# Patient Record
Sex: MALE | Race: BLACK OR AFRICAN AMERICAN | Employment: UNEMPLOYED | URBAN - METROPOLITAN AREA
[De-identification: names, ages, dates, MRNs, and addresses within clinical notes are randomized per-mention and may not be internally consistent; named-entity substitution may affect disease eponyms.]

---

## 2019-03-25 ENCOUNTER — OP HISTORICAL/CONVERTED ENCOUNTER (OUTPATIENT)
Dept: OTHER | Age: 44
End: 2019-03-25

## 2019-04-23 ENCOUNTER — OP HISTORICAL/CONVERTED ENCOUNTER (OUTPATIENT)
Dept: OTHER | Age: 44
End: 2019-04-23

## 2019-05-14 ENCOUNTER — OP HISTORICAL/CONVERTED ENCOUNTER (OUTPATIENT)
Dept: OTHER | Age: 44
End: 2019-05-14

## 2019-05-16 ENCOUNTER — OP HISTORICAL/CONVERTED ENCOUNTER (OUTPATIENT)
Dept: OTHER | Age: 44
End: 2019-05-16

## 2019-05-24 ENCOUNTER — IP HISTORICAL/CONVERTED ENCOUNTER (OUTPATIENT)
Dept: OTHER | Age: 44
End: 2019-05-24

## 2019-06-11 ENCOUNTER — OP HISTORICAL/CONVERTED ENCOUNTER (OUTPATIENT)
Dept: OTHER | Age: 44
End: 2019-06-11

## 2019-06-17 ENCOUNTER — OP HISTORICAL/CONVERTED ENCOUNTER (OUTPATIENT)
Dept: OTHER | Age: 44
End: 2019-06-17

## 2019-07-01 ENCOUNTER — OP HISTORICAL/CONVERTED ENCOUNTER (OUTPATIENT)
Dept: OTHER | Age: 44
End: 2019-07-01

## 2019-07-09 ENCOUNTER — ED HISTORICAL/CONVERTED ENCOUNTER (OUTPATIENT)
Dept: OTHER | Age: 44
End: 2019-07-09

## 2019-07-26 ENCOUNTER — OP HISTORICAL/CONVERTED ENCOUNTER (OUTPATIENT)
Dept: OTHER | Age: 44
End: 2019-07-26

## 2019-07-27 ENCOUNTER — APPOINTMENT (OUTPATIENT)
Dept: CT IMAGING | Age: 44
DRG: 299 | End: 2019-07-27
Attending: EMERGENCY MEDICINE
Payer: COMMERCIAL

## 2019-07-27 ENCOUNTER — HOSPITAL ENCOUNTER (INPATIENT)
Age: 44
LOS: 3 days | Discharge: HOME HEALTH | DRG: 299 | End: 2019-07-30
Attending: EMERGENCY MEDICINE | Admitting: INTERNAL MEDICINE
Payer: COMMERCIAL

## 2019-07-27 DIAGNOSIS — C34.90 MALIGNANT NEOPLASM OF LUNG, UNSPECIFIED LATERALITY, UNSPECIFIED PART OF LUNG (HCC): Chronic | ICD-10-CM

## 2019-07-27 DIAGNOSIS — I87.1 SVC SYNDROME: Primary | ICD-10-CM

## 2019-07-27 DIAGNOSIS — D61.818 PANCYTOPENIA (HCC): ICD-10-CM

## 2019-07-27 DIAGNOSIS — J18.9 PNEUMONIA OF RIGHT LUNG DUE TO INFECTIOUS ORGANISM, UNSPECIFIED PART OF LUNG: ICD-10-CM

## 2019-07-27 DIAGNOSIS — R13.10 PAIN WITH SWALLOWING: ICD-10-CM

## 2019-07-27 PROBLEM — I10 HTN (HYPERTENSION): Chronic | Status: ACTIVE | Noted: 2019-07-27

## 2019-07-27 LAB
ALBUMIN SERPL-MCNC: 3.1 G/DL (ref 3.5–5)
ALBUMIN/GLOB SERPL: 0.8 {RATIO} (ref 1.1–2.2)
ALP SERPL-CCNC: 148 U/L (ref 45–117)
ALT SERPL-CCNC: 19 U/L (ref 12–78)
ANION GAP SERPL CALC-SCNC: 9 MMOL/L (ref 5–15)
AST SERPL-CCNC: 16 U/L (ref 15–37)
B PERT DNA SPEC QL NAA+PROBE: NOT DETECTED
BILIRUB SERPL-MCNC: 0.4 MG/DL (ref 0.2–1)
BUN SERPL-MCNC: 18 MG/DL (ref 6–20)
BUN/CREAT SERPL: 19 (ref 12–20)
C PNEUM DNA SPEC QL NAA+PROBE: NOT DETECTED
CALCIUM SERPL-MCNC: 8.8 MG/DL (ref 8.5–10.1)
CHLORIDE SERPL-SCNC: 106 MMOL/L (ref 97–108)
CO2 SERPL-SCNC: 25 MMOL/L (ref 21–32)
COMMENT, HOLDF: NORMAL
CREAT SERPL-MCNC: 0.94 MG/DL (ref 0.7–1.3)
FLUAV H1 2009 PAND RNA SPEC QL NAA+PROBE: NOT DETECTED
FLUAV H1 RNA SPEC QL NAA+PROBE: NOT DETECTED
FLUAV H3 RNA SPEC QL NAA+PROBE: NOT DETECTED
FLUAV SUBTYP SPEC NAA+PROBE: NOT DETECTED
FLUBV RNA SPEC QL NAA+PROBE: NOT DETECTED
GLOBULIN SER CALC-MCNC: 3.7 G/DL (ref 2–4)
GLUCOSE SERPL-MCNC: 101 MG/DL (ref 65–100)
HADV DNA SPEC QL NAA+PROBE: NOT DETECTED
HCOV 229E RNA SPEC QL NAA+PROBE: NOT DETECTED
HCOV HKU1 RNA SPEC QL NAA+PROBE: NOT DETECTED
HCOV NL63 RNA SPEC QL NAA+PROBE: NOT DETECTED
HCOV OC43 RNA SPEC QL NAA+PROBE: NOT DETECTED
HMPV RNA SPEC QL NAA+PROBE: NOT DETECTED
HPIV1 RNA SPEC QL NAA+PROBE: NOT DETECTED
HPIV2 RNA SPEC QL NAA+PROBE: NOT DETECTED
HPIV3 RNA SPEC QL NAA+PROBE: NOT DETECTED
HPIV4 RNA SPEC QL NAA+PROBE: NOT DETECTED
LACTATE SERPL-SCNC: 1 MMOL/L (ref 0.4–2)
LIPASE SERPL-CCNC: 136 U/L (ref 73–393)
M PNEUMO DNA SPEC QL NAA+PROBE: NOT DETECTED
POTASSIUM SERPL-SCNC: 3.7 MMOL/L (ref 3.5–5.1)
PROCALCITONIN SERPL-MCNC: <0.1 NG/ML
PROT SERPL-MCNC: 6.8 G/DL (ref 6.4–8.2)
RSV RNA SPEC QL NAA+PROBE: NOT DETECTED
RV+EV RNA SPEC QL NAA+PROBE: NOT DETECTED
SAMPLES BEING HELD,HOLD: NORMAL
SODIUM SERPL-SCNC: 140 MMOL/L (ref 136–145)
TROPONIN I SERPL-MCNC: <0.05 NG/ML

## 2019-07-27 PROCEDURE — 84484 ASSAY OF TROPONIN QUANT: CPT

## 2019-07-27 PROCEDURE — 74011250636 HC RX REV CODE- 250/636: Performed by: INTERNAL MEDICINE

## 2019-07-27 PROCEDURE — 84145 PROCALCITONIN (PCT): CPT

## 2019-07-27 PROCEDURE — 74011636320 HC RX REV CODE- 636/320: Performed by: RADIOLOGY

## 2019-07-27 PROCEDURE — 0099U RESPIRATORY PANEL,PCR,NASOPHARYNGEAL: CPT

## 2019-07-27 PROCEDURE — 83605 ASSAY OF LACTIC ACID: CPT

## 2019-07-27 PROCEDURE — 65270000029 HC RM PRIVATE

## 2019-07-27 PROCEDURE — 85025 COMPLETE CBC W/AUTO DIFF WBC: CPT

## 2019-07-27 PROCEDURE — 36415 COLL VENOUS BLD VENIPUNCTURE: CPT

## 2019-07-27 PROCEDURE — 80053 COMPREHEN METABOLIC PANEL: CPT

## 2019-07-27 PROCEDURE — 99284 EMERGENCY DEPT VISIT MOD MDM: CPT

## 2019-07-27 PROCEDURE — 87040 BLOOD CULTURE FOR BACTERIA: CPT

## 2019-07-27 PROCEDURE — 86738 MYCOPLASMA ANTIBODY: CPT

## 2019-07-27 PROCEDURE — 71260 CT THORAX DX C+: CPT

## 2019-07-27 PROCEDURE — 93005 ELECTROCARDIOGRAM TRACING: CPT

## 2019-07-27 PROCEDURE — 74011000258 HC RX REV CODE- 258: Performed by: INTERNAL MEDICINE

## 2019-07-27 PROCEDURE — 83690 ASSAY OF LIPASE: CPT

## 2019-07-27 RX ORDER — FLUCONAZOLE 100 MG/1
TABLET ORAL SEE ADMIN INSTRUCTIONS
COMMUNITY
Start: 2019-07-21 | End: 2019-07-30

## 2019-07-27 RX ORDER — METOPROLOL SUCCINATE 50 MG/1
50 TABLET, EXTENDED RELEASE ORAL DAILY
COMMUNITY

## 2019-07-27 RX ORDER — DEXAMETHASONE SODIUM PHOSPHATE 4 MG/ML
10 INJECTION, SOLUTION INTRA-ARTICULAR; INTRALESIONAL; INTRAMUSCULAR; INTRAVENOUS; SOFT TISSUE ONCE
Status: COMPLETED | OUTPATIENT
Start: 2019-07-27 | End: 2019-07-27

## 2019-07-27 RX ORDER — ACETAMINOPHEN 325 MG/1
650 TABLET ORAL
Status: DISCONTINUED | OUTPATIENT
Start: 2019-07-27 | End: 2019-07-30 | Stop reason: HOSPADM

## 2019-07-27 RX ORDER — PANTOPRAZOLE SODIUM 40 MG/1
40 TABLET, DELAYED RELEASE ORAL
Status: DISCONTINUED | OUTPATIENT
Start: 2019-07-28 | End: 2019-07-30 | Stop reason: HOSPADM

## 2019-07-27 RX ORDER — SODIUM CHLORIDE TAB 1 GM 1 G
1 TAB MISCELLANEOUS DAILY
COMMUNITY
End: 2019-09-24

## 2019-07-27 RX ORDER — SODIUM CHLORIDE 0.9 % (FLUSH) 0.9 %
5-40 SYRINGE (ML) INJECTION EVERY 8 HOURS
Status: DISCONTINUED | OUTPATIENT
Start: 2019-07-27 | End: 2019-07-30 | Stop reason: HOSPADM

## 2019-07-27 RX ORDER — FLUCONAZOLE 100 MG/1
100 TABLET ORAL DAILY
Status: DISCONTINUED | OUTPATIENT
Start: 2019-07-28 | End: 2019-07-30 | Stop reason: HOSPADM

## 2019-07-27 RX ORDER — METOPROLOL SUCCINATE 50 MG/1
50 TABLET, EXTENDED RELEASE ORAL DAILY
Status: DISCONTINUED | OUTPATIENT
Start: 2019-07-28 | End: 2019-07-30 | Stop reason: HOSPADM

## 2019-07-27 RX ORDER — LANOLIN ALCOHOL/MO/W.PET/CERES
325 CREAM (GRAM) TOPICAL
COMMUNITY
End: 2019-09-24

## 2019-07-27 RX ORDER — POTASSIUM CHLORIDE 20 MEQ/1
20 TABLET, EXTENDED RELEASE ORAL DAILY
COMMUNITY
End: 2019-08-15 | Stop reason: SDUPTHER

## 2019-07-27 RX ORDER — LEVOFLOXACIN 5 MG/ML
750 INJECTION, SOLUTION INTRAVENOUS EVERY 24 HOURS
Status: DISCONTINUED | OUTPATIENT
Start: 2019-07-27 | End: 2019-07-30

## 2019-07-27 RX ORDER — SUCRALFATE 1 G/1
1 TABLET ORAL DAILY
Status: DISCONTINUED | OUTPATIENT
Start: 2019-07-28 | End: 2019-07-28

## 2019-07-27 RX ORDER — SODIUM CHLORIDE 0.9 % (FLUSH) 0.9 %
5-40 SYRINGE (ML) INJECTION AS NEEDED
Status: DISCONTINUED | OUTPATIENT
Start: 2019-07-27 | End: 2019-07-30 | Stop reason: HOSPADM

## 2019-07-27 RX ORDER — SODIUM CHLORIDE 9 MG/ML
75 INJECTION, SOLUTION INTRAVENOUS CONTINUOUS
Status: DISCONTINUED | OUTPATIENT
Start: 2019-07-27 | End: 2019-07-30 | Stop reason: HOSPADM

## 2019-07-27 RX ORDER — FUROSEMIDE 20 MG/1
20 TABLET ORAL DAILY
COMMUNITY
End: 2019-08-15 | Stop reason: SDUPTHER

## 2019-07-27 RX ORDER — HYDROCODONE BITARTRATE AND ACETAMINOPHEN 5; 325 MG/1; MG/1
1 TABLET ORAL
COMMUNITY
End: 2020-06-30

## 2019-07-27 RX ORDER — HYDROMORPHONE HYDROCHLORIDE 1 MG/ML
0.5 INJECTION, SOLUTION INTRAMUSCULAR; INTRAVENOUS; SUBCUTANEOUS
Status: DISCONTINUED | OUTPATIENT
Start: 2019-07-27 | End: 2019-07-30 | Stop reason: HOSPADM

## 2019-07-27 RX ORDER — DRONABINOL 2.5 MG/1
2.5 CAPSULE ORAL
COMMUNITY
End: 2019-08-27

## 2019-07-27 RX ORDER — OXYCODONE AND ACETAMINOPHEN 5; 325 MG/1; MG/1
1 TABLET ORAL
Status: DISCONTINUED | OUTPATIENT
Start: 2019-07-27 | End: 2019-07-30 | Stop reason: HOSPADM

## 2019-07-27 RX ORDER — ZOLPIDEM TARTRATE 5 MG/1
5 TABLET ORAL
Status: DISCONTINUED | OUTPATIENT
Start: 2019-07-27 | End: 2019-07-30 | Stop reason: HOSPADM

## 2019-07-27 RX ORDER — SUCRALFATE 1 G/1
1 TABLET ORAL DAILY
COMMUNITY
End: 2019-09-10

## 2019-07-27 RX ORDER — PANTOPRAZOLE SODIUM 40 MG/1
40 TABLET, DELAYED RELEASE ORAL DAILY
COMMUNITY
End: 2019-08-15 | Stop reason: SDUPTHER

## 2019-07-27 RX ORDER — ENOXAPARIN SODIUM 100 MG/ML
40 INJECTION SUBCUTANEOUS EVERY 24 HOURS
Status: CANCELLED | OUTPATIENT
Start: 2019-07-27

## 2019-07-27 RX ORDER — SODIUM CHLORIDE 9 MG/ML
75 INJECTION, SOLUTION INTRAVENOUS ONCE
Status: ACTIVE | OUTPATIENT
Start: 2019-07-27 | End: 2019-07-28

## 2019-07-27 RX ORDER — DEXAMETHASONE SODIUM PHOSPHATE 4 MG/ML
4 INJECTION, SOLUTION INTRA-ARTICULAR; INTRALESIONAL; INTRAMUSCULAR; INTRAVENOUS; SOFT TISSUE EVERY 6 HOURS
Status: DISCONTINUED | OUTPATIENT
Start: 2019-07-28 | End: 2019-07-30 | Stop reason: HOSPADM

## 2019-07-27 RX ORDER — PROCHLORPERAZINE EDISYLATE 5 MG/ML
10 INJECTION INTRAMUSCULAR; INTRAVENOUS
Status: DISCONTINUED | OUTPATIENT
Start: 2019-07-27 | End: 2019-07-30 | Stop reason: HOSPADM

## 2019-07-27 RX ORDER — PREDNISONE 10 MG/1
TABLET ORAL SEE ADMIN INSTRUCTIONS
COMMUNITY
Start: 2019-07-18 | End: 2019-07-30

## 2019-07-27 RX ADMIN — PIPERACILLIN SODIUM,TAZOBACTAM SODIUM 3.38 G: 3; .375 INJECTION, POWDER, FOR SOLUTION INTRAVENOUS at 18:23

## 2019-07-27 RX ADMIN — DEXAMETHASONE SODIUM PHOSPHATE 10 MG: 4 INJECTION, SOLUTION INTRAMUSCULAR; INTRAVENOUS at 16:10

## 2019-07-27 RX ADMIN — SODIUM CHLORIDE 75 ML/HR: 900 INJECTION, SOLUTION INTRAVENOUS at 16:15

## 2019-07-27 RX ADMIN — Medication 10 ML: at 15:47

## 2019-07-27 RX ADMIN — IOPAMIDOL 100 ML: 612 INJECTION, SOLUTION INTRAVENOUS at 12:26

## 2019-07-27 RX ADMIN — LEVOFLOXACIN 750 MG: 5 INJECTION, SOLUTION INTRAVENOUS at 16:06

## 2019-07-27 NOTE — PROGRESS NOTES
TRANSFER - IN REPORT:    Verbal report received from Azar RN (name) on Og Griffin  being received from ED (unit) for routine progression of care      Report consisted of patients Situation, Background, Assessment and   Recommendations(SBAR). Information from the following report(s) SBAR, Kardex, ED Summary, Intake/Output, MAR, Accordion and Recent Results was reviewed with the receiving nurse. Opportunity for questions and clarification was provided. Assessment completed upon patients arrival to unit and care assumed.

## 2019-07-27 NOTE — ED TRIAGE NOTES
Patient states having pain to upper, mid chest when eating for about 8 weeks. Patient is lung cancer patient, states had radiation. Pain with swallowing also.

## 2019-07-27 NOTE — H&P
[address]  [address] [place] [address]  [phone]    Admission History and Physical      NAME:  Day Woodruff   :   1975   MRN:  [id number]     PCP:  Renetta Hall MD     Date/Time:  2019         Subjective:     CHIEF COMPLAINT: Pain     HISTORY OF PRESENT ILLNESS:     Mr. Rex Hope is a 39 y.o.  male who is admitted with SVC syndrome. Mr. Rex Hope presented to the Emergency Department this AM complaining of pain: chest, mainly when he eats, intermittent, shooting into right arm at times    History obtained from chart review and the patient. No previous records available    Past Medical History:   Diagnosis Date    Anemia, iron deficiency     Aphagia     2/2 radiation    Hypertension     Lung cancer ([facility] [address] 2019    Pneumonia involving right lung         No past surgical history on file. Social History     Tobacco Use    Smoking status: Not on file   Substance Use Topics    Alcohol use: Not Currently        Family History   Problem Relation Age of Onset    Cancer Neg Hx         Allergies no known allergies     Prior to Admission medications    Medication Sig Start Date End Date Taking? Authorizing Provider   furosemide (LASIX) 20 mg tablet Take 20 mg by mouth daily. Yes Provider, Historical   metoprolol succinate (TOPROL-XL) 50 mg XL tablet Take 50 mg by mouth daily. Yes Provider, Historical   potassium chloride (K-DUR, KLOR-CON) 20 mEq tablet Take 20 mEq by mouth daily. Yes Provider, Historical   sucralfate (CARAFATE) 1 gram tablet Take 1 g by mouth daily. Yes Provider, Historical   ferrous sulfate 325 mg (65 mg iron) tablet Take 325 mg by mouth three (3) times daily (with meals). Yes Provider, Historical   sodium chloride 1 gram tablet Take 1 g by mouth daily. Yes Provider, Historical   dronabinol (MARINOL) 2.5 mg capsule Take 2.5 mg by mouth two (2) times daily as needed (appetite).    Yes Provider, Historical   guaiFENesin (MUCINEX) 1,200 mg Ta12 ER tablet Take 1,200 mg by mouth two (2) times a day. Yes Provider, Historical   fluconazole (DIFLUCAN) 100 mg tablet See Admin Instructions. Take two pills on day one and then one pill daily until finished. 7/12/19 7/30/19 Yes Provider, Historical   HYDROcodone-acetaminophen (NORCO) 5-325 mg per tablet Take 1 Tab by mouth every six (6) hours as needed for Pain. Yes Provider, Historical   pantoprazole (PROTONIX) 40 mg tablet Take 40 mg by mouth daily. Yes Provider, Historical   predniSONE (DELTASONE) 10 mg tablet Take  by mouth See Admin Instructions.  Take 20 mg daily x 7 days and then 10 mg daily x 7 days   Prednisone started on 7/18/19 7/18/19 7/31/19 Yes Provider, Historical   OTHER Banophen/Lidocaine/Maalox swish and swallow 5 ml for 30 seconds four times daily started 7/26/19   Yes Provider, Historical         Review of Systems:  (bold if positive, if negative)    Gen:  Eyes:  ENT:  CVS:  chest pain,Pulm:  GI:    :    MS:  swellingSkin:  Psych:  Endo:    Hem:  Renal:    Neuro:            Objective:      VITALS:    Vital signs reviewed; most recent are:    Visit Vitals  BP (!) 136/99   Pulse 96   Temp 98.2 °F (36.8 °C)   Resp 22   Ht 5' 9\" (1.753 m)   Wt 59 kg (130 lb)   SpO2 100%   BMI 19.20 kg/m²     SpO2 Readings from Last 6 Encounters:   07/27/19 100%        No intake or output data in the 24 hours ending 07/27/19 1538         Exam:     Physical Exam:    Gen: Well-developed, thin, in no acute distress  HEENT:  Pink conjunctivae, PERRL, hearing intact to voice, moist mucous membranes  Neck: Supple, without masses, thyroid non-tender  Resp: No accessory muscle use, clear breath sounds without wheezes rales or rhonchi  Card: No murmurs, normal S1, S2 without thrills, bruits or peripheral edema, 2+ LE peripheral pulses  Abd:  Soft, non-tender, non-distended, normoactive bowel sounds are present, no palpable organomegaly and no detectable hernias  Lymph: No cervical or inguinal adenopathy  Musc: No cyanosis or clubbing  Skin: No rashes or ulcers, skin turgor is good, cap refill <2 sec  Neuro:  Cranial nerves are grossly intact, no focal motor weakness, follows commands appropriately  Psych:  Good insight, oriented to person, place and time, alert             Labs:    Recent Labs     07/27/19  1454   WBC 1.1*   HGB 7.6*   HCT 24.3*   PLT 61*     Recent Labs     07/27/19  1131      K 3.7      CO2 25   *   BUN 18   CREA 0.94   CA 8.8   ALB 3.1*   TBILI 0.4   SGOT 16   ALT 19     No results found for: GLUCPOC  No results for input(s): PH, PCO2, PO2, HCO3, FIO2 in the last 72 hours. No results for input(s): INR in the last 72 hours. No lab exists for component: INREXT, INREXT    Additional testing:  Chest CT with mutliple pulmonary nodules, large right paratracheal mass with occlusion of the SVC, possible post-obstructive pneumonia in RUL, visualized by me.          Assessment/Plan:       Principal Problem:    SVC syndrome (7/27/2019)   - worrisome that this occurred after chemotherapy and radiation   - consult Hem/Onc   - I think the majority of his symptoms are due to mechanical compression from the mass   - pain meds for now   - Palliative Care consult on Monday    Active Problems:    Lung cancer ([facility] [address] (7/27/2019)   - unclear if he has failed his recent treatment   - consult Hem/Onc      Pneumonia (7/27/2019)   - not really symptomatic, check procalcitonin and send pneumonia work up   - antibiotics for now      Dysphagia   - may be due to mechanical compression but may have radiation esophagitis or some thing else going on   - continue acid suppression, fluconazole and sucralfate   - GI consult      Pancytopenia   - worrisome as he is over 2 weeks out from chemotherapy/radiation   - watch counts   - Hem/Onc to see       Code status:   - patient is FULL CODE, no AMD on file, mother Tonja Cannon is surrogate      Total time spent on patient care: 79 Minutes                  Care Plan discussed with: Patient, Family, Nursing Staff and Gill Garcia NP    Discussed:  Code Status, Care Plan and D/C Planning    Prophylaxis:  SCD's, no Lovenox due to platelet count    Probable Disposition:  Home w/Family           ___________________________________________________    Attending Physician: Juan Carlos Vaz MD

## 2019-07-27 NOTE — ROUTINE PROCESS
TRANSFER - OUT REPORT:    Verbal report given to Kingsley(name) stefany Fritz  being transferred to 5th floor(unit) for routine progression of care       Report consisted of patients Situation, Background, Assessment and   Recommendations(SBAR). Information from the following report(s) SBAR, Kardex, ED Summary, [facility] and Recent Results was reviewed with the receiving nurse. Lines:   Peripheral IV 07/27/19 Left Antecubital (Active)   Site Assessment Clean, dry, & intact 7/27/2019 11:36 AM   Phlebitis Assessment 0 7/27/2019 11:36 AM   Infiltration Assessment 0 7/27/2019 11:36 AM   Dressing Status Clean, dry, & intact 7/27/2019 11:36 AM   Hub Color/Line Status Pink 7/27/2019 11:36 AM   Action Taken Blood drawn 7/27/2019 11:36 AM        Opportunity for questions and clarification was provided.       Patient transported with:   [employer]

## 2019-07-27 NOTE — ED PROVIDER NOTES
39 y.o. male with past medical history significant for stage 3B non small cell lung cancer who presents ambulatory to ED with chief complaint of chest pain. PT reports upper mid throbbing chest pain whenever he eats which occasionally will shoot into with a sharp quality into his RUE causing his arm to shake and his chronic SOB to be exacerbated whenever he swallows. Pt reports that he recently finished radiation treatments of the chest for his lung cancer around 7/13/19. Pt was treated at [facility] and states that he is now done with radiation treatment after receiving 33 rounds of treatment. 11:25 AM  I have evaluated the patient as the Provider in Triage. I have reviewed His vital signs and the triage nurse assessment. I have talked with the patient and any available family and advised that I am the provider in triage and have ordered the appropriate study to initiate their work up based on the clinical presentation during my assessment. I have advised that the patient will be accommodated in the Main ED as soon as possible. I have also requested to contact the triage nurse or myself immediately if the patient experiences any changes in their condition during this brief waiting period. This is a 65-year-old male who presents to the emergency room with complaints of chest pain with eating, and intermittent right arm pain after he eats. Patient states she has stage IIIB non-small cell lung cancer, and is currently being treated at [facility]. Patient cannot recall the name of his oncologist. Patient states he had 33 radiation treatments the last one being on the 13th. Patient states he had his last chemotherapy treatment last week. States after his last radiation treatment he had intermittent chest pain followed by excruciating right arm pain that causes him to shake. Patient also states that he is having difficulty swallowing after the last radiation treatment.  States he can only have soft foods today he is able to eat chicken broth without problem. Denies any fever or chills. Denies any nausea or vomiting. Denies any worsening shortness of breath or dizziness. There is no further complaints at this time. Other, MD Hermelinda  No past medical history on file. No past surgical history on file. The history is provided by the patient. No  was used. No past medical history on file. No past surgical history on file. No family history on file. Social History     Socioeconomic History    Marital status: Not on file     Spouse name: Not on file    Number of children: Not on file    Years of education: Not on file    Highest education level: Not on file   Occupational History    Not on file   Social Needs    Financial resource strain: Not on file    Food insecurity:     Worry: Not on file     Inability: Not on file    Transportation needs:     Medical: Not on file     Non-medical: Not on file   Tobacco Use    Smoking status: Not on file   Substance and Sexual Activity    Alcohol use: Not on file    Drug use: Not on file    Sexual activity: Not on file   Lifestyle    Physical activity:     Days per week: Not on file     Minutes per session: Not on file    Stress: Not on file   Relationships    Social connections:     Talks on phone: Not on file     Gets together: Not on file     Attends [employer] service: Not on file     Active member of club or organization: Not on file     Attends meetings of clubs or organizations: Not on file     Relationship status: Not on file    Intimate partner violence:     Fear of current or ex partner: Not on file     Emotionally abused: Not on file     Physically abused: Not on file     Forced sexual activity: Not on file   Other Topics Concern    Not on file   Social History Narrative    Not on file         ALLERGIES: Patient has no allergy information on record.     Review of Systems   Constitutional: Negative for activity change, appetite change, chills, fatigue and fever. HENT: Positive for trouble swallowing. Negative for congestion, ear discharge, ear pain, sinus pressure, sinus pain and sore throat. Eyes: Negative for photophobia, pain, redness, itching and visual disturbance. Respiratory: Negative for chest tightness and shortness of breath. Cardiovascular: Positive for chest pain. Negative for palpitations. Gastrointestinal: Negative for abdominal distention, abdominal pain, nausea and vomiting. Endocrine: Negative. Genitourinary: Negative for difficulty urinating, frequency and urgency. Musculoskeletal: Positive for arthralgias (rue pain). Negative for back pain, neck pain and neck stiffness. Skin: Negative for color change, pallor, rash and wound. Allergic/Immunologic: Negative. Neurological: Negative for dizziness, syncope, weakness and headaches. Hematological: Does not bruise/bleed easily. Psychiatric/Behavioral: Negative for behavioral problems. The patient is not nervous/anxious. There were no vitals filed for this visit. Physical Exam   Constitutional: He is oriented to person, place, and time. He appears well-developed and well-nourished. No distress. Chronically ill appearing male   HENT:   Head: Normocephalic and atraumatic. Right Ear: External ear normal.   Left Ear: External ear normal.   Nose: Nose normal.   Mouth/Throat: Oropharynx is clear and moist.   Eyes: Pupils are equal, round, and reactive to light. Conjunctivae and EOM are normal. Right eye exhibits no discharge. Left eye exhibits no discharge. Neck: Normal range of motion. Neck supple. No JVD present. No tracheal deviation present. Cardiovascular: Regular rhythm, normal heart sounds and intact distal pulses. Exam reveals no gallop. No murmur heard. Tachycardia 100's   Pulmonary/Chest: Effort normal and breath sounds normal. No respiratory distress. He has no wheezes.  He has no rales. He exhibits no tenderness. Abdominal: Soft. Bowel sounds are normal. He exhibits no distension. There is no tenderness. There is no rebound and no guarding. Genitourinary:   Genitourinary Comments: Negative     Musculoskeletal: Normal range of motion. He exhibits no edema or tenderness. Neurological: He is alert and oriented to person, place, and time. Skin: Skin is warm and dry. No rash noted. No erythema. No pallor. Discoloration on anterior chest consistent with radiation   Psychiatric: He has a normal mood and affect. His behavior is normal. Judgment and thought content normal.   Nursing note and vitals reviewed. MDM  Number of Diagnoses or Management Options  Pain with swallowing: new and requires workup  Pneumonia of right lung due to infectious organism, unspecified part of lung: new and requires workup  SVC syndrome: new and requires workup  Diagnosis management comments: Plan:  Admit to the hospitalist service for further evaluation and treatment. IV antibiotics. IV Fluids. Patient in agreement with plan of care. Amount and/or Complexity of Data Reviewed  Clinical lab tests: ordered and reviewed  Tests in the radiology section of CPT®: ordered and reviewed  Discuss the patient with other providers: yes (Discussed with Dr. Shon Khalil  )           Procedures    Hospitalist Shawna for Admission  2:06 PM    ED Room Number: ER07/07  Patient Name and age:  Phuong Fritz 39 y.o.  male  Working Diagnosis:   1. SVC syndrome    2. Pneumonia of right lung due to infectious organism, unspecified part of lung    3. Pain with swallowing      Readmission: no  Isolation Requirements:  Other, cancer with chemo last week  Recommended Level of Care:  step down  Code Status:  Full Code  Other:  Lung cancer patient, gets treatment at [facility]. Last radiation 7/13, last chemo last week.  Now with worsening pain when eating, decreased po intake and pneumonia that will require IV antibiotics. SVC syndrome.    Department:[facility] ED - [phone]

## 2019-07-27 NOTE — PROGRESS NOTES
[facility]: MED RECONCILIATION    Comments/Recommendations:   Patient is awake and alert. Interview with the patient and family member at the bedside. Verifies NK[facility]  Preferred pharmacy updated. Patient provides pill bottles for review and a medication list which was found not to be up to date during the interview. The patient will not take his own medications while in the hospital and family will take the medications home. The patient was started on a Z-Jonatan on 7/18 for a cough but he only took this medication four two days and then stopped it because it was not helping. Allergies: Patient has no known allergies. Prior to Admission Medications:     Prior to Admission Medications   Prescriptions Last Dose Informant Patient Reported? Taking? HYDROcodone-acetaminophen (NORCO) 5-325 mg per tablet 7/25/2019 Parent Yes Yes   Sig: Take 1 Tab by mouth every six (6) hours as needed for Pain. OTHER 7/26/2019 at Unknown time Parent Yes Yes   Sig: Banophen/Lidocaine/Maalox swish and swallow 5 ml for 30 seconds four times daily started 7/26/19   dronabinol (MARINOL) 2.5 mg capsule 7/25/2019 Parent Yes Yes   Sig: Take 2.5 mg by mouth two (2) times daily as needed (appetite). ferrous sulfate 325 mg (65 mg iron) tablet 7/27/2019 at Unknown time Parent Yes Yes   Sig: Take 325 mg by mouth three (3) times daily (with meals). fluconazole (DIFLUCAN) 100 mg tablet 7/26/2019 at Unknown time Parent Yes Yes   Sig: See Admin Instructions. Take two pills on day one and then one pill daily until finished. furosemide (LASIX) 20 mg tablet 7/26/2019 at Unknown time Parent Yes Yes   Sig: Take 20 mg by mouth daily. guaiFENesin (MUCINEX) 1,200 mg Ta12 ER tablet 7/27/2019 at Unknown time Parent Yes Yes   Sig: Take 1,200 mg by mouth two (2) times a day. metoprolol succinate (TOPROL-XL) 50 mg XL tablet 7/27/2019 at Unknown time Parent Yes Yes   Sig: Take 50 mg by mouth daily.    pantoprazole (PROTONIX) 40 mg tablet 7/26/2019 at Unknown time Parent Yes Yes   Sig: Take 40 mg by mouth daily. potassium chloride (K-DUR, KLOR-CON) 20 mEq tablet 7/26/2019 at Unknown time Parent Yes Yes   Sig: Take 20 mEq by mouth daily. predniSONE (DELTASONE) 10 mg tablet 7/26/2019 at Unknown time Parent Yes Yes   Sig: Take  by mouth See Admin Instructions. Take 20 mg daily x 7 days and then 10 mg daily x 7 days   Prednisone started on 7/18/19   sodium chloride 1 gram tablet 7/26/2019 at Unknown time Parent Yes Yes   Sig: Take 1 g by mouth daily. sucralfate (CARAFATE) 1 gram tablet 7/26/2019 at Unknown time Parent Yes Yes   Sig: Take 1 g by mouth daily.       Facility-Administered Medications: None      Thank you,      Zoe Villalobos, PharmD, BCPS

## 2019-07-28 PROBLEM — R13.10 DYSPHAGIA: Status: ACTIVE | Noted: 2019-07-28

## 2019-07-28 LAB
ANION GAP SERPL CALC-SCNC: 5 MMOL/L (ref 5–15)
BUN SERPL-MCNC: 16 MG/DL (ref 6–20)
BUN/CREAT SERPL: 22 (ref 12–20)
CALCIUM SERPL-MCNC: 8.6 MG/DL (ref 8.5–10.1)
CHLORIDE SERPL-SCNC: 104 MMOL/L (ref 97–108)
CO2 SERPL-SCNC: 27 MMOL/L (ref 21–32)
CREAT SERPL-MCNC: 0.74 MG/DL (ref 0.7–1.3)
ERYTHROCYTE [DISTWIDTH] IN BLOOD BY AUTOMATED COUNT: 21.8 % (ref 11.5–14.5)
GLUCOSE SERPL-MCNC: 158 MG/DL (ref 65–100)
HCT VFR BLD AUTO: 25 % (ref 36.6–50.3)
HGB BLD-MCNC: 7.9 G/DL (ref 12.1–17)
MAGNESIUM SERPL-MCNC: 1.6 MG/DL (ref 1.6–2.4)
MCH RBC QN AUTO: 21.1 PG (ref 26–34)
MCHC RBC AUTO-ENTMCNC: 31.6 G/DL (ref 30–36.5)
MCV RBC AUTO: 66.8 FL (ref 80–99)
NRBC # BLD: 0 K/UL (ref 0–0.01)
NRBC BLD-RTO: 0 PER 100 WBC
PHOSPHATE SERPL-MCNC: 3.3 MG/DL (ref 2.6–4.7)
PLATELET # BLD AUTO: 61 K/UL (ref 150–400)
POTASSIUM SERPL-SCNC: 4 MMOL/L (ref 3.5–5.1)
RBC # BLD AUTO: 3.74 M/UL (ref 4.1–5.7)
SODIUM SERPL-SCNC: 136 MMOL/L (ref 136–145)
WBC # BLD AUTO: 1 K/UL (ref 4.1–11.1)

## 2019-07-28 PROCEDURE — 74011250636 HC RX REV CODE- 250/636: Performed by: INTERNAL MEDICINE

## 2019-07-28 PROCEDURE — 74011000258 HC RX REV CODE- 258: Performed by: INTERNAL MEDICINE

## 2019-07-28 PROCEDURE — 84100 ASSAY OF PHOSPHORUS: CPT

## 2019-07-28 PROCEDURE — 85027 COMPLETE CBC AUTOMATED: CPT

## 2019-07-28 PROCEDURE — 74011250637 HC RX REV CODE- 250/637: Performed by: INTERNAL MEDICINE

## 2019-07-28 PROCEDURE — 87899 AGENT NOS ASSAY W/OPTIC: CPT

## 2019-07-28 PROCEDURE — 80048 BASIC METABOLIC PNL TOTAL CA: CPT

## 2019-07-28 PROCEDURE — 65270000029 HC RM PRIVATE

## 2019-07-28 PROCEDURE — 83735 ASSAY OF MAGNESIUM: CPT

## 2019-07-28 PROCEDURE — 36415 COLL VENOUS BLD VENIPUNCTURE: CPT

## 2019-07-28 PROCEDURE — 87449 NOS EACH ORGANISM AG IA: CPT

## 2019-07-28 RX ORDER — SUCRALFATE 1 G/1
1 TABLET ORAL
Status: DISCONTINUED | OUTPATIENT
Start: 2019-07-28 | End: 2019-07-30 | Stop reason: HOSPADM

## 2019-07-28 RX ADMIN — PIPERACILLIN SODIUM,TAZOBACTAM SODIUM 3.38 G: 3; .375 INJECTION, POWDER, FOR SOLUTION INTRAVENOUS at 00:10

## 2019-07-28 RX ADMIN — SUCRALFATE 1 G: 1 TABLET ORAL at 09:09

## 2019-07-28 RX ADMIN — PANTOPRAZOLE SODIUM 40 MG: 40 TABLET, DELAYED RELEASE ORAL at 06:31

## 2019-07-28 RX ADMIN — SODIUM CHLORIDE 75 ML/HR: 900 INJECTION, SOLUTION INTRAVENOUS at 11:08

## 2019-07-28 RX ADMIN — SUCRALFATE 1 G: 1 TABLET ORAL at 21:59

## 2019-07-28 RX ADMIN — DEXAMETHASONE SODIUM PHOSPHATE 4 MG: 4 INJECTION, SOLUTION INTRAMUSCULAR; INTRAVENOUS at 11:54

## 2019-07-28 RX ADMIN — SUCRALFATE 1 G: 1 TABLET ORAL at 15:54

## 2019-07-28 RX ADMIN — DEXAMETHASONE SODIUM PHOSPHATE 4 MG: 4 INJECTION, SOLUTION INTRAMUSCULAR; INTRAVENOUS at 17:42

## 2019-07-28 RX ADMIN — METOPROLOL SUCCINATE 50 MG: 50 TABLET, EXTENDED RELEASE ORAL at 09:09

## 2019-07-28 RX ADMIN — DEXAMETHASONE SODIUM PHOSPHATE 4 MG: 4 INJECTION, SOLUTION INTRAMUSCULAR; INTRAVENOUS at 06:29

## 2019-07-28 RX ADMIN — Medication 10 ML: at 13:27

## 2019-07-28 RX ADMIN — DEXAMETHASONE SODIUM PHOSPHATE 4 MG: 4 INJECTION, SOLUTION INTRAMUSCULAR; INTRAVENOUS at 00:09

## 2019-07-28 RX ADMIN — FLUCONAZOLE 100 MG: 100 TABLET ORAL at 09:09

## 2019-07-28 RX ADMIN — LEVOFLOXACIN 750 MG: 5 INJECTION, SOLUTION INTRAVENOUS at 14:40

## 2019-07-28 RX ADMIN — PIPERACILLIN SODIUM,TAZOBACTAM SODIUM 3.38 G: 3; .375 INJECTION, POWDER, FOR SOLUTION INTRAVENOUS at 15:54

## 2019-07-28 RX ADMIN — PIPERACILLIN SODIUM,TAZOBACTAM SODIUM 3.38 G: 3; .375 INJECTION, POWDER, FOR SOLUTION INTRAVENOUS at 09:08

## 2019-07-28 NOTE — CONSULTS
[facility]. Antelmo Gamez M.D.  [phone]                    GASTROENTEROLOGY CONSULTATION NOTE              NAME:  Ely Swan   :   1975   MRN:   [id number]       Referring Physician:    Dr. Daisy Beckford Date:   2019 12:27 PM    Chief Complaint:    Dysphagia and odynophagia     History of Present Illness:    Patient is a 39 y. o. who presented to the ER with worsening odynophagia and dysphagia that started after he finished radiation therapy for lung cancer with SVC syndrome. He reports after he initiates the swallow, he gets pain in the substernal area that radiates to the left chest and that is preventing him from swallowing his food. He denies nausea or vomiting, denies hematemesis and denies history of GERD. PMH:  Past Medical History:   Diagnosis Date    Anemia, iron deficiency     Aphagia     2/2 radiation    Hypertension     Lung cancer ([facility] [address] 2019    Pneumonia involving right lung        PSH:  No past surgical history on file. Allergies: Allergies no known allergies    Home Medications:  Prior to Admission Medications   Prescriptions Last Dose Informant Patient Reported? Taking? HYDROcodone-acetaminophen (NORCO) 5-325 mg per tablet 2019 Parent Yes Yes   Sig: Take 1 Tab by mouth every six (6) hours as needed for Pain. OTHER 2019 at Unknown time Parent Yes Yes   Sig: Banophen/Lidocaine/Maalox swish and swallow 5 ml for 30 seconds four times daily started 19   dronabinol (MARINOL) 2.5 mg capsule 2019 Parent Yes Yes   Sig: Take 2.5 mg by mouth two (2) times daily as needed (appetite). ferrous sulfate 325 mg (65 mg iron) tablet 2019 at Unknown time Parent Yes Yes   Sig: Take 325 mg by mouth three (3) times daily (with meals). fluconazole (DIFLUCAN) 100 mg tablet 2019 at Unknown time Parent Yes Yes   Sig: See Admin Instructions. Take two pills on day one and then one pill daily until finished.    furosemide (LASIX) 20 mg tablet 7/26/2019 at Unknown time Parent Yes Yes   Sig: Take 20 mg by mouth daily. guaiFENesin (MUCINEX) 1,200 mg Ta12 ER tablet 7/27/2019 at Unknown time Parent Yes Yes   Sig: Take 1,200 mg by mouth two (2) times a day. metoprolol succinate (TOPROL-XL) 50 mg XL tablet 7/27/2019 at Unknown time Parent Yes Yes   Sig: Take 50 mg by mouth daily. pantoprazole (PROTONIX) 40 mg tablet 7/26/2019 at Unknown time Parent Yes Yes   Sig: Take 40 mg by mouth daily. potassium chloride (K-DUR, KLOR-CON) 20 mEq tablet 7/26/2019 at Unknown time Parent Yes Yes   Sig: Take 20 mEq by mouth daily. predniSONE (DELTASONE) 10 mg tablet 7/26/2019 at Unknown time Parent Yes Yes   Sig: Take  by mouth See Admin Instructions. Take 20 mg daily x 7 days and then 10 mg daily x 7 days   Prednisone started on 7/18/19   sodium chloride 1 gram tablet 7/26/2019 at Unknown time Parent Yes Yes   Sig: Take 1 g by mouth daily. sucralfate (CARAFATE) 1 gram tablet 7/26/2019 at Unknown time Parent Yes Yes   Sig: Take 1 g by mouth daily.       Facility-Administered Medications: None       Hospital Medications:  Current Facility-Administered Medications   Medication Dose Route Frequency    levoFLOXacin (LEVAQUIN) 750 mg in D5W IVPB  750 mg IntraVENous Q24H    piperacillin-tazobactam (ZOSYN) 3.375 g in 0.9% sodium chloride (MBP/ADV) 100 mL  3.375 g IntraVENous Q8H    0.9% sodium chloride infusion  75 mL/hr IntraVENous CONTINUOUS    sodium chloride (NS) flush 5-40 mL  5-40 mL IntraVENous Q8H    sodium chloride (NS) flush 5-40 mL  5-40 mL IntraVENous PRN    acetaminophen (TYLENOL) tablet 650 mg  650 mg Oral Q4H PRN    oxyCODONE-acetaminophen (PERCOCET) 5-325 mg per tablet 1 Tab  1 Tab Oral Q4H PRN    HYDROmorphone (PF) (DILAUDID) injection 0.5 mg  0.5 mg IntraVENous Q4H PRN    prochlorperazine (COMPAZINE) injection 10 mg  10 mg IntraVENous Q6H PRN    zolpidem (AMBIEN) tablet 5 mg  5 mg Oral QHS PRN    pantoprazole (PROTONIX) tablet 40 mg  40 mg Oral ACB    dexamethasone (DECADRON) 4 mg/mL injection 4 mg  4 mg IntraVENous Q6H    fluconazole (DIFLUCAN) tablet 100 mg  100 mg Oral DAILY    metoprolol succinate (TOPROL-XL) XL tablet 50 mg  50 mg Oral DAILY    sucralfate (CARAFATE) tablet 1 g  1 g Oral DAILY       Social History:  Social History     Tobacco Use    Smoking status: Not on file   Substance Use Topics    Alcohol use: Not Currently       Family History:  Family History   Problem Relation Age of Onset    Cancer Neg Hx        Review of Systems:  (bold if positive, if negative)     Gen:    Eyes:    ENT:    CVS:  chest pain,  Pulm:    GI:    :    MS:  swelling  Skin:    Psych:    Endo:    Hem:    Renal:    Neuro:       Objective:     Patient Vitals for the past 8 hrs:   BP Temp Pulse Resp SpO2   07/28/19 1138 109/67 97.7 °F (36.5 °C) 90 21 100 %   07/28/19 0741 113/74 97.4 °F (36.3 °C) 83 20 100 %     07/28 0701 - 07/28 1900  In: 240 [P.O.:240]  Out: -   07/26 1901 - 07/28 0700  In: 100 [I.V.:100]  Out: -     EXAM:     NEURO-alert, oriented x3, affect appropriate, no focal neurological deficits, moves all extremities well   HEENT-Head: Normocephalic, no lesions, without obvious abnormality. LUNGS-clear to auscultation bilaterally    COR-regular rate and rhythym     ABD- soft, non-tender. Bowel sounds normal. No masses,  no organomegaly     EXT-no edema    Skin - No rash     Data Review     Recent Labs     07/28/19 0229 07/27/19  1454   WBC 1.0* 1.1*   HGB 7.9* 7.6*   HCT 25.0* 24.3*   PLT 61* 61*     Recent Labs     07/28/19  0229 07/27/19  1131    140   K 4.0 3.7    106   CO2 27 25   BUN 16 18   CREA 0.74 0.94   * 101*   PHOS 3.3  --    CA 8.6 8.8     Recent Labs     07/27/19  1131   SGOT 16   *   TP 6.8   ALB 3.1*   GLOB 3.7   LPSE 136     No results for input(s): INR, PTP, APTT in the last 72 hours.     No lab exists for component: INREXT    Patient Active Problem List   Diagnosis Code    SVC syndrome I87.1    Lung cancer (HCC) C34.90    Pneumonia J18.9    HTN (hypertension) I10    Pancytopenia (HCC) D61.818    Dysphagia R13.10       Assessment and Plan:  Odynophagia and dysphagia most likely related to recent radiation causing radiation esophagitis. Candidiasis or extrinsic compression can not be ruled out either. Since he is quite neutropenic at this point, I would start with PPI and sucralfate and keep on soft diet. If symptoms fail to improve and WBCs are better, and EGD can be performed. Will see in am.      Thanks for allowing me to participate in the care of this patient.   Signed By: Dayana Campbell MD     7/28/2019  12:27 PM

## 2019-07-28 NOTE — PROGRESS NOTES
[facility] [place] [facility]  [address] [facility], [address] [place] [address]  [phone]      Medical Progress Note      NAME: Janet Camacho   :  1975  MRM:  [id number]    Date/Time: 2019  11:23 AM         Subjective:     Chief Complaint:  Pain: chest, mainly with swallowing, about the same    ROS:  (bold if positive, if negative)                   Chest Pain     Tolerating Diet          Objective:       Vitals:          Last 24hrs VS reviewed since prior progress note.  Most recent are:    Visit Vitals  /74 (BP 1 Location: Right arm, BP Patient Position: At rest)   Pulse 83   Temp 97.4 °F (36.3 °C)   Resp 20   Ht 5' 9\" (1.753 m)   Wt 59 kg (130 lb)   SpO2 100%   BMI 19.20 kg/m²     SpO2 Readings from Last 6 Encounters:   19 100%            Intake/Output Summary (Last 24 hours) at 2019 1123  Last data filed at 2019 0913  Gross per 24 hour   Intake 340 ml   Output --   Net 340 ml          Exam:     Physical Exam:    Gen:  Well-developed, well-nourished, in no acute distress  HEENT:  Pink conjunctivae, PERRL, hearing intact to voice, moist mucous membranes  Neck:  Supple, without masses, thyroid non-tender  Resp:  No accessory muscle use, clear breath sounds without wheezes rales or rhonchi  Card:  No murmurs, normal S1, S2 without thrills, bruits or peripheral edema  Abd:  Soft, non-tender, non-distended, normoactive bowel sounds are present, no palpable organomegaly and no detectable hernias  Lymph:  No cervical or inguinal adenopathy  Musc:  No cyanosis or clubbing  Skin:  No rashes or ulcers, skin turgor is good  Neuro:  Cranial nerves are grossly intact, no focal motor weakness, follows commands appropriately  Psych:  Good insight, oriented to person, place and time, alert       Medications Reviewed: (see below)    Lab Data Reviewed: (see below)    ______________________________________________________________________    Medications:     Current Facility-Administered Medications   Medication Dose Route Frequency    levoFLOXacin (LEVAQUIN) 750 mg in D5W IVPB  750 mg IntraVENous Q24H    piperacillin-tazobactam (ZOSYN) 3.375 g in 0.9% sodium chloride (MBP/ADV) 100 mL  3.375 g IntraVENous Q8H    0.9% sodium chloride infusion  75 mL/hr IntraVENous CONTINUOUS    sodium chloride (NS) flush 5-40 mL  5-40 mL IntraVENous Q8H    sodium chloride (NS) flush 5-40 mL  5-40 mL IntraVENous PRN    acetaminophen (TYLENOL) tablet 650 mg  650 mg Oral Q4H PRN    oxyCODONE-acetaminophen (PERCOCET) 5-325 mg per tablet 1 Tab  1 Tab Oral Q4H PRN    HYDROmorphone (PF) (DILAUDID) injection 0.5 mg  0.5 mg IntraVENous Q4H PRN    prochlorperazine (COMPAZINE) injection 10 mg  10 mg IntraVENous Q6H PRN    zolpidem (AMBIEN) tablet 5 mg  5 mg Oral QHS PRN    pantoprazole (PROTONIX) tablet 40 mg  40 mg Oral ACB    dexamethasone (DECADRON) 4 mg/mL injection 4 mg  4 mg IntraVENous Q6H    fluconazole (DIFLUCAN) tablet 100 mg  100 mg Oral DAILY    metoprolol succinate (TOPROL-XL) XL tablet 50 mg  50 mg Oral DAILY    sucralfate (CARAFATE) tablet 1 g  1 g Oral DAILY            Lab Review:     Recent Labs     07/28/19 0229 07/27/19  1454   WBC 1.0* 1.1*   HGB 7.9* 7.6*   HCT 25.0* 24.3*   PLT 61* 61*     Recent Labs     07/28/19 0229 07/27/19  1131    140   K 4.0 3.7    106   CO2 27 25   * 101*   BUN 16 18   CREA 0.74 0.94   CA 8.6 8.8   MG 1.6  --    PHOS 3.3  --    ALB  --  3.1*   TBILI  --  0.4   SGOT  --  16   ALT  --  19     No results found for: GLUCPOC  No results for input(s): PH, PCO2, PO2, HCO3, FIO2 in the last 72 hours. No results for input(s): INR in the last 72 hours.     No lab exists for component: INREXT  No results found for: SDES  Lab Results   Component Value Date/Time    Culture result: NO GROWTH AFTER 15 HOURS 07/27/2019 02:54 PM            Assessment:     Principal Problem:    SVC syndrome (7/27/2019)    Active Problems:    Lung cancer ([facility] [address] (7/27/2019) Pneumonia (7/27/2019)      HTN (hypertension) (7/27/2019)      Pancytopenia ([facility] [address] (7/27/2019)           Plan:     Principal Problem:    SVC syndrome (7/27/2019)   - continue steroids, no real improvement yet   - Hem/Onc input appreciated   - outpatient follow up here    Active Problems:    Lung cancer ([facility] [address] (7/27/2019)   - outpatient follow up here      Pneumonia (7/27/2019)   - procalcitonin <0.1, infection seems unlikely,more likely due to malignancy   - limited course of antibiotics   - respiratory panel negative, awaiting other w/u      HTN (hypertension) (7/27/2019)   - BP okay on metoprolo      Pancytopenia ([facility] [address] (7/27/2019)   - per Dr. Nicole Cheney, concerning for another cause given length of time since chemo/radiation   - Dr. Nicole Cheney has ordered HIV testing      Dysphagia   - GI to see, continue acid suppression      Total time spent in patient care: 25 minutes                  Care Plan discussed with: Patient, Nursing Staff and Dr. Nicole Cheney    Discussed:  Code Status, Care Plan and D/C Planning    Prophylaxis:  SCD's    Disposition:  Home w/Family           ___________________________________________________    Attending Physician: Zev Diaz MD

## 2019-07-28 NOTE — CONSULTS
[facility] [place] at [address] [facility]  [facility]: [phone]  F: [phone]      Reason for Visit:   Lacy Ahumada is a 39 y.o. male who is seen in consultation at the request of Dr. Neptali Kline for evaluation of recurrent/progressive lung cancer, SVC syndrome      Hematology / Oncology Treatment History:   chemoXRT at [facility], completed 2 weeks ago      History of Present Illness:     Admitted on 7/27/19 complaining of chest pain, almost only when he eats, intermittent, radiating to right arm at times. No pain currently. No SOB. Past Medical History:   Diagnosis Date    Anemia, iron deficiency     Aphagia     2/2 radiation    Hypertension     Lung cancer ([address] 7/27/2019    Pneumonia involving right lung       No past surgical history on file.    Social History     Tobacco Use    Smoking status: Not on file   Substance Use Topics    Alcohol use: Not Currently      Family History   Problem Relation Age of Onset    Cancer Neg Hx      Current Facility-Administered Medications   Medication Dose Route Frequency    levoFLOXacin (LEVAQUIN) 750 mg in D5W IVPB  750 mg IntraVENous Q24H    piperacillin-tazobactam (ZOSYN) 3.375 g in 0.9% sodium chloride (MBP/ADV) 100 mL  3.375 g IntraVENous Q8H    0.9% sodium chloride infusion  75 mL/hr IntraVENous CONTINUOUS    sodium chloride (NS) flush 5-40 mL  5-40 mL IntraVENous Q8H    sodium chloride (NS) flush 5-40 mL  5-40 mL IntraVENous PRN    acetaminophen (TYLENOL) tablet 650 mg  650 mg Oral Q4H PRN    oxyCODONE-acetaminophen (PERCOCET) 5-325 mg per tablet 1 Tab  1 Tab Oral Q4H PRN    HYDROmorphone (PF) (DILAUDID) injection 0.5 mg  0.5 mg IntraVENous Q4H PRN    prochlorperazine (COMPAZINE) injection 10 mg  10 mg IntraVENous Q6H PRN    zolpidem (AMBIEN) tablet 5 mg  5 mg Oral QHS PRN    pantoprazole (PROTONIX) tablet 40 mg  40 mg Oral ACB    dexamethasone (DECADRON) 4 mg/mL injection 4 mg  4 mg IntraVENous Q6H    fluconazole (DIFLUCAN) tablet 100 mg  100 mg Oral DAILY    metoprolol succinate (TOPROL-XL) XL tablet 50 mg  50 mg Oral DAILY    sucralfate (CARAFATE) tablet 1 g  1 g Oral DAILY      Allergies no known allergies     Review of Systems: A complete review of systems was obtained, negative except as described above. Physical Exam:     Visit Vitals  /74 (BP 1 Location: Right arm, BP Patient Position: At rest)   Pulse 83   Temp 97.4 °F (36.3 °C)   Resp 20   Ht 5' 9\" (1.753 m)   Wt 130 lb (59 kg)   SpO2 100%   BMI 19.20 kg/m²     ECOG PS: 1  General: No distress  Eyes: PERRLA, anicteric sclerae  HENT: Atraumatic with normal appearance of ears and nose; OP clear  Neck: Supple; no thyromegaly   Lymphatic: No cervical, supraclavicular, or axillary adenopathy  Respiratory: CTAB, normal respiratory effort  CV: Normal rate, regular rhythm, no murmurs, no peripheral edema  GI: Soft, nontender, nondistended, no masses, no hepatomegaly, no splenomegaly  MS: Normal gait and station. Digits without clubbing or cyanosis. Skin: radiation burns to lower neck, upper chest  Neuro/Psych: Alert, oriented, appropriate affect, normal judgment/insight      Results:     Lab Results   Component Value Date/Time    WBC 1.0 (L) 07/28/2019 02:29 AM    HGB 7.9 (L) 07/28/2019 02:29 AM    HCT 25.0 (L) 07/28/2019 02:29 AM    PLATELET 61 (L) 19/06/9624 02:29 AM    MCV 66.8 (L) 07/28/2019 02:29 AM    ABS.  NEUTROPHILS 0.8 (L) 07/27/2019 02:54 PM     Lab Results   Component Value Date/Time    Sodium 136 07/28/2019 02:29 AM    Potassium 4.0 07/28/2019 02:29 AM    Chloride 104 07/28/2019 02:29 AM    CO2 27 07/28/2019 02:29 AM    Glucose 158 (H) 07/28/2019 02:29 AM    BUN 16 07/28/2019 02:29 AM    Creatinine 0.74 07/28/2019 02:29 AM    GFR est AA >60 07/28/2019 02:29 AM    GFR est non-AA >60 07/28/2019 02:29 AM    Calcium 8.6 07/28/2019 02:29 AM     Lab Results   Component Value Date/Time    Bilirubin, total 0.4 07/27/2019 11:31 AM    ALT (SGPT) 19 07/27/2019 11:31 AM    AST (SGOT) 16 07/27/2019 11:31 AM    Alk. phosphatase 148 (H) 07/27/2019 11:31 AM    Protein, total 6.8 07/27/2019 11:31 AM    Albumin 3.1 (L) 07/27/2019 11:31 AM    Globulin 3.7 07/27/2019 11:31 AM     7/27/19 CT chest  IMPRESSION:    1. Large right paratracheal mass as described, consistent with metastatic  adenopathy, with resultant occlusion of the SVC, occlusion of the superior  azygos vein and tumor/thrombus extending into the left brachiocephalic vein, and  numerous collaterals throughout the mediastinum. Findings are consistent with  SVC syndrome. The mass also appears to occlude the right upper lobe pulmonary  artery. 2. Multiple pulmonary nodules in the right upper lobe as described above,  consistent with some combination of the primary lung malignancy and metastatic  disease. Multiple other small consolidative and groundglass opacities in the  right upper lobe, which may represent postobstructive atelectasis or pneumonia. Correlation with prior imaging if available is recommended. 3. Large right pleural effusion. Trace perihepatic ascites. 4. Sclerosis in the juxta-articular humeral heads, suspicious for avascular  Necrosis. Personally reviewed CT images    Assessment and Recommendations:   1. Recurrent/progressive right lung cancer:  Need to obtain records from [facility], will get these tomorrow. High concern for progressive disease. Discussed with patient that this may be treatable, but is not curable, goal will be palliative. He stated that he would like to transfer his care to [facility]    Will obtain CT a/p and MRI brain tomorrow to complete staging. Agree with palliative care consult    2. SVC syndrome:  Was given dex 10 mg IV then 4 mg q 6h; continue    3. Pancytopenia:  Due to chemotherapy, radiation; transfuse plt for < 10 and hgb < 7; ?  If secondary diagnosis is contributing, as degree is worse than expected; checking HIV with am labs    4. Dysphagia: likely due to mass and radiation; agree with fluconazole and sucralfate    5. Pneumonia:  Continue abx as per hospitalist    Discussed with Dr. Walter Mendoza      If stable tomorrow, pt may be able to be discharged home for outpatient follow up and treatment.         Signed By: Darline Perez MD

## 2019-07-28 NOTE — PROGRESS NOTES
Spiritual Care Assessment/Progress Note  [facility]      NAME: Laurie Lipscomb      MRN: [id number]  AGE: 39 y.o.  SEX: male  Restoration Affiliation:    Language: English     7/28/2019     Total Time (in minutes): 12     Spiritual Assessment begun in [facility]  MED SURG 2 through conversation with:         [x]Patient        [] Family    [] Friend(s)        Reason for Consult: Palliative Care, Initial/Spiritual Assessment     Spiritual beliefs: (Please include comment if needed)     [x] Identifies with a gale tradition:  Pentecostalism      [] Supported by a gale community:            [] Claims no spiritual orientation:           [] Seeking spiritual identity:                [] Adheres to an individual form of spirituality:           [] Not able to assess:                           Identified resources for coping:      [] Prayer                               [] Music                  [] Guided Imagery     [x] Family/friends                 [] Pet visits     [] Devotional reading                         [] Unknown     [] Other:                                            Interventions offered during this visit: (See comments for more details)    Patient Interventions: Affirmation of gale, Catharsis/review of pertinent events in supportive environment, Iconic (affirming the presence of God/Higher Power), Prayer (assurance of), Initial/Spiritual assessment, patient floor           Plan of Care:     [] Support spiritual and/or cultural needs    [] Support AMD and/or advance care planning process      [] Support grieving process   [] Coordinate Rites and/or Rituals    [] Coordination with community clergy   [] No spiritual needs identified at this time   [] Detailed Plan of Care below (See Comments)  [] Make referral to Music Therapy  [] Make referral to Pet Therapy     [] Make referral to Addiction services  [] Make referral to [place]  [] Make referral to Spiritual Care Partner  [] No future visits requested [x] Follow up visits as needed     Comments: Initial Palliative Care spiritual assessment in 5 Med Surg. Mr. Naina Swain shared some of his recent medical journey and his current medical challenge that brought him to the hospital.  He shared he is feeling a bit better. His mother and his 2 Aunts have been a significant support for him. He attends a [employer] near where he lives though he has not shared with the Spiritism his current clark challenges. He has not other needs at this time. Provided spiritual presence and prayer. Advised of  Availability.   Visited by: Britt Marshall., MS., [address] [facility] [address] [place] ([id number])

## 2019-07-28 NOTE — PROGRESS NOTES
Problem: Falls - Risk of  Goal: *Absence of Falls  Description  Document Martin Sy Fall Risk and appropriate interventions in the flowsheet.   Outcome: Progressing Towards Goal  Note:   Fall Risk Interventions:            Medication Interventions: Teach patient to arise slowly

## 2019-07-28 NOTE — PROGRESS NOTES
Bedside shift change report given to Janie FLORES (oncoming nurse) by Franchesca Leblanc (offgoing nurse). Report included the following information SBAR, Kardex, MAR and Recent Results.

## 2019-07-29 ENCOUNTER — APPOINTMENT (OUTPATIENT)
Dept: CT IMAGING | Age: 44
DRG: 299 | End: 2019-07-29
Attending: INTERNAL MEDICINE
Payer: COMMERCIAL

## 2019-07-29 ENCOUNTER — HOSPITAL ENCOUNTER (OUTPATIENT)
Dept: MRI IMAGING | Age: 44
Discharge: HOME | DRG: 299 | End: 2019-07-29
Attending: INTERNAL MEDICINE
Payer: COMMERCIAL

## 2019-07-29 VITALS — BODY MASS INDEX: 19.2 KG/M2 | WEIGHT: 130 LBS

## 2019-07-29 LAB
APTT PPP: 22.4 SEC (ref 22.1–32)
ATRIAL RATE: 105 BPM
BASOPHILS # BLD: 0 K/UL (ref 0–0.1)
BASOPHILS NFR BLD: 0 % (ref 0–1)
CALCULATED P AXIS, ECG09: 81 DEGREES
CALCULATED R AXIS, ECG10: 69 DEGREES
CALCULATED T AXIS, ECG11: 62 DEGREES
DIAGNOSIS, 93000: NORMAL
DIFFERENTIAL METHOD BLD: ABNORMAL
EOSINOPHIL # BLD: 0 K/UL (ref 0–0.4)
EOSINOPHIL NFR BLD: 1 % (ref 0–7)
ERYTHROCYTE [DISTWIDTH] IN BLOOD BY AUTOMATED COUNT: 21.9 % (ref 11.5–14.5)
HCT VFR BLD AUTO: 24.3 % (ref 36.6–50.3)
HGB BLD-MCNC: 7.6 G/DL (ref 12.1–17)
HIV 1+2 AB+HIV1 P24 AG SERPL QL IA: NONREACTIVE
HIV12 RESULT COMMENT, HHIVC: NORMAL
IMM GRANULOCYTES # BLD AUTO: 0 K/UL
IMM GRANULOCYTES NFR BLD AUTO: 0 %
INR PPP: 1.1 (ref 0.9–1.1)
LYMPHOCYTES # BLD: 0.2 K/UL (ref 0.8–3.5)
LYMPHOCYTES NFR BLD: 15 % (ref 12–49)
MCH RBC QN AUTO: 21.1 PG (ref 26–34)
MCHC RBC AUTO-ENTMCNC: 31.3 G/DL (ref 30–36.5)
MCV RBC AUTO: 67.3 FL (ref 80–99)
MONOCYTES # BLD: 0.1 K/UL (ref 0–1)
MONOCYTES NFR BLD: 8 % (ref 5–13)
NEUTS SEG # BLD: 0.8 K/UL (ref 1.8–8)
NEUTS SEG NFR BLD: 76 % (ref 32–75)
NRBC # BLD: 0 K/UL (ref 0–0.01)
NRBC BLD-RTO: 0 PER 100 WBC
P-R INTERVAL, ECG05: 158 MS
PATH REV BLD -IMP: ABNORMAL
PLATELET # BLD AUTO: 61 K/UL (ref 150–400)
PMV BLD AUTO: ABNORMAL FL (ref 8.9–12.9)
PROTHROMBIN TIME: 11.5 SEC (ref 9–11.1)
Q-T INTERVAL, ECG07: 324 MS
QRS DURATION, ECG06: 78 MS
QTC CALCULATION (BEZET), ECG08: 428 MS
RBC # BLD AUTO: 3.61 M/UL (ref 4.1–5.7)
RBC MORPH BLD: ABNORMAL
THERAPEUTIC RANGE,PTTT: NORMAL SECS (ref 58–77)
VENTRICULAR RATE, ECG03: 105 BPM
WBC # BLD AUTO: 1.1 K/UL (ref 4.1–11.1)

## 2019-07-29 PROCEDURE — 85610 PROTHROMBIN TIME: CPT

## 2019-07-29 PROCEDURE — 74011250636 HC RX REV CODE- 250/636: Performed by: INTERNAL MEDICINE

## 2019-07-29 PROCEDURE — 74011250637 HC RX REV CODE- 250/637: Performed by: INTERNAL MEDICINE

## 2019-07-29 PROCEDURE — 76450000000

## 2019-07-29 PROCEDURE — 85730 THROMBOPLASTIN TIME PARTIAL: CPT

## 2019-07-29 PROCEDURE — 74011636320 HC RX REV CODE- 636/320: Performed by: RADIOLOGY

## 2019-07-29 PROCEDURE — 87389 HIV-1 AG W/HIV-1&-2 AB AG IA: CPT

## 2019-07-29 PROCEDURE — 65270000029 HC RM PRIVATE

## 2019-07-29 PROCEDURE — 74011250636 HC RX REV CODE- 250/636: Performed by: RADIOLOGY

## 2019-07-29 PROCEDURE — A9575 INJ GADOTERATE MEGLUMI 0.1ML: HCPCS | Performed by: RADIOLOGY

## 2019-07-29 PROCEDURE — 74177 CT ABD & PELVIS W/CONTRAST: CPT

## 2019-07-29 PROCEDURE — 70553 MRI BRAIN STEM W/O & W/DYE: CPT

## 2019-07-29 PROCEDURE — 36415 COLL VENOUS BLD VENIPUNCTURE: CPT

## 2019-07-29 PROCEDURE — 74011000258 HC RX REV CODE- 258: Performed by: INTERNAL MEDICINE

## 2019-07-29 RX ORDER — DEXAMETHASONE 4 MG/1
4 TABLET ORAL 4 TIMES DAILY
Qty: 120 TAB | Refills: 0 | Status: SHIPPED | OUTPATIENT
Start: 2019-07-29 | End: 2019-07-30 | Stop reason: SDUPTHER

## 2019-07-29 RX ORDER — LEVOFLOXACIN 750 MG/1
750 TABLET ORAL DAILY
Qty: 5 TAB | Refills: 0 | Status: SHIPPED | OUTPATIENT
Start: 2019-07-29 | End: 2019-07-30

## 2019-07-29 RX ORDER — GADOTERATE MEGLUMINE 376.9 MG/ML
12 INJECTION INTRAVENOUS
Status: COMPLETED | OUTPATIENT
Start: 2019-07-29 | End: 2019-07-29

## 2019-07-29 RX ADMIN — LEVOFLOXACIN 750 MG: 5 INJECTION, SOLUTION INTRAVENOUS at 15:33

## 2019-07-29 RX ADMIN — DEXAMETHASONE SODIUM PHOSPHATE 4 MG: 4 INJECTION, SOLUTION INTRAMUSCULAR; INTRAVENOUS at 21:11

## 2019-07-29 RX ADMIN — IOPAMIDOL 100 ML: 755 INJECTION, SOLUTION INTRAVENOUS at 12:44

## 2019-07-29 RX ADMIN — IOHEXOL 50 ML: 240 INJECTION, SOLUTION INTRATHECAL; INTRAVASCULAR; INTRAVENOUS; ORAL at 10:00

## 2019-07-29 RX ADMIN — GADOTERATE MEGLUMINE 12 ML: 376.9 INJECTION INTRAVENOUS at 12:55

## 2019-07-29 RX ADMIN — SUCRALFATE 1 G: 1 TABLET ORAL at 17:42

## 2019-07-29 RX ADMIN — DEXAMETHASONE SODIUM PHOSPHATE 4 MG: 4 INJECTION, SOLUTION INTRAMUSCULAR; INTRAVENOUS at 06:52

## 2019-07-29 RX ADMIN — Medication 10 ML: at 21:12

## 2019-07-29 RX ADMIN — METOPROLOL SUCCINATE 50 MG: 50 TABLET, EXTENDED RELEASE ORAL at 08:50

## 2019-07-29 RX ADMIN — DEXAMETHASONE SODIUM PHOSPHATE 4 MG: 4 INJECTION, SOLUTION INTRAMUSCULAR; INTRAVENOUS at 15:34

## 2019-07-29 RX ADMIN — Medication 10 ML: at 15:34

## 2019-07-29 RX ADMIN — SUCRALFATE 1 G: 1 TABLET ORAL at 08:50

## 2019-07-29 RX ADMIN — PANTOPRAZOLE SODIUM 40 MG: 40 TABLET, DELAYED RELEASE ORAL at 06:52

## 2019-07-29 RX ADMIN — PIPERACILLIN SODIUM,TAZOBACTAM SODIUM 3.38 G: 3; .375 INJECTION, POWDER, FOR SOLUTION INTRAVENOUS at 08:49

## 2019-07-29 RX ADMIN — SUCRALFATE 1 G: 1 TABLET ORAL at 21:11

## 2019-07-29 RX ADMIN — PIPERACILLIN SODIUM,TAZOBACTAM SODIUM 3.38 G: 3; .375 INJECTION, POWDER, FOR SOLUTION INTRAVENOUS at 00:49

## 2019-07-29 RX ADMIN — PIPERACILLIN SODIUM,TAZOBACTAM SODIUM 3.38 G: 3; .375 INJECTION, POWDER, FOR SOLUTION INTRAVENOUS at 17:34

## 2019-07-29 RX ADMIN — SODIUM CHLORIDE 75 ML/HR: 900 INJECTION, SOLUTION INTRAVENOUS at 18:50

## 2019-07-29 RX ADMIN — FLUCONAZOLE 100 MG: 100 TABLET ORAL at 08:50

## 2019-07-29 RX ADMIN — DEXAMETHASONE SODIUM PHOSPHATE 4 MG: 4 INJECTION, SOLUTION INTRAMUSCULAR; INTRAVENOUS at 00:51

## 2019-07-29 NOTE — PROGRESS NOTES
Bedside shift change report given to Pinky Sanchez RN (oncoming nurse) by Naeem Chapin RN (offgoing nurse). Report included the following information SBAR, Kardex and MAR.

## 2019-07-29 NOTE — PROGRESS NOTES
7/29/2019  4:49 PM    Reason for Admission:   SVC Syndrome                   RRAT Score:   5                  Plan for utilizing home health:  TBD                        Current Advanced Directive/Advance Care Plan: Pt reported he does not have an advanced plan and was not interested in discussing it with spiritual care                         Transition of Care Plan:     Pt resides in a mobile home in [facility] alone, but his mother is currently staying with him. He reported he has a good local support system. Rx filled at [facility] in [place], no issues getting Rx. Pt has not worked or 3 months due to his cancer treatment. Pt was independent with ADL's and driving before admission. No hx of HH, Rehab, or DME. Pt's PCP is Dr. Sumit Sahu in [place]. Pt's family will provide discharge transportation. Pt does not have any discharge concerns at this time. CM will follow for discharge needs. GABY Santoyo    Care Management Interventions  PCP Verified by CM: Yes  Mode of Transport at Discharge: Other (see comment)(Family)  Transition of Care Consult (CM Consult): Discharge Planning  Current Support Network: Own Home(pt's mother is currently staying with him in his home)  Confirm Follow Up Transport: Family  Plan discussed with Pt/Family/Caregiver:  Yes

## 2019-07-29 NOTE — PROGRESS NOTES
[address] NP  [phone]           GI PROGRESS NOTE        NAME: Rohit Pop   :  1975   MRN:  [id number]       Subjective:   Had some pain with carbonated beverage, but overall feels much better. Objective:   Lying in bed in NAD. VITALS:   Last 24hrs VS reviewed since prior progress note. Most recent are:  Visit Vitals  /87 (BP 1 Location: Right arm, BP Patient Position: At rest)   Pulse 84   Temp 98.3 °F (36.8 °C)   Resp 19   Ht 5' 9\" (1.753 m)   Wt 59 kg (130 lb)   SpO2 100%   BMI 19.20 kg/m²       Intake/Output Summary (Last 24 hours) at 2019 1522  Last data filed at 2019 5736  Gross per 24 hour   Intake 660 ml   Output --   Net 660 ml       PHYSICAL EXAM:  General: Alert, in no acute distress    HEENT: Anicteric sclerae. Lungs:            CTA Bilaterally. Heart:  Regular  rhythm,    Abdomen: Soft, Non distended, Non tender.  (+)Bowel sounds, no HSM  Extremities: No c/c/e  Neurologic:  CN 2-12 gi, Alert and oriented X 3. No acute neurological distress   Psych:   Good insight. Not anxious nor agitated.     Lab Data Reviewed:   Recent Labs     19  1454   WBC 1.0* 1.1*   HGB 7.9* 7.6*   HCT 25.0* 24.3*   PLT 61* 61*     Recent Labs     19  02219  1131    140   K 4.0 3.7    106   CO2 27 25   BUN 16 18   CREA 0.74 0.94   * 101*   PHOS 3.3  --    CA 8.6 8.8     Recent Labs     19  1131   SGOT 16   *   TP 6.8   ALB 3.1*   GLOB 3.7   LPSE 136       ________________________________________________________________________  Patient Active Problem List   Diagnosis Code    SVC syndrome I87.1    Lung cancer (HCC) C34.90    Pneumonia J18.9    HTN (hypertension) I10    Pancytopenia (HCC) D61.818    Dysphagia R13.10         Assessment and Plan:  Dysphagia and Odynophagia:  Improved from yesterday  - Continue Daily PPI  - Continue Carafate  - Monitor hemoglobin  - Soft textured diet  - No NSAIDs  - Discussed the importance of eating slowly and chewing food thoroughly with him. - No plans for EGD at this time. Will see again on request.  Please have him follow up in the office after his discharge. Please call with any questions or concerns.        Signed By: Ed Gibbons NP     7/29/2019  3:22 PM

## 2019-07-29 NOTE — PROGRESS NOTES
[facility] [place] [facility]  [address]  [phone]      Medical Progress Note      NAME: Laura Tamez   :  1975  MRM:  [id number]    Date/Time: 2019  10:35 AM          Subjective:     Chief Complaint:  Pain: chest, mainly with swallowing, better today, exacerbated some by carbonation this AM    ROS:  (bold if positive, if negative)                   Chest Pain     Tolerating Diet          Objective:       Vitals:          Last 24hrs VS reviewed since prior progress note.  Most recent are:    Visit Vitals  /70 (BP 1 Location: Right arm, BP Patient Position: At rest)   Pulse 84   Temp 97.8 °F (36.6 °C)   Resp 19   Ht 5' 9\" (1.753 m)   Wt 59 kg (130 lb)   SpO2 99%   BMI 19.20 kg/m²     SpO2 Readings from Last 6 Encounters:   19 99%            Intake/Output Summary (Last 24 hours) at 2019 1035  Last data filed at 2019 3558  Gross per 24 hour   Intake 660 ml   Output 200 ml   Net 460 ml          Exam:     Physical Exam:    Gen:  Well-developed, well-nourished, in no acute distress  HEENT:  Pink conjunctivae, PERRL, hearing intact to voice, moist mucous membranes  Neck:  Supple, without masses, thyroid non-tender  Resp:  No accessory muscle use, clear breath sounds without wheezes rales or rhonchi  Card:  No murmurs, normal S1, S2 without thrills, bruits or peripheral edema  Abd:  Soft, non-tender, non-distended, normoactive bowel sounds are present, no palpable organomegaly and no detectable hernias  Lymph:  No cervical or inguinal adenopathy  Musc:  No cyanosis or clubbing  Skin:  No rashes or ulcers, skin turgor is good  Neuro:  Cranial nerves are grossly intact, no focal motor weakness, follows commands appropriately  Psych:  Good insight, oriented to person, place and time, alert       Medications Reviewed: (see below)    Lab Data Reviewed: (see below)    ______________________________________________________________________    Medications:     Current Facility-Administered Medications   Medication Dose Route Frequency    iopamidol (ISOVUE-370) 76 % injection 100 mL  100 mL IntraVENous RAD ONCE    sucralfate (CARAFATE) tablet 1 g  1 g Oral AC&HS    levoFLOXacin (LEVAQUIN) 750 mg in D5W IVPB  750 mg IntraVENous Q24H    piperacillin-tazobactam (ZOSYN) 3.375 g in 0.9% sodium chloride (MBP/ADV) 100 mL  3.375 g IntraVENous Q8H    0.9% sodium chloride infusion  75 mL/hr IntraVENous CONTINUOUS    sodium chloride (NS) flush 5-40 mL  5-40 mL IntraVENous Q8H    sodium chloride (NS) flush 5-40 mL  5-40 mL IntraVENous PRN    acetaminophen (TYLENOL) tablet 650 mg  650 mg Oral Q4H PRN    oxyCODONE-acetaminophen (PERCOCET) 5-325 mg per tablet 1 Tab  1 Tab Oral Q4H PRN    HYDROmorphone (PF) (DILAUDID) injection 0.5 mg  0.5 mg IntraVENous Q4H PRN    prochlorperazine (COMPAZINE) injection 10 mg  10 mg IntraVENous Q6H PRN    zolpidem (AMBIEN) tablet 5 mg  5 mg Oral QHS PRN    pantoprazole (PROTONIX) tablet 40 mg  40 mg Oral ACB    dexamethasone (DECADRON) 4 mg/mL injection 4 mg  4 mg IntraVENous Q6H    fluconazole (DIFLUCAN) tablet 100 mg  100 mg Oral DAILY    metoprolol succinate (TOPROL-XL) XL tablet 50 mg  50 mg Oral DAILY            Lab Review:     Recent Labs     07/28/19 0229 07/27/19  1454   WBC 1.0* 1.1*   HGB 7.9* 7.6*   HCT 25.0* 24.3*   PLT 61* 61*     Recent Labs     07/28/19 0229 07/27/19  1131    140   K 4.0 3.7    106   CO2 27 25   * 101*   BUN 16 18   CREA 0.74 0.94   CA 8.6 8.8   MG 1.6  --    PHOS 3.3  --    ALB  --  3.1*   TBILI  --  0.4   SGOT  --  16   ALT  --  19     No results found for: GLUCPOC  No results for input(s): PH, PCO2, PO2, HCO3, FIO2 in the last 72 hours. No results for input(s): INR in the last 72 hours.     No lab exists for component: INREXT, INREXT  No results found for: SDES  Lab Results   Component Value Date/Time    Culture result: NO GROWTH 2 DAYS 07/27/2019 02:54 PM            Assessment:     Principal Problem:    SVC syndrome (7/27/2019)    Active Problems:    Lung cancer ([address] (7/27/2019)      Pneumonia (7/27/2019)      HTN (hypertension) (7/27/2019)      Pancytopenia ([address] (7/27/2019)      Dysphagia (7/28/2019)           Plan:     Principal Problem:    SVC syndrome (7/27/2019)   - continue steroids, some improvement   - Hem/Onc input appreciated   - outpatient follow up here    Active Problems:    Lung cancer ([facility] (7/27/2019)   - outpatient follow up here   - Dr. Wilkie Dance has ordered staging scans   - home after scans if okay with Dr. Wilkie Dance      Pneumonia (7/27/2019)   - procalcitonin <0.1, infection seems unlikely, more likely due to malignancy   - limited course of antibiotics   - respiratory panel negative, awaiting other w/u      HTN (hypertension) (7/27/2019)   - BP okay on metoprolol      Pancytopenia ([address] (7/27/2019)   - per Dr. Wilkie Dance, concerning for another cause given length of time since chemo/radiation   - Dr. Wilkie Dance has ordered HIV testing, doesn't look like it was drawn   - persists      Dysphagia   - GI input appreciated, continue acid suppression, sucralfate, fluconazole      Total time spent in patient care: 35 minutes                  Care Plan discussed with: Patient, Family, Nursing Staff and Dr. Wilkie Dance    Discussed:  Code Status, Care Plan and D/C Planning    Prophylaxis:  SCD's    Disposition:  Home w/Family           ___________________________________________________    Attending Physician: Houston Muñoz MD

## 2019-07-29 NOTE — ACP (ADVANCE CARE PLANNING)
Primary Decision Maker: Ernesto Watkins - [phone]  Advance Care Planning 7/29/2019   Patient's Healthcare Decision Maker is: Legal Next of Kin   Confirm Advance Directive None   Patient Would Like to Complete Advance Directive Yes     Pt does not have AMD in place but was receptive to receiving information. Pt is not currently , does not have children, mother is legal NOK and would be surrogate decision maker in the event that pt is unable to speak for himself. Copy of AMD was provided for pt to review. Palliative team will be available if pt wishes to complete form while inpt; pt aware that health care teams involved in his care can assist with completion of AMD after discharge from [facility], as well.

## 2019-07-29 NOTE — DISCHARGE INSTRUCTIONS
HOSPITALIST DISCHARGE INSTRUCTIONS  NAME: Adiel Kenyon   :  1975   MRN:  [id number]     Date/Time:  2019 1:28 PM    ADMIT DATE: 2019     DISCHARGE DATE: 2019     DISCHARGE DIAGNOSIS:  Lung cancer with SVC syndrome    MEDICATIONS:  · It is important that you take the medication exactly as they are prescribed. · Keep your medication in the bottles provided by the pharmacist and keep a list of the medication names, dosages, and times to be taken in your wallet. · Do not take other medications without consulting your doctor. Pain Management: per above medications    What to do at Home    Recommended diet:  Regular Diet    Recommended activity: Activity as tolerated    If you experience any of the following symptoms then please call your primary care physician or return to the emergency room if you cannot get hold of your doctor:  Fever, chills, nausea, vomiting, diarrhea, change in mentation, falling, bleeding, shortness of breath    Follow Up: Follow-up Information     Follow up With Specialties Details Why Contact Info    Other, MD Hermelinda    Patient can only remember the practice name and not the physician              Information obtained by :  I understand that if any problems occur once I am at home I am to contact my physician. I understand and acknowledge receipt of the instructions indicated above.                                                                                                                                            Physician's or R.N.'s Signature                                                                  Date/Time                                                                                                                                              Patient or Representative Signature                                                          Date/Time

## 2019-07-29 NOTE — CONSULTS
Palliative Medicine Consult  Alphonso: [phone]    Patient Name: Mable Brown  YOB: 1975    Date of Initial Consult: 07/29/2019  Reason for Consult: Care decisions  Requesting Provider: Arturo Steve MD   Primary Care Physician: Celina, MD Hermelinda     SUMMARY:   Mable Brown is a 39 y. o. with a past history of HTN, lung CA , who was admitted on 7/27/2019 from home with a diagnosis of SVC syndrome. Patient presented to the ED with complaints of chest with eating and right arm pain. ED eval revealed neutra penia and chest CT with Large right paratracheal mass as described, consistent with metastatic adenopathy, with resultant occlusion of the SVC, occlusion of the superior azygos vein and tumor/thrombus extending into the left brachiocephalic vein, and numerous collaterals throughout the mediastinum. Findings are consistent with SVC syndrome. The mass also appears to occlude the right upper lobe pulmonary artery. Multiple pulmonary nodules in the right upper lobe as described above, consistent with some combination of the primary lung malignancy and metastatic disease. Multiple other small consolidative and groundglass opacities in the right upper lobe, which may represent postobstructive atelectasis or pneumonia. Large right pleural effusion. Trace perihepatic ascites. Patient admitted and started on IV antibiotics and steroids. Heme Onc consulted and attempting to obtain records from [facility]. Patient planning on transferring care to [facility]. Staging scans ordered and are pending. GI consulted and odynophagia and dysphagia most likely related to recent radiation causing radiation esophagitis. Recommended starting PPI and sucralfate and keep on a soft diet. Would recommend EGD if symptoms fail to improve. Current medical issues leading to Palliative Medicine involvement include: care decisions. SH: , no children.  Lives alone but his mother is staying with him currently. PALLIATIVE DIAGNOSES:   1. Cancer related pain  2. Physical debility  3. Goals of care  4. ACP  5. Lung CA  6. SVC Syndrome        PLAN:   1. Met with patient and his mother with [facility] and introduced the role of Palliative Medicine. 2. Cancer related pain--> right chest and arm, worse when he eats. Currently exacerbated by carbonated drink he is drinking. Possible GI source. Reports that it is overall improved with PPI/sulcrafate and decadron. Has opioid pain medication ordered by has not required any. Will continue to optimize symptom management. 3. Goals of care--> Discussed current hospitalization and prior level of functioning. Reports prior to admission, he was independent in ADLs, lived alone, and was working full time. Since cancer diagnosis, he has not been working due to ongoing treatment/radiation. Reports that with his first chemo treatment, he suffered an allergic reaction, but other than that has tolerated treatment well. He understands the plan for staging studies and he is planning on transferring his care to [address] [place] [address] for continued treatment. Reports that diagnosis was a surprise, given how young he is, and he is hopeful that he will respond to treatment. Will continue to follow. 4. ACP--> Patient has not completed an AMD in the past, reviewed blank form with patient and mother and he will consider completing, not interested in completing one now. Surrogate decision maker would be his mother, Licha Echeverria, as legal next of kin. 5. Initial consult note routed to primary continuity provider and/or primary health care team members  6.  Communicated plan of care with: Palliative [facility] Team     GOALS OF CARE / TREATMENT PREFERENCES:     GOALS OF CARE:  Patient/Health Care Proxy Stated Goals: Prolong life    TREATMENT PREFERENCES:   Code Status: Full Code    Advance Care Planning:  [x] [facility] Interdisciplinary Team has updated the ACP Navigator with Health Care Decision Maker and Patient Capacity      Advance Care Planning 7/29/2019   Patient's Healthcare Decision Maker is: Legal Next of Kin   Confirm Advance Directive None   Patient Would Like to Complete Advance Directive Yes       Medical Interventions: Full interventions     Other Instructions: Other:    As far as possible, the palliative care team has discussed with patient / health care proxy about goals of care / treatment preferences for patient. HISTORY:     History obtained from: patient, mother    CHIEF COMPLAINT: pain    HPI/SUBJECTIVE:    The patient is:   [x] Verbal and participatory  [] Non-participatory due to:     Patient presented to the ED with complaints of chest with eating and right arm pain. ED eval revealed neutropenia and chest CT with Large right paratracheal mass as described, consistent with metastatic adenopathy, with resultant occlusion of the SVC, occlusion of the superior azygos vein and tumor/thrombus extending into the left brachiocephalic vein, and numerous collaterals throughout the mediastinum. Findings are consistent with SVC syndrome. The mass also appears to occlude the right upper lobe pulmonary artery. Multiple pulmonary nodules in the right upper lobe as described above, consistent with some combination of the primary lung malignancy and metastatic disease. Multiple other small consolidative and groundglass opacities in the right upper lobe, which may represent postobstructive atelectasis or pneumonia. Large right pleural effusion. Trace perihepatic ascites. PAtient admitted and started on IV antibiotics and steroids. Heme Onc consulted and [facility] Patient planning on transferring care [facility]. Staging scans ordered and are pending. GI consulted and odynophagia and dysphagia most likely related to recent radiation causing radiation esophagitis.  Recommended starting PPI and sucralfate and keep on a soft diet. Would recommend EGD if symptoms fail to improve. Complaints of right chest and arm, worse when he eats. Currently exacerbated by carbonated drink he is drinking. Possible GI source. Reports that it is overall improved with PPI/sulcrafate and decadron. Has opioid pain medication ordered by has not required any. Clinical Pain Assessment (nonverbal scale for severity on nonverbal patients):   Clinical Pain Assessment  Severity: 0          Duration: for how long has pt been experiencing pain (e.g., 2 days, 1 month, years)  Frequency: how often pain is an issue (e.g., several times per day, once every few days, constant)     FUNCTIONAL ASSESSMENT:     Palliative Performance Scale (PPS):  PPS: 60       PSYCHOSOCIAL/SPIRITUAL SCREENING:     Palliative IDT has assessed this patient for cultural preferences / practices and a referral made as appropriate to needs (Cultural Services, Patient Advocacy, Ethics, etc.)    Any spiritual / Methodist concerns:  [] Yes /  [x] No    Caregiver Burnout:  [] Yes /  [x] No /  [] No Caregiver Present      Anticipatory grief assessment:   [x] Normal  / [] Maladaptive       ESAS Anxiety:      ESAS Depression:          REVIEW OF SYSTEMS:     Positive and pertinent negative findings in ROS are noted above in HPI. The following systems were [x] reviewed / [] unable to be reviewed as noted in HPI  Other findings are noted below. Systems: constitutional, ears/nose/mouth/throat, respiratory, gastrointestinal, genitourinary, musculoskeletal, integumentary, neurologic, psychiatric, endocrine. Positive findings noted below. Modified ESAS Completed by: provider     Drowsiness: 0     Pain: 0     Nausea: 0   Anorexia: 0 Dyspnea: 0     Constipation: No              PHYSICAL EXAM:     From RN flowsheet:  Wt Readings from Last 3 Encounters:   07/27/19 130 lb (59 kg)   07/29/19 130 lb (59 kg)     Blood pressure 143/84, pulse 74, temperature 97.7 °F (36.5 °C), resp.  rate 18, height 5' 9\" (1.753 m), weight 130 lb (59 kg), SpO2 100 %. Pain Scale 1: Numeric (0 - 10)  Pain Intensity 1: 0                 Last bowel movement, if known:     Constitutional: thin, NAD  Eyes: pupils equal, anicteric  ENMT: no nasal discharge, moist mucous membranes  Cardiovascular: regular rhythm, distal pulses intact  Respiratory: breathing not labored, symmetric  Gastrointestinal: soft non-tender, +bowel sounds  Musculoskeletal: no deformity, no tenderness to palpation  Skin: warm, dry  Neurologic: following commands, moving all extremities  Psychiatric: full affect, no hallucinations  Other:       HISTORY:     Principal Problem:    SVC syndrome (7/27/2019)    Active Problems:    Lung cancer ([facility] (7/27/2019)      Pneumonia (7/27/2019)      HTN (hypertension) (7/27/2019)      Pancytopenia ([facility] (7/27/2019)      Dysphagia (7/28/2019)      Past Medical History:   Diagnosis Date    Anemia, iron deficiency     Aphagia     2/2 radiation    Hypertension     Lung cancer ([facility] 7/27/2019    Pneumonia involving right lung       No past surgical history on file. Family History   Problem Relation Age of Onset    Cancer Neg Hx       History reviewed, no pertinent family history.   Social History     Tobacco Use    Smoking status: Not on file   Substance Use Topics    Alcohol use: Not Currently     No Known Allergies   Current Facility-Administered Medications   Medication Dose Route Frequency    sucralfate (CARAFATE) tablet 1 g  1 g Oral AC&HS    0.9% sodium chloride infusion  75 mL/hr IntraVENous CONTINUOUS    sodium chloride (NS) flush 5-40 mL  5-40 mL IntraVENous Q8H    sodium chloride (NS) flush 5-40 mL  5-40 mL IntraVENous PRN    acetaminophen (TYLENOL) tablet 650 mg  650 mg Oral Q4H PRN    oxyCODONE-acetaminophen (PERCOCET) 5-325 mg per tablet 1 Tab  1 Tab Oral Q4H PRN    HYDROmorphone (PF) (DILAUDID) injection 0.5 mg  0.5 mg IntraVENous Q4H PRN    prochlorperazine (COMPAZINE) injection 10 mg  10 mg IntraVENous Q6H PRN    zolpidem (AMBIEN) tablet 5 mg  5 mg Oral QHS PRN    pantoprazole (PROTONIX) tablet 40 mg  40 mg Oral ACB    dexamethasone (DECADRON) 4 mg/mL injection 4 mg  4 mg IntraVENous Q6H    fluconazole (DIFLUCAN) tablet 100 mg  100 mg Oral DAILY    metoprolol succinate (TOPROL-XL) XL tablet 50 mg  50 mg Oral DAILY          LAB AND IMAGING FINDINGS:     Lab Results   Component Value Date/Time    WBC 1.3 (L) 07/30/2019 06:03 AM    HGB 7.4 (L) 07/30/2019 06:03 AM    PLATELET 62 (L) 63/10/4426 06:03 AM     Lab Results   Component Value Date/Time    Sodium 136 07/28/2019 02:29 AM    Potassium 4.0 07/28/2019 02:29 AM    Chloride 104 07/28/2019 02:29 AM    CO2 27 07/28/2019 02:29 AM    BUN 16 07/28/2019 02:29 AM    Creatinine 0.74 07/28/2019 02:29 AM    Calcium 8.6 07/28/2019 02:29 AM    Magnesium 1.6 07/28/2019 02:29 AM    Phosphorus 3.3 07/28/2019 02:29 AM      Lab Results   Component Value Date/Time    AST (SGOT) 16 07/27/2019 11:31 AM    Alk. phosphatase 148 (H) 07/27/2019 11:31 AM    Protein, total 6.8 07/27/2019 11:31 AM    Albumin 3.1 (L) 07/27/2019 11:31 AM    Globulin 3.7 07/27/2019 11:31 AM     Lab Results   Component Value Date/Time    INR 1.2 (H) 07/30/2019 07:19 AM    Prothrombin time 11.9 (H) 07/30/2019 07:19 AM    aPTT 25.4 07/30/2019 07:19 AM      No results found for: IRON, FE, TIBC, IBCT, PSAT, FERR   No results found for: PH, PCO2, PO2  No components found for: GLPOC   No results found for: CPK, CKMB             Total time: 50 min  Counseling / coordination time, spent as noted above: 40  > 50% counseling / coordination?: yes    Prolonged service was provided for  []30 min   []75 min in face to face time in the presence of the patient, spent as noted above. Time Start:   Time End:   Note: this can only be billed with 73144 (initial) or 28100 (follow up). If multiple start / stop times, list each separately.

## 2019-07-29 NOTE — PROGRESS NOTES
[facility] [place] at [facility] 88  [address] [facility], [address] [place]: [phone]  F: [phone]      Reason for Visit:   Mable Brown is a 39 y.o. male who is seen in consultation at the request of Dr. Lisandro Suresh for evaluation of recurrent/progressive lung cancer, SVC syndrome      Hematology / Oncology Treatment History:   chemoXRT at [facility], completed 2 weeks ago    Reviewed last note dated 7/12/2019 Dr Gary Campbell from [address] [place] [address]  Followed for non-small cell lung carcinoma and anemia  R level 4 lymph node bx showed non small cell carcinoma, favored squamous cell carcinoma  Received C2 cisplatin and etoposide on 7/16; was receiving radiation to chest.     Assess/plan  1. Non-small cell lung cancer. Locally advanced. Pt with SVC syndrome. Radiation to chest for SVC  Received weekly carboplatin AUC 2 but during 2nd dose of taxol pt developed allergic reaction to taxol    PET 4/23/19 showed FDG avid para medistinal mass in the RUL consistent w/ malignancy, satellite lesions in the RUL, small right pleural effusion, R cervical and subcarnal lymph nodes and FDG avid subcutaneous density in the left groin concerning for metastatis disease  Rlevel 4 lymph node bx showed non small cell carcinoma favored squamous cell carcinoma. But not enough tissue for molecular testing. Reviewed with pathologist and cardiothoracic surgeon; not able to get any additional tissue for molecular testing. Due to allergic reaction to taxol started on Etoposide and cisplatin. 2. Microcytic hypochromic anemia  2/2 to chemo and low Fe levels; on ferrous sulfate 325 mg TID    3. Hypokalemia  KCL 20 gilma daily    4. Anorexia  Marinol BID    5. PICC line/ right common femoral vein due to SVC; pt unable to get erin cath      History of Present Illness:     Admitted on 7/27/19 complaining of chest pain, almost only when he eats, intermittent, radiating to right arm at times.   No pain currently. No SOB. Interval History:     Feeling better; able to eat more this am;. Denies any N/V. Sore throat is what brought him in to the hospital. Hungry but sore throat was keeping him from eating. Has loss approx 30# over the last several months. Had XRT to rt chest area: 33 treatments;   Due for next treatment on 8/6;     Mother at bedside. Past Medical History:   Diagnosis Date    Anemia, iron deficiency     Aphagia     2/2 radiation    Hypertension     Lung cancer ([facility] 7/27/2019    Pneumonia involving right lung       No past surgical history on file.    Social History     Tobacco Use    Smoking status: Not on file   Substance Use Topics    Alcohol use: Not Currently      Family History   Problem Relation Age of Onset    Cancer Neg Hx      Current Facility-Administered Medications   Medication Dose Route Frequency    sucralfate (CARAFATE) tablet 1 g  1 g Oral AC&HS    levoFLOXacin (LEVAQUIN) 750 mg in D5W IVPB  750 mg IntraVENous Q24H    piperacillin-tazobactam (ZOSYN) 3.375 g in 0.9% sodium chloride (MBP/ADV) 100 mL  3.375 g IntraVENous Q8H    0.9% sodium chloride infusion  75 mL/hr IntraVENous CONTINUOUS    sodium chloride (NS) flush 5-40 mL  5-40 mL IntraVENous Q8H    sodium chloride (NS) flush 5-40 mL  5-40 mL IntraVENous PRN    acetaminophen (TYLENOL) tablet 650 mg  650 mg Oral Q4H PRN    oxyCODONE-acetaminophen (PERCOCET) 5-325 mg per tablet 1 Tab  1 Tab Oral Q4H PRN    HYDROmorphone (PF) (DILAUDID) injection 0.5 mg  0.5 mg IntraVENous Q4H PRN    prochlorperazine (COMPAZINE) injection 10 mg  10 mg IntraVENous Q6H PRN    zolpidem (AMBIEN) tablet 5 mg  5 mg Oral QHS PRN    pantoprazole (PROTONIX) tablet 40 mg  40 mg Oral ACB    dexamethasone (DECADRON) 4 mg/mL injection 4 mg  4 mg IntraVENous Q6H    fluconazole (DIFLUCAN) tablet 100 mg  100 mg Oral DAILY    metoprolol succinate (TOPROL-XL) XL tablet 50 mg  50 mg Oral DAILY      No Known Allergies     Review of Systems: A complete review of systems was obtained, negative except as described above. Physical Exam:     Visit Vitals  /87 (BP 1 Location: Right arm, BP Patient Position: At rest)   Pulse 84   Temp 98.3 °F (36.8 °C)   Resp 19   Ht 5' 9\" (1.753 m)   Wt 59 kg (130 lb)   SpO2 100%   BMI 19.20 kg/m²     ECOG PS: 1  General: No distress  Eyes: PERRLA, anicteric sclerae  HENT: Atraumatic with normal appearance of ears and nose; OP clear  Neck: Supple; no thyromegaly   Lymphatic: No cervical, supraclavicular, or axillary adenopathy  Respiratory: diminished on RLL; , normal respiratory effort  CV: Normal rate, regular rhythm, no murmurs, no peripheral edema  GI: Soft, nontender, nondistended, no masses, no hepatomegaly, no splenomegaly  MS:  PICC line noted to rt upper leg; Digits without clubbing or cyanosis. Skin: radiation burns to rt upper lower neck, upper chest and rt upper back area. Neuro/Psych: Alert, oriented, appropriate affect, normal judgment/insight      Results:     Lab Results   Component Value Date/Time    WBC 1.0 (L) 07/28/2019 02:29 AM    HGB 7.9 (L) 07/28/2019 02:29 AM    HCT 25.0 (L) 07/28/2019 02:29 AM    PLATELET 61 (L) 07/49/6524 02:29 AM    MCV 66.8 (L) 07/28/2019 02:29 AM    ABS. NEUTROPHILS 0.8 (L) 07/27/2019 02:54 PM     Lab Results   Component Value Date/Time    Sodium 136 07/28/2019 02:29 AM    Potassium 4.0 07/28/2019 02:29 AM    Chloride 104 07/28/2019 02:29 AM    CO2 27 07/28/2019 02:29 AM    Glucose 158 (H) 07/28/2019 02:29 AM    BUN 16 07/28/2019 02:29 AM    Creatinine 0.74 07/28/2019 02:29 AM    GFR est AA >60 07/28/2019 02:29 AM    GFR est non-AA >60 07/28/2019 02:29 AM    Calcium 8.6 07/28/2019 02:29 AM     Lab Results   Component Value Date/Time    Bilirubin, total 0.4 07/27/2019 11:31 AM    ALT (SGPT) 19 07/27/2019 11:31 AM    AST (SGOT) 16 07/27/2019 11:31 AM    Alk.  phosphatase 148 (H) 07/27/2019 11:31 AM    Protein, total 6.8 07/27/2019 11:31 AM    Albumin 3.1 (L) 07/27/2019 11:31 AM    Globulin 3.7 07/27/2019 11:31 AM     7/27/19 CT chest  IMPRESSION:    1. Large right paratracheal mass as described, consistent with metastatic  adenopathy, with resultant occlusion of the SVC, occlusion of the superior  azygos vein and tumor/thrombus extending into the left brachiocephalic vein, and  numerous collaterals throughout the mediastinum. Findings are consistent with  SVC syndrome. The mass also appears to occlude the right upper lobe pulmonary  artery. 2. Multiple pulmonary nodules in the right upper lobe as described above,  consistent with some combination of the primary lung malignancy and metastatic  disease. Multiple other small consolidative and groundglass opacities in the  right upper lobe, which may represent postobstructive atelectasis or pneumonia. Correlation with prior imaging if available is recommended. 3. Large right pleural effusion. Trace perihepatic ascites. 4. Sclerosis in the juxta-articular humeral heads, suspicious for avascular  Necrosis. Personally reviewed CT images    Assessment and Recommendations:   1. Recurrent/progressive right lung cancer:    Previous being followed by Dr Lilian Hankins with [facility];   -obtained records and imaging  from [facility]. See above summary; .   -High concern for progressive disease: Will have radiology compare new imaging with outside imaging    --Discussed with patient that this may be treatable, but is not curable, goal will be palliative. He stated that he would like to transfer his care to [facility]  -7/29  CT a/p and MRI brain to complete staging.  - according to outside notes: not enough tissue obtained for molecular testing: reviewed imaging with pulmonary and radiology: plan for EBUS  in pm tomorrow (7/30) for additional tissue to run molecular testing      2. SVC syndrome:  Was given dex 10 mg IV then 4 mg q 6h; continue    3.  Pancytopenia:  Due to chemotherapy, radiation; transfuse plt for < 10 and hgb < 7; ? If secondary diagnosis is contributing, as degree is worse than expected; checking HIV    4. Dysphagia: likely due to mass and radiation; agree with fluconazole and sucralfate    5. Pneumonia:  Continue abx as per hospitalist    6. Goals of care  Palliative consult      Plan reviewed with Dr Aidan Figueroa.      Signed By: Carl Gomez NP

## 2019-07-29 NOTE — PALLIATIVE CARE DISCHARGE
Goals of Care/Treatment Preferences    The Palliative Medicine team was consulted as part of your/your loved one's care in the hospital. Our team is a supportive service; we strive to relieve suffering and improve quality of life. We reviewed advance care planning information, which includes the following:  Patient's [place] is[de-identified] Legal Next of Kin  Confirm Advance Directive: None  Patient Would Like to Complete Advance Directive: Yes         We reviewed / discussed your code status as: Full Code     Full Code means perform CPR in the event of cardiac arrest.      DNR means do NOT perform CPR in the event of cardiac arrest.      Partial Code means you have specific preferences, please discuss with your healthcare team.      No Order means this issue was not addressed / resolved during your stay       Other Instructions:  A copy of an Advance Medical Directive was provided for you to review further with your family. This form allows you to appoint trusted individuals to make medical decisions on your behalf if you are unable to speak for yourself. This form also allows you to provide instructions for your health care, which can serve as a guide to your family and health care team.  When completing an AMD, it is always recommended that you discuss your wishes with your loved ones and/or health care agent (also commonly known as \"Medical Power of \"), including a discussion of how you define \"quality of life. \"  Should you choose to complete an AMD, it is recommended that you provide a copy to your health care teams to be included in your medical record. Because of the importance of this information, we are providing you with a printed copy to share with other healthcare providers after this hospitalization is complete.

## 2019-07-29 NOTE — PROGRESS NOTES
Palliative Medicine      Code Status: Full Code    Advance Care Planning:  Advance Care Planning 7/29/2019   Patient's Healthcare Decision Maker is: Legal Next of Kin: Mother Licha Echeverria, [phone]    Confirm Advance Directive None   Patient Would Like to Complete Advance Directive Yes       Patient / Family Encounter Documentation    Participants (names): Pt, mother Angelica Leiva (NP Arie Ruth)    Narrative:  Pt was awake in bed, alert and very pleasant. Mother arrived mid-visit. Pt reports he is , does not have children or siblings, has several aunts and uncles. Pt lives alone, was working prior to diagnosis. Pt indicated his is not currently working, mother has been staying with him to assist with transportation to and from medical appointments. Pt has had 5 ED visits since March, 2 of which have resulted in an admission (including current hospitalization). Pt was diagnosed with lung cancer within the past months, underwent 33 radiation treatments and has started chemo. Pt has been followed at [facility] but wishes to transfer his care/cancer treatment to [facility]. Pt does not have AMD in place but was receptive to receiving information. Copy of AMD was provided for pt to review. Palliative team will be available if pt wishes to complete form while inpt; pt aware that health care teams involved in his care can assist with completion of AMD after discharge from [facility], as well. In absence of valid AMD, pt's mother is legal NOK and would be surrogate decision maker in the event that pt is unable to speak for himself. Psychosocial Issues Identified/ Resilience Factors:  Pt's spirits were light throughout visit but pt acknowledged that he had not been expecting to hear a cancer diagnosis when he sought medical attention for a persistent cough.   Mother is supportive, lives 2 hrs away in [facility] but has been staying with pt in [place] to assist with his care needs.    Goals of Care / Plan: Pt stated he might be discharged home as soon as today or tomorrow. Pt was given copy of AMD to review; Palliative team can assist with completion of form while pt is inpt, if desired. Pt was given information re: outpt Palliative Medicine clinic, in the event that additional support/symptom management would be of benefit in the future. Discussed with Oncology NP. Thank you for including Palliative Medicine in the care of Mr. Yunior Wellington.     Medardo Schilling LCSW, [facility]-  [phone])

## 2019-07-29 NOTE — DISCHARGE SUMMARY
Physician Discharge Summary     Patient ID:  Teressa Murcia  [id number]  38 y.o.  1975    Admit date: 7/27/2019    Discharge date: 7/29/2019    Admission Diagnoses: SVC syndrome [I87.1]    Discharge Diagnoses:  Principal Diagnosis SVC syndrome                                            Principal Problem:    SVC syndrome (7/27/2019)    Active Problems:    Lung cancer ([facility] [address] (7/27/2019)      Pneumonia (7/27/2019)      HTN (hypertension) (7/27/2019)      Pancytopenia ([address] (7/27/2019)      Dysphagia (7/28/2019)         Resolved Problems:  Problem List as of 7/29/2019 Date Reviewed: 7/27/2019          Codes Class Noted - Resolved    Dysphagia ICD-10-CM: R13.10  ICD-9-CM: 787.20  7/28/2019 - Present        * (Principal) SVC syndrome ICD-10-CM: I87.1  ICD-9-CM: 459.2  7/27/2019 - Present        Lung cancer ([facility] [address] (Chronic) ICD-10-CM: C34.90  ICD-9-CM: 162.9  7/27/2019 - Present        Pneumonia ICD-10-CM: J18.9  ICD-9-CM: 143  7/27/2019 - Present        HTN (hypertension) (Chronic) ICD-10-CM: I10  ICD-9-CM: 401.9  7/27/2019 - Present        Pancytopenia ([facility] [address] ICD-10-CM: C59.899  ICD-9-CM: 284.19  7/27/2019 - Present                Hospital Course:   Mr. Kenny Zepeda was admitted to the Hospitalist Service on the 5th floor for treatment of SVC syndrome. He was started on IV steroids and seen by Hem/Onc and restaging scans were done without evidence of additional metastatic disease. Hem/Onc will follow up with him as an outpatient as he wants to transfer his care here. They determined that his previous biopsy was insufficient and molecular markers were not done and so they consulted Pulmonary and EBUS was done to obtain additional tissue for additional testing. He was HIV negative but remained pancytopenic. He was evaluated by GI and recommended to continue PPI, fluconazole and sucralfate    He was discharged home on 7/29/2019 in improved condition.         PCP: Other, Phys, MD    Consults: GI and Hematology/Oncology    Discharge Exam:  See my Progress Note from today. Disposition: home    Patient Instructions:   Current Discharge Medication List      START taking these medications    Details   levoFLOXacin (LEVAQUIN) 750 mg tablet Take 1 Tab by mouth daily for 5 days. Qty: 5 Tab, Refills: 0      dexAMETHasone (DECADRON) 4 mg tablet Take 4 mg by mouth four (4) times daily. Qty: 120 Tab, Refills: 0         CONTINUE these medications which have NOT CHANGED    Details   furosemide (LASIX) 20 mg tablet Take 20 mg by mouth daily. metoprolol succinate (TOPROL-XL) 50 mg XL tablet Take 50 mg by mouth daily. potassium chloride (K-DUR, KLOR-CON) 20 mEq tablet Take 20 mEq by mouth daily. sucralfate (CARAFATE) 1 gram tablet Take 1 g by mouth daily. ferrous sulfate 325 mg (65 mg iron) tablet Take 325 mg by mouth three (3) times daily (with meals). sodium chloride 1 gram tablet Take 1 g by mouth daily. dronabinol (MARINOL) 2.5 mg capsule Take 2.5 mg by mouth two (2) times daily as needed (appetite). guaiFENesin (MUCINEX) 1,200 mg Ta12 ER tablet Take 1,200 mg by mouth two (2) times a day. fluconazole (DIFLUCAN) 100 mg tablet See Admin Instructions. Take two pills on day one and then one pill daily until finished. HYDROcodone-acetaminophen (NORCO) 5-325 mg per tablet Take 1 Tab by mouth every six (6) hours as needed for Pain.      pantoprazole (PROTONIX) 40 mg tablet Take 40 mg by mouth daily.       OTHER Banophen/Lidocaine/Maalox swish and swallow 5 ml for 30 seconds four times daily started 7/26/19         STOP taking these medications       predniSONE (DELTASONE) 10 mg tablet Comments:   Reason for Stopping:              Activity: Activity as tolerated  Diet: Regular Diet  Wound Care: None needed    Follow-up Information     Follow up With Specialties Details Why Contact Info    Other, MD Hermelinda    Patient can only remember the practice name and not the physician 35 minutes were spent on this discharge.     Signed:  Roc Pacheco MD  7/29/2019  1:29 PM

## 2019-07-30 ENCOUNTER — ANESTHESIA (OUTPATIENT)
Dept: ENDOSCOPY | Age: 44
DRG: 299 | End: 2019-07-30
Payer: COMMERCIAL

## 2019-07-30 ENCOUNTER — ANESTHESIA EVENT (OUTPATIENT)
Dept: ENDOSCOPY | Age: 44
DRG: 299 | End: 2019-07-30
Payer: COMMERCIAL

## 2019-07-30 VITALS
OXYGEN SATURATION: 100 % | SYSTOLIC BLOOD PRESSURE: 148 MMHG | BODY MASS INDEX: 19.26 KG/M2 | TEMPERATURE: 97.5 F | WEIGHT: 130 LBS | HEART RATE: 74 BPM | HEIGHT: 69 IN | RESPIRATION RATE: 18 BRPM | DIASTOLIC BLOOD PRESSURE: 93 MMHG

## 2019-07-30 LAB
APTT PPP: 25.4 SEC (ref 22.1–32)
BASOPHILS # BLD: 0 K/UL (ref 0–0.1)
BASOPHILS NFR BLD: 0 % (ref 0–1)
DIFFERENTIAL METHOD BLD: ABNORMAL
EOSINOPHIL # BLD: 0 K/UL (ref 0–0.4)
EOSINOPHIL NFR BLD: 0 % (ref 0–7)
ERYTHROCYTE [DISTWIDTH] IN BLOOD BY AUTOMATED COUNT: 22.8 % (ref 11.5–14.5)
FLUID CULTURE, SPNG2: NORMAL
HCT VFR BLD AUTO: 23.2 % (ref 36.6–50.3)
HGB BLD-MCNC: 7.4 G/DL (ref 12.1–17)
IMM GRANULOCYTES # BLD AUTO: 0 K/UL (ref 0–0.04)
IMM GRANULOCYTES NFR BLD AUTO: 0 % (ref 0–0.5)
INR PPP: 1.2 (ref 0.9–1.1)
L PNEUMO1 AG UR QL IA: NEGATIVE
LYMPHOCYTES # BLD: 0.1 K/UL (ref 0.8–3.5)
LYMPHOCYTES NFR BLD: 5 % (ref 12–49)
M PNEUMO IGG SER IA-ACNC: 1244 U/ML (ref 0–99)
M PNEUMO IGM SER IA-ACNC: <770 U/ML (ref 0–769)
MCH RBC QN AUTO: 21.6 PG (ref 26–34)
MCHC RBC AUTO-ENTMCNC: 31.9 G/DL (ref 30–36.5)
MCV RBC AUTO: 67.6 FL (ref 80–99)
MONOCYTES # BLD: 0.2 K/UL (ref 0–1)
MONOCYTES NFR BLD: 12 % (ref 5–13)
NEUTS SEG # BLD: 1 K/UL (ref 1.8–8)
NEUTS SEG NFR BLD: 83 % (ref 32–75)
NRBC # BLD: 0 K/UL (ref 0–0.01)
NRBC BLD-RTO: 0 PER 100 WBC
ORGANISM ID, SPNG3: NORMAL
PLATELET # BLD AUTO: 62 K/UL (ref 150–400)
PLEASE NOTE, SPNG4: NORMAL
PROTHROMBIN TIME: 11.9 SEC (ref 9–11.1)
RBC # BLD AUTO: 3.43 M/UL (ref 4.1–5.7)
RBC MORPH BLD: ABNORMAL
S PNEUM AG SPEC QL LA: NEGATIVE
SPECIMEN SOURCE: NORMAL
SPECIMEN SOURCE: NORMAL
SPECIMEN, SPNG1: NORMAL
THERAPEUTIC RANGE,PTTT: NORMAL SECS (ref 58–77)
WBC # BLD AUTO: 1.3 K/UL (ref 4.1–11.1)

## 2019-07-30 PROCEDURE — 88341 IMHCHEM/IMCYTCHM EA ADD ANTB: CPT

## 2019-07-30 PROCEDURE — 74011000258 HC RX REV CODE- 258: Performed by: INTERNAL MEDICINE

## 2019-07-30 PROCEDURE — 76060000032 HC ANESTHESIA 0.5 TO 1 HR: Performed by: INTERNAL MEDICINE

## 2019-07-30 PROCEDURE — 74011250637 HC RX REV CODE- 250/637: Performed by: INTERNAL MEDICINE

## 2019-07-30 PROCEDURE — 85610 PROTHROMBIN TIME: CPT

## 2019-07-30 PROCEDURE — 07D78ZX EXTRACTION OF THORAX LYMPHATIC, VIA NATURAL OR ARTIFICIAL OPENING ENDOSCOPIC, DIAGNOSTIC: ICD-10-PCS | Performed by: INTERNAL MEDICINE

## 2019-07-30 PROCEDURE — 36415 COLL VENOUS BLD VENIPUNCTURE: CPT

## 2019-07-30 PROCEDURE — 77030003406 HC NDL ASPIR BIOP OCOA -C: Performed by: INTERNAL MEDICINE

## 2019-07-30 PROCEDURE — 88172 CYTP DX EVAL FNA 1ST EA SITE: CPT

## 2019-07-30 PROCEDURE — 74011000250 HC RX REV CODE- 250

## 2019-07-30 PROCEDURE — 85730 THROMBOPLASTIN TIME PARTIAL: CPT

## 2019-07-30 PROCEDURE — 74011250636 HC RX REV CODE- 250/636

## 2019-07-30 PROCEDURE — 74011250636 HC RX REV CODE- 250/636: Performed by: INTERNAL MEDICINE

## 2019-07-30 PROCEDURE — 88173 CYTOPATH EVAL FNA REPORT: CPT

## 2019-07-30 PROCEDURE — 88342 IMHCHEM/IMCYTCHM 1ST ANTB: CPT

## 2019-07-30 PROCEDURE — 88360 TUMOR IMMUNOHISTOCHEM/MANUAL: CPT

## 2019-07-30 PROCEDURE — 88305 TISSUE EXAM BY PATHOLOGIST: CPT

## 2019-07-30 PROCEDURE — 76040000007: Performed by: INTERNAL MEDICINE

## 2019-07-30 PROCEDURE — 77030009046 HC CATH BRNCH BLLN OCOA -B: Performed by: INTERNAL MEDICINE

## 2019-07-30 PROCEDURE — 74011000250 HC RX REV CODE- 250: Performed by: INTERNAL MEDICINE

## 2019-07-30 PROCEDURE — 85025 COMPLETE CBC W/AUTO DIFF WBC: CPT

## 2019-07-30 RX ORDER — LIDOCAINE HYDROCHLORIDE 40 MG/ML
SOLUTION TOPICAL ONCE
Status: COMPLETED | OUTPATIENT
Start: 2019-07-30 | End: 2019-07-30

## 2019-07-30 RX ORDER — LIDOCAINE HYDROCHLORIDE 20 MG/ML
2 INJECTION, SOLUTION INFILTRATION; PERINEURAL
Status: DISCONTINUED | OUTPATIENT
Start: 2019-07-30 | End: 2019-07-30 | Stop reason: HOSPADM

## 2019-07-30 RX ORDER — ACETYLCYSTEINE 200 MG/ML
4 SOLUTION ORAL; RESPIRATORY (INHALATION) ONCE
Status: DISCONTINUED | OUTPATIENT
Start: 2019-07-30 | End: 2019-07-30 | Stop reason: HOSPADM

## 2019-07-30 RX ORDER — DEXAMETHASONE 4 MG/1
4 TABLET ORAL 3 TIMES DAILY
Qty: 120 TAB | Refills: 0 | Status: SHIPPED | OUTPATIENT
Start: 2019-07-30 | End: 2019-08-27

## 2019-07-30 RX ORDER — SUCCINYLCHOLINE CHLORIDE 20 MG/ML
INJECTION INTRAMUSCULAR; INTRAVENOUS AS NEEDED
Status: DISCONTINUED | OUTPATIENT
Start: 2019-07-30 | End: 2019-07-30 | Stop reason: HOSPADM

## 2019-07-30 RX ORDER — ROCURONIUM BROMIDE 10 MG/ML
INJECTION, SOLUTION INTRAVENOUS AS NEEDED
Status: DISCONTINUED | OUTPATIENT
Start: 2019-07-30 | End: 2019-07-30 | Stop reason: HOSPADM

## 2019-07-30 RX ORDER — PROPOFOL 10 MG/ML
INJECTION, EMULSION INTRAVENOUS AS NEEDED
Status: DISCONTINUED | OUTPATIENT
Start: 2019-07-30 | End: 2019-07-30 | Stop reason: HOSPADM

## 2019-07-30 RX ORDER — LIDOCAINE HYDROCHLORIDE 20 MG/ML
INJECTION, SOLUTION EPIDURAL; INFILTRATION; INTRACAUDAL; PERINEURAL AS NEEDED
Status: DISCONTINUED | OUTPATIENT
Start: 2019-07-30 | End: 2019-07-30 | Stop reason: HOSPADM

## 2019-07-30 RX ORDER — FENTANYL CITRATE 50 UG/ML
INJECTION, SOLUTION INTRAMUSCULAR; INTRAVENOUS AS NEEDED
Status: DISCONTINUED | OUTPATIENT
Start: 2019-07-30 | End: 2019-07-30 | Stop reason: HOSPADM

## 2019-07-30 RX ORDER — LIDOCAINE HYDROCHLORIDE AND EPINEPHRINE 20; 10 MG/ML; UG/ML
2 INJECTION, SOLUTION INFILTRATION; PERINEURAL AS NEEDED
Status: DISCONTINUED | OUTPATIENT
Start: 2019-07-30 | End: 2019-07-30 | Stop reason: HOSPADM

## 2019-07-30 RX ORDER — SODIUM CHLORIDE 9 MG/ML
INJECTION, SOLUTION INTRAVENOUS
Status: DISCONTINUED | OUTPATIENT
Start: 2019-07-30 | End: 2019-07-30 | Stop reason: HOSPADM

## 2019-07-30 RX ORDER — LIDOCAINE HYDROCHLORIDE 20 MG/ML
JELLY TOPICAL ONCE
Status: COMPLETED | OUTPATIENT
Start: 2019-07-30 | End: 2019-07-30

## 2019-07-30 RX ADMIN — PIPERACILLIN SODIUM,TAZOBACTAM SODIUM 3.38 G: 3; .375 INJECTION, POWDER, FOR SOLUTION INTRAVENOUS at 01:04

## 2019-07-30 RX ADMIN — METOPROLOL SUCCINATE 50 MG: 50 TABLET, EXTENDED RELEASE ORAL at 08:53

## 2019-07-30 RX ADMIN — SODIUM CHLORIDE: 9 INJECTION, SOLUTION INTRAVENOUS at 15:11

## 2019-07-30 RX ADMIN — PROPOFOL 120 MG: 10 INJECTION, EMULSION INTRAVENOUS at 14:32

## 2019-07-30 RX ADMIN — SODIUM CHLORIDE: 9 INJECTION, SOLUTION INTRAVENOUS at 14:28

## 2019-07-30 RX ADMIN — SUCRALFATE 1 G: 1 TABLET ORAL at 06:05

## 2019-07-30 RX ADMIN — PIPERACILLIN SODIUM,TAZOBACTAM SODIUM 3.38 G: 3; .375 INJECTION, POWDER, FOR SOLUTION INTRAVENOUS at 08:53

## 2019-07-30 RX ADMIN — SUCCINYLCHOLINE CHLORIDE 100 MG: 20 INJECTION INTRAMUSCULAR; INTRAVENOUS at 14:32

## 2019-07-30 RX ADMIN — PANTOPRAZOLE SODIUM 40 MG: 40 TABLET, DELAYED RELEASE ORAL at 06:05

## 2019-07-30 RX ADMIN — LIDOCAINE HYDROCHLORIDE 80 MG: 20 INJECTION, SOLUTION EPIDURAL; INFILTRATION; INTRACAUDAL; PERINEURAL at 14:31

## 2019-07-30 RX ADMIN — PROPOFOL 80 MG: 10 INJECTION, EMULSION INTRAVENOUS at 14:33

## 2019-07-30 RX ADMIN — LIDOCAINE HYDROCHLORIDE: 20 JELLY TOPICAL at 15:17

## 2019-07-30 RX ADMIN — FENTANYL CITRATE 100 MCG: 50 INJECTION, SOLUTION INTRAMUSCULAR; INTRAVENOUS at 14:31

## 2019-07-30 RX ADMIN — FLUCONAZOLE 100 MG: 100 TABLET ORAL at 08:53

## 2019-07-30 RX ADMIN — ROCURONIUM BROMIDE 5 MG: 10 INJECTION, SOLUTION INTRAVENOUS at 14:31

## 2019-07-30 RX ADMIN — DEXAMETHASONE SODIUM PHOSPHATE 4 MG: 4 INJECTION, SOLUTION INTRAMUSCULAR; INTRAVENOUS at 16:13

## 2019-07-30 RX ADMIN — LIDOCAINE HYDROCHLORIDE: 40 SOLUTION TOPICAL at 13:52

## 2019-07-30 RX ADMIN — Medication 10 ML: at 15:58

## 2019-07-30 RX ADMIN — DEXAMETHASONE SODIUM PHOSPHATE 4 MG: 4 INJECTION, SOLUTION INTRAMUSCULAR; INTRAVENOUS at 03:19

## 2019-07-30 RX ADMIN — DEXAMETHASONE SODIUM PHOSPHATE 4 MG: 4 INJECTION, SOLUTION INTRAMUSCULAR; INTRAVENOUS at 08:53

## 2019-07-30 NOTE — ROUTINE PROCESS
Nupur Flores  1975  [id number]    Situation:  Verbal report received from: DEVI Gould RN  Procedure: Procedure(s):  ENDOSCOPIC BRONCHOSCOPY ULTRASOUND (EBUS)  FINE NEEDLE ASPIRATION    Background:    Preoperative diagnosis: lung cancer  Postoperative diagnosis: * No post-op diagnosis entered *    :  Dr. Alina Paige  Assistant(s): Endoscopy Technician-1: Jose Miguel Rodriguez  Endoscopy RN-1: Carrie Andrade RN    Specimens: * No specimens in log *  H. Pylori  no    Assessment:  Intra-procedure medications   Anesthesia gave intra-procedure sedation and medications, see anesthesia flow sheet yes    Intravenous fluids: NS@ KVO     Vital signs stable     Abdominal assessment: round and soft     Recommendation:  Discharge patient per MD order.   Return to floor  Permission to share finding with family or friend n/a

## 2019-07-30 NOTE — PROGRESS NOTES
Discharge reviewed with patient and mother, verbalizes understanding. Aware of medication change, prescription sent to pharmacy and f/u appt. Patient states nurse is coming to his home tomorrow from home health to change PICC line dressing. Peripheral IV removed.

## 2019-07-30 NOTE — PROGRESS NOTES
Music Therapy Assessment  [address] [place] [id number]     1975  39 y.o.  male    Patient Telephone Number: There is no home phone number on file. Catholic Affiliation:    Language: English   Patient Active Problem List    Diagnosis Date Noted    Cancer related pain     Physical debility     Goals of care, counseling/discussion     ACP (advance care planning)     Dysphagia 07/28/2019    SVC syndrome 07/27/2019    [facility] 07/[facility]    Pneumonia 07/27/2019    HTN (hypertension) 07/27/2019    Pancytopenia ([facility] 07/27/2019        Date: 7/30/2019            Total Time (in minutes): 5          SFM  MED SURG 2    Mental Status:   [x] Alert [  ] Miguel Angel Arslan [  ]  Confused  [  ] Minimally responsive  [  ] Sleeping    Communication Status: [  ] Impaired Speech [  ] Nonverbal -N/A    Physical Status:   [  ] Oxygen in use  [  ] Hard of Hearing [  ] Vision Impaired  [  ] Ambulatory  [  ] Ambulatory with assistance [  ] Non-ambulatory -N/A    Music Preferences, Background: Rap and Hip-Hop. Clinical Problem to be addressed: Support healthy pt/family coping and self-expression. Goal(s) met in session: N/A: Please see Session Observations below. Physical/Pain management (Scale of 1-10):    Pre-session rating: Pt denied pain. Post-session rating: N/A: Please see Session Observations below.    [  ] Increased relaxation   [  ] Affected breathing patterns  [  ] Decreased muscle tension   [  ] Decreased agitation  [  ] Affected heart rate    [  ] Increased alertness     Emotional/Psychological:  [  ] Increased self-expression   [  ] Decreased aggressive behavior   [  ] Decreased feelings of stress  [  ] Discussed healthy coping skills     [  ] Improved mood    [  ] Decreased withdrawn behavior     Social:  [  ] Decreased feelings of isolation/loneliness [  ] Positive social interaction   [  ] Provided support and/or comfort for family/friends    Spiritual:  [  ] Spiritual support    [  ] Expressed peace  [  ] Expressed gale    [  ] Discussed beliefs    Techniques Utilized (Check all that apply): N/A: Please see Session Observations below. [  ] Procedural support MT [  ] Music for relaxation [  ] Patient preferred music  [  ] Nikole analysis  [  ] Song choice  [  ] Music for validation  [  ] Entrainment  [  ] Movement to music [  ] Guided visualization  [  ] Leliviaa Beech  [  ] Patient instrument playing [  ] Luther Cisneros writing  [  ] Rochelle Hollingsworth along   [  ] Stephanie Less  [  ] Sensory stimulation  [  ] Active Listening  [  ] Music for spiritual support [  ] Making of CDs as gifts    Session Observations: F/up visit; Patient (pt) was alert lying in bed and pt's mom was at bedside. This music therapy intern (MT intern) asked pt how he was feeling and about his music preferences, which he shared. Pt declined having music therapy saying this was not a good time though was open to a follow up visit at another time. MT intern expressed understanding and will follow as able.      Nora \"Rachan\" Kevin Becker Music Therapy Intern  Spiritual Care Department  Referral-based service

## 2019-07-30 NOTE — CONSULTS
[facility]  Pulmonary, Critical Care, and Sleep Medicine    Initial Patient Consult    Name: Adriane Olivas MRN: [id number]   : 1975 Hospital: [facility]   Date: 2019        IMPRESSION:   · Metastatic NSCLC s/p chemo/XRT  · R pleural effusion, suspect maligant  · SVC syndrome  · Radiation esophagitis  · Pancytopenia  · HTN      RECOMMENDATIONS:   · Supplemental O2 as needed to keep sats > 88%. Currently on RA  · Check coags  · Will plan for EBUS tomorrow to try to obtain further tissue for molecular markers  · Could also consider thoracentesis   · HIV pending  · Do not suspect pna. Does not need abx from a pulmonary standpoint  · Please keep NPO after ~6am    Thank you for the consult  Will follow     Subjective:     Asked to see Mr. Omero Eduardo by heme-onc for bronch/EBUS. Mr. Omero Eduardo is a 38yo male w/ history of metastatic NSCLC (favored to be squamous cell) s/p chemo and XRT and HTN who presented to the ER on  w/ complaints of chest pain when eating, odynophagia, dry cough and dyspnea on exertion. Symptoms of chest pain/odynophagia are thought to be due to radiation esophagitis. During this hospital stay, he has asked to transition care to heme-onc here. We have been asked to see the patient to perform bronch/EBUS in order to obtain additional tissue for tumor markers as initial biopsy results didnt have enough tissue for molecular testing. Prior PET scan results (2019): FDG avid para medistinal mass in the RUL consistent w/ malignancy, satellite lesions in the RUL, small right pleural effusion, R cervical and subcarnal lymph nodes and FDG avid subcutaneous density in the left groin concerning for metastatis disease    Based on the above PET and current CT scans, radiology does not see any cervical nodes amenable to biopsy and they also say that the more peripheral RUL lesion did not light up on prior PET.   Thoracentesis can be done; however, oncology would prefer attempts to obtain tissue from mediastinal nodes. In talking to the patient, the cancer was diagnosed by mediastinoscopy (heme-onc notes mention 4R). Prior to this, he describes having a bronchoscopy but further details about this are unknown including whether or not it was an EBUS and whether or not anything was biopsied. Past Medical History:   Diagnosis Date    Anemia, iron deficiency     Aphagia     2/2 radiation    Hypertension     Lung cancer ([facility] 7/27/2019    Pneumonia involving right lung       No past surgical history on file. Prior to Admission medications    Medication Sig Start Date End Date Taking? Authorizing Provider   levoFLOXacin (LEVAQUIN) 750 mg tablet Take 1 Tab by mouth daily for 5 days. 7/29/19 8/3/19 Yes Urvashi Goldman MD   dexAMETHasone (DECADRON) 4 mg tablet Take 4 mg by mouth four (4) times daily. 7/29/19  Yes Urvashi Goldman MD   furosemide (LASIX) 20 mg tablet Take 20 mg by mouth daily. Yes Provider, Historical   metoprolol succinate (TOPROL-XL) 50 mg XL tablet Take 50 mg by mouth daily. Yes Provider, Historical   potassium chloride (K-DUR, KLOR-CON) 20 mEq tablet Take 20 mEq by mouth daily. Yes Provider, Historical   sucralfate (CARAFATE) 1 gram tablet Take 1 g by mouth daily. Yes Provider, Historical   ferrous sulfate 325 mg (65 mg iron) tablet Take 325 mg by mouth three (3) times daily (with meals). Yes Provider, Historical   sodium chloride 1 gram tablet Take 1 g by mouth daily. Yes Provider, Historical   dronabinol (MARINOL) 2.5 mg capsule Take 2.5 mg by mouth two (2) times daily as needed (appetite). Yes Provider, Historical   guaiFENesin (MUCINEX) 1,200 mg Ta12 ER tablet Take 1,200 mg by mouth two (2) times a day. Yes Provider, Historical   fluconazole (DIFLUCAN) 100 mg tablet See Admin Instructions.  Take two pills on day one and then one pill daily until finished. 7/21/19 7/30/19 Yes Provider, Historical HYDROcodone-acetaminophen (NORCO) 5-325 mg per tablet Take 1 Tab by mouth every six (6) hours as needed for Pain. Yes Provider, Historical   pantoprazole (PROTONIX) 40 mg tablet Take 40 mg by mouth daily. Yes Provider, Historical   predniSONE (DELTASONE) 10 mg tablet Take  by mouth See Admin Instructions. Take 20 mg daily x 7 days and then 10 mg daily x 7 days   Prednisone started on 19 Yes Provider, Historical   OTHER Banophen/Lidocaine/Maalox swish and swallow 5 ml for 30 seconds four times daily started 19   Yes Provider, Historical     No Known Allergies   Social History     Tobacco Use    Smoking status: Not on file   Substance Use Topics    Alcohol use: Not Currently      Family History   Problem Relation Age of Onset    Cancer Neg Hx         Current Facility-Administered Medications   Medication Dose Route Frequency    sucralfate (CARAFATE) tablet 1 g  1 g Oral AC&HS    levoFLOXacin (LEVAQUIN) 750 mg in D5W IVPB  750 mg IntraVENous Q24H    piperacillin-tazobactam (ZOSYN) 3.375 g in 0.9% sodium chloride (MBP/ADV) 100 mL  3.375 g IntraVENous Q8H    0.9% sodium chloride infusion  75 mL/hr IntraVENous CONTINUOUS    sodium chloride (NS) flush 5-40 mL  5-40 mL IntraVENous Q8H    pantoprazole (PROTONIX) tablet 40 mg  40 mg Oral ACB    dexamethasone (DECADRON) 4 mg/mL injection 4 mg  4 mg IntraVENous Q6H    fluconazole (DIFLUCAN) tablet 100 mg  100 mg Oral DAILY    metoprolol succinate (TOPROL-XL) XL tablet 50 mg  50 mg Oral DAILY       Review of Systems:  A comprehensive review of systems was negative except for that written in the HPI.     Objective:   Vital Signs:    Visit Vitals  /67 (BP 1 Location: Right arm, BP Patient Position: At rest)   Pulse 81   Temp 97.6 °F (36.4 °C)   Resp 20   Ht 5' 9\" (1.753 m)   Wt 59 kg (130 lb)   SpO2 100%   BMI 19.20 kg/m²       O2 Device: Room air       Temp (24hrs), Av.9 °F (36.6 °C), Min:97.6 °F (36.4 °C), Max:98.3 °F (36.8 °C)       Intake/Output:   Last shift:      No intake/output data recorded. Last 3 shifts: 07/28 0701 - 07/29 1900  In: 900 [P.O.:900]  Out: 200 [Urine:200]    Intake/Output Summary (Last 24 hours) at 7/29/2019 2314  Last data filed at 7/29/2019 4324  Gross per 24 hour   Intake 480 ml   Output --   Net 480 ml      Physical Exam:   General:  Alert, cooperative, no distress   Head:  Normocephalic, without obvious abnormality, atraumatic. Eyes:  Conjunctivae/corneas clear. PERRL, EOMs intact. Nose: Nares normal. Septum midline. Mucosa normal. No drainage or sinus tenderness. Throat: Lips, mucosa, and tongue normal. Teeth and gums normal.   Neck: Supple, symmetrical, trachea midline, no adenopathy, thyroid: no enlargment/tenderness/nodules, no carotid bruit and no JVD. Back:   Symmetric, no curvature. ROM normal.   Lungs:   Diminished in the R base   Chest wall:  No tenderness or deformity. Heart:  Regular rate and rhythm, S1, S2 normal, no murmur, click, rub or gallop. Abdomen:   Soft, non-tender. Bowel sounds normal. No masses,  No organomegaly. Extremities: Extremities normal, atraumatic, no cyanosis or edema. Pulses: 2+ and symmetric all extremities.    Skin: Skin color, texture, turgor normal. No rashes or lesions   Lymph nodes: Cervical, supraclavicular, and axillary nodes normal.   Neurologic: Grossly nonfocal     Data review:     Recent Results (from the past 24 hour(s))   HIV 1/2 AG/AB, 4TH GENERATION,W RFLX CONFIRM    Collection Time: 07/29/19  2:47 PM   Result Value Ref Range    HIV 1/2 Interpretation NONREACTIVE NR      HIV 1/2 result comment SEE NOTE     PTT    Collection Time: 07/29/19  5:48 PM   Result Value Ref Range    aPTT 22.4 22.1 - 32.0 sec    aPTT, therapeutic range     58.0 - 77.0 SECS   PROTHROMBIN TIME + INR    Collection Time: 07/29/19  5:48 PM   Result Value Ref Range    INR 1.1 0.9 - 1.1      Prothrombin time 11.5 (H) 9.0 - 11.1 sec       Imaging:  I have personally reviewed the patients radiographs and have reviewed the reports:          Patti Fitzpatrick MD

## 2019-07-30 NOTE — PROGRESS NOTES
Music Therapy Assessment  [address] [place] [id number]     1975  39 y.o.  male    Patient Telephone Number: There is no home phone number on file. Religion Affiliation:    Language: English   Patient Active Problem List    Diagnosis Date Noted    Cancer related pain     Physical debility     Goals of care, counseling/discussion     ACP (advance care planning)     Dysphagia 07/28/2019    SVC syndrome 07/27/2019    Lung cancer ([facility] 07/27/2019    Pneumonia 07/27/2019    HTN (hypertension) 07/27/2019    Pancytopenia ([facility] 07/27/2019        Date: 7/30/2019            Total Time (in minutes): 5          [facility]    Mental Status:   [  ] Alert [  ] [place] [  ]  Confused  [  ] Minimally responsive  [  ] Sleeping -N/A    Communication Status: [  ] Impaired Speech [  ] Nonverbal -N/A    Physical Status:   [  ] Oxygen in use  [  ] Hard of Hearing [  ] Vision Impaired  [  ] Ambulatory  [  ] Ambulatory with assistance [  ] Non-ambulatory -N/A    Music Preferences, Background: N/A: Please see Session Observations below. Clinical Problem addressed: N/A: Please see Session Observations below. Goal(s) met in session: N/A: Please see Session Observations below. Physical/Pain management (Scale of 1-10):    Pre-session rating: N/A: Please see Session Observations below. Post-session rating: N/A: Please see Session Observations below.    [  ] Increased relaxation   [  ] Affected breathing patterns  [  ] Decreased muscle tension   [  ] Decreased agitation  [  ] Affected heart rate    [  ] Increased alertness     Emotional/Psychological:  [  ] Increased self-expression   [  ] Decreased aggressive behavior   [  ] Decreased feelings of stress  [  ] Discussed healthy coping skills     [  ] Improved mood    [  ] Decreased withdrawn behavior     Social:  [  ] Decreased feelings of isolation/loneliness [  ] Positive social interaction   [  ] Provided support and/or comfort for family/friends    Spiritual:  [  ] Spiritual support    [  ] Expressed peace  [  ] Expressed gale    [  ] Discussed beliefs    Techniques Utilized (Check all that apply): N/A: Please see Session Observations below. [  ] Procedural support MT [  ] Music for relaxation [  ] Patient preferred music  [  ] Nikole analysis  [  ] Song choice  [  ] Music for validation  [  ] Entrainment  [  ] Movement to music [  ] Guided visualization  [  ] Hung Valle  [  ] Patient instrument playing [  ] Adiel Guzman writing  [  ] Artemio Conde along   [  ] Anupama Boucher  [  ] Sensory stimulation  [  ] Active Listening  [  ] Music for spiritual support [  ] Making of CDs as gifts    Session Observations: Referral from Shanta Kuhn, Palliative NP. This music therapy intern (MT intern) attempted to see this patient (pt) and learned from pt's nurse that pt was off of the unit receiving a procedure. Pt's nurse recommended MT intern to follow up with pt at a later time. MT intern expressed understanding and will follow as able.     Nora \"Rachna\" Kevin Becker Music Therapy Intern  Spiritual Care Department  Referral-based service

## 2019-07-30 NOTE — ANESTHESIA POSTPROCEDURE EVALUATION
Procedure(s):  ENDOSCOPIC BRONCHOSCOPY ULTRASOUND (EBUS)  FINE NEEDLE ASPIRATION. general    Anesthesia Post Evaluation      Multimodal analgesia: multimodal analgesia not used between 6 hours prior to anesthesia start to PACU discharge  Patient location during evaluation: PACU  Patient participation: complete - patient participated  Level of consciousness: awake  Pain management: adequate  Airway patency: patent  Anesthetic complications: no  Cardiovascular status: acceptable, blood pressure returned to baseline and hemodynamically stable  Respiratory status: acceptable  Hydration status: acceptable  Post anesthesia nausea and vomiting:  controlled      Vitals Value Taken Time   /92 7/30/2019  3:33 PM   Temp     Pulse 72 7/30/2019  3:34 PM   Resp 22 7/30/2019  3:34 PM   SpO2 100 % 7/30/2019  3:34 PM   Vitals shown include unvalidated device data.

## 2019-07-30 NOTE — PROGRESS NOTES
Home health resumption order received and faxed in [employer] to [place]. Transition of Care Plan  1. Discharge home with family transport. 2. Follow up with Dr. Wilkie Dance  3.  Resume HH with [place] [facility] LCSW

## 2019-07-30 NOTE — PROGRESS NOTES
[facility] [place] [facility] [address]  [address], [place], [address] [facility]  [phone]      Medical Progress Note      NAME: Hayley Garcia   :  1975  MRM:  [id number]    Date/Time: 2019  10:53 AM          Subjective:     Chief Complaint:  Pain: chest, mainly with swallowing, again slightly better today    ROS:  (bold if positive, if negative)                   Chest Pain     Tolerating Diet          Objective:       Vitals:          Last 24hrs VS reviewed since prior progress note.  Most recent are:    Visit Vitals  /84 (BP 1 Location: Right arm, BP Patient Position: At rest)   Pulse 74   Temp 97.7 °F (36.5 °C)   Resp 18   Ht 5' 9\" (1.753 m)   Wt 59 kg (130 lb)   SpO2 100%   BMI 19.20 kg/m²     SpO2 Readings from Last 6 Encounters:   19 100%          No intake or output data in the 24 hours ending 19 1053       Exam:     Physical Exam:    Gen:  Well-developed, well-nourished, in no acute distress  HEENT:  Pink conjunctivae, PERRL, hearing intact to voice, moist mucous membranes  Neck:  Supple, without masses, thyroid non-tender  Resp:  No accessory muscle use, clear breath sounds without wheezes rales or rhonchi  Card:  No murmurs, normal S1, S2 without thrills, bruits or peripheral edema  Abd:  Soft, non-tender, non-distended, normoactive bowel sounds are present, no palpable organomegaly and no detectable hernias  Lymph:  No cervical or inguinal adenopathy  Musc:  No cyanosis or clubbing  Skin:  No rashes or ulcers, skin turgor is good  Neuro:  Cranial nerves are grossly intact, no focal motor weakness, follows commands appropriately  Psych:  Good insight, oriented to person, place and time, alert       Medications Reviewed: (see below)    Lab Data Reviewed: (see below)    ______________________________________________________________________    Medications:     Current Facility-Administered Medications   Medication Dose Route Frequency    sucralfate (CARAFATE) tablet 1 g  1 g Oral AC&HS    0.9% sodium chloride infusion  75 mL/hr IntraVENous CONTINUOUS    sodium chloride (NS) flush 5-40 mL  5-40 mL IntraVENous Q8H    sodium chloride (NS) flush 5-40 mL  5-40 mL IntraVENous PRN    acetaminophen (TYLENOL) tablet 650 mg  650 mg Oral Q4H PRN    oxyCODONE-acetaminophen (PERCOCET) 5-325 mg per tablet 1 Tab  1 Tab Oral Q4H PRN    HYDROmorphone (PF) (DILAUDID) injection 0.5 mg  0.5 mg IntraVENous Q4H PRN    prochlorperazine (COMPAZINE) injection 10 mg  10 mg IntraVENous Q6H PRN    zolpidem (AMBIEN) tablet 5 mg  5 mg Oral QHS PRN    pantoprazole (PROTONIX) tablet 40 mg  40 mg Oral ACB    dexamethasone (DECADRON) 4 mg/mL injection 4 mg  4 mg IntraVENous Q6H    fluconazole (DIFLUCAN) tablet 100 mg  100 mg Oral DAILY    metoprolol succinate (TOPROL-XL) XL tablet 50 mg  50 mg Oral DAILY            Lab Review:     Recent Labs     07/30/19  0603 07/28/19 0229 07/27/19  1454   WBC 1.3* 1.0* 1.1*   HGB 7.4* 7.9* 7.6*   HCT 23.2* 25.0* 24.3*   PLT 62* 61* 61*     Recent Labs     07/30/19  0719 07/29/19  1748 07/28/19 0229 07/27/19  1131   NA  --   --  136 140   K  --   --  4.0 3.7   CL  --   --  104 106   CO2  --   --  27 25   GLU  --   --  158* 101*   BUN  --   --  16 18   CREA  --   --  0.74 0.94   CA  --   --  8.6 8.8   MG  --   --  1.6  --    PHOS  --   --  3.3  --    ALB  --   --   --  3.1*   TBILI  --   --   --  0.4   SGOT  --   --   --  16   ALT  --   --   --  19   INR 1.2* 1.1  --   --      No results found for: GLUCPOC  No results for input(s): PH, PCO2, PO2, HCO3, FIO2 in the last 72 hours.   Recent Labs     07/30/19  0719 07/29/19  1748   INR 1.2* 1.1     No results found for: SDES  Lab Results   Component Value Date/Time    Culture result: NO GROWTH 3 DAYS 07/27/2019 02:54 PM            Assessment:     Principal Problem:    SVC syndrome (7/27/2019)    Active Problems:    Lung cancer ([address] (7/27/2019)      Pneumonia (7/27/2019)      HTN (hypertension) (7/27/2019) Pancytopenia ([facility] (7/27/2019)      Dysphagia (7/28/2019)           Plan:     Principal Problem:    SVC syndrome (7/27/2019)   - continue steroids, some improvement   - Hem/Onc input appreciated   - outpatient follow up here    Active Problems:    Lung cancer ([facility] (7/27/2019)   - awaiting EBUS today for additional tissue    - outpatient follow up here   - Dr. Jalil Hernandez has ordered biopsy for additional testing   - home after biopsy      Pneumonia (7/27/2019)   - per Pulmonary, no antibiotics indicated, stopped everything      HTN (hypertension) (7/27/2019)   - BP okay on metoprolol      Pancytopenia ([facility] (7/27/2019)   - per Dr. Jalil Hernandez, concerning for another cause given length of time since chemo/radiation   - HIV negative      Dysphagia   - GI input appreciated, continue acid suppression, sucralfate, fluconazole      Total time spent in patient care: 35 minutes                  Care Plan discussed with: Patient, Family, Nursing Staff and Dr. Jalil Hernandez    Discussed:  Code Status, Care Plan and D/C Planning    Prophylaxis:  SCD's    Disposition:  Home w/Family           ___________________________________________________    Attending Physician: Kelin Meeks MD

## 2019-07-30 NOTE — PROGRESS NOTES
[facility] [place] at [address], [address] [facility] [address] [facility]  Keith Lucian: [phone]  F: [phone]      Reason for Visit:   Tramaine Grove is a 39 y.o. male who is seen in consultation at the request of Dr. Agusto Warren for evaluation of recurrent/progressive lung cancer, SVC syndrome      Hematology / Oncology Treatment History:   chemoXRT at [facility], completed 2 weeks ago    Reviewed last note dated 7/12/2019 Dr Majo Lutz from [address] [facility]  Followed for non-small cell lung carcinoma and anemia  R level 4 lymph node bx showed non small cell carcinoma, favored squamous cell carcinoma  Received C2 cisplatin and etoposide on 7/16; was receiving radiation to chest.     Assess/plan  1. Non-small cell lung cancer. Locally advanced. Pt with SVC syndrome. Radiation to chest for SVC  Received weekly carboplatin AUC 2 but during 2nd dose of taxol pt developed allergic reaction to taxol    PET 4/23/19 showed FDG avid para medistinal mass in the RUL consistent w/ malignancy, satellite lesions in the RUL, small right pleural effusion, R cervical and subcarnal lymph nodes and FDG avid subcutaneous density in the left groin concerning for metastatis disease  Rlevel 4 lymph node bx showed non small cell carcinoma favored squamous cell carcinoma. But not enough tissue for molecular testing. Reviewed with pathologist and cardiothoracic surgeon; not able to get any additional tissue for molecular testing. Due to allergic reaction to taxol started on Etoposide and cisplatin. 2. Microcytic hypochromic anemia  2/2 to chemo and low Fe levels; on ferrous sulfate 325 mg TID    3. Hypokalemia  KCL 20 gilma daily    4. Anorexia  Marinol BID    5.  PICC line/ right common femoral vein due to SVC; pt unable to get erin cath    Pt reports received XRT x 33 txs to chest; due for next tx on 8/6    History of Present Illness:     Admitted on 7/27/19 complaining of chest pain, almost only when he eats, intermittent, radiating to right arm at times. No pain currently. No SOB. Interval History:     Denies any SOB; has been able to tolerate small amts of solid food. Has some pain to rt chest area when eating or drinking fluids. Denies any additional pain or N/V. Mother at bedside. Past Medical History:   Diagnosis Date    Anemia, iron deficiency     Aphagia     2/2 radiation    Hypertension     Lung cancer ([facility] [address] 7/27/2019    Pneumonia involving right lung       No past surgical history on file. Social History     Tobacco Use    Smoking status: Not on file   Substance Use Topics    Alcohol use: Not Currently      Family History   Problem Relation Age of Onset    Cancer Neg Hx      Current Facility-Administered Medications   Medication Dose Route Frequency    sucralfate (CARAFATE) tablet 1 g  1 g Oral AC&HS    0.9% sodium chloride infusion  75 mL/hr IntraVENous CONTINUOUS    sodium chloride (NS) flush 5-40 mL  5-40 mL IntraVENous Q8H    sodium chloride (NS) flush 5-40 mL  5-40 mL IntraVENous PRN    acetaminophen (TYLENOL) tablet 650 mg  650 mg Oral Q4H PRN    oxyCODONE-acetaminophen (PERCOCET) 5-325 mg per tablet 1 Tab  1 Tab Oral Q4H PRN    HYDROmorphone (PF) (DILAUDID) injection 0.5 mg  0.5 mg IntraVENous Q4H PRN    prochlorperazine (COMPAZINE) injection 10 mg  10 mg IntraVENous Q6H PRN    zolpidem (AMBIEN) tablet 5 mg  5 mg Oral QHS PRN    pantoprazole (PROTONIX) tablet 40 mg  40 mg Oral ACB    dexamethasone (DECADRON) 4 mg/mL injection 4 mg  4 mg IntraVENous Q6H    fluconazole (DIFLUCAN) tablet 100 mg  100 mg Oral DAILY    metoprolol succinate (TOPROL-XL) XL tablet 50 mg  50 mg Oral DAILY      No Known Allergies     Review of Systems: A complete review of systems was obtained, negative except as described above.       Physical Exam:     Visit Vitals  /90 (BP 1 Location: Right arm, BP Patient Position: At rest)   Pulse 70   Temp 97.9 °F (36.6 °C)   Resp 16   Ht 5' 9\" (1.753 m)   Wt 59 kg (130 lb)   SpO2 100%   BMI 19.20 kg/m²     ECOG PS: 1  General: No distress  Eyes: PERRLA, anicteric sclerae  HENT: Atraumatic with normal appearance of ears and nose; OP clear  Neck: Supple; no thyromegaly   Lymphatic: No cervical, supraclavicular, or axillary adenopathy  Respiratory: diminished on RLL; , normal respiratory effort  CV: Normal rate, regular rhythm, no murmurs, no peripheral edema  GI: Soft, nontender, nondistended, no masses, no hepatomegaly, no splenomegaly  MS:  PICC line noted to rt upper leg; Digits without clubbing or cyanosis. Skin: radiation burns to rt upper lower neck, upper chest and rt upper back area. Neuro/Psych: Alert, oriented, appropriate affect, normal judgment/insight      Results:     Lab Results   Component Value Date/Time    WBC 1.3 (L) 07/30/2019 06:03 AM    HGB 7.4 (L) 07/30/2019 06:03 AM    HCT 23.2 (L) 07/30/2019 06:03 AM    PLATELET 62 (L) 59/26/4981 06:03 AM    MCV 67.6 (L) 07/30/2019 06:03 AM    ABS. NEUTROPHILS 1.0 (L) 07/30/2019 06:03 AM     Lab Results   Component Value Date/Time    Sodium 136 07/28/2019 02:29 AM    Potassium 4.0 07/28/2019 02:29 AM    Chloride 104 07/28/2019 02:29 AM    CO2 27 07/28/2019 02:29 AM    Glucose 158 (H) 07/28/2019 02:29 AM    BUN 16 07/28/2019 02:29 AM    Creatinine 0.74 07/28/2019 02:29 AM    GFR est AA >60 07/28/2019 02:29 AM    GFR est non-AA >60 07/28/2019 02:29 AM    Calcium 8.6 07/28/2019 02:29 AM     Lab Results   Component Value Date/Time    Bilirubin, total 0.4 07/27/2019 11:31 AM    ALT (SGPT) 19 07/27/2019 11:31 AM    AST (SGOT) 16 07/27/2019 11:31 AM    Alk. phosphatase 148 (H) 07/27/2019 11:31 AM    Protein, total 6.8 07/27/2019 11:31 AM    Albumin 3.1 (L) 07/27/2019 11:31 AM    Globulin 3.7 07/27/2019 11:31 AM     7/27/19 CT chest  IMPRESSION:    1.  Large right paratracheal mass as described, consistent with metastatic  adenopathy, with resultant occlusion of the SVC, occlusion of the superior  azygos vein and tumor/thrombus extending into the left brachiocephalic vein, and  numerous collaterals throughout the mediastinum. Findings are consistent with  SVC syndrome. The mass also appears to occlude the right upper lobe pulmonary  artery. 2. Multiple pulmonary nodules in the right upper lobe as described above,  consistent with some combination of the primary lung malignancy and metastatic  disease. Multiple other small consolidative and groundglass opacities in the  right upper lobe, which may represent postobstructive atelectasis or pneumonia. Correlation with prior imaging if available is recommended. 3. Large right pleural effusion. Trace perihepatic ascites. 4. Sclerosis in the juxta-articular humeral heads, suspicious for avascular  Necrosis. Personally reviewed CT images    Assessment and Recommendations:   1. Recurrent/progressive right lung cancer:    Previous being followed by Dr Erica Jones with [address];   -obtained records and imaging  from [facility]. See above summary; . -Will have radiology compare new imaging with outside imaging;    --Discussed with patient that this may be treatable, but is not curable, goal will be palliative. He stated that he would like to transfer his care to [facility]  -7/29  CT a/p and MRI brain negative for metastatic disease  - according to outside notes: not enough tissue obtained for molecular testing: reviewed imaging with pulmonary and radiology: plan for EBUS today (7/30) for additional tissue to run molecular testing      2. SVC syndrome:  Was given dex 10 mg IV then 4 mg q 6h; continue    3. Pancytopenia:  Due to chemotherapy, radiation; transfuse plt for < 10 and hgb < 7; ? If secondary diagnosis is contributing, as degree is worse than expected; HIV negative  -counts stable today; continue to monitor     4. Dysphagia: likely due to mass and radiation; agree with fluconazole and sucralfate  -GI consulted    5.  Pneumonia: pulmonary following; do not suspect PNA;  abx stopped    6. Goals of care  Palliative consult      Plan reviewed with Dr Aidan Figueroa.      Signed By: Carl Gomez NP

## 2019-07-30 NOTE — PROGRESS NOTES
Spoke with Dr. Antonio Cummins, Allyssa laura for patient to discharge home today from pulmonary standpoint. Patient alert and oriented, ready for discharge.

## 2019-07-30 NOTE — ANESTHESIA PREPROCEDURE EVALUATION
Relevant Problems   No relevant active problems       Anesthetic History   No history of anesthetic complications            Review of Systems / Medical History  Patient summary reviewed, nursing notes reviewed and pertinent labs reviewed    Pulmonary  Within defined limits                 Neuro/Psych   Within defined limits           Cardiovascular  Within defined limits  Hypertension              Exercise tolerance: >4 METS     GI/Hepatic/Renal  Within defined limits              Endo/Other  Within defined limits      Cancer     Other Findings   Comments: Lung cancer s/p radiation    Pneumonia hx           Physical Exam    Airway  Mallampati: II    Neck ROM: normal range of motion   Mouth opening: Normal     Cardiovascular  Regular rate and rhythm,  S1 and S2 normal,  no murmur, click, rub, or gallop  Rhythm: regular  Rate: normal         Dental  No notable dental hx       Pulmonary  Breath sounds clear to auscultation               Abdominal  GI exam deferred       Other Findings            Anesthetic Plan    ASA: 3  Anesthesia type: general          Induction: Intravenous  Anesthetic plan and risks discussed with: Patient

## 2019-07-30 NOTE — PERIOP NOTES
[id number]: Anesthesia staff at patient's bedside administering anesthesia and monitoring patients vital signs throughout procedure. See anesthesia note. 1520: Patient tolerated procedure without problems. Patient arousable and voices no complaints Report received from CRNA, see anesthesia note. Patient transported to endoscopy recovery area.   1523: Report given to the recovery nurse Kaye Rodriguez

## 2019-07-30 NOTE — PROCEDURES
[address] [facility]  [address]  [address]  [phone]    June Nurys  1975  [id number]      Date of Procedure: 7/30/2019    Preoperative diagnosis: lung cancer    Procedure: Procedure(s):  ENDOSCOPIC BRONCHOSCOPY ULTRASOUND (EBUS)  FINE NEEDLE ASPIRATION    Indication: NSCLC    :  Mayra Chan MD    Assistant(s): Endoscopy Technician-1: Fatoumata Wilkinson  Endoscopy RN-1: Miesha Melvin RN    Anesthesia/Sedation:  General anesthesia      Procedure Details:  After infomed consent was obtained for the procedure, with all risks and benefits of procedure explained the patient was taken to the endoscopy suite and placed in the supine position. Following sequential administration of sedation as per above, the bronchoscope was inserted into the ETT and advanced under direct vision to the tracheobronchial tree      Findings:   Bronchoscopy visualized radiation changes/inflammation in anteriorlateral part of the distant trachea  EBUS: Large bulky lymphadenopathy/mass in 4R position. TBNA performed with 8 passes    Therapies:   None    Specimens:   See above           Complications:   None noted; patient tolerated the procedure well.     EBL:  Minimal           Impression:    Lung cancer    Recommendations:   Await cytology      Beth Magaña MD  7/30/2019  3:21 PM

## 2019-07-30 NOTE — ACP (ADVANCE CARE PLANNING)
responded to an in-basket request to assist Rod Correa with an Advance Medical Directive (AMD) on the Med. Surgical floor.  consulted with the Palliative Care team who informed  that Rod Correa has already received the AMD information 7/29 but was not interested in filling out the form at this time. He is aware that he can fill out this form with his other health care providers after discharge.  will follow up as able and/or needed  Ana Farley. Arnold Epstein.      Paging Service: [phone])

## 2019-07-30 NOTE — PROGRESS NOTES
[facility]  Pulmonary, Critical Care, and Sleep Medicine    Progress Note    Name: Day Woodruff MRN: [id number]   : 1975 Hospital: [address] [facility]   Date: 2019        IMPRESSION:   · Metastatic NSCLC s/p chemo/XRT  · R pleural effusion, suspect maligant  · SVC syndrome  · Radiation esophagitis  · Pancytopenia  · HTN      RECOMMENDATIONS:   · Supplemental O2 as needed to keep sats > 88%. Currently on RA  · Will plan for EBUS this afternoon at 2:30pm to try to obtain further tissue for molecular markers  · Do not suspect pna. Does not need abx from a pulmonary standpoint  · Decadron per hematology  · Protonix       Subjective:     Asked to see Mr. Rex Hope by heme-onc for bronch/EBUS. Mr. Rex Hope is a 36yo male w/ history of metastatic NSCLC (favored to be squamous cell) s/p chemo and XRT and HTN who presented to the ER on  w/ complaints of chest pain when eating, odynophagia, dry cough and dyspnea on exertion. Symptoms of chest pain/odynophagia are thought to be due to radiation esophagitis. During this hospital stay, he has asked to transition care to heme-onc here. We have been asked to see the patient to perform bronch/EBUS in order to obtain additional tissue for tumor markers as initial biopsy results didnt have enough tissue for molecular testing. Prior PET scan results (2019): FDG avid para medistinal mass in the RUL consistent w/ malignancy, satellite lesions in the RUL, small right pleural effusion, R cervical and subcarnal lymph nodes and FDG avid subcutaneous density in the left groin concerning for metastatis disease    Chest CT with large right paratracheal mass consistent with metastatic adenopathy, with resultant occlusion of the SVC, occlusion of the superior azygos vein and tumor/thrombus extending into the left brachiocephalic vein, and numerous collaterals throughout the mediastinum. Findings are consistent with SVC syndrome.  The mass also appears to occlude the right upper lobe pulmonary artery. Multiple pulmonary nodules in the right upper lobe consistent with some combination of the primary lung malignancy and metastatic disease. Multiple other small consolidative and groundglass opacities in the right upper lobe, which may represent postobstructive atelectasis or pneumonia. Large right pleural effusion. Based on the above PET and current CT scans, radiology does not see any cervical nodes amenable to biopsy and they also say that the more peripheral RUL lesion did not light up on prior PET. Thoracentesis can be done; however, oncology would prefer attempts to obtain tissue from mediastinal nodes. In talking to the patient, the cancer was diagnosed by mediastinoscopy (heme-onc notes mention 4R). Prior to this, he describes having a bronchoscopy but further details about this are unknown including whether or not it was an EBUS and whether or not anything was biopsied. Interval history  Afebrile  Sats 100% on RA  WBC 1.3  Hgb 7.4  INR 1.2  Blood bx ngtd  RVP negative  HIV nonreactive  Pna work up in process  Brain MRI negative for acute process and no evidence of mets    ROS: Has no complaints this morning. Denies SOB or CP. Denies fever, chills, or cough. Denies abd pain (feels hungry) or LE pain/swelling. Past Medical History:   Diagnosis Date    Anemia, iron deficiency     Aphagia     2/2 radiation    Hypertension     Lung cancer ([facility] [address] 7/27/2019    Pneumonia involving right lung       No past surgical history on file. Prior to Admission medications    Medication Sig Start Date End Date Taking? Authorizing Provider   dexAMETHasone (DECADRON) 4 mg tablet Take 4 mg by mouth four (4) times daily. 7/29/19  Yes Kaila Hoover MD   furosemide (LASIX) 20 mg tablet Take 20 mg by mouth daily. Yes Provider, Historical   metoprolol succinate (TOPROL-XL) 50 mg XL tablet Take 50 mg by mouth daily.    Yes Provider, Historical   potassium chloride (K-DUR, KLOR-CON) 20 mEq tablet Take 20 mEq by mouth daily. Yes Provider, Historical   sucralfate (CARAFATE) 1 gram tablet Take 1 g by mouth daily. Yes Provider, Historical   ferrous sulfate 325 mg (65 mg iron) tablet Take 325 mg by mouth three (3) times daily (with meals). Yes Provider, Historical   sodium chloride 1 gram tablet Take 1 g by mouth daily. Yes Provider, Historical   dronabinol (MARINOL) 2.5 mg capsule Take 2.5 mg by mouth two (2) times daily as needed (appetite). Yes Provider, Historical   guaiFENesin (MUCINEX) 1,200 mg Ta12 ER tablet Take 1,200 mg by mouth two (2) times a day. Yes Provider, Historical   fluconazole (DIFLUCAN) 100 mg tablet See Admin Instructions. Take two pills on day one and then one pill daily until finished. 7/21/19 7/30/19 Yes Provider, Historical   HYDROcodone-acetaminophen (NORCO) 5-325 mg per tablet Take 1 Tab by mouth every six (6) hours as needed for Pain. Yes Provider, Historical   pantoprazole (PROTONIX) 40 mg tablet Take 40 mg by mouth daily. Yes Provider, Historical   predniSONE (DELTASONE) 10 mg tablet Take  by mouth See Admin Instructions.  Take 20 mg daily x 7 days and then 10 mg daily x 7 days   Prednisone started on 7/18/19 7/18/19 7/31/19 Yes Provider, Historical   OTHER Banophen/Lidocaine/Maalox swish and swallow 5 ml for 30 seconds four times daily started 7/26/19   Yes Provider, Historical     No Known Allergies   Social History     Tobacco Use    Smoking status: Not on file   Substance Use Topics    Alcohol use: Not Currently      Family History   Problem Relation Age of Onset    Cancer Neg Hx         Current Facility-Administered Medications   Medication Dose Route Frequency    sucralfate (CARAFATE) tablet 1 g  1 g Oral AC&HS    0.9% sodium chloride infusion  75 mL/hr IntraVENous CONTINUOUS    sodium chloride (NS) flush 5-40 mL  5-40 mL IntraVENous Q8H    pantoprazole (PROTONIX) tablet 40 mg  40 mg Oral ACB  dexamethasone (DECADRON) 4 mg/mL injection 4 mg  4 mg IntraVENous Q6H    fluconazole (DIFLUCAN) tablet 100 mg  100 mg Oral DAILY    metoprolol succinate (TOPROL-XL) XL tablet 50 mg  50 mg Oral DAILY       Review of Systems:  A comprehensive review of systems was negative except for that written in the HPI. Objective:   Vital Signs:    Visit Vitals  /84 (BP 1 Location: Right arm, BP Patient Position: At rest)   Pulse 74   Temp 97.7 °F (36.5 °C)   Resp 18   Ht 5' 9\" (1.753 m)   Wt 59 kg (130 lb)   SpO2 100%   BMI 19.20 kg/m²       O2 Device: Room air       Temp (24hrs), Av.9 °F (36.6 °C), Min:97.6 °F (36.4 °C), Max:98.3 °F (36.8 °C)       Intake/Output:   Last shift:      No intake/output data recorded. Last 3 shifts:  1901 -  0700  In: 480 [P.O.:480]  Out: -   No intake or output data in the 24 hours ending 19 1121     Physical Exam:   General:  Alert, cooperative, no distress   Head:  Normocephalic, without obvious abnormality, atraumatic. Eyes:  Conjunctivae/corneas clear. EOMs intact. Nose: Nares normal. Septum midline. Mucosa normal.   Throat: Lips, mucosa, and tongue normal.   Neck: Supple, symmetrical, trachea midline, no adenopathy. Lungs:   Diminished in the R base   Chest wall:  No tenderness or deformity. Heart:  Regular rate and rhythm, S1, S2 normal, no murmur, click, rub or gallop. Abdomen:   Soft, non-tender. Bowel sounds normal. No masses,  No organomegaly. Extremities: Extremities normal, atraumatic, no cyanosis or edema. Pulses: 2+ and symmetric all extremities.    Skin: Skin color, texture, turgor normal. No rashes or lesions   Lymph nodes: Cervical, supraclavicular nodes normal.   Neurologic: Grossly nonfocal     Data review:     Recent Results (from the past 24 hour(s))   HIV 1/2 AG/AB, 4TH GENERATION,W RFLX CONFIRM    Collection Time: 19  2:47 PM   Result Value Ref Range    HIV 1/2 Interpretation NONREACTIVE NR      HIV 1/2 result comment SEE NOTE     PTT    Collection Time: 07/29/19  5:48 PM   Result Value Ref Range    aPTT 22.4 22.1 - 32.0 sec    aPTT, therapeutic range     58.0 - 77.0 SECS   PROTHROMBIN TIME + INR    Collection Time: 07/29/19  5:48 PM   Result Value Ref Range    INR 1.1 0.9 - 1.1      Prothrombin time 11.5 (H) 9.0 - 11.1 sec   CBC WITH AUTOMATED DIFF    Collection Time: 07/30/19  6:03 AM   Result Value Ref Range    WBC 1.3 (L) 4.1 - 11.1 K/uL    RBC 3.43 (L) 4.10 - 5.70 M/uL    HGB 7.4 (L) 12.1 - 17.0 g/dL    HCT 23.2 (L) 36.6 - 50.3 %    MCV 67.6 (L) 80.0 - 99.0 FL    MCH 21.6 (L) 26.0 - 34.0 PG    MCHC 31.9 30.0 - 36.5 g/dL    RDW 22.8 (H) 11.5 - 14.5 %    PLATELET 62 (L) 252 - 400 K/uL    NRBC 0.0 0  WBC    ABSOLUTE NRBC 0.00 0.00 - 0.01 K/uL    NEUTROPHILS 83 (H) 32 - 75 %    LYMPHOCYTES 5 (L) 12 - 49 %    MONOCYTES 12 5 - 13 %    EOSINOPHILS 0 0 - 7 %    BASOPHILS 0 0 - 1 %    IMMATURE GRANULOCYTES 0 0.0 - 0.5 %    ABS. NEUTROPHILS 1.0 (L) 1.8 - 8.0 K/UL    ABS. LYMPHOCYTES 0.1 (L) 0.8 - 3.5 K/UL    ABS. MONOCYTES 0.2 0.0 - 1.0 K/UL    ABS. EOSINOPHILS 0.0 0.0 - 0.4 K/UL    ABS. BASOPHILS 0.0 0.0 - 0.1 K/UL    ABS. IMM.  GRANS. 0.0 0.00 - 0.04 K/UL    DF SMEAR SCANNED      RBC COMMENTS ANISOCYTOSIS  3+        RBC COMMENTS TARGET CELLS  1+        RBC COMMENTS HYPOCHROMIA  1+        RBC COMMENTS SCHISTOCYTES  2+        RBC COMMENTS OVALOCYTES  PRESENT       PTT    Collection Time: 07/30/19  7:19 AM   Result Value Ref Range    aPTT 25.4 22.1 - 32.0 sec    aPTT, therapeutic range     58.0 - 77.0 SECS   PROTHROMBIN TIME + INR    Collection Time: 07/30/19  7:19 AM   Result Value Ref Range    INR 1.2 (H) 0.9 - 1.1      Prothrombin time 11.9 (H) 9.0 - 11.1 sec       Imaging:  I have personally reviewed the patients radiographs and have reviewed the reports:  No new images this morning        Linda Malik

## 2019-07-30 NOTE — PROGRESS NOTES
Bedside shift change report given to Sharon (oncoming nurse) by Gage Choudhary (offgoing nurse). Report included the following information SBAR, Kardex, Procedure Summary, Intake/Output, MAR and Recent Results.

## 2019-07-30 NOTE — PROGRESS NOTES
responded to an in-basket request to assist Ammon Merlin with an Advance Medical Directive (AMD) on the Med. Surgical floor.  consulted with the Palliative Care team who informed  that Ammon Merlin has already received the AMD information 7/29 but was not interested in filling out the form at this time. He is aware that he can fill out this form with his other health care providers after discharge.  will follow up as able and/or needed  Adify. Bandar Baird.      Paging Service: [phone]

## 2019-07-30 NOTE — PERIOP NOTES
Mable Brown  1975  [id number]    Situation:    Scheduled Procedure: Procedure(s):  ENDOSCOPIC BRONCHOSCOPY ULTRASOUND (EBUS)  Verbal report received from: Zach Balderrama RN  Preoperative diagnosis: lung cancer    Background:    Procedure: Procedure(s):  ENDOSCOPIC BRONCHOSCOPY ULTRASOUND (EBUS)  Physician performing procedure; Dr. Akil John RN    NPO Status/Last PO Intake: 0600    Pregnancy Test:Not applicable If yes, result: none    Is the patient taking Blood Thinners: NO If yes, list:  and last taken   Is the patient diabetic:no       If yes, what was the last BS:    Time taken? Anything given? no           Does the patient have a Pacemaker/Defibrillator in place?: no   Does the patient need antibiotics before/during/after procedure: no   If the patient is having a colon, How much prep was drank? What were the Colon prep results? Does the patient have SCD in place:no   Is patient on CONTACT precautions:no        If yes, what kind of CONTACT precautions:   Patient under protective isolation. Assessment:  Are the vital signs stable prior to patient coming to ENDO?  yes  Is the patient alert/oriented and able to sign consent for the procedures:yes     Does the patient have a patient IV in place?  yes     Recommendation:  Family or Friend present yes     Permission to share finding with Family or Friend yes

## 2019-07-30 NOTE — PROGRESS NOTES
Bedside shift change report given to MARK Ulloa (oncoming nurse) by Antonietta Castillo RN (offgoing nurse). Report included the following information SBAR.

## 2019-07-30 NOTE — ROUTINE PROCESS
Interdisciplinary team rounds were held 7/30/2019 with the following team members:Care Management, Nursing, Nutrition, Pharmacy, Physical Therapy and Physician. Plan of care discussed. See clinical pathway and/or care plan for interventions and desired outcomes. Plan: can dc post procedure.

## 2019-08-01 DIAGNOSIS — C34.90 MALIGNANT NEOPLASM OF LUNG, UNSPECIFIED LATERALITY, UNSPECIFIED PART OF LUNG (HCC): Primary | Chronic | ICD-10-CM

## 2019-08-01 LAB
BACTERIA SPEC CULT: NORMAL
SERVICE CMNT-IMP: NORMAL

## 2019-08-02 RX ORDER — SODIUM CHLORIDE 0.9 % (FLUSH) 0.9 %
5-10 SYRINGE (ML) INJECTION AS NEEDED
Status: CANCELLED | OUTPATIENT
Start: 2019-08-05

## 2019-08-02 RX ORDER — SODIUM CHLORIDE 9 MG/ML
10 INJECTION INTRAMUSCULAR; INTRAVENOUS; SUBCUTANEOUS AS NEEDED
Status: CANCELLED | OUTPATIENT
Start: 2019-08-05

## 2019-08-02 RX ORDER — HEPARIN 100 UNIT/ML
500 SYRINGE INTRAVENOUS AS NEEDED
Status: CANCELLED | OUTPATIENT
Start: 2019-08-05

## 2019-08-05 ENCOUNTER — HOSPITAL ENCOUNTER (OUTPATIENT)
Dept: GENERAL RADIOLOGY | Age: 44
Discharge: HOME | End: 2019-08-05
Attending: RADIOLOGY
Payer: COMMERCIAL

## 2019-08-05 ENCOUNTER — HOSPITAL ENCOUNTER (OUTPATIENT)
Dept: ULTRASOUND IMAGING | Age: 44
Discharge: HOME | End: 2019-08-05
Attending: NURSE PRACTITIONER
Payer: COMMERCIAL

## 2019-08-05 ENCOUNTER — HOSPITAL ENCOUNTER (OUTPATIENT)
Dept: INFUSION THERAPY | Age: 44
Discharge: HOME | End: 2019-08-05
Payer: COMMERCIAL

## 2019-08-05 VITALS
OXYGEN SATURATION: 100 % | SYSTOLIC BLOOD PRESSURE: 138 MMHG | TEMPERATURE: 97.2 F | HEART RATE: 85 BPM | RESPIRATION RATE: 18 BRPM | DIASTOLIC BLOOD PRESSURE: 96 MMHG

## 2019-08-05 VITALS
HEART RATE: 81 BPM | WEIGHT: 130 LBS | DIASTOLIC BLOOD PRESSURE: 104 MMHG | SYSTOLIC BLOOD PRESSURE: 142 MMHG | BODY MASS INDEX: 19.26 KG/M2 | HEIGHT: 69 IN | OXYGEN SATURATION: 100 % | RESPIRATION RATE: 20 BRPM

## 2019-08-05 DIAGNOSIS — C34.90 MALIGNANT NEOPLASM OF LUNG, UNSPECIFIED LATERALITY, UNSPECIFIED PART OF LUNG (HCC): Chronic | ICD-10-CM

## 2019-08-05 LAB
ALBUMIN SERPL-MCNC: 3.2 G/DL (ref 3.5–5)
ALBUMIN/GLOB SERPL: 0.9 {RATIO} (ref 1.1–2.2)
ALP SERPL-CCNC: 104 U/L (ref 45–117)
ALT SERPL-CCNC: 18 U/L (ref 12–78)
ANION GAP SERPL CALC-SCNC: 3 MMOL/L (ref 5–15)
AST SERPL-CCNC: 12 U/L (ref 15–37)
BASOPHILS # BLD: 0 K/UL (ref 0–0.1)
BASOPHILS NFR BLD: 0 % (ref 0–1)
BILIRUB SERPL-MCNC: 0.2 MG/DL (ref 0.2–1)
BUN SERPL-MCNC: 35 MG/DL (ref 6–20)
BUN/CREAT SERPL: 45 (ref 12–20)
CALCIUM SERPL-MCNC: 8.8 MG/DL (ref 8.5–10.1)
CHLORIDE SERPL-SCNC: 107 MMOL/L (ref 97–108)
CO2 SERPL-SCNC: 30 MMOL/L (ref 21–32)
CREAT SERPL-MCNC: 0.77 MG/DL (ref 0.7–1.3)
DIFFERENTIAL METHOD BLD: ABNORMAL
EOSINOPHIL # BLD: 0 K/UL (ref 0–0.4)
EOSINOPHIL NFR BLD: 0 % (ref 0–7)
ERYTHROCYTE [DISTWIDTH] IN BLOOD BY AUTOMATED COUNT: 26.3 % (ref 11.5–14.5)
GLOBULIN SER CALC-MCNC: 3.4 G/DL (ref 2–4)
GLUCOSE SERPL-MCNC: 108 MG/DL (ref 65–100)
HCT VFR BLD AUTO: 30.5 % (ref 36.6–50.3)
HGB BLD-MCNC: 9.6 G/DL (ref 12.1–17)
IMM GRANULOCYTES # BLD AUTO: 0 K/UL
IMM GRANULOCYTES NFR BLD AUTO: 0 %
LYMPHOCYTES # BLD: 0.4 K/UL (ref 0.8–3.5)
LYMPHOCYTES NFR BLD: 6 % (ref 12–49)
MCH RBC QN AUTO: 22.1 PG (ref 26–34)
MCHC RBC AUTO-ENTMCNC: 31.5 G/DL (ref 30–36.5)
MCV RBC AUTO: 70.3 FL (ref 80–99)
METAMYELOCYTES NFR BLD MANUAL: 8 %
MONOCYTES # BLD: 1 K/UL (ref 0–1)
MONOCYTES NFR BLD: 16 % (ref 5–13)
MYELOCYTES NFR BLD MANUAL: 4 %
NEUTS SEG # BLD: 4.2 K/UL (ref 1.8–8)
NEUTS SEG NFR BLD: 66 % (ref 32–75)
NRBC # BLD: 0.03 K/UL (ref 0–0.01)
NRBC BLD-RTO: 0.5 PER 100 WBC
PLATELET # BLD AUTO: 147 K/UL (ref 150–400)
POTASSIUM SERPL-SCNC: 4.1 MMOL/L (ref 3.5–5.1)
PROT SERPL-MCNC: 6.6 G/DL (ref 6.4–8.2)
RBC # BLD AUTO: 4.34 M/UL (ref 4.1–5.7)
RBC MORPH BLD: ABNORMAL
SODIUM SERPL-SCNC: 140 MMOL/L (ref 136–145)
WBC # BLD AUTO: 6.4 K/UL (ref 4.1–11.1)
WBC MORPH BLD: ABNORMAL

## 2019-08-05 PROCEDURE — 88112 CYTOPATH CELL ENHANCE TECH: CPT

## 2019-08-05 PROCEDURE — 36415 COLL VENOUS BLD VENIPUNCTURE: CPT

## 2019-08-05 PROCEDURE — 80053 COMPREHEN METABOLIC PANEL: CPT

## 2019-08-05 PROCEDURE — 85025 COMPLETE CBC W/AUTO DIFF WBC: CPT

## 2019-08-05 PROCEDURE — 71045 X-RAY EXAM CHEST 1 VIEW: CPT

## 2019-08-05 PROCEDURE — 88305 TISSUE EXAM BY PATHOLOGIST: CPT

## 2019-08-05 PROCEDURE — 74011250636 HC RX REV CODE- 250/636: Performed by: STUDENT IN AN ORGANIZED HEALTH CARE EDUCATION/TRAINING PROGRAM

## 2019-08-05 PROCEDURE — 32555 ASPIRATE PLEURA W/ IMAGING: CPT

## 2019-08-05 RX ORDER — LIDOCAINE HYDROCHLORIDE 10 MG/ML
10 INJECTION, SOLUTION EPIDURAL; INFILTRATION; INTRACAUDAL; PERINEURAL
Status: COMPLETED | OUTPATIENT
Start: 2019-08-05 | End: 2019-08-05

## 2019-08-05 RX ADMIN — LIDOCAINE HYDROCHLORIDE 10 ML: 10 INJECTION, SOLUTION EPIDURAL; INFILTRATION; INTRACAUDAL; PERINEURAL at 13:00

## 2019-08-05 NOTE — DISCHARGE INSTRUCTIONS
Patient Education        Thoracentesis: What to Expect at Home  Your Recovery  Thoracentesis (say \"tgyy-vx-irk-SARAY-sis\") is a procedure to remove fluid from the space between the lungs and the chest wall (pleural space). This procedure may also be called a \"chest tap. \" It is normal to have a small amount of fluid in the pleural space. But too much fluid can build up because of problems such as infection, heart failure, or lung cancer. The procedure may have been done to help with shortness of breath and pain caused by the fluid buildup. Or you may have had this procedure so the doctor could test the fluid to find the cause of the buildup. Your chest may be sore where the doctor put the needle or catheter into your skin (the puncture site). This usually gets better after a day or two. You can go back to work or your normal activities as soon as you feel up to it. If the doctor sent the fluid to a lab for testing, it may take several days to get the results. The doctor or nurse will discuss the results with you. This care sheet gives you a general idea about how long it will take for you to recover. But each person recovers at a different pace. Follow the steps below to feel better as quickly as possible. How can you care for yourself at home? Activity    · Rest when you feel tired. Getting enough sleep will help you recover.     · Avoid strenuous activities, such as bicycle riding, jogging, weight lifting, or aerobic exercise, until your doctor says it is okay.     · You may shower. Do not take a bath until the puncture site has healed, or until your doctor tells you it is okay.     · Ask your doctor when you can drive again.     · You may need to take 1 or 2 days off from work. It depends on the type of work you do and how you feel. Diet    · You can eat your normal diet.     · Drink plenty of fluids (unless your doctor tells you not to).    Medicines    · Your doctor will tell you if and when you can restart your medicines. He or she will also give you instructions about taking any new medicines.     · If you take blood thinners, such as warfarin (Coumadin), clopidogrel (Plavix), or aspirin, be sure to talk to your doctor. He or she will tell you if and when to start taking those medicines again. Make sure that you understand exactly what your doctor wants you to do.     · Be safe with medicines. Take pain medicines exactly as directed. ? If the doctor gave you a prescription medicine for pain, take it as prescribed. ? If you are not taking a prescription pain medicine, ask your doctor if you can take an over-the-counter medicine. ? Do not take two or more pain medicines at the same time unless the doctor told you to. Many pain medicines have acetaminophen, which is Tylenol. Too much acetaminophen (Tylenol) can be harmful.     · If you think your pain medicine is making you sick to your stomach:  ? Take your medicine after meals (unless your doctor has told you not to). ? Ask your doctor for a different pain medicine.     · If your doctor prescribed antibiotics, take them as directed. Do not stop taking them just because you feel better. You need to take the full course of antibiotics.    Care of the puncture site    · Wash the area daily with warm, soapy water, and pat it dry. Don't use hydrogen peroxide or alcohol, which may delay healing. You may cover the area with a gauze bandage if it weeps or rubs against clothing. Change the bandage every day.     · Keep the area clean and dry. Follow-up care is a key part of your treatment and safety. Be sure to make and go to all appointments, and call your doctor if you are having problems. It's also a good idea to know your test results and keep a list of the medicines you take. When should you call for help? Call 911 anytime you think you may need emergency care. For example, call if:    · You passed out (lost consciousness).     · You have severe trouble breathing.   · You have sudden chest pain and shortness of breath, or you cough up blood.    Call your doctor now or seek immediate medical care if:    · You have shortness of breath that is new or getting worse.     · You have new or worse pain in your chest, especially when you take a deep breath.     · You are sick to your stomach or cannot keep fluids down.     · You have a fever over 100°F.     · Bright red blood has soaked through the bandage over your puncture site.     · You have signs of infection, such as:  ? Increased pain, swelling, warmth, or redness. ? Red streaks leading from the puncture site. ? Pus draining from the puncture site. ? Swollen lymph nodes in your neck, armpits, or groin. ? A fever.     · You cough up a lot more mucus than normal, or your mucus changes color.    Watch closely for changes in your health, and be sure to contact your doctor if you have any problems. Where can you learn more? Go to http://jeanette-venus.info/. Enter K955 in the search box to learn more about \"Thoracentesis: What to Expect at Home. \"  Current as of: September 5, 2018  Content Version: 12.1  © 2073-4747 [employer]. Care instructions adapted under license by [employer] (which disclaims liability or warranty for this information). If you have questions about a medical condition or this instruction, always ask your healthcare professional. [place] [address] any warranty or liability for your use of this information.

## 2019-08-05 NOTE — PROGRESS NOTES
Pt received to [address] ambulatory, assessed for thoracentesis. Confirmed NKA, LS diminshed bilaterally, vitals stable. States no pain. Dr Storm Palafox here for consent, timeout at [phone], start time 21 . Stop time [id number]. Pt tolerated well, total of 520 cc clear yellow fluid pulled from R lung space. Clean dressing applied. Pt post op xray ordered, discharge directions provided. Xray shows no pneumothorax, pt may be discharged per Dr Storm Palafox. Taken out to C[facility] Saunders.

## 2019-08-06 ENCOUNTER — DOCUMENTATION ONLY (OUTPATIENT)
Dept: ONCOLOGY | Age: 44
End: 2019-08-06

## 2019-08-06 ENCOUNTER — OFFICE VISIT (OUTPATIENT)
Dept: ONCOLOGY | Age: 44
End: 2019-08-06

## 2019-08-06 VITALS
TEMPERATURE: 97.8 F | DIASTOLIC BLOOD PRESSURE: 81 MMHG | HEIGHT: 69 IN | RESPIRATION RATE: 18 BRPM | HEART RATE: 92 BPM | OXYGEN SATURATION: 100 % | WEIGHT: 133 LBS | BODY MASS INDEX: 19.7 KG/M2 | SYSTOLIC BLOOD PRESSURE: 119 MMHG

## 2019-08-06 DIAGNOSIS — I87.1 SVC SYNDROME: ICD-10-CM

## 2019-08-06 DIAGNOSIS — D61.818 PANCYTOPENIA (HCC): ICD-10-CM

## 2019-08-06 DIAGNOSIS — C34.11 MALIGNANT NEOPLASM OF UPPER LOBE OF RIGHT LUNG (HCC): Primary | ICD-10-CM

## 2019-08-06 NOTE — PROGRESS NOTES
Cancer [place] at [facility]  [address], [address] [facility] [address] [facility]  [place] : [phone]  F: [phone]      Reason for Visit:   Jaymie Garcia is a 39 y.o. male who is seen in follow up for management of NSCLC    Treatment History:   ChemoXRT at [facility], completed 2 weeks ago     Reviewed last note dated 7/12/2019 Dr Javi Angela from [address] [place] [address]  Followed for non-small cell lung carcinoma and anemia  R level 4 lymph node bx showed non small cell carcinoma, favored squamous cell carcinoma  Started with weekly carboplatin auc 2 and paclitaxel, but during 2nd dose of paclitaxel, developed an allergic reaction to taxol  Received C2 cisplatin and etoposide on 7/16/19; was receiving radiation to chest. Received zarxio 1 shot, better     Assess/plan  1. Non-small cell lung cancer. Locally advanced. Pt with SVC syndrome. Radiation to chest for SVC  Received weekly carboplatin AUC 2 but during 2nd dose of taxol pt developed allergic reaction to taxol     PET 4/23/19 showed FDG avid para medistinal mass in the RUL consistent w/ malignancy, satellite lesions in the RUL, small right pleural effusion, R cervical and subcarnal lymph nodes and FDG avid subcutaneous density in the left groin concerning for metastatis disease  Rlevel 4 lymph node bx showed non small cell carcinoma favored squamous cell carcinoma. But not enough tissue for molecular testing. Reviewed with pathologist and cardiothoracic surgeon; not able to get any additional tissue for molecular testing.      Due to allergic reaction to taxol started on Etoposide and cisplatin.      2. Microcytic hypochromic anemia  2/2 to chemo and low Fe levels; on ferrous sulfate 325 mg TID     3. Hypokalemia  KCL 20 gilma daily     4. Anorexia  Marinol BID     5.  PICC line/ right common femoral vein due to SVC; pt unable to get erin cath    7/30/19 EBUS with Dr. Antonio Cummins, TBNA performed on mass/LAD 4R position, path pending    8/5/19 thoracentesis:  520 mL of clear fluid removed, cytology pending    History of Present Illness:   Was admitted from 19-19 at [facility] for SVC syndrome, discharged home on dex 4 mg 4x/day. Was previously treated at [facility], desired to transfer his care to [facility]    Pt feels swelling and swallowing is better today. Past Medical History:   Diagnosis Date    Anemia, iron deficiency     Aphagia     2/2 radiation    Hypertension     Lung cancer ([address] 2019    Pneumonia involving right lung       History reviewed. No pertinent surgical history. Social History     Tobacco Use    Smoking status: Former Smoker     Packs/day: 0.25     Years: 9.00     Pack years: 2.25     Last attempt to quit: 2019     Years since quittin.4    Smokeless tobacco: Never Used    Tobacco comment: cigar smoking 5-6 months and quit   Substance Use Topics    Alcohol use: Not Currently      Family History   Problem Relation Age of Onset    Cancer Maternal Grandmother         ovarian     Current Outpatient Medications   Medication Sig    dexAMETHasone (DECADRON) 4 mg tablet Take 4 mg by mouth three (3) times daily.  furosemide (LASIX) 20 mg tablet Take 20 mg by mouth daily.  metoprolol succinate (TOPROL-XL) 50 mg XL tablet Take 50 mg by mouth daily.  potassium chloride (K-DUR, KLOR-CON) 20 mEq tablet Take 20 mEq by mouth daily.  sucralfate (CARAFATE) 1 gram tablet Take 1 g by mouth daily.  ferrous sulfate 325 mg (65 mg iron) tablet Take 325 mg by mouth three (3) times daily (with meals).  sodium chloride 1 gram tablet Take 1 g by mouth daily.  dronabinol (MARINOL) 2.5 mg capsule Take 2.5 mg by mouth two (2) times daily as needed (appetite).  guaiFENesin (MUCINEX) 1,200 mg Ta12 ER tablet Take 1,200 mg by mouth two (2) times a day.  pantoprazole (PROTONIX) 40 mg tablet Take 40 mg by mouth daily.     OTHER Banophen/Lidocaine/Maalox swish and swallow 5 ml for 30 seconds four times daily started 19  HYDROcodone-acetaminophen (NORCO) 5-325 mg per tablet Take 1 Tab by mouth every six (6) hours as needed for Pain. No current facility-administered medications for this visit. No Known Allergies     Review of Systems: A complete review of systems was obtained, negative except as described above. Physical Exam:     Visit Vitals  /81   Pulse 92   Temp 97.8 °F (36.6 °C) (Temporal)   Resp 18   Ht 5' 9\" (1.753 m)   Wt 133 lb (60.3 kg)   SpO2 100%   BMI 19.64 kg/m²     ECOG PS: 0  General: No distress  Eyes: PERRLA, anicteric sclerae  HENT: Atraumatic, OP clear  Neck: Supple  Lymphatic: No cervical, supraclavicular adenopathy  Respiratory: CTAB, normal respiratory effort  CV: Normal rate, regular rhythm, no murmurs, no peripheral edema  GI: Soft, nontender, nondistended, no masses, no hepatomegaly, no splenomegaly; + BS  MS:  Digits without clubbing or cyanosis. Skin: picc line right groin  Psych: Alert, oriented, appropriate affect, normal judgment/insight    Results:     Lab Results   Component Value Date/Time    WBC 6.4 08/05/2019 02:39 PM    HGB 9.6 (L) 08/05/2019 02:39 PM    HCT 30.5 (L) 08/05/2019 02:39 PM    PLATELET 088 (L) 93/16/1657 02:39 PM    MCV 70.3 (L) 08/05/2019 02:39 PM    ABS. NEUTROPHILS 4.2 08/05/2019 02:39 PM     Lab Results   Component Value Date/Time    Sodium 140 08/05/2019 02:39 PM    Potassium 4.1 08/05/2019 02:39 PM    Chloride 107 08/05/2019 02:39 PM    CO2 30 08/05/2019 02:39 PM    Glucose 108 (H) 08/05/2019 02:39 PM    BUN 35 (H) 08/05/2019 02:39 PM    Creatinine 0.77 08/05/2019 02:39 PM    GFR est AA >60 08/05/2019 02:39 PM    GFR est non-AA >60 08/05/2019 02:39 PM    Calcium 8.8 08/05/2019 02:39 PM     Lab Results   Component Value Date/Time    Bilirubin, total 0.2 08/05/2019 02:39 PM    ALT (SGPT) 18 08/05/2019 02:39 PM    AST (SGOT) 12 (L) 08/05/2019 02:39 PM    Alk.  phosphatase 104 08/05/2019 02:39 PM    Protein, total 6.6 08/05/2019 02:39 PM    Albumin 3.2 (L) 08/05/2019 02:39 PM    Globulin 3.4 08/05/2019 02:39 PM     7/29/19 MRI brain  Negative    7/29/19 CT c/a/p  Since 4/23/2019, there has been no significant change in size of the large right  paratracheal mediastinal mass. There are many new areas of peripheral consolidation in the right upper lobe,  which may represent some combination of progressive disease, posttreatment  change, or infection. Attention on follow-up is needed. A previously seen right  upper lobe nodule in the posterior segment appears to have decreased in size. The large right pleural effusion on this exam is significantly increased in size  from prior. Records reviewed and summarized above. Pathology report(s) reviewed above. Radiology report(s) reviewed above. Assessment/plan:   1. RUL NSCLC:  At least cT4 cN2 stage IIIB    Discussed durvalumab 10 mg/kg q 2 weeks for 12 months if his disease is not stage IV, meaning not in his pleural fluid. Discussed if stage IV disease, then is cancer is still treatable, but not curable, goal of therapy is palliative    We discussed the risks and benefits of durvalumab therapy today. Potential side effects include, but are not limited to fatigue, rash, auto-immune issues, infertility, allergic reactions, and rarely, death. The patient has consented to beginning therapy. If the pleural fluid is +, discussed treating with chemotherapy + immunotherapy. Final discussion of chemotherapy depends on pathology. He has another round of chemotherapy tomorrow in [place], at this time he will cancel that. 2. Emotional well being:  She has excellent support and is coping well with her disease    3. SVC syndrome:  Reduce dex from 4 mg 4x/day to 3x/day stacia 8/11, then reduce to bid; improving; has right femoral PICC line    4. Neutropenia:  Resolved    5. Anemia:  Due to cancer and chemotherapy; on PO iron tid    6. Thrombocytopenia:  Nearly normal, due to chemo    7.  Hypokalemia:  On 20 meq kcl daily    8. Gerd:  On protonix an dcarafate    Discussed him seeing Dr. Romina Umana at next visit and he is comfortable with that. > 40 minutes were spent with this patient with > 50% of that time spent in face to face counseling. I appreciate the opportunity to participate in Mr. Alvarez Centeno care. Signed By: Kandis Stewart MD      No orders of the defined types were placed in this encounter.

## 2019-08-08 NOTE — PROGRESS NOTES
Oncology Navigator  Psychosocial Assessment    Reason for Assessment:    []Depression  []Anxiety  []Caregiver [place]  []Maladaptive Coping with Serious Illness   [] Social Work Referral [x] Initial Assessment  [] Other     Sources of Information:    [x]Patient  []Family  [x]Staff  [x]Medical Record    Advance Care Planning:  Advance Care Planning 7/29/2019   Patient's Healthcare Decision Maker is: Legal Next of Kin   Confirm Advance Directive None   Patient Would Like to Complete Advance Directive Yes       Mental Status:    [x]Alert  []Lethargic  []Unresponsive   [] Unable to assess   Oriented to:  [x]Person  [x]Place  [x]Time  [x]Situation      Barriers to Learning:    []Language  []Developmental  []Cognitive  []Altered Mental Status  []Visual/Hearing Impairment  []Unable to Read/Write  []Motivational   [x]No Barriers Identified  []Other:    Relationship Status:  [x]Single  []  []Significant Other/Life Partner  []  []  []      Living Circumstances:  [x]Lives Alone  []Family/Significant Other in Household  []Roommates  []Children in the Home  []Paid Caregivers  []Assisted Living Facility/Group Home  []Skilled [address]  []Homeless  []Incarcerated  []Environmental/Care Concerns  []other:    Employment Status:  [x]Employed Full-time []Employed Part-time []Homemaker [] Disabled  [] Retired []Other:    Support System:    [x]Strong  []Fair  []Limited    Financial/Legal Concerns:    []Uninsured  [x]Limited Income/Resources  []Non-Citizen  []No Concerns Identified  []Financial POA:    []Other:    Confucianist/Spiritual/Existential:  []Strong Sense of Spirituality  []Involved in [employer]  []Request  Visit  []Expressing Spiritual/Existential Angst  [x]No Concerns Identified    Coping with Illness:         Patient: Family/Caregiver:   Understanding and Acceptance of Illness/Prognosis  [x] []   Strong Sense of Resilience [x] []   Self Reflection [] []   Cm [x] []   Does not Readily Discuss Illness [x] []   Denial of Terminal Status [] []   Anger [] []   Depression [] []   Anxiety/Fear [] []   Bargaining [] []   Recent Diagnosis/Prognosis [x] []   Difficulties with Body Image [] []   Loss of Identity [] []   Excessive Substance Use [] []   Mental Health History [] []   Enmeshed Relationships [] []   History of Loss [] []   Anticipatory Grief [] []   Concern for Complicated Grief [] []   Suicidal Ideation or Plan [] []   Unable to assess [] []            Narrative: Met with patient and his mother to introduce social work navigator role and supports. Patient live alone but tells me his mother has been staying with him to help care for him. He is currently on FMLA from work which will run out on 8/16. Patient tells me his company offers long-term disability. Offered supportive listening as patient ventilates feelings regarding diagnosis. Patient reports \"I am not worried\". His goal is to remain positive during this. He tells me his mother and aunts are supportive of he emotional and practical needs. Encouraged patient to contact his employer regarding long-tem disability benefits. He voiced understanding. Assessment/Action:   1. Patient is actively coping with diagnosis. He is well supported by her mother and aunts with practical and emotional needs. He reports \"I am not worried\" and continues to remain positive and hopeful. 2. Patient plans to contact his employer regarding FMLA and long-term disability  3. Follow up with patient when he returned to clinic  4. Psychosocial support to include ongoing assessment, supportive counseling and resource linkage.       Plan/Referral:     Insurance/Entitlements referral (Longer term disability & FMLA)    Thank you,   Elissa Wu LCSW

## 2019-08-12 NOTE — PROGRESS NOTES
[facility] [place] [facility] [place] [address] [place]: [phone]  F: [phone]      Reason for Visit:   Janet Camacho is a 39 y.o. male who is seen for follow up of non-small cell lung cancer. Treatment History:   · Diagnosed at [facility]  · Biopsy of right level 4 node: non small cell carcinoma, favored squamous cell carcinoma, insufficient sample for further testing  · Stage III Non-small cell lung cancer (Squamous cell)  · Definitive radiation with Dr. Joanie Mauricio completed mid-July 2019  · Concurrent chemotherapy with carboplatin and paclitaxel by Dr. Windsor Cowden, stopped during second infusion due to reaction to paclitaxel  · Concurrent chemotherapy with cisplatin and etoposide 7/16/2019 by Dr. Windsor Cowden  · CT Chest 7/27/2019: There has been no significant change in size of the large right paratracheal mediastinal mass. There are many new areas of peripheral consolidation in the right upper lobe, which may represent some combination of progressive disease, posttreatment change, or infection. Attention on follow-up is needed. A previously seen right upper lobe nodule in the posterior segment appears to have decreased in size. The large right pleural effusion on this exam is significantly increased in size from prior. · CT A/P 7/29/2019: no metastatic disease in abdomen or pelvis  · MRI Brain 7/29/2019: no intracranial metastatic disease  · EBUS by Dr. Ana Travis 7/30/2019: Squamous cell, PDL1 pending, insufficient tissue for molecular studies  · Thoracentesis 8/5/2019: cytology negative      History of Present Illness:   Janet Camacho is a pleasant 39 y.o. male who presents today for management on non-small cell lung cancer. He is transferring into my care from my colleague Dr. Jae Robles. He reports doing well. Arm swelling is improving. He remains on dexamethasone, currently down to BID since Sunday.   He report some pain in his chest with swallowing, but this is improving. He reports some stable PEDROZA. Cough is improving. Appetite is good on the steroids, weight stable. He is accompanied by his mother today. He lives about an hour [address] [place], mother staying with him to help care for him. Not currently working due to his illness, previously was a . He quit tobacco at diagnosis, though he was a light smoker (1-2 packs per week). Past Medical History:   Diagnosis Date    Anemia, iron deficiency     Aphagia     2/2 radiation    Hypertension     Lung cancer ([facility] [address] 2019    Pneumonia involving right lung       No past surgical history on file. Social History     Tobacco Use    Smoking status: Former Smoker     Packs/day: 0.25     Years: 9.00     Pack years: 2.25     Last attempt to quit: 2019     Years since quittin.4    Smokeless tobacco: Never Used    Tobacco comment: cigar smoking 5-6 months and quit   Substance Use Topics    Alcohol use: Not Currently      Family History   Problem Relation Age of Onset    Cancer Maternal Grandmother         ovarian     Current Outpatient Medications   Medication Sig    pantoprazole (PROTONIX) 40 mg tablet Take 1 Tab by mouth daily.  furosemide (LASIX) 20 mg tablet Take 1 Tab by mouth daily.  potassium chloride (K-DUR, KLOR-CON) 20 mEq tablet Take 1 Tab by mouth daily.  dexAMETHasone (DECADRON) 4 mg tablet Take 4 mg by mouth three (3) times daily.  metoprolol succinate (TOPROL-XL) 50 mg XL tablet Take 50 mg by mouth daily.  sucralfate (CARAFATE) 1 gram tablet Take 1 g by mouth daily.  sodium chloride 1 gram tablet Take 1 g by mouth daily.  dronabinol (MARINOL) 2.5 mg capsule Take 2.5 mg by mouth two (2) times daily as needed (appetite).  OTHER Banophen/Lidocaine/Maalox swish and swallow 5 ml for 30 seconds four times daily started 19    ferrous sulfate 325 mg (65 mg iron) tablet Take 325 mg by mouth three (3) times daily (with meals).     guaiFENesin (MUCINEX) 1,200 mg Ta12 ER tablet Take 1,200 mg by mouth two (2) times a day.  HYDROcodone-acetaminophen (NORCO) 5-325 mg per tablet Take 1 Tab by mouth every six (6) hours as needed for Pain. No current facility-administered medications for this visit. No Known Allergies     Review of Systems: A complete review of systems was obtained, negative except as described above. Physical Exam:     Visit Vitals  BP (!) 118/91 (BP 1 Location: Left arm, BP Patient Position: Sitting)   Pulse 92   Temp 97.8 °F (36.6 °C) (Temporal)   Resp 16   Ht 5' 9\" (1.753 m)   Wt 130 lb (59 kg)   SpO2 100%   BMI 19.20 kg/m²     ECOG PS: 1  General: No distress  Eyes: PERRLA, anicteric sclerae  HENT: Atraumatic, OP clear  Neck: Supple  Lymphatic: No cervical, supraclavicular, or inguinal adenopathy  Respiratory: CTAB, normal respiratory effort  CV: Normal rate, regular rhythm, no murmurs, no peripheral edema  GI: Soft, nontender, nondistended, no masses, no hepatomegaly, no splenomegaly  MS: Normal gait and station. Digits without clubbing or cyanosis. Skin: No rashes, ecchymoses, or petechiae. Normal temperature, turgor, and texture. Psych: Alert, oriented, appropriate affect, normal judgment/insight    Results:     Lab Results   Component Value Date/Time    WBC 6.4 08/05/2019 02:39 PM    HGB 9.6 (L) 08/05/2019 02:39 PM    HCT 30.5 (L) 08/05/2019 02:39 PM    PLATELET 083 (L) 26/67/6050 02:39 PM    MCV 70.3 (L) 08/05/2019 02:39 PM    ABS.  NEUTROPHILS 4.2 08/05/2019 02:39 PM     Lab Results   Component Value Date/Time    Sodium 140 08/05/2019 02:39 PM    Potassium 4.1 08/05/2019 02:39 PM    Chloride 107 08/05/2019 02:39 PM    CO2 30 08/05/2019 02:39 PM    Glucose 108 (H) 08/05/2019 02:39 PM    BUN 35 (H) 08/05/2019 02:39 PM    Creatinine 0.77 08/05/2019 02:39 PM    GFR est AA >60 08/05/2019 02:39 PM    GFR est non-AA >60 08/05/2019 02:39 PM    Calcium 8.8 08/05/2019 02:39 PM     Lab Results   Component Value Date/Time Bilirubin, total 0.2 08/05/2019 02:39 PM    ALT (SGPT) 18 08/05/2019 02:39 PM    AST (SGOT) 12 (L) 08/05/2019 02:39 PM    Alk. phosphatase 104 08/05/2019 02:39 PM    Protein, total 6.6 08/05/2019 02:39 PM    Albumin 3.2 (L) 08/05/2019 02:39 PM    Globulin 3.4 08/05/2019 02:39 PM       Records reviewed and summarized above. Pathology report(s) reviewed above. Reviewed with pathologist.  Radiology report(s) reviewed above. Assessment:   1) Non-small cell lung cancer  Stage IIIB  He has at least Stage IIIB disease, and has been treated with concurrent radiation and chemotherapy (cisplatin and etoposide). Restaging studies unfortunately do not demonstrate a significant response to therapy. His biopsy proven disease is stable, and there are several new findings that are concerning for the possibility of metastatic disease, though indeterminate. Thoracentesis was performed and cytology was negative, though given the large size of the effusion, malignant effusion remains a definite possibility. I recommend we obtain a PET/CT and use this to compare to his pre-treatment PET, in order to better evaluate a response to therapy and assess for possible metastatic disease. Now that he has completed radiation, I recommend moving forward with maintenance immunotherapy with Durvalumab. We discussed the risks and benefits of durvalumab therapy today. Potential side effects include, but are not limited to fatigue, rash, auto-immune issues, infertility, allergic reactions, and rarely, death. The patient has consented to beginning therapy. Unfortunately, his two biopsies have yielded scant material, insufficient for molecular studies. I have requested they attempt to run PDL1 on his most recent specimen. At progression, a repeat biopsy may be needed for NGS. 2) SVC syndrome  Persists, given his lack of response to radiation. Tapering steroids in preparation of immunotherapy. Continue lasix for now.     3) Pleural effusion  Cytology negative. No improvement in dyspnea with thoracentesis. Monitor, repeat thoracentesis PRN    4) Poor IV access  With SVC syndrome, he cannot have a port or UE PICC. Currently has femoral PICC in place. 5) Neutropenia  Resolved. Secondary to chemotherapy. 6) Thrombocytopenia  Nearly resolved on last check. Secondary to chemotherapy. 7) Anemia  Improving. Continue oral iron. Check anemia labs with next visit. 8) Radiation esophagitis  Continue PPI and carafate for now, likely can stop soon    7) Emotional Well Being  No psychosocial concerns identified today. Patient has adequate support.        Plan:     · Request radiation oncology records  · PET/CT in one week  · Taper dexamethasone over the next two weeks  · PDL1 on pathology  · Plan to proceed in about 2 weeks with Durvalumab (10mg/kg) given every 2 weeks for one year  · Labs: CBC, CMP prior to each cycle, TSH every 6 weeks  · Additional anemia labs with first cycle  · CT Chest after C#6  · Return to clinic with the start of therapy    I appreciate the opportunity to participate in Mr. Janes nicole. I called and reviewed case with Dr. Hugh Miller.     Signed By: Kandis Rogers MD

## 2019-08-15 ENCOUNTER — DOCUMENTATION ONLY (OUTPATIENT)
Dept: ONCOLOGY | Age: 44
End: 2019-08-15

## 2019-08-15 ENCOUNTER — OFFICE VISIT (OUTPATIENT)
Dept: ONCOLOGY | Age: 44
End: 2019-08-15

## 2019-08-15 VITALS
SYSTOLIC BLOOD PRESSURE: 118 MMHG | OXYGEN SATURATION: 100 % | WEIGHT: 130 LBS | RESPIRATION RATE: 16 BRPM | BODY MASS INDEX: 19.26 KG/M2 | TEMPERATURE: 97.8 F | DIASTOLIC BLOOD PRESSURE: 91 MMHG | HEART RATE: 92 BPM | HEIGHT: 69 IN

## 2019-08-15 DIAGNOSIS — C34.11 MALIGNANT NEOPLASM OF UPPER LOBE OF RIGHT LUNG (HCC): Primary | ICD-10-CM

## 2019-08-15 RX ORDER — POTASSIUM CHLORIDE 20 MEQ/1
20 TABLET, EXTENDED RELEASE ORAL DAILY
Qty: 30 TAB | Refills: 0 | Status: SHIPPED | OUTPATIENT
Start: 2019-08-15 | End: 2019-09-10

## 2019-08-15 RX ORDER — PANTOPRAZOLE SODIUM 40 MG/1
40 TABLET, DELAYED RELEASE ORAL DAILY
Qty: 30 TAB | Refills: 0 | Status: SHIPPED | OUTPATIENT
Start: 2019-08-15 | End: 2019-09-10

## 2019-08-15 RX ORDER — FUROSEMIDE 20 MG/1
20 TABLET ORAL DAILY
Qty: 30 TAB | Refills: 0 | Status: SHIPPED | OUTPATIENT
Start: 2019-08-15 | End: 2019-09-10

## 2019-08-15 NOTE — PATIENT INSTRUCTIONS
Your diagnosis:   Non-small cell lung cancer   Squamous Cell  At least Stage IIIB, possibly Stage IV    Take dexamethasone twice a day for two more days  Then once a day for 5 days  Then half a tablet a day for 5 days  Then stop    You will be called to schedule a PET scan next week    We will start immune therapy with Durvalumab in 2 weeks

## 2019-08-16 NOTE — PROGRESS NOTES
[facility] [place] [facility] [place] [address] [facility]  [place]: [phone]  F: [phone]      Reason for Visit:   Seth Lopez is a 39 y.o. male who is seen for follow up of non-small cell lung cancer. Treatment History:   · Diagnosed at [facility]  · Biopsy of right level 4 node: non small cell carcinoma, favored squamous cell carcinoma, insufficient sample for further testing  · Stage III Non-small cell lung cancer (Squamous cell)  · Definitive radiation with Dr. Marylee Bushman completed mid-July 2019  · Concurrent chemotherapy with carboplatin and paclitaxel by Dr. Cleatus Hamman, stopped during second infusion due to reaction to paclitaxel  · Concurrent chemotherapy with cisplatin and etoposide 7/16/2019 by Dr. Cleatus Hamman  · CT Chest 7/27/2019: There has been no significant change in size of the large right paratracheal mediastinal mass. There are many new areas of peripheral consolidation in the right upper lobe, which may represent some combination of progressive disease, posttreatment change, or infection. Attention on follow-up is needed. A previously seen right upper lobe nodule in the posterior segment appears to have decreased in size. The large right pleural effusion on this exam is significantly increased in size from prior. · CT A/P 7/29/2019: no metastatic disease in abdomen or pelvis  · MRI Brain 7/29/2019: no intracranial metastatic disease  · EBUS by Dr. Tahira Reyes 7/30/2019: Squamous cell, PDL1 pending, insufficient tissue for molecular studies  · Thoracentesis 8/5/2019: cytology negative  · Initiated maintenance therapy with Durvalumab on 8/27/2019    History of Present Illness:   Presents today for initiation of therapy with Durvalumab. He has tapered off his steroids, took final dose this morning. Arm swelling has not significantly recurred. Some dyspnea on exertion, stable. Some occasional cough, nonproductive. No pain.     He is accompanied by his mother today. He lives about an hour [facility], mother staying with him to help care for him. Not currently working due to his illness, previously was a . He quit tobacco at diagnosis, though he was a light smoker (1-2 packs per week). PAST HISTORY: The following sections were reviewed and updated in the EMR as appropriate: PMH, SH, FH, Medications, Allergies. No Known Allergies     Review of Systems: A complete review of systems was obtained, reviewed, and scanned into the EMR. Pertinent findings reviewed above. Physical Exam:     Visit Vitals  /82 (BP 1 Location: Left arm, BP Patient Position: Sitting)   Pulse 85   Temp 97 °F (36.1 °C) (Temporal)   Resp 20   Ht 5' 9\" (1.753 m)   Wt 139 lb (63 kg)   SpO2 98%   BMI 20.53 kg/m²     ECOG PS: 1  General: No distress  Eyes: PERRLA, anicteric sclerae  HENT: Atraumatic, OP clear  Neck: Supple  Lymphatic: No cervical, supraclavicular, or inguinal adenopathy  Respiratory: CTAB, normal respiratory effort  CV: Normal rate, regular rhythm, no murmurs, no peripheral edema  GI: Soft, nontender, nondistended, no masses, no hepatomegaly, no splenomegaly  MS: Normal gait and station. Digits without clubbing or cyanosis. Skin: No rashes, ecchymoses, or petechiae. Normal temperature, turgor, and texture. Psych: Alert, oriented, appropriate affect, normal judgment/insight    Results:     Lab Results   Component Value Date/Time    WBC 6.4 08/05/2019 02:39 PM    HGB 9.6 (L) 08/05/2019 02:39 PM    HCT 30.5 (L) 08/05/2019 02:39 PM    PLATELET 331 (L) 98/62/8791 02:39 PM    MCV 70.3 (L) 08/05/2019 02:39 PM    ABS.  NEUTROPHILS 4.2 08/05/2019 02:39 PM     Lab Results   Component Value Date/Time    Sodium 140 08/05/2019 02:39 PM    Potassium 4.1 08/05/2019 02:39 PM    Chloride 107 08/05/2019 02:39 PM    CO2 30 08/05/2019 02:39 PM    Glucose 108 (H) 08/05/2019 02:39 PM    BUN 35 (H) 08/05/2019 02:39 PM    Creatinine 0.77 08/05/2019 02:39 PM    GFR est AA >60 08/05/2019 02:39 PM    GFR est non-AA >60 08/05/2019 02:39 PM    Calcium 8.8 08/05/2019 02:39 PM     Lab Results   Component Value Date/Time    Bilirubin, total 0.2 08/05/2019 02:39 PM    ALT (SGPT) 18 08/05/2019 02:39 PM    AST (SGOT) 12 (L) 08/05/2019 02:39 PM    Alk. phosphatase 104 08/05/2019 02:39 PM    Protein, total 6.6 08/05/2019 02:39 PM    Albumin 3.2 (L) 08/05/2019 02:39 PM    Globulin 3.4 08/05/2019 02:39 PM       PET 8/21/2019:  1. Decreased size and activity of the mediastinal mass. 2. RUL nodular densities remain ametabolic. 3. No new finding. Assessment:   1) Non-small cell lung cancer  Stage IIIB  He has at least Stage IIIB disease, and has been treated with concurrent radiation and chemotherapy (cisplatin and etoposide). Repeat PET demonstrates a partial response to therapy. No definite evidence of distant metastatic disease at this time, though he does have a concerning pleural effusion. My recommendation is to now proceed with one year of maintenance immunotherapy with Durvalumab. We discussed the risks and benefits of durvalumab therapy today. Potential side effects include, but are not limited to fatigue, rash, auto-immune issues, infertility, allergic reactions, and rarely, death. The patient has consented to beginning therapy. Unfortunately, his two biopsies have yielded scant material, insufficient for molecular studies or PDL1 testing. At progression, a repeat biopsy may be needed for NGS. We will proceed today with treatment. We will see him every 2 weeks initially with therapy, and space out visits to every 4 weeks in the future if he is doing well. Reimage every 12 weeks. 2) SVC syndrome  Persists, but improving clinically. He has tapered off the steroids. Continue lasix for now, may try stopping that at a future visit if symptoms doing well. 3) Pleural effusion  Cytology negative. No improvement in dyspnea with thoracentesis.   Monitor, repeat thoracentesis PRN    4) Poor IV access  With SVC syndrome, he cannot have a port or UE PICC. Currently has femoral PICC in place. 5) Neutropenia  Resolved. Secondary to chemotherapy. 6) Thrombocytopenia  Nearly resolved on last check. Secondary to chemotherapy. 7) Anemia  Improving. Continue oral iron. Check anemia labs today. 8) Radiation esophagitis  Continue PPI for now. D/c carafate, has not been taking this. 9) Hyponatremia? Normal sodium in our system on last several checks. May have had low sodium previously, as he is taking salt tablets. D/c these for now and monitor labs. 10) Emotional Well Being  No psychosocial concerns identified today.  Patient has adequate support.        Plan:     · Plan to proceed today with Durvalumab (10mg/kg) given every 2 weeks for one year  · Labs: CBC, CMP prior to each cycle, TSH every 6 weeks  · Additional anemia labs today  · D/c carafate  · D/c steroids  · D/c salt tablets  · Continue lasix and KCL for now  · Continue PPI for now  · CT Chest after C#6  · Return to clinic in 2 weeks    Signed By: Seymour Ward MD

## 2019-08-21 ENCOUNTER — HOSPITAL ENCOUNTER (OUTPATIENT)
Dept: PET IMAGING | Age: 44
Discharge: HOME | End: 2019-08-21
Attending: INTERNAL MEDICINE
Payer: COMMERCIAL

## 2019-08-21 VITALS — WEIGHT: 129 LBS | BODY MASS INDEX: 19.11 KG/M2 | HEIGHT: 69 IN

## 2019-08-21 DIAGNOSIS — C34.11 MALIGNANT NEOPLASM OF UPPER LOBE OF RIGHT LUNG (HCC): ICD-10-CM

## 2019-08-21 PROCEDURE — A9552 F18 FDG: HCPCS

## 2019-08-21 RX ORDER — SODIUM CHLORIDE 0.9 % (FLUSH) 0.9 %
10 SYRINGE (ML) INJECTION
Status: COMPLETED | OUTPATIENT
Start: 2019-08-21 | End: 2019-08-21

## 2019-08-21 RX ADMIN — Medication 10 ML: at 14:10

## 2019-08-27 ENCOUNTER — OFFICE VISIT (OUTPATIENT)
Dept: ONCOLOGY | Age: 44
End: 2019-08-27

## 2019-08-27 ENCOUNTER — HOSPITAL ENCOUNTER (OUTPATIENT)
Dept: INFUSION THERAPY | Age: 44
Discharge: HOME | End: 2019-08-27
Payer: COMMERCIAL

## 2019-08-27 VITALS
SYSTOLIC BLOOD PRESSURE: 123 MMHG | RESPIRATION RATE: 20 BRPM | TEMPERATURE: 97 F | DIASTOLIC BLOOD PRESSURE: 82 MMHG | BODY MASS INDEX: 20.59 KG/M2 | HEART RATE: 85 BPM | HEIGHT: 69 IN | WEIGHT: 139 LBS | OXYGEN SATURATION: 98 %

## 2019-08-27 VITALS
HEART RATE: 72 BPM | TEMPERATURE: 97.1 F | SYSTOLIC BLOOD PRESSURE: 112 MMHG | DIASTOLIC BLOOD PRESSURE: 71 MMHG | RESPIRATION RATE: 16 BRPM

## 2019-08-27 DIAGNOSIS — C34.11 MALIGNANT NEOPLASM OF UPPER LOBE OF RIGHT LUNG (HCC): Primary | ICD-10-CM

## 2019-08-27 LAB
ALBUMIN SERPL-MCNC: 3.5 G/DL (ref 3.5–5)
ALBUMIN/GLOB SERPL: 1.2 {RATIO} (ref 1.1–2.2)
ALP SERPL-CCNC: 78 U/L (ref 45–117)
ALT SERPL-CCNC: 23 U/L (ref 12–78)
ANION GAP SERPL CALC-SCNC: 6 MMOL/L (ref 5–15)
AST SERPL-CCNC: 8 U/L (ref 15–37)
BASOPHILS # BLD: 0 K/UL (ref 0–0.1)
BASOPHILS NFR BLD: 0 % (ref 0–1)
BILIRUB SERPL-MCNC: 0.3 MG/DL (ref 0.2–1)
BUN SERPL-MCNC: 29 MG/DL (ref 6–20)
BUN/CREAT SERPL: 36 (ref 12–20)
CALCIUM SERPL-MCNC: 8.7 MG/DL (ref 8.5–10.1)
CHLORIDE SERPL-SCNC: 105 MMOL/L (ref 97–108)
CO2 SERPL-SCNC: 27 MMOL/L (ref 21–32)
CREAT SERPL-MCNC: 0.8 MG/DL (ref 0.7–1.3)
DIFFERENTIAL METHOD BLD: ABNORMAL
EOSINOPHIL # BLD: 0 K/UL (ref 0–0.4)
EOSINOPHIL NFR BLD: 0 % (ref 0–7)
ERYTHROCYTE [DISTWIDTH] IN BLOOD BY AUTOMATED COUNT: 24.5 % (ref 11.5–14.5)
FERRITIN SERPL-MCNC: 698 NG/ML (ref 26–388)
FOLATE SERPL-MCNC: 9.2 NG/ML (ref 5–21)
GLOBULIN SER CALC-MCNC: 2.9 G/DL (ref 2–4)
GLUCOSE SERPL-MCNC: 107 MG/DL (ref 65–100)
HCT VFR BLD AUTO: 32 % (ref 36.6–50.3)
HGB BLD-MCNC: 10.1 G/DL (ref 12.1–17)
IMM GRANULOCYTES # BLD AUTO: 0 K/UL
IMM GRANULOCYTES NFR BLD AUTO: 0 %
IRON SATN MFR SERPL: 44 % (ref 20–50)
IRON SERPL-MCNC: 103 UG/DL (ref 35–150)
LYMPHOCYTES # BLD: 0.2 K/UL (ref 0.8–3.5)
LYMPHOCYTES NFR BLD: 3 % (ref 12–49)
MCH RBC QN AUTO: 24.1 PG (ref 26–34)
MCHC RBC AUTO-ENTMCNC: 31.6 G/DL (ref 30–36.5)
MCV RBC AUTO: 76.4 FL (ref 80–99)
METAMYELOCYTES NFR BLD MANUAL: 1 %
MONOCYTES # BLD: 0.6 K/UL (ref 0–1)
MONOCYTES NFR BLD: 8 % (ref 5–13)
NEUTS SEG # BLD: 6.4 K/UL (ref 1.8–8)
NEUTS SEG NFR BLD: 88 % (ref 32–75)
NRBC # BLD: 0 K/UL (ref 0–0.01)
NRBC BLD-RTO: 0 PER 100 WBC
PLATELET # BLD AUTO: 74 K/UL (ref 150–400)
POTASSIUM SERPL-SCNC: 4.1 MMOL/L (ref 3.5–5.1)
PROT SERPL-MCNC: 6.4 G/DL (ref 6.4–8.2)
RBC # BLD AUTO: 4.19 M/UL (ref 4.1–5.7)
RBC MORPH BLD: ABNORMAL
RETICS # AUTO: 0.07 M/UL (ref 0.03–0.1)
RETICS/RBC NFR AUTO: 1.7 % (ref 0.7–2.1)
SODIUM SERPL-SCNC: 138 MMOL/L (ref 136–145)
TIBC SERPL-MCNC: 236 UG/DL (ref 250–450)
VIT B12 SERPL-MCNC: 265 PG/ML (ref 193–986)
WBC # BLD AUTO: 7.3 K/UL (ref 4.1–11.1)

## 2019-08-27 PROCEDURE — 82607 VITAMIN B-12: CPT

## 2019-08-27 PROCEDURE — 82728 ASSAY OF FERRITIN: CPT

## 2019-08-27 PROCEDURE — 83540 ASSAY OF IRON: CPT

## 2019-08-27 PROCEDURE — 36415 COLL VENOUS BLD VENIPUNCTURE: CPT

## 2019-08-27 PROCEDURE — 82746 ASSAY OF FOLIC ACID SERUM: CPT

## 2019-08-27 PROCEDURE — 85045 AUTOMATED RETICULOCYTE COUNT: CPT

## 2019-08-27 PROCEDURE — 74011250636 HC RX REV CODE- 250/636: Performed by: INTERNAL MEDICINE

## 2019-08-27 PROCEDURE — 96413 CHEMO IV INFUSION 1 HR: CPT

## 2019-08-27 PROCEDURE — 74011000258 HC RX REV CODE- 258: Performed by: INTERNAL MEDICINE

## 2019-08-27 PROCEDURE — 85025 COMPLETE CBC W/AUTO DIFF WBC: CPT

## 2019-08-27 PROCEDURE — 80053 COMPREHEN METABOLIC PANEL: CPT

## 2019-08-27 RX ORDER — SODIUM CHLORIDE 0.9 % (FLUSH) 0.9 %
10 SYRINGE (ML) INJECTION AS NEEDED
Status: CANCELLED
Start: 2019-08-27

## 2019-08-27 RX ORDER — SODIUM CHLORIDE 9 MG/ML
10 INJECTION INTRAMUSCULAR; INTRAVENOUS; SUBCUTANEOUS AS NEEDED
Status: CANCELLED | OUTPATIENT
Start: 2019-08-27

## 2019-08-27 RX ORDER — SODIUM CHLORIDE 0.9 % (FLUSH) 0.9 %
10 SYRINGE (ML) INJECTION AS NEEDED
Status: ACTIVE | OUTPATIENT
Start: 2019-08-27 | End: 2019-08-27

## 2019-08-27 RX ORDER — DIPHENHYDRAMINE HYDROCHLORIDE 50 MG/ML
50 INJECTION, SOLUTION INTRAMUSCULAR; INTRAVENOUS AS NEEDED
Status: CANCELLED
Start: 2019-08-27

## 2019-08-27 RX ORDER — EPINEPHRINE 1 MG/ML
0.3 INJECTION, SOLUTION, CONCENTRATE INTRAVENOUS AS NEEDED
Status: CANCELLED | OUTPATIENT
Start: 2019-08-27

## 2019-08-27 RX ORDER — HEPARIN 100 UNIT/ML
300-500 SYRINGE INTRAVENOUS AS NEEDED
Status: CANCELLED
Start: 2019-08-27

## 2019-08-27 RX ORDER — HEPARIN 100 UNIT/ML
300-500 SYRINGE INTRAVENOUS AS NEEDED
Status: ACTIVE | OUTPATIENT
Start: 2019-08-27 | End: 2019-08-27

## 2019-08-27 RX ORDER — SODIUM CHLORIDE 9 MG/ML
25 INJECTION, SOLUTION INTRAVENOUS CONTINUOUS
Status: CANCELLED | OUTPATIENT
Start: 2019-08-27

## 2019-08-27 RX ORDER — ACETAMINOPHEN 325 MG/1
650 TABLET ORAL AS NEEDED
Status: CANCELLED
Start: 2019-08-27

## 2019-08-27 RX ORDER — SODIUM CHLORIDE 9 MG/ML
25 INJECTION, SOLUTION INTRAVENOUS CONTINUOUS
Status: DISCONTINUED | OUTPATIENT
Start: 2019-08-27 | End: 2019-08-28 | Stop reason: HOSPADM

## 2019-08-27 RX ORDER — ALBUTEROL SULFATE 0.83 MG/ML
2.5 SOLUTION RESPIRATORY (INHALATION) AS NEEDED
Status: CANCELLED
Start: 2019-08-27

## 2019-08-27 RX ORDER — ONDANSETRON 2 MG/ML
8 INJECTION INTRAMUSCULAR; INTRAVENOUS AS NEEDED
Status: CANCELLED | OUTPATIENT
Start: 2019-08-27

## 2019-08-27 RX ORDER — DIPHENHYDRAMINE HYDROCHLORIDE 50 MG/ML
50 INJECTION, SOLUTION INTRAMUSCULAR; INTRAVENOUS AS NEEDED
Status: DISCONTINUED | OUTPATIENT
Start: 2019-08-27 | End: 2019-08-28 | Stop reason: HOSPADM

## 2019-08-27 RX ORDER — HYDROCORTISONE SODIUM SUCCINATE 100 MG/2ML
100 INJECTION, POWDER, FOR SOLUTION INTRAMUSCULAR; INTRAVENOUS AS NEEDED
Status: CANCELLED | OUTPATIENT
Start: 2019-08-27

## 2019-08-27 RX ORDER — HYDROCORTISONE SODIUM SUCCINATE 100 MG/2ML
100 INJECTION, POWDER, FOR SOLUTION INTRAMUSCULAR; INTRAVENOUS AS NEEDED
Status: DISCONTINUED | OUTPATIENT
Start: 2019-08-27 | End: 2019-08-28 | Stop reason: HOSPADM

## 2019-08-27 RX ORDER — SODIUM CHLORIDE 9 MG/ML
10 INJECTION INTRAMUSCULAR; INTRAVENOUS; SUBCUTANEOUS AS NEEDED
Status: ACTIVE | OUTPATIENT
Start: 2019-08-27 | End: 2019-08-27

## 2019-08-27 RX ADMIN — Medication 10 ML: at 11:23

## 2019-08-27 RX ADMIN — SODIUM CHLORIDE 10 ML: 9 INJECTION INTRAMUSCULAR; INTRAVENOUS; SUBCUTANEOUS at 14:38

## 2019-08-27 RX ADMIN — SODIUM CHLORIDE 585 MG: 9 INJECTION, SOLUTION INTRAVENOUS at 13:30

## 2019-08-27 RX ADMIN — SODIUM CHLORIDE 25 ML/HR: 900 INJECTION, SOLUTION INTRAVENOUS at 13:22

## 2019-08-27 RX ADMIN — Medication 10 ML: at 11:22

## 2019-08-27 RX ADMIN — Medication 500 UNITS: at 14:38

## 2019-08-27 RX ADMIN — Medication 500 UNITS: at 11:22

## 2019-08-27 NOTE — PROGRESS NOTES
Gurinder Gonsales is a 39 y.o. male follow up for lung cancer. 1. Have you been to the ER, urgent care clinic since your last visit? Hospitalized since your last visit?no     2. Have you seen or consulted any other health care providers outside of the [address] [place] [address] since your last visit? Include any pap smears or colon screening.  no

## 2019-08-27 NOTE — PROGRESS NOTES
[facility] Progress Note    Date: 2019    Name: Saralyn Nyhan    MRN: [id number]         : 1975     Patient went to appointment with Dr. Beverley Fleming prior to [facility] appt. Mr. Jesus Bautista Arrived ambulatory and in no distress for C1D1 of Durvlaumab Regimen. Assessment was completed, no acute issues at this time, no new complaints voiced. Double lumen femoral PICC accessed without difficulty, labs drawn & sent for processing. + blood return in both purple and red lumens. Mr. Fritz's vitals were reviewed. Visit Vitals  /70   Pulse 72   Temp 97.3 °F (36.3 °C)   Resp 18       Lab results were obtained and reviewed. Recent Results (from the past 12 hour(s))   CBC WITH AUTOMATED DIFF    Collection Time: 19 11:19 AM   Result Value Ref Range    WBC 7.3 4.1 - 11.1 K/uL    RBC 4.19 4. 10 - 5.70 M/uL    HGB 10.1 (L) 12.1 - 17.0 g/dL    HCT 32.0 (L) 36.6 - 50.3 %    MCV 76.4 (L) 80.0 - 99.0 FL    MCH 24.1 (L) 26.0 - 34.0 PG    MCHC 31.6 30.0 - 36.5 g/dL    RDW 24.5 (H) 11.5 - 14.5 %    PLATELET 74 (L) 106 - 400 K/uL    NRBC 0.0 0  WBC    ABSOLUTE NRBC 0.00 0.00 - 0.01 K/uL    NEUTROPHILS 88 (H) 32 - 75 %    LYMPHOCYTES 3 (L) 12 - 49 %    MONOCYTES 8 5 - 13 %    EOSINOPHILS 0 0 - 7 %    BASOPHILS 0 0 - 1 %    METAMYELOCYTES 1 (H) 0 %    IMMATURE GRANULOCYTES 0 %    ABS. NEUTROPHILS 6.4 1.8 - 8.0 K/UL    ABS. LYMPHOCYTES 0.2 (L) 0.8 - 3.5 K/UL    ABS. MONOCYTES 0.6 0.0 - 1.0 K/UL    ABS. EOSINOPHILS 0.0 0.0 - 0.4 K/UL    ABS. BASOPHILS 0.0 0.0 - 0.1 K/UL    ABS. IMM.  GRANS. 0.0 K/UL    DF MANUAL      RBC COMMENTS ANISOCYTOSIS  1+        RBC COMMENTS OVALOCYTES  PRESENT        RBC COMMENTS MICROCYTOSIS  1+        RBC COMMENTS SCHISTOCYTES  PRESENT        RBC COMMENTS HYPOCHROMIA  1+        RBC COMMENTS POLYCHROMASIA  PRESENT       METABOLIC PANEL, COMPREHENSIVE    Collection Time: 19 11:19 AM   Result Value Ref Range    Sodium 138 136 - 145 mmol/L    Potassium 4.1 3.5 - 5.1 mmol/L    Chloride 105 97 - 108 mmol/L    CO2 27 21 - 32 mmol/L    Anion gap 6 5 - 15 mmol/L    Glucose 107 (H) 65 - 100 mg/dL    BUN 29 (H) 6 - 20 MG/DL    Creatinine 0.80 0.70 - 1.30 MG/DL    BUN/Creatinine ratio 36 (H) 12 - 20      GFR est AA >60 >60 ml/min/1.73m2    GFR est non-AA >60 >60 ml/min/1.73m2    Calcium 8.7 8.5 - 10.1 MG/DL    Bilirubin, total 0.3 0.2 - 1.0 MG/DL    ALT (SGPT) 23 12 - 78 U/L    AST (SGOT) 8 (L) 15 - 37 U/L    Alk. phosphatase 78 45 - 117 U/L    Protein, total 6.4 6.4 - 8.2 g/dL    Albumin 3.5 3.5 - 5.0 g/dL    Globulin 2.9 2.0 - 4.0 g/dL    A-G Ratio 1.2 1.1 - 2.2     RETICULOCYTE COUNT    Collection Time: 08/27/19 11:19 AM   Result Value Ref Range    Reticulocyte count 1.7 0.7 - 2.1 %    Absolute Retic Cnt. 0.0717 0.0260 - 0.0950 M/ul       Medications:  Durvalumab IV through red lumen   RN remained at bedside for first 15 minutes of infusion. Mr. Tia Simmonds tolerated treatment well and was discharged from [place] [address] in stable condition at. PICC line flushed & heparinized per protocol. He is to return on 9/10/19 at 10:00 for his next appointment.     Karime Mandujano RN  August 27, 2019

## 2019-09-05 RX ORDER — HEPARIN 100 UNIT/ML
300-500 SYRINGE INTRAVENOUS AS NEEDED
Status: CANCELLED
Start: 2019-11-05

## 2019-09-05 RX ORDER — ONDANSETRON 2 MG/ML
8 INJECTION INTRAMUSCULAR; INTRAVENOUS AS NEEDED
Status: CANCELLED | OUTPATIENT
Start: 2019-09-10

## 2019-09-05 RX ORDER — SODIUM CHLORIDE 0.9 % (FLUSH) 0.9 %
10 SYRINGE (ML) INJECTION AS NEEDED
Status: CANCELLED
Start: 2019-10-08

## 2019-09-05 RX ORDER — SODIUM CHLORIDE 9 MG/ML
10 INJECTION INTRAMUSCULAR; INTRAVENOUS; SUBCUTANEOUS AS NEEDED
Status: CANCELLED | OUTPATIENT
Start: 2019-12-03

## 2019-09-05 RX ORDER — EPINEPHRINE 1 MG/ML
0.3 INJECTION, SOLUTION, CONCENTRATE INTRAVENOUS AS NEEDED
Status: CANCELLED | OUTPATIENT
Start: 2019-11-19

## 2019-09-05 RX ORDER — ACETAMINOPHEN 325 MG/1
650 TABLET ORAL AS NEEDED
Status: CANCELLED
Start: 2019-10-08

## 2019-09-05 RX ORDER — DIPHENHYDRAMINE HYDROCHLORIDE 50 MG/ML
50 INJECTION, SOLUTION INTRAMUSCULAR; INTRAVENOUS
Status: CANCELLED | OUTPATIENT
Start: 2019-12-03

## 2019-09-05 RX ORDER — SODIUM CHLORIDE 9 MG/ML
10 INJECTION INTRAMUSCULAR; INTRAVENOUS; SUBCUTANEOUS AS NEEDED
Status: CANCELLED | OUTPATIENT
Start: 2019-11-19

## 2019-09-05 RX ORDER — EPINEPHRINE 1 MG/ML
0.3 INJECTION, SOLUTION, CONCENTRATE INTRAVENOUS AS NEEDED
Status: CANCELLED | OUTPATIENT
Start: 2019-10-22

## 2019-09-05 RX ORDER — DIPHENHYDRAMINE HYDROCHLORIDE 50 MG/ML
50 INJECTION, SOLUTION INTRAMUSCULAR; INTRAVENOUS
Status: CANCELLED | OUTPATIENT
Start: 2019-10-22

## 2019-09-05 RX ORDER — EPINEPHRINE 1 MG/ML
0.3 INJECTION, SOLUTION, CONCENTRATE INTRAVENOUS AS NEEDED
Status: CANCELLED | OUTPATIENT
Start: 2019-09-24

## 2019-09-05 RX ORDER — DIPHENHYDRAMINE HYDROCHLORIDE 50 MG/ML
50 INJECTION, SOLUTION INTRAMUSCULAR; INTRAVENOUS
Status: CANCELLED | OUTPATIENT
Start: 2019-09-10

## 2019-09-05 RX ORDER — SODIUM CHLORIDE 9 MG/ML
25 INJECTION, SOLUTION INTRAVENOUS CONTINUOUS
Status: CANCELLED | OUTPATIENT
Start: 2019-11-05

## 2019-09-05 RX ORDER — ACETAMINOPHEN 325 MG/1
650 TABLET ORAL AS NEEDED
Status: CANCELLED
Start: 2019-12-03

## 2019-09-05 RX ORDER — EPINEPHRINE 1 MG/ML
0.3 INJECTION, SOLUTION, CONCENTRATE INTRAVENOUS AS NEEDED
Status: CANCELLED | OUTPATIENT
Start: 2019-12-03

## 2019-09-05 RX ORDER — DIPHENHYDRAMINE HYDROCHLORIDE 50 MG/ML
50 INJECTION, SOLUTION INTRAMUSCULAR; INTRAVENOUS AS NEEDED
Status: CANCELLED
Start: 2019-10-08

## 2019-09-05 RX ORDER — ACETAMINOPHEN 325 MG/1
650 TABLET ORAL AS NEEDED
Status: CANCELLED
Start: 2019-11-19

## 2019-09-05 RX ORDER — SODIUM CHLORIDE 9 MG/ML
25 INJECTION, SOLUTION INTRAVENOUS CONTINUOUS
Status: CANCELLED | OUTPATIENT
Start: 2019-10-22

## 2019-09-05 RX ORDER — SODIUM CHLORIDE 9 MG/ML
10 INJECTION INTRAMUSCULAR; INTRAVENOUS; SUBCUTANEOUS AS NEEDED
Status: CANCELLED | OUTPATIENT
Start: 2019-09-10

## 2019-09-05 RX ORDER — SODIUM CHLORIDE 0.9 % (FLUSH) 0.9 %
10 SYRINGE (ML) INJECTION AS NEEDED
Status: CANCELLED
Start: 2019-11-19

## 2019-09-05 RX ORDER — SODIUM CHLORIDE 9 MG/ML
25 INJECTION, SOLUTION INTRAVENOUS CONTINUOUS
Status: CANCELLED | OUTPATIENT
Start: 2019-09-10

## 2019-09-05 RX ORDER — ACETAMINOPHEN 325 MG/1
650 TABLET ORAL
Status: CANCELLED | OUTPATIENT
Start: 2019-12-03

## 2019-09-05 RX ORDER — SODIUM CHLORIDE 9 MG/ML
25 INJECTION, SOLUTION INTRAVENOUS CONTINUOUS
Status: CANCELLED | OUTPATIENT
Start: 2019-12-03

## 2019-09-05 RX ORDER — EPINEPHRINE 1 MG/ML
0.3 INJECTION, SOLUTION, CONCENTRATE INTRAVENOUS AS NEEDED
Status: CANCELLED | OUTPATIENT
Start: 2019-09-10

## 2019-09-05 RX ORDER — ALBUTEROL SULFATE 0.83 MG/ML
2.5 SOLUTION RESPIRATORY (INHALATION) AS NEEDED
Status: CANCELLED
Start: 2019-10-08

## 2019-09-05 RX ORDER — SODIUM CHLORIDE 9 MG/ML
10 INJECTION INTRAMUSCULAR; INTRAVENOUS; SUBCUTANEOUS AS NEEDED
Status: CANCELLED | OUTPATIENT
Start: 2019-09-24

## 2019-09-05 RX ORDER — ONDANSETRON 2 MG/ML
8 INJECTION INTRAMUSCULAR; INTRAVENOUS AS NEEDED
Status: CANCELLED | OUTPATIENT
Start: 2019-11-05

## 2019-09-05 RX ORDER — ONDANSETRON 2 MG/ML
8 INJECTION INTRAMUSCULAR; INTRAVENOUS AS NEEDED
Status: CANCELLED | OUTPATIENT
Start: 2019-10-08

## 2019-09-05 RX ORDER — SODIUM CHLORIDE 9 MG/ML
10 INJECTION INTRAMUSCULAR; INTRAVENOUS; SUBCUTANEOUS AS NEEDED
Status: CANCELLED | OUTPATIENT
Start: 2019-10-08

## 2019-09-05 RX ORDER — HEPARIN 100 UNIT/ML
300-500 SYRINGE INTRAVENOUS AS NEEDED
Status: CANCELLED
Start: 2019-11-19

## 2019-09-05 RX ORDER — ALBUTEROL SULFATE 0.83 MG/ML
2.5 SOLUTION RESPIRATORY (INHALATION) AS NEEDED
Status: CANCELLED
Start: 2019-10-22

## 2019-09-05 RX ORDER — ONDANSETRON 2 MG/ML
8 INJECTION INTRAMUSCULAR; INTRAVENOUS AS NEEDED
Status: CANCELLED | OUTPATIENT
Start: 2019-10-22

## 2019-09-05 RX ORDER — ALBUTEROL SULFATE 0.83 MG/ML
2.5 SOLUTION RESPIRATORY (INHALATION) AS NEEDED
Status: CANCELLED
Start: 2019-09-10

## 2019-09-05 RX ORDER — DIPHENHYDRAMINE HYDROCHLORIDE 50 MG/ML
50 INJECTION, SOLUTION INTRAMUSCULAR; INTRAVENOUS AS NEEDED
Status: CANCELLED
Start: 2019-09-24

## 2019-09-05 RX ORDER — ACETAMINOPHEN 325 MG/1
650 TABLET ORAL AS NEEDED
Status: CANCELLED
Start: 2019-11-05

## 2019-09-05 RX ORDER — DIPHENHYDRAMINE HYDROCHLORIDE 50 MG/ML
50 INJECTION, SOLUTION INTRAMUSCULAR; INTRAVENOUS AS NEEDED
Status: CANCELLED
Start: 2019-11-19

## 2019-09-05 RX ORDER — ACETAMINOPHEN 325 MG/1
650 TABLET ORAL
Status: CANCELLED | OUTPATIENT
Start: 2019-09-24

## 2019-09-05 RX ORDER — EPINEPHRINE 1 MG/ML
0.3 INJECTION, SOLUTION, CONCENTRATE INTRAVENOUS AS NEEDED
Status: CANCELLED | OUTPATIENT
Start: 2019-10-08

## 2019-09-05 RX ORDER — SODIUM CHLORIDE 9 MG/ML
10 INJECTION INTRAMUSCULAR; INTRAVENOUS; SUBCUTANEOUS AS NEEDED
Status: CANCELLED | OUTPATIENT
Start: 2019-11-05

## 2019-09-05 RX ORDER — SODIUM CHLORIDE 0.9 % (FLUSH) 0.9 %
10 SYRINGE (ML) INJECTION AS NEEDED
Status: CANCELLED
Start: 2019-09-24

## 2019-09-05 RX ORDER — SODIUM CHLORIDE 0.9 % (FLUSH) 0.9 %
10 SYRINGE (ML) INJECTION AS NEEDED
Status: CANCELLED
Start: 2019-12-03

## 2019-09-05 RX ORDER — ACETAMINOPHEN 325 MG/1
650 TABLET ORAL
Status: CANCELLED | OUTPATIENT
Start: 2019-09-10

## 2019-09-05 RX ORDER — ACETAMINOPHEN 325 MG/1
650 TABLET ORAL AS NEEDED
Status: CANCELLED
Start: 2019-09-24

## 2019-09-05 RX ORDER — ONDANSETRON 2 MG/ML
8 INJECTION INTRAMUSCULAR; INTRAVENOUS AS NEEDED
Status: CANCELLED | OUTPATIENT
Start: 2019-12-03

## 2019-09-05 RX ORDER — ALBUTEROL SULFATE 0.83 MG/ML
2.5 SOLUTION RESPIRATORY (INHALATION) AS NEEDED
Status: CANCELLED
Start: 2019-09-24

## 2019-09-05 RX ORDER — HEPARIN 100 UNIT/ML
300-500 SYRINGE INTRAVENOUS AS NEEDED
Status: CANCELLED
Start: 2019-10-22

## 2019-09-05 RX ORDER — HYDROCORTISONE SODIUM SUCCINATE 100 MG/2ML
100 INJECTION, POWDER, FOR SOLUTION INTRAMUSCULAR; INTRAVENOUS AS NEEDED
Status: CANCELLED | OUTPATIENT
Start: 2019-10-08

## 2019-09-05 RX ORDER — ALBUTEROL SULFATE 0.83 MG/ML
2.5 SOLUTION RESPIRATORY (INHALATION) AS NEEDED
Status: CANCELLED
Start: 2019-11-05

## 2019-09-05 RX ORDER — HEPARIN 100 UNIT/ML
300-500 SYRINGE INTRAVENOUS AS NEEDED
Status: CANCELLED
Start: 2019-09-24

## 2019-09-05 RX ORDER — HYDROCORTISONE SODIUM SUCCINATE 100 MG/2ML
100 INJECTION, POWDER, FOR SOLUTION INTRAMUSCULAR; INTRAVENOUS AS NEEDED
Status: CANCELLED | OUTPATIENT
Start: 2019-09-10

## 2019-09-05 RX ORDER — DIPHENHYDRAMINE HYDROCHLORIDE 50 MG/ML
50 INJECTION, SOLUTION INTRAMUSCULAR; INTRAVENOUS AS NEEDED
Status: CANCELLED
Start: 2019-11-05

## 2019-09-05 RX ORDER — ACETAMINOPHEN 325 MG/1
650 TABLET ORAL
Status: CANCELLED | OUTPATIENT
Start: 2019-10-22

## 2019-09-05 RX ORDER — HEPARIN 100 UNIT/ML
300-500 SYRINGE INTRAVENOUS AS NEEDED
Status: CANCELLED
Start: 2019-10-08

## 2019-09-05 RX ORDER — HEPARIN 100 UNIT/ML
300-500 SYRINGE INTRAVENOUS AS NEEDED
Status: CANCELLED
Start: 2019-09-10

## 2019-09-05 RX ORDER — SODIUM CHLORIDE 9 MG/ML
25 INJECTION, SOLUTION INTRAVENOUS CONTINUOUS
Status: CANCELLED | OUTPATIENT
Start: 2019-11-19

## 2019-09-05 RX ORDER — ALBUTEROL SULFATE 0.83 MG/ML
2.5 SOLUTION RESPIRATORY (INHALATION) AS NEEDED
Status: CANCELLED
Start: 2019-12-03

## 2019-09-05 RX ORDER — DIPHENHYDRAMINE HYDROCHLORIDE 50 MG/ML
50 INJECTION, SOLUTION INTRAMUSCULAR; INTRAVENOUS AS NEEDED
Status: CANCELLED
Start: 2019-09-10

## 2019-09-05 RX ORDER — DIPHENHYDRAMINE HYDROCHLORIDE 50 MG/ML
50 INJECTION, SOLUTION INTRAMUSCULAR; INTRAVENOUS AS NEEDED
Status: CANCELLED
Start: 2019-12-03

## 2019-09-05 RX ORDER — SODIUM CHLORIDE 9 MG/ML
10 INJECTION INTRAMUSCULAR; INTRAVENOUS; SUBCUTANEOUS AS NEEDED
Status: CANCELLED | OUTPATIENT
Start: 2019-10-22

## 2019-09-05 RX ORDER — ACETAMINOPHEN 325 MG/1
650 TABLET ORAL AS NEEDED
Status: CANCELLED
Start: 2019-10-22

## 2019-09-05 RX ORDER — ONDANSETRON 2 MG/ML
8 INJECTION INTRAMUSCULAR; INTRAVENOUS AS NEEDED
Status: CANCELLED | OUTPATIENT
Start: 2019-11-19

## 2019-09-05 RX ORDER — SODIUM CHLORIDE 9 MG/ML
25 INJECTION, SOLUTION INTRAVENOUS CONTINUOUS
Status: CANCELLED | OUTPATIENT
Start: 2019-10-08

## 2019-09-05 RX ORDER — DIPHENHYDRAMINE HYDROCHLORIDE 50 MG/ML
50 INJECTION, SOLUTION INTRAMUSCULAR; INTRAVENOUS
Status: CANCELLED | OUTPATIENT
Start: 2019-11-05

## 2019-09-05 RX ORDER — ACETAMINOPHEN 325 MG/1
650 TABLET ORAL
Status: CANCELLED | OUTPATIENT
Start: 2019-11-05

## 2019-09-05 RX ORDER — HYDROCORTISONE SODIUM SUCCINATE 100 MG/2ML
100 INJECTION, POWDER, FOR SOLUTION INTRAMUSCULAR; INTRAVENOUS AS NEEDED
Status: CANCELLED | OUTPATIENT
Start: 2019-12-03

## 2019-09-05 RX ORDER — SODIUM CHLORIDE 0.9 % (FLUSH) 0.9 %
10 SYRINGE (ML) INJECTION AS NEEDED
Status: CANCELLED
Start: 2019-11-05

## 2019-09-05 RX ORDER — HYDROCORTISONE SODIUM SUCCINATE 100 MG/2ML
100 INJECTION, POWDER, FOR SOLUTION INTRAMUSCULAR; INTRAVENOUS AS NEEDED
Status: CANCELLED | OUTPATIENT
Start: 2019-09-24

## 2019-09-05 RX ORDER — SODIUM CHLORIDE 0.9 % (FLUSH) 0.9 %
10 SYRINGE (ML) INJECTION AS NEEDED
Status: CANCELLED
Start: 2019-09-10

## 2019-09-05 RX ORDER — DIPHENHYDRAMINE HYDROCHLORIDE 50 MG/ML
50 INJECTION, SOLUTION INTRAMUSCULAR; INTRAVENOUS AS NEEDED
Status: CANCELLED
Start: 2019-10-22

## 2019-09-05 RX ORDER — HEPARIN 100 UNIT/ML
300-500 SYRINGE INTRAVENOUS AS NEEDED
Status: CANCELLED
Start: 2019-12-03

## 2019-09-05 RX ORDER — DIPHENHYDRAMINE HYDROCHLORIDE 50 MG/ML
50 INJECTION, SOLUTION INTRAMUSCULAR; INTRAVENOUS
Status: CANCELLED | OUTPATIENT
Start: 2019-09-24

## 2019-09-05 RX ORDER — HYDROCORTISONE SODIUM SUCCINATE 100 MG/2ML
100 INJECTION, POWDER, FOR SOLUTION INTRAMUSCULAR; INTRAVENOUS AS NEEDED
Status: CANCELLED | OUTPATIENT
Start: 2019-11-19

## 2019-09-05 RX ORDER — HYDROCORTISONE SODIUM SUCCINATE 100 MG/2ML
100 INJECTION, POWDER, FOR SOLUTION INTRAMUSCULAR; INTRAVENOUS AS NEEDED
Status: CANCELLED | OUTPATIENT
Start: 2019-11-05

## 2019-09-05 RX ORDER — SODIUM CHLORIDE 0.9 % (FLUSH) 0.9 %
10 SYRINGE (ML) INJECTION AS NEEDED
Status: CANCELLED
Start: 2019-10-22

## 2019-09-05 RX ORDER — SODIUM CHLORIDE 9 MG/ML
25 INJECTION, SOLUTION INTRAVENOUS CONTINUOUS
Status: CANCELLED | OUTPATIENT
Start: 2019-09-24

## 2019-09-05 RX ORDER — ALBUTEROL SULFATE 0.83 MG/ML
2.5 SOLUTION RESPIRATORY (INHALATION) AS NEEDED
Status: CANCELLED
Start: 2019-11-19

## 2019-09-05 RX ORDER — EPINEPHRINE 1 MG/ML
0.3 INJECTION, SOLUTION, CONCENTRATE INTRAVENOUS AS NEEDED
Status: CANCELLED | OUTPATIENT
Start: 2019-11-05

## 2019-09-05 RX ORDER — ACETAMINOPHEN 325 MG/1
650 TABLET ORAL AS NEEDED
Status: CANCELLED
Start: 2019-09-10

## 2019-09-05 RX ORDER — ONDANSETRON 2 MG/ML
8 INJECTION INTRAMUSCULAR; INTRAVENOUS AS NEEDED
Status: CANCELLED | OUTPATIENT
Start: 2019-09-24

## 2019-09-05 RX ORDER — HYDROCORTISONE SODIUM SUCCINATE 100 MG/2ML
100 INJECTION, POWDER, FOR SOLUTION INTRAMUSCULAR; INTRAVENOUS AS NEEDED
Status: CANCELLED | OUTPATIENT
Start: 2019-10-22

## 2019-09-09 NOTE — PROGRESS NOTES
[facility] at [address], [address] [place] [address]  Mauricio Sniff: [phone]  F: [phone]      Reason for Visit:   Ramone Barreto is a 39 y.o. male who is seen for follow up of non-small cell lung cancer. Treatment History:   · Diagnosed at [facility]  · Biopsy of right level 4 node: non small cell carcinoma, favored squamous cell carcinoma, insufficient sample for further testing  · Stage III Non-small cell lung cancer (Squamous cell)  · Definitive radiation with Dr. Keo Corrales completed mid-July 2019  · Concurrent chemotherapy with carboplatin and paclitaxel by Dr. Gonzales Mary, stopped during second infusion due to reaction to paclitaxel  · Concurrent chemotherapy with cisplatin and etoposide 7/16/2019 by Dr. Gonzales Mary  · CT Chest 7/27/2019: There has been no significant change in size of the large right paratracheal mediastinal mass. There are many new areas of peripheral consolidation in the right upper lobe, which may represent some combination of progressive disease, posttreatment change, or infection. Attention on follow-up is needed. A previously seen right upper lobe nodule in the posterior segment appears to have decreased in size. The large right pleural effusion on this exam is significantly increased in size from prior. · CT A/P 7/29/2019: no metastatic disease in abdomen or pelvis  · MRI Brain 7/29/2019: no intracranial metastatic disease  · EBUS by Dr. Wanda Murdock 7/30/2019: Squamous cell, PDL1 pending, insufficient tissue for molecular studies  · Thoracentesis 8/5/2019: cytology negative  · Initiated maintenance therapy with Durvalumab on 8/27/2019    History of Present Illness:   Presents today for follow up on therapy with Durvalumab. Energy has been down. Feels tired at times. Had mild itching to left arm, this has resolved. No rash. Appetite has been great. Denies nausea, vomiting, change in bowels. He is accompanied by his mother today.   He lives about an hour [address] of here, mother staying with him to help care for him. Not currently working due to his illness, previously was a . He quit tobacco at diagnosis, though he was a light smoker (1-2 packs per week). PAST HISTORY: The following sections were reviewed and updated in the EMR as appropriate: PMH, SH, FH, Medications, Allergies. No Known Allergies     Review of Systems: A complete review of systems was obtained, reviewed, and scanned into the EMR. Pertinent findings reviewed above. Physical Exam:     Visit Vitals  /81 (BP 1 Location: Right arm, BP Patient Position: Sitting)   Pulse 95   Temp 96.2 °F (35.7 °C) (Temporal)   Resp 20   Ht 5' 9\" (1.753 m)   Wt 144 lb (65.3 kg)   SpO2 100%   BMI 21.27 kg/m²     ECOG PS: 1  General: No distress  Eyes: PERRLA, anicteric sclerae  HENT: Atraumatic, OP clear  Neck: Supple  Lymphatic: No cervical, supraclavicular, or inguinal adenopathy  Respiratory: CTAB, normal respiratory effort  CV: Normal rate, regular rhythm, no murmurs, no peripheral edema  GI: Soft, nontender, nondistended, no masses, no hepatomegaly, no splenomegaly  MS: Normal gait and station. Digits without clubbing or cyanosis. Skin: No rashes, ecchymoses, or petechiae. Normal temperature, turgor, and texture. Psych: Alert, oriented, appropriate affect, normal judgment/insight    Results:     Lab Results   Component Value Date/Time    WBC 3.2 (L) 09/10/2019 10:29 AM    HGB 9.7 (L) 09/10/2019 10:29 AM    HCT 30.4 (L) 09/10/2019 10:29 AM    PLATELET 74 (L) 09/76/3651 10:29 AM    MCV 77.7 (L) 09/10/2019 10:29 AM    ABS.  NEUTROPHILS PENDING 09/10/2019 10:29 AM     Lab Results   Component Value Date/Time    Sodium 138 08/27/2019 11:19 AM    Potassium 4.1 08/27/2019 11:19 AM    Chloride 105 08/27/2019 11:19 AM    CO2 27 08/27/2019 11:19 AM    Glucose 107 (H) 08/27/2019 11:19 AM    BUN 29 (H) 08/27/2019 11:19 AM    Creatinine 0.80 08/27/2019 11:19 AM    GFR est AA >60 08/27/2019 11:19 AM    GFR est non-AA >60 08/27/2019 11:19 AM    Calcium 8.7 08/27/2019 11:19 AM     Lab Results   Component Value Date/Time    Bilirubin, total 0.3 08/27/2019 11:19 AM    ALT (SGPT) 23 08/27/2019 11:19 AM    AST (SGOT) 8 (L) 08/27/2019 11:19 AM    Alk. phosphatase 78 08/27/2019 11:19 AM    Protein, total 6.4 08/27/2019 11:19 AM    Albumin 3.5 08/27/2019 11:19 AM    Globulin 2.9 08/27/2019 11:19 AM       PET 8/21/2019:  1. Decreased size and activity of the mediastinal mass. 2. RUL nodular densities remain ametabolic. 3. No new finding. Assessment:   1) Non-small cell lung cancer  Stage IIIB  He has at least Stage IIIB disease, and has been treated with concurrent radiation and chemotherapy (cisplatin and etoposide). Repeat PET demonstrates a partial response to therapy. No definite evidence of distant metastatic disease at this time, though he does have a concerning pleural effusion. Recommended one year of maintenance immunotherapy with Durvalumab initiated 8/27/2019. Tolerated cycle 1 very well with exception of grade 1 cough, grade 1 SOB, grade 1 fatigue. Will proceed with C2 today as ordered. Check thyroid labs today. Unfortunately, his two biopsies have yielded scant material, insufficient for molecular studies or PDL1 testing. At progression, a repeat biopsy may be needed for NGS. We will proceed today with treatment. We will see him every 2 weeks initially with therapy, and space out visits to every 4 weeks in the future if he is doing well. Reimage every 12 weeks. 2) SVC syndrome  Persists, but improving clinically. He has tapered off the steroids. Confirmed he is off steroids. Will now stop Lasix and see how he does. 3) Pleural effusion  Cytology negative. No improvement in dyspnea with thoracentesis. Monitor, repeat thoracentesis PRN    4) Poor IV access  With SVC syndrome, he cannot have a port or UE PICC. Currently has femoral PICC in place.     5) weight gain  Appears to be \"good\" weight gain, not related to swelling. Will adjust Durvalumab dose today for weight. 6) Thrombocytopenia  Monitor. Secondary to chemotherapy. 7) Anemia  Improving. Continue oral iron. No folate or Vit B12 def identified. 8) Radiation esophagitis  This has resolved. Ok to stop Pantoprazole    9) Hyponatremia? Normal sodium in our system on last several checks. He is off salt tablets now    10 Hypokalemia? Normal potassium in our system on last several checks. As we are stopping the lasix, I will try him off the KCL supplements as well and monitor. 11) Emotional Well Being  No psychosocial concerns identified today.  Patient has adequate support.        Plan:     · Plan to proceed today with Durvalumab (10mg/kg) given every 2 weeks for one year  · Labs: CBC, CMP prior to each cycle, TSH every 6 weeks  · CT Chest after C6  · Return to clinic in 2 weeks    Signed By: Nelia Wheatley MD

## 2019-09-10 ENCOUNTER — OFFICE VISIT (OUTPATIENT)
Dept: ONCOLOGY | Age: 44
End: 2019-09-10

## 2019-09-10 ENCOUNTER — HOSPITAL ENCOUNTER (OUTPATIENT)
Dept: INFUSION THERAPY | Age: 44
Discharge: HOME | End: 2019-09-10
Payer: COMMERCIAL

## 2019-09-10 VITALS
HEIGHT: 69 IN | TEMPERATURE: 96.2 F | BODY MASS INDEX: 21.33 KG/M2 | SYSTOLIC BLOOD PRESSURE: 117 MMHG | RESPIRATION RATE: 20 BRPM | WEIGHT: 144 LBS | HEART RATE: 95 BPM | OXYGEN SATURATION: 100 % | DIASTOLIC BLOOD PRESSURE: 81 MMHG

## 2019-09-10 VITALS
DIASTOLIC BLOOD PRESSURE: 69 MMHG | RESPIRATION RATE: 18 BRPM | HEIGHT: 69 IN | BODY MASS INDEX: 21.4 KG/M2 | HEART RATE: 85 BPM | SYSTOLIC BLOOD PRESSURE: 108 MMHG | OXYGEN SATURATION: 100 % | WEIGHT: 144.5 LBS | TEMPERATURE: 96.6 F

## 2019-09-10 DIAGNOSIS — C34.11 MALIGNANT NEOPLASM OF UPPER LOBE OF RIGHT LUNG (HCC): Primary | Chronic | ICD-10-CM

## 2019-09-10 DIAGNOSIS — C34.11 MALIGNANT NEOPLASM OF UPPER LOBE OF RIGHT LUNG (HCC): Primary | ICD-10-CM

## 2019-09-10 LAB
ALBUMIN SERPL-MCNC: 3.1 G/DL (ref 3.5–5)
ALBUMIN/GLOB SERPL: 1 {RATIO} (ref 1.1–2.2)
ALP SERPL-CCNC: 128 U/L (ref 45–117)
ALT SERPL-CCNC: 22 U/L (ref 12–78)
ANION GAP SERPL CALC-SCNC: 4 MMOL/L (ref 5–15)
AST SERPL-CCNC: 13 U/L (ref 15–37)
BASOPHILS # BLD: 0 K/UL (ref 0–0.1)
BASOPHILS NFR BLD: 0 % (ref 0–1)
BILIRUB SERPL-MCNC: 0.3 MG/DL (ref 0.2–1)
BUN SERPL-MCNC: 25 MG/DL (ref 6–20)
BUN/CREAT SERPL: 32 (ref 12–20)
CALCIUM SERPL-MCNC: 8.9 MG/DL (ref 8.5–10.1)
CHLORIDE SERPL-SCNC: 107 MMOL/L (ref 97–108)
CO2 SERPL-SCNC: 29 MMOL/L (ref 21–32)
CREAT SERPL-MCNC: 0.78 MG/DL (ref 0.7–1.3)
DIFFERENTIAL METHOD BLD: ABNORMAL
EOSINOPHIL # BLD: 0.1 K/UL (ref 0–0.4)
EOSINOPHIL NFR BLD: 4 % (ref 0–7)
ERYTHROCYTE [DISTWIDTH] IN BLOOD BY AUTOMATED COUNT: 20.3 % (ref 11.5–14.5)
GLOBULIN SER CALC-MCNC: 3.2 G/DL (ref 2–4)
GLUCOSE SERPL-MCNC: 98 MG/DL (ref 65–100)
HCT VFR BLD AUTO: 30.4 % (ref 36.6–50.3)
HGB BLD-MCNC: 9.7 G/DL (ref 12.1–17)
IMM GRANULOCYTES # BLD AUTO: 0.1 K/UL (ref 0–0.04)
IMM GRANULOCYTES NFR BLD AUTO: 2 % (ref 0–0.5)
LYMPHOCYTES # BLD: 0.4 K/UL (ref 0.8–3.5)
LYMPHOCYTES NFR BLD: 11 % (ref 12–49)
MCH RBC QN AUTO: 24.8 PG (ref 26–34)
MCHC RBC AUTO-ENTMCNC: 31.9 G/DL (ref 30–36.5)
MCV RBC AUTO: 77.7 FL (ref 80–99)
MONOCYTES # BLD: 0.4 K/UL (ref 0–1)
MONOCYTES NFR BLD: 14 % (ref 5–13)
NEUTS SEG # BLD: 2.2 K/UL (ref 1.8–8)
NEUTS SEG NFR BLD: 69 % (ref 32–75)
NRBC # BLD: 0 K/UL (ref 0–0.01)
NRBC BLD-RTO: 0 PER 100 WBC
PLATELET # BLD AUTO: 74 K/UL (ref 150–400)
PMV BLD AUTO: ABNORMAL FL (ref 8.9–12.9)
POTASSIUM SERPL-SCNC: 4 MMOL/L (ref 3.5–5.1)
PROT SERPL-MCNC: 6.3 G/DL (ref 6.4–8.2)
RBC # BLD AUTO: 3.91 M/UL (ref 4.1–5.7)
RBC MORPH BLD: ABNORMAL
SODIUM SERPL-SCNC: 140 MMOL/L (ref 136–145)
TSH SERPL DL<=0.05 MIU/L-ACNC: 1.49 UIU/ML (ref 0.36–3.74)
WBC # BLD AUTO: 3.2 K/UL (ref 4.1–11.1)

## 2019-09-10 PROCEDURE — 74011250636 HC RX REV CODE- 250/636: Performed by: NURSE PRACTITIONER

## 2019-09-10 PROCEDURE — 74011250636 HC RX REV CODE- 250/636

## 2019-09-10 PROCEDURE — 85025 COMPLETE CBC W/AUTO DIFF WBC: CPT

## 2019-09-10 PROCEDURE — 36415 COLL VENOUS BLD VENIPUNCTURE: CPT

## 2019-09-10 PROCEDURE — 80053 COMPREHEN METABOLIC PANEL: CPT

## 2019-09-10 PROCEDURE — 96413 CHEMO IV INFUSION 1 HR: CPT

## 2019-09-10 PROCEDURE — 74011000258 HC RX REV CODE- 258: Performed by: NURSE PRACTITIONER

## 2019-09-10 PROCEDURE — 84443 ASSAY THYROID STIM HORMONE: CPT

## 2019-09-10 RX ORDER — HEPARIN 100 UNIT/ML
300-500 SYRINGE INTRAVENOUS AS NEEDED
Status: ACTIVE | OUTPATIENT
Start: 2019-09-10 | End: 2019-09-10

## 2019-09-10 RX ORDER — SODIUM CHLORIDE 9 MG/ML
25 INJECTION, SOLUTION INTRAVENOUS CONTINUOUS
Status: DISCONTINUED | OUTPATIENT
Start: 2019-09-10 | End: 2019-09-11 | Stop reason: HOSPADM

## 2019-09-10 RX ORDER — SODIUM CHLORIDE 9 MG/ML
10 INJECTION INTRAMUSCULAR; INTRAVENOUS; SUBCUTANEOUS AS NEEDED
Status: ACTIVE | OUTPATIENT
Start: 2019-09-10 | End: 2019-09-10

## 2019-09-10 RX ORDER — SODIUM CHLORIDE 0.9 % (FLUSH) 0.9 %
10 SYRINGE (ML) INJECTION AS NEEDED
Status: ACTIVE | OUTPATIENT
Start: 2019-09-10 | End: 2019-09-10

## 2019-09-10 RX ADMIN — SODIUM CHLORIDE 10 ML: 9 INJECTION INTRAMUSCULAR; INTRAVENOUS; SUBCUTANEOUS at 12:10

## 2019-09-10 RX ADMIN — SODIUM CHLORIDE 655 MG: 9 INJECTION, SOLUTION INTRAVENOUS at 12:20

## 2019-09-10 RX ADMIN — SODIUM CHLORIDE 25 ML/HR: 900 INJECTION, SOLUTION INTRAVENOUS at 12:11

## 2019-09-10 RX ADMIN — Medication 500 UNITS: at 12:11

## 2019-09-10 RX ADMIN — Medication 10 ML: at 13:20

## 2019-09-10 RX ADMIN — Medication 500 UNITS: at 13:20

## 2019-09-10 RX ADMIN — Medication 10 ML: at 10:31

## 2019-09-10 RX ADMIN — Medication 10 ML: at 10:30

## 2019-09-10 NOTE — PATIENT INSTRUCTIONS
You may stop your Furosemide and Potassium pills. You no longer need these. Since swallowing is better and you are no longer having pain with swallowing you may also stop Pantoprazole (Protonix).      Follow up in office and for next treatment in 2 weeks

## 2019-09-10 NOTE — PROGRESS NOTES
[facility] Progress Note    Date: September 10, 2019    Name: Pippa Mcfarland    MRN: [id number]         : 1975    Mr. Nicholas Otero Arrived ambulatory and in no distress for C2D1 of Durvalumab Regimen. Assessment was completed, no acute issues at this time, no new complaints voiced. PICC line accessed without difficulty, labs drawn & sent for processing. Purple and red lumens both flush easily and + blood return. Patient proceed to appointment with Dr. Edgardo May. Mr. Fritz's vitals were reviewed. Lab results were obtained and reviewed. Recent Results (from the past 12 hour(s))   TSH 3RD GENERATION    Collection Time: 09/10/19 10:22 AM   Result Value Ref Range    TSH 1.49 0.36 - 3.74 uIU/mL   CBC WITH AUTOMATED DIFF    Collection Time: 09/10/19 10:29 AM   Result Value Ref Range    WBC 3.2 (L) 4.1 - 11.1 K/uL    RBC 3.91 (L) 4.10 - 5.70 M/uL    HGB 9.7 (L) 12.1 - 17.0 g/dL    HCT 30.4 (L) 36.6 - 50.3 %    MCV 77.7 (L) 80.0 - 99.0 FL    MCH 24.8 (L) 26.0 - 34.0 PG    MCHC 31.9 30.0 - 36.5 g/dL    RDW 20.3 (H) 11.5 - 14.5 %    PLATELET 74 (L) 582 - 400 K/uL    MPV ABNORMAL 8.9 - 12.9 FL    NRBC 0.0 0  WBC    ABSOLUTE NRBC 0.00 0.00 - 0.01 K/uL    NEUTROPHILS 69 32 - 75 %    LYMPHOCYTES 11 (L) 12 - 49 %    MONOCYTES 14 (H) 5 - 13 %    EOSINOPHILS 4 0 - 7 %    BASOPHILS 0 0 - 1 %    IMMATURE GRANULOCYTES 2 (H) 0.0 - 0.5 %    ABS. NEUTROPHILS 2.2 1.8 - 8.0 K/UL    ABS. LYMPHOCYTES 0.4 (L) 0.8 - 3.5 K/UL    ABS. MONOCYTES 0.4 0.0 - 1.0 K/UL    ABS. EOSINOPHILS 0.1 0.0 - 0.4 K/UL    ABS. BASOPHILS 0.0 0.0 - 0.1 K/UL    ABS. IMM.  GRANS. 0.1 (H) 0.00 - 0.04 K/UL    DF SMEAR SCANNED      RBC COMMENTS NORMOCYTIC, NORMOCHROMIC     METABOLIC PANEL, COMPREHENSIVE    Collection Time: 09/10/19 10:29 AM   Result Value Ref Range    Sodium 140 136 - 145 mmol/L    Potassium 4.0 3.5 - 5.1 mmol/L    Chloride 107 97 - 108 mmol/L    CO2 29 21 - 32 mmol/L    Anion gap 4 (L) 5 - 15 mmol/L    Glucose 98 65 - 100 mg/dL    BUN 25 (H) 6 - 20 MG/DL    Creatinine 0.78 0.70 - 1.30 MG/DL    BUN/Creatinine ratio 32 (H) 12 - 20      GFR est AA >60 >60 ml/min/1.73m2    GFR est non-AA >60 >60 ml/min/1.73m2    Calcium 8.9 8.5 - 10.1 MG/DL    Bilirubin, total 0.3 0.2 - 1.0 MG/DL    ALT (SGPT) 22 12 - 78 U/L    AST (SGOT) 13 (L) 15 - 37 U/L    Alk. phosphatase 128 (H) 45 - 117 U/L    Protein, total 6.3 (L) 6.4 - 8.2 g/dL    Albumin 3.1 (L) 3.5 - 5.0 g/dL    Globulin 3.2 2.0 - 4.0 g/dL    A-G Ratio 1.0 (L) 1.1 - 2.2         Medications:  Durvalumab IV    Mr. Alis Nava tolerated treatment well and was discharged from [place] [id number] in stable condition. PICC line, flushed & heparinized per protocol, both lumens + blood return. He is to return on 9/24/19 at 10:00 for his next appointment.     Johnathon Hamilton RN  September 10, 2019

## 2019-09-10 NOTE — PROGRESS NOTES
Teressa Murcia is a 39 y.o. male follow up for lung cancer. 1. Have you been to the ER, urgent care clinic since your last visit? Hospitalized since your last visit?no     2. Have you seen or consulted any other health care providers outside of the [address] [place] [address] since your last visit? Include any pap smears or colon screening.  no

## 2019-09-18 ENCOUNTER — TELEPHONE (OUTPATIENT)
Dept: ONCOLOGY | Age: 44
End: 2019-09-18

## 2019-09-18 NOTE — TELEPHONE ENCOUNTER
[address] at [place]  [phone]    09/18/19- Patient reports bilateral swelling from his ankles to knees. Denies redness, pain, or warmth. Stated SOB remains the same, has not worsened. Patient has been off lasix for about a week. Questioned if he should start taking lasix and potassium again. Will discuss with Veda Moss and call patient back. 10:38 AM- Okay for patient to restart lasix and potassium daily, per Veda Moss. Will re-evaluate swelling at patient's appointment next week. He verbalized understanding, no further questions or concerns at this time.

## 2019-09-18 NOTE — TELEPHONE ENCOUNTER
Patient called and stated that he has some questions about his medication. Patient stated that his legs are swelling and has two different fluid medications and would like to know if he can take them.  # [phone]

## 2019-09-20 NOTE — PROGRESS NOTES
[facility] [place] at [facility]  [address] [facility], [address] [place] [address]  Eleuterio Jose: [phone]  F: [phone]      Reason for Visit:   Spring Iyer is a 39 y.o. male who is seen for follow up of non-small cell lung cancer. Treatment History:   · Diagnosed at [facility]  · Biopsy of right level 4 node: non small cell carcinoma, favored squamous cell carcinoma, insufficient sample for further testing  · Stage III Non-small cell lung cancer (Squamous cell)  · Definitive radiation with Dr. Ileana Kam completed mid-July 2019  · Concurrent chemotherapy with carboplatin and paclitaxel by Dr. Jeane Granda, stopped during second infusion due to reaction to paclitaxel  · Concurrent chemotherapy with cisplatin and etoposide 7/16/2019 by Dr. Jeane Granda  · CT Chest 7/27/2019: There has been no significant change in size of the large right paratracheal mediastinal mass. There are many new areas of peripheral consolidation in the right upper lobe, which may represent some combination of progressive disease, posttreatment change, or infection. Attention on follow-up is needed. A previously seen right upper lobe nodule in the posterior segment appears to have decreased in size. The large right pleural effusion on this exam is significantly increased in size from prior. · CT A/P 7/29/2019: no metastatic disease in abdomen or pelvis  · MRI Brain 7/29/2019: no intracranial metastatic disease  · EBUS by Dr. Panda Luo 7/30/2019: Squamous cell, PDL1 pending, insufficient tissue for molecular studies  · Thoracentesis 8/5/2019: cytology negative  · Initiated maintenance therapy with Durvalumab on 8/27/2019    History of Present Illness:   He has been noticing some increasing swelling in his feet. Called our office on 9/18 and was told to resume his lasix and potassium. Has not noticed much improvement yet.   Today as we discuss this further, it seems like he is having neuropathy in his soles rather than edema in his legs.  Feels like he is walking on glass. Pain becomes bad after standing for awhile. May have noticed this before chemotherapy, but worsened significantly while on chemotherapy. Some mild arm swelling recurring, no pain. No pain elsewhere. No recent infections. No fevers or chills. Dyspnea on exertion persists, stable. He is accompanied by his mother today. He lives about an hour [address] of here, mother staying with him to help care for him. Not currently working due to his illness, previously was a . He quit tobacco at diagnosis, though he was a light smoker (1-2 packs per week). PAST HISTORY: The following sections were reviewed and updated in the EMR as appropriate: PMH, SH, FH, Medications, Allergies. No Known Allergies     Review of Systems: A complete review of systems was obtained, reviewed, and scanned into the EMR. Pertinent findings reviewed above. Physical Exam:     Visit Vitals  /80 (BP 1 Location: Right arm, BP Patient Position: Sitting)   Pulse 91   Temp 97.5 °F (36.4 °C) (Temporal)   Resp 16   Ht 5' 9\" (1.753 m)   Wt 149 lb (67.6 kg)   SpO2 100%   BMI 22.00 kg/m²     ECOG PS: 1  General: No distress  Eyes: PERRLA, anicteric sclerae  HENT: Atraumatic, OP clear  Neck: Supple  Lymphatic: No cervical, supraclavicular, or inguinal adenopathy  Respiratory: CTAB, normal respiratory effort  CV: Normal rate, regular rhythm, no murmurs, no peripheral edema  GI: Soft, nontender, nondistended, no masses, no hepatomegaly, no splenomegaly  MS: Normal gait and station. Digits without clubbing or cyanosis. Skin: No rashes, ecchymoses, or petechiae. Normal temperature, turgor, and texture.   Psych: Alert, oriented, appropriate affect, normal judgment/insight    Results:     Lab Results   Component Value Date/Time    WBC 3.3 (L) 09/24/2019 10:07 AM    HGB 9.8 (L) 09/24/2019 10:07 AM    HCT 31.1 (L) 09/24/2019 10:07 AM    PLATELET 948 (L) 37/91/3880 10:07 AM    MCV 77.6 (L) 09/24/2019 10:07 AM    ABS. NEUTROPHILS 2.0 09/24/2019 10:07 AM     Lab Results   Component Value Date/Time    Sodium 139 09/24/2019 10:07 AM    Potassium 4.2 09/24/2019 10:07 AM    Chloride 106 09/24/2019 10:07 AM    CO2 28 09/24/2019 10:07 AM    Glucose 98 09/24/2019 10:07 AM    BUN 18 09/24/2019 10:07 AM    Creatinine 0.78 09/24/2019 10:07 AM    GFR est AA >60 09/24/2019 10:07 AM    GFR est non-AA >60 09/24/2019 10:07 AM    Calcium 9.0 09/24/2019 10:07 AM     Lab Results   Component Value Date/Time    Bilirubin, total 0.3 09/10/2019 10:29 AM    ALT (SGPT) 22 09/10/2019 10:29 AM    AST (SGOT) 13 (L) 09/10/2019 10:29 AM    Alk. phosphatase 128 (H) 09/10/2019 10:29 AM    Protein, total 6.3 (L) 09/10/2019 10:29 AM    Albumin 3.1 (L) 09/10/2019 10:29 AM    Globulin 3.2 09/10/2019 10:29 AM     Lab Results   Component Value Date/Time    Reticulocyte count 1.7 08/27/2019 11:19 AM    Iron % saturation 44 08/27/2019 11:19 AM    TIBC 236 (L) 08/27/2019 11:19 AM    Ferritin 698 (H) 08/27/2019 11:19 AM    Vitamin B12 265 08/27/2019 11:19 AM    Folate 9.2 08/27/2019 11:19 AM    TSH 1.49 09/10/2019 10:22 AM    Lipase 136 07/27/2019 11:31 AM     Lab Results   Component Value Date/Time    INR 1.2 (H) 07/30/2019 07:19 AM    aPTT 25.4 07/30/2019 07:19 AM         PET 8/21/2019:  1. Decreased size and activity of the mediastinal mass. 2. RUL nodular densities remain ametabolic. 3. No new finding. Assessment:   1) Non-small cell lung cancer  Stage IIIB  He has at least Stage IIIB disease, and has been treated with concurrent radiation and chemotherapy (cisplatin and etoposide). Repeat PET demonstrates a partial response to therapy. No definite evidence of distant metastatic disease at this time, though he does have a concerning pleural effusion. He is currently on maintenance immunotherapy with Durvalumab. He is tolerating therapy well so far. We will proceed with his next treatment today.     We will see him every 2 weeks initially with therapy, and space out visits to every 4 weeks in the future if he is doing well. Reimage every 12 weeks. Unfortunately, his two biopsies have yielded scant material, insufficient for molecular studies or PDL1 testing. At progression, a repeat biopsy may be needed for NGS. 2) SVC syndrome  Persists, but improving clinically. Off steroids. Monitor. 3) Pleural effusion  Cytology negative. No improvement in dyspnea with thoracentesis. Monitor, repeat thoracentesis PRN    4) Poor IV access  With SVC syndrome, he cannot have a port or UE PICC. Currently has femoral PICC in place. 5) Thrombocytopenia  Improving. Monitor. Secondary to chemotherapy. 6) Anemia  Improving. Iron labs ok, ferritin 698, ok to stop oral iron. No folate or Vit B12 def identified. 7) Radiation esophagitis  This has resolved. Ok to stop PPI when this script runs out in a few days. 8) Hyponatremia? Normal sodium in our system on last several checks. He is off salt tablets now and repeat sodium level is normal.  Monitor. 9) Hypokalemia? Normal potassium in our system on last several checks. Stopped KCL when he stopped lasix, but now back on this after resuming lasix. Will stop once again, as we are again stopping his lasix. Monitor. 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 I think what he is describing is really neuropathy in his feet. D/c lasix. Try cymbalta, will start at 30mg and increase to 60mg after his next visit if needed. 11) Emotional Well Being  No psychosocial concerns identified today.  Patient has adequate support.        Plan:     · D/c lasix and KCL  · Start cymbalta 30mg PO daily  · Proceed today with C#3 Durvalumab (10mg/kg) given every 2 weeks for one year  · Labs: CBC, CMP prior to each cycle, TSH every 6 weeks  · CT Chest after C6  · Return to [place] in 2 weeks, return to see me in 4 weeks    Signed By: Zoraida Epstein MD

## 2019-09-24 ENCOUNTER — HOSPITAL ENCOUNTER (OUTPATIENT)
Dept: INFUSION THERAPY | Age: 44
Discharge: HOME | End: 2019-09-24
Payer: COMMERCIAL

## 2019-09-24 ENCOUNTER — OFFICE VISIT (OUTPATIENT)
Dept: ONCOLOGY | Age: 44
End: 2019-09-24

## 2019-09-24 VITALS
DIASTOLIC BLOOD PRESSURE: 76 MMHG | HEIGHT: 69 IN | HEART RATE: 91 BPM | TEMPERATURE: 97.9 F | SYSTOLIC BLOOD PRESSURE: 112 MMHG | WEIGHT: 149.8 LBS | BODY MASS INDEX: 22.19 KG/M2 | RESPIRATION RATE: 18 BRPM

## 2019-09-24 VITALS
SYSTOLIC BLOOD PRESSURE: 119 MMHG | RESPIRATION RATE: 16 BRPM | TEMPERATURE: 97.5 F | DIASTOLIC BLOOD PRESSURE: 80 MMHG | BODY MASS INDEX: 22.07 KG/M2 | WEIGHT: 149 LBS | OXYGEN SATURATION: 100 % | HEART RATE: 91 BPM | HEIGHT: 69 IN

## 2019-09-24 DIAGNOSIS — C34.11 MALIGNANT NEOPLASM OF UPPER LOBE OF RIGHT LUNG (HCC): Primary | ICD-10-CM

## 2019-09-24 DIAGNOSIS — T45.1X5A CHEMOTHERAPY-INDUCED NEUROPATHY (HCC): ICD-10-CM

## 2019-09-24 DIAGNOSIS — G62.0 CHEMOTHERAPY-INDUCED NEUROPATHY (HCC): ICD-10-CM

## 2019-09-24 LAB
ALBUMIN SERPL-MCNC: 3.4 G/DL (ref 3.5–5)
ALBUMIN/GLOB SERPL: 1.1 {RATIO} (ref 1.1–2.2)
ALP SERPL-CCNC: 133 U/L (ref 45–117)
ALT SERPL-CCNC: 18 U/L (ref 12–78)
ANION GAP SERPL CALC-SCNC: 5 MMOL/L (ref 5–15)
AST SERPL-CCNC: 18 U/L (ref 15–37)
BASOPHILS # BLD: 0 K/UL (ref 0–0.1)
BASOPHILS NFR BLD: 0 % (ref 0–1)
BILIRUB DIRECT SERPL-MCNC: 0.1 MG/DL (ref 0–0.2)
BILIRUB SERPL-MCNC: 0.4 MG/DL (ref 0.2–1)
BUN SERPL-MCNC: 18 MG/DL (ref 6–20)
BUN/CREAT SERPL: 23 (ref 12–20)
CALCIUM SERPL-MCNC: 9 MG/DL (ref 8.5–10.1)
CHLORIDE SERPL-SCNC: 106 MMOL/L (ref 97–108)
CO2 SERPL-SCNC: 28 MMOL/L (ref 21–32)
CREAT SERPL-MCNC: 0.78 MG/DL (ref 0.7–1.3)
DIFFERENTIAL METHOD BLD: ABNORMAL
EOSINOPHIL # BLD: 0.4 K/UL (ref 0–0.4)
EOSINOPHIL NFR BLD: 11 % (ref 0–7)
ERYTHROCYTE [DISTWIDTH] IN BLOOD BY AUTOMATED COUNT: 17.4 % (ref 11.5–14.5)
GLOBULIN SER CALC-MCNC: 3.2 G/DL (ref 2–4)
GLUCOSE SERPL-MCNC: 98 MG/DL (ref 65–100)
HCT VFR BLD AUTO: 31.1 % (ref 36.6–50.3)
HGB BLD-MCNC: 9.8 G/DL (ref 12.1–17)
IMM GRANULOCYTES # BLD AUTO: 0 K/UL (ref 0–0.04)
IMM GRANULOCYTES NFR BLD AUTO: 1 % (ref 0–0.5)
LYMPHOCYTES # BLD: 0.4 K/UL (ref 0.8–3.5)
LYMPHOCYTES NFR BLD: 13 % (ref 12–49)
MCH RBC QN AUTO: 24.4 PG (ref 26–34)
MCHC RBC AUTO-ENTMCNC: 31.5 G/DL (ref 30–36.5)
MCV RBC AUTO: 77.6 FL (ref 80–99)
MONOCYTES # BLD: 0.5 K/UL (ref 0–1)
MONOCYTES NFR BLD: 16 % (ref 5–13)
NEUTS SEG # BLD: 2 K/UL (ref 1.8–8)
NEUTS SEG NFR BLD: 59 % (ref 32–75)
NRBC # BLD: 0 K/UL (ref 0–0.01)
NRBC BLD-RTO: 0 PER 100 WBC
PLATELET # BLD AUTO: 121 K/UL (ref 150–400)
POTASSIUM SERPL-SCNC: 4.2 MMOL/L (ref 3.5–5.1)
PROT SERPL-MCNC: 6.6 G/DL (ref 6.4–8.2)
RBC # BLD AUTO: 4.01 M/UL (ref 4.1–5.7)
RBC MORPH BLD: ABNORMAL
SODIUM SERPL-SCNC: 139 MMOL/L (ref 136–145)
WBC # BLD AUTO: 3.3 K/UL (ref 4.1–11.1)

## 2019-09-24 PROCEDURE — 74011250636 HC RX REV CODE- 250/636: Performed by: NURSE PRACTITIONER

## 2019-09-24 PROCEDURE — 74011000258 HC RX REV CODE- 258: Performed by: NURSE PRACTITIONER

## 2019-09-24 PROCEDURE — 80048 BASIC METABOLIC PNL TOTAL CA: CPT

## 2019-09-24 PROCEDURE — 85025 COMPLETE CBC W/AUTO DIFF WBC: CPT

## 2019-09-24 PROCEDURE — 96413 CHEMO IV INFUSION 1 HR: CPT

## 2019-09-24 PROCEDURE — 80076 HEPATIC FUNCTION PANEL: CPT

## 2019-09-24 PROCEDURE — 36415 COLL VENOUS BLD VENIPUNCTURE: CPT

## 2019-09-24 RX ORDER — SODIUM CHLORIDE 0.9 % (FLUSH) 0.9 %
10 SYRINGE (ML) INJECTION AS NEEDED
Status: ACTIVE | OUTPATIENT
Start: 2019-09-24 | End: 2019-09-24

## 2019-09-24 RX ORDER — SODIUM CHLORIDE 9 MG/ML
10 INJECTION INTRAMUSCULAR; INTRAVENOUS; SUBCUTANEOUS AS NEEDED
Status: ACTIVE | OUTPATIENT
Start: 2019-09-24 | End: 2019-09-24

## 2019-09-24 RX ORDER — DULOXETIN HYDROCHLORIDE 30 MG/1
30 CAPSULE, DELAYED RELEASE ORAL DAILY
Qty: 30 CAP | Refills: 2 | Status: SHIPPED | OUTPATIENT
Start: 2019-09-24 | End: 2020-01-14 | Stop reason: SDUPTHER

## 2019-09-24 RX ORDER — SODIUM CHLORIDE 9 MG/ML
25 INJECTION, SOLUTION INTRAVENOUS CONTINUOUS
Status: DISPENSED | OUTPATIENT
Start: 2019-09-24 | End: 2019-09-24

## 2019-09-24 RX ORDER — HEPARIN 100 UNIT/ML
300-500 SYRINGE INTRAVENOUS AS NEEDED
Status: ACTIVE | OUTPATIENT
Start: 2019-09-24 | End: 2019-09-24

## 2019-09-24 RX ADMIN — SODIUM CHLORIDE 25 ML/HR: 900 INJECTION, SOLUTION INTRAVENOUS at 13:04

## 2019-09-24 RX ADMIN — Medication 500 UNITS: at 14:32

## 2019-09-24 RX ADMIN — Medication 10 ML: at 14:33

## 2019-09-24 RX ADMIN — Medication 10 ML: at 14:32

## 2019-09-24 RX ADMIN — Medication 10 ML: at 10:14

## 2019-09-24 RX ADMIN — SODIUM CHLORIDE 655 MG: 9 INJECTION, SOLUTION INTRAVENOUS at 13:13

## 2019-09-24 RX ADMIN — Medication 500 UNITS: at 14:33

## 2019-09-24 RX ADMIN — Medication 10 ML: at 10:13

## 2019-09-24 NOTE — PROGRESS NOTES
Yaritu Nelda is a 39 y.o. male follow up for lung cancer. 1. Have you been to the ER, urgent care clinic since your last visit? Hospitalized since your last visit?no     2. Have you seen or consulted any other health care providers outside of the [address] [place] [address] since your last visit? Include any pap smears or colon screening.  no

## 2019-09-24 NOTE — PROGRESS NOTES
[facility] VISIT NOTE    1000  Pt arrived at [facility] ambulatory and in no distress for C3D1 Durvalumab. Assessment completed, pt c/o leg swelling. Left femoral double lumen PICC flushed with positive blood return noted, labs drawn and sent for processing. Patient to see MD.      Chemotherapy Flowsheet 9/24/2019   Cycle C3D1   Date 9/24/2019   Drug / Regimen Durvalumab   Notes given       Vitals:  /76   Pulse 91   Temp 97.9 °F (36.6 °C)   Resp 18   Ht 5' 9\" (1.753 m)   Wt 67.9 kg (149 lb 12.8 oz)   BMI 22.12 kg/m²     Labs:     Recent Results (from the past 12 hour(s))   CBC WITH AUTOMATED DIFF    Collection Time: 09/24/19 10:07 AM   Result Value Ref Range    WBC 3.3 (L) 4.1 - 11.1 K/uL    RBC 4.01 (L) 4.10 - 5.70 M/uL    HGB 9.8 (L) 12.1 - 17.0 g/dL    HCT 31.1 (L) 36.6 - 50.3 %    MCV 77.6 (L) 80.0 - 99.0 FL    MCH 24.4 (L) 26.0 - 34.0 PG    MCHC 31.5 30.0 - 36.5 g/dL    RDW 17.4 (H) 11.5 - 14.5 %    PLATELET 942 (L) 865 - 400 K/uL    NRBC 0.0 0  WBC    ABSOLUTE NRBC 0.00 0.00 - 0.01 K/uL    NEUTROPHILS 59 32 - 75 %    LYMPHOCYTES 13 12 - 49 %    MONOCYTES 16 (H) 5 - 13 %    EOSINOPHILS 11 (H) 0 - 7 %    BASOPHILS 0 0 - 1 %    IMMATURE GRANULOCYTES 1 (H) 0.0 - 0.5 %    ABS. NEUTROPHILS 2.0 1.8 - 8.0 K/UL    ABS. LYMPHOCYTES 0.4 (L) 0.8 - 3.5 K/UL    ABS. MONOCYTES 0.5 0.0 - 1.0 K/UL    ABS. EOSINOPHILS 0.4 0.0 - 0.4 K/UL    ABS. BASOPHILS 0.0 0.0 - 0.1 K/UL    ABS. IMM.  GRANS. 0.0 0.00 - 0.04 K/UL    DF SMEAR SCANNED      RBC COMMENTS NORMOCYTIC, NORMOCHROMIC     METABOLIC PANEL, BASIC    Collection Time: 09/24/19 10:07 AM   Result Value Ref Range    Sodium 139 136 - 145 mmol/L    Potassium 4.2 3.5 - 5.1 mmol/L    Chloride 106 97 - 108 mmol/L    CO2 28 21 - 32 mmol/L    Anion gap 5 5 - 15 mmol/L    Glucose 98 65 - 100 mg/dL    BUN 18 6 - 20 MG/DL    Creatinine 0.78 0.70 - 1.30 MG/DL    BUN/Creatinine ratio 23 (H) 12 - 20      GFR est AA >60 >60 ml/min/1.73m2    GFR est non-AA >60 >60 ml/min/1.73m2    Calcium 9.0 8.5 - 10.1 MG/DL   HEPATIC FUNCTION PANEL    Collection Time: 09/24/19 10:07 AM   Result Value Ref Range    Protein, total 6.6 6.4 - 8.2 g/dL    Albumin 3.4 (L) 3.5 - 5.0 g/dL    Globulin 3.2 2.0 - 4.0 g/dL    A-G Ratio 1.1 1.1 - 2.2      Bilirubin, total 0.4 0.2 - 1.0 MG/DL    Bilirubin, direct 0.1 0.0 - 0.2 MG/DL    Alk. phosphatase 133 (H) 45 - 117 U/L    AST (SGOT) 18 15 - 37 U/L    ALT (SGPT) 18 12 - 78 U/L       Medications received:    Medications Administered     0.9% sodium chloride infusion     Admin Date  09/24/2019 Action  New Bag Dose  25 mL/hr Rate  25 mL/hr Route  IntraVENous Administered By  Kateryna Jarvis RN          durvalumab (IMFINZI) 655 mg in 0.9% sodium chloride 100 mL, overfill volume 10 mL IVPB     Admin Date  09/24/2019 Action  New Bag Dose  655 mg Rate  123.1 mL/hr Route  IntraVENous Administered By  Kateryna Jarvis RN          heparin (porcine) pf 300-500 Units     Admin Date  09/24/2019 Action  Given Dose  500 Units Route  InterCATHeter Administered By  Kateryna Jarvis RN           Admin Date  09/24/2019 Action  Given Dose  500 Units Route  InterCATHeter Administered By  Kateryna Jarvis RN          saline peripheral flush soln 10 mL     Admin Date  09/24/2019 Action  Given Dose  10 mL Route  InterCATHeter Administered By  Kateryna Jarvis RN           Admin Date  09/24/2019 Action  Given Dose  10 mL Route  InterCATHeter Administered By  Kateryna Jarvis RN           Admin Date  09/24/2019 Action  Given Dose  10 mL Route  InterCATHeter Administered By  Kateryna Jarvis RN           Admin Date  09/24/2019 Action  Given Dose  10 mL Route  InterCATHeter Administered By  Kateryna Jarvis RN                Tolerated treatment well, no adverse reaction noted. 1440  D/C'd from [facility] ambulatory and in no distress.      Future Appointments:  Future Appointments   Date Time Provider Aquilino Steel   10/8/2019 10:00 AM [facility]   10/8/2019 10:15 AM Whitney Singh NP [facility]   10/22/2019 10:00 AM SS INF1 CH1 >4H [facility]   10/22/2019 10:15 AM Whitney Singh NP [facility] SCHED   11/5/2019 10:00 AM SS INF2 CH1 >4H [facility]   11/19/2019 10:00 AM SS INF1 CH1 >4H [facility]   12/3/2019 10:00 AM SS INF1 CH1 >4H [facility] Monica Kathleen

## 2019-10-04 ENCOUNTER — TELEPHONE (OUTPATIENT)
Dept: ONCOLOGY | Age: 44
End: 2019-10-04

## 2019-10-08 ENCOUNTER — HOSPITAL ENCOUNTER (OUTPATIENT)
Dept: INFUSION THERAPY | Age: 44
Discharge: HOME | End: 2019-10-08
Payer: COMMERCIAL

## 2019-10-08 VITALS
HEIGHT: 69 IN | TEMPERATURE: 97.5 F | OXYGEN SATURATION: 100 % | HEART RATE: 79 BPM | RESPIRATION RATE: 18 BRPM | BODY MASS INDEX: 23.79 KG/M2 | WEIGHT: 160.6 LBS | SYSTOLIC BLOOD PRESSURE: 108 MMHG | DIASTOLIC BLOOD PRESSURE: 76 MMHG

## 2019-10-08 DIAGNOSIS — C34.11 MALIGNANT NEOPLASM OF UPPER LOBE OF RIGHT LUNG (HCC): Primary | ICD-10-CM

## 2019-10-08 LAB
ALBUMIN SERPL-MCNC: 3.4 G/DL (ref 3.5–5)
ALBUMIN/GLOB SERPL: 1.1 {RATIO} (ref 1.1–2.2)
ALP SERPL-CCNC: 126 U/L (ref 45–117)
ALT SERPL-CCNC: 18 U/L (ref 12–78)
ANION GAP SERPL CALC-SCNC: 6 MMOL/L (ref 5–15)
AST SERPL-CCNC: 15 U/L (ref 15–37)
BASOPHILS # BLD: 0 K/UL (ref 0–0.1)
BASOPHILS NFR BLD: 0 % (ref 0–1)
BILIRUB SERPL-MCNC: 0.3 MG/DL (ref 0.2–1)
BUN SERPL-MCNC: 15 MG/DL (ref 6–20)
BUN/CREAT SERPL: 19 (ref 12–20)
CALCIUM SERPL-MCNC: 8.8 MG/DL (ref 8.5–10.1)
CHLORIDE SERPL-SCNC: 108 MMOL/L (ref 97–108)
CO2 SERPL-SCNC: 26 MMOL/L (ref 21–32)
CREAT SERPL-MCNC: 0.78 MG/DL (ref 0.7–1.3)
DIFFERENTIAL METHOD BLD: ABNORMAL
EOSINOPHIL # BLD: 0.2 K/UL (ref 0–0.4)
EOSINOPHIL NFR BLD: 5 % (ref 0–7)
ERYTHROCYTE [DISTWIDTH] IN BLOOD BY AUTOMATED COUNT: 14.9 % (ref 11.5–14.5)
GLOBULIN SER CALC-MCNC: 3 G/DL (ref 2–4)
GLUCOSE SERPL-MCNC: 93 MG/DL (ref 65–100)
HCT VFR BLD AUTO: 32.6 % (ref 36.6–50.3)
HGB BLD-MCNC: 10.3 G/DL (ref 12.1–17)
IMM GRANULOCYTES # BLD AUTO: 0 K/UL (ref 0–0.04)
IMM GRANULOCYTES NFR BLD AUTO: 0 % (ref 0–0.5)
LYMPHOCYTES # BLD: 0.4 K/UL (ref 0.8–3.5)
LYMPHOCYTES NFR BLD: 13 % (ref 12–49)
MCH RBC QN AUTO: 24.8 PG (ref 26–34)
MCHC RBC AUTO-ENTMCNC: 31.6 G/DL (ref 30–36.5)
MCV RBC AUTO: 78.6 FL (ref 80–99)
MONOCYTES # BLD: 0.5 K/UL (ref 0–1)
MONOCYTES NFR BLD: 16 % (ref 5–13)
NEUTS SEG # BLD: 2 K/UL (ref 1.8–8)
NEUTS SEG NFR BLD: 66 % (ref 32–75)
NRBC # BLD: 0 K/UL (ref 0–0.01)
NRBC BLD-RTO: 0 PER 100 WBC
PLATELET # BLD AUTO: 120 K/UL (ref 150–400)
POTASSIUM SERPL-SCNC: 4.3 MMOL/L (ref 3.5–5.1)
PROT SERPL-MCNC: 6.4 G/DL (ref 6.4–8.2)
RBC # BLD AUTO: 4.15 M/UL (ref 4.1–5.7)
RBC MORPH BLD: ABNORMAL
SODIUM SERPL-SCNC: 140 MMOL/L (ref 136–145)
TSH SERPL DL<=0.05 MIU/L-ACNC: 2.97 UIU/ML (ref 0.36–3.74)
WBC # BLD AUTO: 3.1 K/UL (ref 4.1–11.1)
WBC MORPH BLD: ABNORMAL

## 2019-10-08 PROCEDURE — 36415 COLL VENOUS BLD VENIPUNCTURE: CPT

## 2019-10-08 PROCEDURE — 84443 ASSAY THYROID STIM HORMONE: CPT

## 2019-10-08 PROCEDURE — 74011000258 HC RX REV CODE- 258: Performed by: NURSE PRACTITIONER

## 2019-10-08 PROCEDURE — 74011250636 HC RX REV CODE- 250/636: Performed by: NURSE PRACTITIONER

## 2019-10-08 PROCEDURE — 80053 COMPREHEN METABOLIC PANEL: CPT

## 2019-10-08 PROCEDURE — 96413 CHEMO IV INFUSION 1 HR: CPT

## 2019-10-08 PROCEDURE — 85025 COMPLETE CBC W/AUTO DIFF WBC: CPT

## 2019-10-08 RX ORDER — SODIUM CHLORIDE 9 MG/ML
25 INJECTION, SOLUTION INTRAVENOUS CONTINUOUS
Status: DISPENSED | OUTPATIENT
Start: 2019-10-08 | End: 2019-10-08

## 2019-10-08 RX ORDER — SODIUM CHLORIDE 0.9 % (FLUSH) 0.9 %
10 SYRINGE (ML) INJECTION AS NEEDED
Status: ACTIVE | OUTPATIENT
Start: 2019-10-08 | End: 2019-10-08

## 2019-10-08 RX ORDER — SODIUM CHLORIDE 9 MG/ML
10 INJECTION INTRAMUSCULAR; INTRAVENOUS; SUBCUTANEOUS AS NEEDED
Status: ACTIVE | OUTPATIENT
Start: 2019-10-08 | End: 2019-10-08

## 2019-10-08 RX ORDER — HEPARIN 100 UNIT/ML
300-500 SYRINGE INTRAVENOUS AS NEEDED
Status: ACTIVE | OUTPATIENT
Start: 2019-10-08 | End: 2019-10-08

## 2019-10-08 RX ADMIN — SODIUM CHLORIDE 655 MG: 900 INJECTION, SOLUTION INTRAVENOUS at 12:22

## 2019-10-08 RX ADMIN — Medication 10 ML: at 13:35

## 2019-10-08 RX ADMIN — SODIUM CHLORIDE 25 ML/HR: 900 INJECTION, SOLUTION INTRAVENOUS at 12:23

## 2019-10-08 RX ADMIN — SODIUM CHLORIDE, PRESERVATIVE FREE 500 UNITS: 5 INJECTION INTRAVENOUS at 13:35

## 2019-10-08 NOTE — PROGRESS NOTES
\[facility]\"" Progress Note    Date: 2019    Name: Flaquito Perera    MRN: [id number]         : 1975    Mr. Larayne Klinefelter Arrived ambulatory and in no distress for C4D1 of Durvalumab Regimen. Assessment was completed, no acute issues at this time, no new complaints voiced. Left femoral PICC accessed without difficulty, labs drawn & sent for processing. Pt states a home health nurse reports to home for PICC dressing changes. Last dressing change occurred 10/7/2019. Pharmacy notified of weight gain in regards to medication dose, orders received to administer same dose per MD.     Chemotherapy Flowsheet 10/8/2019   Cycle C4D1   Date 10/8/2019   Drug / Regimen Durvalumab   Notes given       Mr. Fritz's vitals were reviewed. Visit Vitals  BP (!) 125/91   Pulse 85   Temp 97.5 °F (36.4 °C)   Resp 18   Ht 5' 9\" (1.753 m)   Wt 72.8 kg (160 lb 9.6 oz)   SpO2 99%   BMI 23.72 kg/m²       Lab results were obtained and reviewed. Recent Results (from the past 8 hour(s))   CBC WITH AUTOMATED DIFF    Collection Time: 10/08/19 10:20 AM   Result Value Ref Range    WBC 3.1 (L) 4.1 - 11.1 K/uL    RBC 4.15 4.10 - 5.70 M/uL    HGB 10.3 (L) 12.1 - 17.0 g/dL    HCT 32.6 (L) 36.6 - 50.3 %    MCV 78.6 (L) 80.0 - 99.0 FL    MCH 24.8 (L) 26.0 - 34.0 PG    MCHC 31.6 30.0 - 36.5 g/dL    RDW 14.9 (H) 11.5 - 14.5 %    PLATELET 265 (L) 487 - 400 K/uL    NRBC 0.0 0  WBC    ABSOLUTE NRBC 0.00 0.00 - 0.01 K/uL    NEUTROPHILS 66 32 - 75 %    LYMPHOCYTES 13 12 - 49 %    MONOCYTES 16 (H) 5 - 13 %    EOSINOPHILS 5 0 - 7 %    BASOPHILS 0 0 - 1 %    IMMATURE GRANULOCYTES 0 0.0 - 0.5 %    ABS. NEUTROPHILS 2.0 1.8 - 8.0 K/UL    ABS. LYMPHOCYTES 0.4 (L) 0.8 - 3.5 K/UL    ABS. MONOCYTES 0.5 0.0 - 1.0 K/UL    ABS. EOSINOPHILS 0.2 0.0 - 0.4 K/UL    ABS. BASOPHILS 0.0 0.0 - 0.1 K/UL    ABS. IMM.  GRANS. 0.0 0.00 - 0.04 K/UL    DF SMEAR SCANNED      RBC COMMENTS ANISOCYTOSIS  1+        RBC COMMENTS OVALOCYTES  PRESENT        RBC COMMENTS POLYCHROMASIA  PRESENT        RBC COMMENTS MICROCYTOSIS  1+        WBC COMMENTS TOXIC GRANULATION     METABOLIC PANEL, COMPREHENSIVE    Collection Time: 10/08/19 10:20 AM   Result Value Ref Range    Sodium 140 136 - 145 mmol/L    Potassium 4.3 3.5 - 5.1 mmol/L    Chloride 108 97 - 108 mmol/L    CO2 26 21 - 32 mmol/L    Anion gap 6 5 - 15 mmol/L    Glucose 93 65 - 100 mg/dL    BUN 15 6 - 20 MG/DL    Creatinine 0.78 0.70 - 1.30 MG/DL    BUN/Creatinine ratio 19 12 - 20      GFR est AA >60 >60 ml/min/1.73m2    GFR est non-AA >60 >60 ml/min/1.73m2    Calcium 8.8 8.5 - 10.1 MG/DL    Bilirubin, total 0.3 0.2 - 1.0 MG/DL    ALT (SGPT) 18 12 - 78 U/L    AST (SGOT) 15 15 - 37 U/L    Alk. phosphatase 126 (H) 45 - 117 U/L    Protein, total 6.4 6.4 - 8.2 g/dL    Albumin 3.4 (L) 3.5 - 5.0 g/dL    Globulin 3.0 2.0 - 4.0 g/dL    A-G Ratio 1.1 1.1 - 2.2     TSH 3RD GENERATION    Collection Time: 10/08/19 10:20 AM   Result Value Ref Range    TSH 2.97 0.36 - 3.74 uIU/mL         Medications:  Medications Administered     0.9% sodium chloride infusion     Admin Date  10/08/2019 Action  New Bag Dose  25 mL/hr Rate  25 mL/hr Route  IntraVENous Administered By  Raudel Cabot          durvalumab (IMFINZI) 655 mg in 0.9% sodium chloride 100 mL, overfill volume 10 mL IVPB     Admin Date  10/08/2019 Action  New Bag Dose  655 mg Rate  123.1 mL/hr Route  IntraVENous Administered By  [place] Cabot          heparin (porcine) pf 300-500 Units     Admin Date  10/08/2019 Action  Given Dose  500 Units Route  InterCATHeter Administered By  Edilberto Chávez RN          saline peripheral flush soln 10 mL     Admin Date  10/08/2019 Action  Given Dose  10 mL Route  InterCATHeter Administered By  Edilberto Chávez RN                Mr. Aiyana Rothman tolerated treatment well and was discharged from [place] [address] in stable condition. PICC flushed & heparinized per protocol. He is to return on October 22 for his next appointment.     Kalyani QUINONEZ Mela Selby RN  October 8, 2019

## 2019-10-21 NOTE — PROGRESS NOTES
[facility]  [address] [facility]  Temo Jason: [phone]  F: [phone]      Reason for Visit:   Altamease Cowden is a 39 y.o. male who is seen for follow up of non-small cell lung cancer. Treatment History:   · Diagnosed at [facility]  · Biopsy of right level 4 node: non small cell carcinoma, favored squamous cell carcinoma, insufficient sample for further testing  · Stage III Non-small cell lung cancer (Squamous cell)  · Definitive radiation with Dr. Sepideh Jeter completed mid-July 2019  · Concurrent chemotherapy with carboplatin and paclitaxel by Dr. Caridad Young, stopped during second infusion due to reaction to paclitaxel  · Concurrent chemotherapy with cisplatin and etoposide 7/16/2019 by Dr. Caridad Young  · CT Chest 7/27/2019: There has been no significant change in size of the large right paratracheal mediastinal mass. There are many new areas of peripheral consolidation in the right upper lobe, which may represent some combination of progressive disease, posttreatment change, or infection. Attention on follow-up is needed. A previously seen right upper lobe nodule in the posterior segment appears to have decreased in size. The large right pleural effusion on this exam is significantly increased in size from prior. · CT A/P 7/29/2019: no metastatic disease in abdomen or pelvis  · MRI Brain 7/29/2019: no intracranial metastatic disease  · EBUS by Dr. Cee Stanley 7/30/2019: Squamous cell, PDL1 pending, insufficient tissue for molecular studies  · Thoracentesis 8/5/2019: cytology negative  · Initiated maintenance therapy with Durvalumab on 8/27/2019    History of Present Illness:   Initiated Cymbalta since last visit due to concern for worsening neuropathy. He feels that this is working well for him, symptoms are improving. No pain with walking. No shooting pain up legs. No trouble sleeping at night. No fever or chills. No congestion.  Dry cough is new in the last 3 weeks. Non productive, no fever or chills. Mild itching on chest, no rash. Appetite has been great, gaining weight. Mild fatigue, mild PEDROZA. No nausea or vomiting. He is accompanied by his mother today. He lives about an hour [address] of here, mother staying with him to help care for him. Not currently working due to his illness, previously was a . He quit tobacco at diagnosis, though he was a light smoker (1-2 packs per week). PAST HISTORY: The following sections were reviewed and updated in the EMR as appropriate: PMH, SH, FH, Medications, Allergies. No Known Allergies     Review of Systems: A complete review of systems was obtained, reviewed, and scanned into the EMR. Pertinent findings reviewed above. Physical Exam:     Visit Vitals  BP (!) 135/96 (BP 1 Location: Left arm, BP Patient Position: Sitting)   Pulse 75   Temp 96.6 °F (35.9 °C) (Temporal)   Resp 16   Ht 5' 9\" (1.753 m)   Wt 161 lb (73 kg)   SpO2 99%   BMI 23.78 kg/m²     ECOG PS: 1  General: No distress  Eyes: PERRLA, anicteric sclerae  HENT: Atraumatic, OP clear  Neck: Supple  Lymphatic: No cervical, supraclavicular, or inguinal adenopathy  Respiratory: CTAB, normal respiratory effort  CV: Normal rate, regular rhythm, no murmurs, no peripheral edema  GI: Soft, nontender, nondistended, no masses, no hepatomegaly, no splenomegaly  MS: Normal gait and station. Digits without clubbing or cyanosis. Skin: No rashes, ecchymoses, or petechiae. Normal temperature, turgor, and texture. Psych: Alert, oriented, appropriate affect, normal judgment/insight    Results:     Lab Results   Component Value Date/Time    WBC 2.8 (L) 10/22/2019 10:23 AM    HGB 10.8 (L) 10/22/2019 10:23 AM    HCT 33.9 (L) 10/22/2019 10:23 AM    PLATELET 457 (L) 72/10/3561 10:23 AM    MCV 77.4 (L) 10/22/2019 10:23 AM    ABS.  NEUTROPHILS 1.9 10/22/2019 10:23 AM     Lab Results   Component Value Date/Time    Sodium 141 10/22/2019 10:23 AM    Potassium 4.0 10/22/2019 10:23 AM    Chloride 108 10/22/2019 10:23 AM    CO2 26 10/22/2019 10:23 AM    Glucose 94 10/22/2019 10:23 AM    BUN 21 (H) 10/22/2019 10:23 AM    Creatinine 0.83 10/22/2019 10:23 AM    GFR est AA >60 10/22/2019 10:23 AM    GFR est non-AA >60 10/22/2019 10:23 AM    Calcium 8.6 10/22/2019 10:23 AM     Lab Results   Component Value Date/Time    Bilirubin, total 0.3 10/22/2019 10:23 AM    ALT (SGPT) 21 10/22/2019 10:23 AM    AST (SGOT) 15 10/22/2019 10:23 AM    Alk. phosphatase 118 (H) 10/22/2019 10:23 AM    Protein, total 6.5 10/22/2019 10:23 AM    Albumin 3.4 (L) 10/22/2019 10:23 AM    Globulin 3.1 10/22/2019 10:23 AM     Lab Results   Component Value Date/Time    Reticulocyte count 1.7 08/27/2019 11:19 AM    Iron % saturation 44 08/27/2019 11:19 AM    TIBC 236 (L) 08/27/2019 11:19 AM    Ferritin 698 (H) 08/27/2019 11:19 AM    Vitamin B12 265 08/27/2019 11:19 AM    Folate 9.2 08/27/2019 11:19 AM    TSH 2.97 10/08/2019 10:20 AM    Lipase 136 07/27/2019 11:31 AM     Lab Results   Component Value Date/Time    INR 1.2 (H) 07/30/2019 07:19 AM    aPTT 25.4 07/30/2019 07:19 AM         PET 8/21/2019:  1. Decreased size and activity of the mediastinal mass. 2. RUL nodular densities remain ametabolic. 3. No new finding. Assessment:   1) Non-small cell lung cancer  Stage IIIB  He has at least Stage IIIB disease, and has been treated with concurrent radiation and chemotherapy (cisplatin and etoposide). Repeat PET demonstrates a partial response to therapy. No definite evidence of distant metastatic disease at this time, though he does have a concerning pleural effusion. He is currently on maintenance immunotherapy with Durvalumab. He is tolerating therapy well so far. We will proceed with his next treatment today. Will repeat imaging after C6 of therapy, ordered. We will see him every 2 weeks initially with therapy, and space out visits to every 4 weeks in the future if he is doing well.   Reimage every 12 weeks. Unfortunately, his two biopsies have yielded scant material, insufficient for molecular studies or PDL1 testing. At progression, a repeat biopsy may be needed for NGS. 2) SVC syndrome  Persists, but improving clinically. Off steroids. Monitor. 3) Pleural effusion  Cytology negative. No improvement in dyspnea with thoracentesis. Monitor, repeat thoracentesis PRN    4) Poor IV access  With SVC syndrome, he cannot have a port or UE PICC. Currently has femoral PICC in place. This is working well. Home health for dressing changes. 5) Thrombocytopenia  Improving. Monitor. Secondary to chemotherapy. 6) Anemia  Improving. Iron labs ok, ferritin 698, ok to stop oral iron. No folate or Vit B12 def identified. 7) Radiation esophagitis  Discontinued PPI. 8) Hyponatremia? Normal sodium in our system on last several checks. He is off salt tablets now and repeat sodium level is normal.  Monitor. 9) Hypokalemia? Normal potassium in our system on last several checks. Now off potassium and Lasix, repeat levels today. 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 11) Cough, non productive  Trial of Claritin x 2 weeks, then will be time to check imaging. 12) Emotional Well Being  No psychosocial concerns identified today. Patient has adequate support. Plan:     · Proceed today with C5 Durvalumab (10mg/kg) given every 2 weeks for one year  · Labs: CBC, CMP prior to each cycle, TSH every 6 weeks  · Continue cymbalta 30mg PO daily  · CT Chest after C6  · Return to [facility] in 2 weeks, return to see me in 4 weeks    Patient was seen in conjunction with Tatiana Dimas NP.     Signed By: Anushka Lopez MD

## 2019-10-22 ENCOUNTER — HOSPITAL ENCOUNTER (OUTPATIENT)
Dept: INFUSION THERAPY | Age: 44
Discharge: HOME | End: 2019-10-22
Payer: COMMERCIAL

## 2019-10-22 ENCOUNTER — OFFICE VISIT (OUTPATIENT)
Dept: ONCOLOGY | Age: 44
End: 2019-10-22

## 2019-10-22 VITALS
SYSTOLIC BLOOD PRESSURE: 121 MMHG | HEIGHT: 69 IN | BODY MASS INDEX: 23.91 KG/M2 | DIASTOLIC BLOOD PRESSURE: 81 MMHG | TEMPERATURE: 97.5 F | WEIGHT: 161.4 LBS | HEART RATE: 74 BPM | OXYGEN SATURATION: 99 % | RESPIRATION RATE: 16 BRPM

## 2019-10-22 VITALS
WEIGHT: 161 LBS | HEIGHT: 69 IN | OXYGEN SATURATION: 99 % | TEMPERATURE: 96.6 F | HEART RATE: 75 BPM | RESPIRATION RATE: 16 BRPM | BODY MASS INDEX: 23.85 KG/M2 | SYSTOLIC BLOOD PRESSURE: 135 MMHG | DIASTOLIC BLOOD PRESSURE: 96 MMHG

## 2019-10-22 DIAGNOSIS — C34.11 MALIGNANT NEOPLASM OF UPPER LOBE OF RIGHT LUNG (HCC): Primary | Chronic | ICD-10-CM

## 2019-10-22 DIAGNOSIS — C34.11 MALIGNANT NEOPLASM OF UPPER LOBE OF RIGHT LUNG (HCC): Primary | ICD-10-CM

## 2019-10-22 LAB
ALBUMIN SERPL-MCNC: 3.4 G/DL (ref 3.5–5)
ALBUMIN/GLOB SERPL: 1.1 {RATIO} (ref 1.1–2.2)
ALP SERPL-CCNC: 118 U/L (ref 45–117)
ALT SERPL-CCNC: 21 U/L (ref 12–78)
ANION GAP SERPL CALC-SCNC: 7 MMOL/L (ref 5–15)
AST SERPL-CCNC: 15 U/L (ref 15–37)
BASOPHILS # BLD: 0 K/UL (ref 0–0.1)
BASOPHILS NFR BLD: 0 % (ref 0–1)
BILIRUB SERPL-MCNC: 0.3 MG/DL (ref 0.2–1)
BUN SERPL-MCNC: 21 MG/DL (ref 6–20)
BUN/CREAT SERPL: 25 (ref 12–20)
CALCIUM SERPL-MCNC: 8.6 MG/DL (ref 8.5–10.1)
CHLORIDE SERPL-SCNC: 108 MMOL/L (ref 97–108)
CO2 SERPL-SCNC: 26 MMOL/L (ref 21–32)
CREAT SERPL-MCNC: 0.83 MG/DL (ref 0.7–1.3)
DIFFERENTIAL METHOD BLD: ABNORMAL
EOSINOPHIL # BLD: 0.1 K/UL (ref 0–0.4)
EOSINOPHIL NFR BLD: 5 % (ref 0–7)
ERYTHROCYTE [DISTWIDTH] IN BLOOD BY AUTOMATED COUNT: 13.3 % (ref 11.5–14.5)
GLOBULIN SER CALC-MCNC: 3.1 G/DL (ref 2–4)
GLUCOSE SERPL-MCNC: 94 MG/DL (ref 65–100)
HCT VFR BLD AUTO: 33.9 % (ref 36.6–50.3)
HGB BLD-MCNC: 10.8 G/DL (ref 12.1–17)
IMM GRANULOCYTES # BLD AUTO: 0 K/UL (ref 0–0.04)
IMM GRANULOCYTES NFR BLD AUTO: 1 % (ref 0–0.5)
LYMPHOCYTES # BLD: 0.4 K/UL (ref 0.8–3.5)
LYMPHOCYTES NFR BLD: 16 % (ref 12–49)
MCH RBC QN AUTO: 24.7 PG (ref 26–34)
MCHC RBC AUTO-ENTMCNC: 31.9 G/DL (ref 30–36.5)
MCV RBC AUTO: 77.4 FL (ref 80–99)
MONOCYTES # BLD: 0.4 K/UL (ref 0–1)
MONOCYTES NFR BLD: 14 % (ref 5–13)
NEUTS SEG # BLD: 1.9 K/UL (ref 1.8–8)
NEUTS SEG NFR BLD: 64 % (ref 32–75)
NRBC # BLD: 0 K/UL (ref 0–0.01)
NRBC BLD-RTO: 0 PER 100 WBC
PLATELET # BLD AUTO: 115 K/UL (ref 150–400)
PMV BLD AUTO: ABNORMAL FL (ref 8.9–12.9)
POTASSIUM SERPL-SCNC: 4 MMOL/L (ref 3.5–5.1)
PROT SERPL-MCNC: 6.5 G/DL (ref 6.4–8.2)
RBC # BLD AUTO: 4.38 M/UL (ref 4.1–5.7)
RBC MORPH BLD: ABNORMAL
SODIUM SERPL-SCNC: 141 MMOL/L (ref 136–145)
WBC # BLD AUTO: 2.8 K/UL (ref 4.1–11.1)

## 2019-10-22 PROCEDURE — 36415 COLL VENOUS BLD VENIPUNCTURE: CPT

## 2019-10-22 PROCEDURE — 74011250636 HC RX REV CODE- 250/636: Performed by: NURSE PRACTITIONER

## 2019-10-22 PROCEDURE — 96413 CHEMO IV INFUSION 1 HR: CPT

## 2019-10-22 PROCEDURE — 80053 COMPREHEN METABOLIC PANEL: CPT

## 2019-10-22 PROCEDURE — 74011000258 HC RX REV CODE- 258: Performed by: NURSE PRACTITIONER

## 2019-10-22 PROCEDURE — 85025 COMPLETE CBC W/AUTO DIFF WBC: CPT

## 2019-10-22 RX ORDER — SODIUM CHLORIDE 9 MG/ML
10 INJECTION INTRAMUSCULAR; INTRAVENOUS; SUBCUTANEOUS AS NEEDED
Status: ACTIVE | OUTPATIENT
Start: 2019-10-22 | End: 2019-10-22

## 2019-10-22 RX ORDER — HEPARIN 100 UNIT/ML
300-500 SYRINGE INTRAVENOUS AS NEEDED
Status: ACTIVE | OUTPATIENT
Start: 2019-10-22 | End: 2019-10-22

## 2019-10-22 RX ORDER — SODIUM CHLORIDE 0.9 % (FLUSH) 0.9 %
10 SYRINGE (ML) INJECTION AS NEEDED
Status: ACTIVE | OUTPATIENT
Start: 2019-10-22 | End: 2019-10-22

## 2019-10-22 RX ORDER — SODIUM CHLORIDE 9 MG/ML
25 INJECTION, SOLUTION INTRAVENOUS CONTINUOUS
Status: DISPENSED | OUTPATIENT
Start: 2019-10-22 | End: 2019-10-22

## 2019-10-22 RX ADMIN — Medication 10 ML: at 13:32

## 2019-10-22 RX ADMIN — SODIUM CHLORIDE 25 ML/HR: 900 INJECTION, SOLUTION INTRAVENOUS at 12:21

## 2019-10-22 RX ADMIN — HEPARIN 500 UNITS: 100 SYRINGE at 13:32

## 2019-10-22 RX ADMIN — SODIUM CHLORIDE 10 ML: 9 INJECTION INTRAMUSCULAR; INTRAVENOUS; SUBCUTANEOUS at 13:32

## 2019-10-22 RX ADMIN — SODIUM CHLORIDE 732 MG: 9 INJECTION, SOLUTION INTRAVENOUS at 12:28

## 2019-10-22 NOTE — PROGRESS NOTES
Nava Mercadowall is a 39 y.o. male follow up for lung cancer. 1. Have you been to the ER, urgent care clinic since your last visit? Hospitalized since your last visit?no     2. Have you seen or consulted any other health care providers outside of the [address] [place] [address] since your last visit? Include any pap smears or colon screening.  no

## 2019-10-22 NOTE — PROGRESS NOTES
[facility] Progress Note    Date: 2019    Name: Arlean Severin    MRN: [id number]         : 1975    Mr. Gem Abdalla Arrived ambulatory and in no distress for cycle 5 day 1 of Durvalumab regimen. Assessment was completed, no acute issues at this time, no new complaints voiced. PICC line accessed without difficulty, labs drawn and processed. Chemotherapy Flowsheet 10/22/2019   Cycle C5D1   Date 10/22/2019   Drug / Regimen Durvalumab   Notes given         Mr. Fritz's vitals were reviewed. Patient Vitals for the past 12 hrs:   Temp Pulse Resp BP SpO2   10/22/19 1330 -- 74 16 121/81 99 %   10/22/19 1014 97.5 °F (36.4 °C) 77 18 124/84 100 %         Lab results were obtained and reviewed. Recent Results (from the past 12 hour(s))   CBC WITH AUTOMATED DIFF    Collection Time: 10/22/19 10:23 AM   Result Value Ref Range    WBC 2.8 (L) 4.1 - 11.1 K/uL    RBC 4.38 4.10 - 5.70 M/uL    HGB 10.8 (L) 12.1 - 17.0 g/dL    HCT 33.9 (L) 36.6 - 50.3 %    MCV 77.4 (L) 80.0 - 99.0 FL    MCH 24.7 (L) 26.0 - 34.0 PG    MCHC 31.9 30.0 - 36.5 g/dL    RDW 13.3 11.5 - 14.5 %    PLATELET 373 (L) 660 - 400 K/uL    MPV ABNORMAL 8.9 - 12.9 FL    NRBC 0.0 0  WBC    ABSOLUTE NRBC 0.00 0.00 - 0.01 K/uL    NEUTROPHILS 64 32 - 75 %    LYMPHOCYTES 16 12 - 49 %    MONOCYTES 14 (H) 5 - 13 %    EOSINOPHILS 5 0 - 7 %    BASOPHILS 0 0 - 1 %    IMMATURE GRANULOCYTES 1 (H) 0.0 - 0.5 %    ABS. NEUTROPHILS 1.9 1.8 - 8.0 K/UL    ABS. LYMPHOCYTES 0.4 (L) 0.8 - 3.5 K/UL    ABS. MONOCYTES 0.4 0.0 - 1.0 K/UL    ABS. EOSINOPHILS 0.1 0.0 - 0.4 K/UL    ABS. BASOPHILS 0.0 0.0 - 0.1 K/UL    ABS. IMM.  GRANS. 0.0 0.00 - 0.04 K/UL    DF SMEAR SCANNED      RBC COMMENTS OVALOCYTES  PRESENT        RBC COMMENTS SCHISTOCYTES  PRESENT        RBC COMMENTS MICROCYTOSIS  1+        RBC COMMENTS HYPOCHROMIA  1+       METABOLIC PANEL, COMPREHENSIVE    Collection Time: 10/22/19 10:23 AM   Result Value Ref Range    Sodium 141 136 - 145 mmol/L    Potassium 4.0 3.5 - 5.1 mmol/L    Chloride 108 97 - 108 mmol/L    CO2 26 21 - 32 mmol/L    Anion gap 7 5 - 15 mmol/L    Glucose 94 65 - 100 mg/dL    BUN 21 (H) 6 - 20 MG/DL    Creatinine 0.83 0.70 - 1.30 MG/DL    BUN/Creatinine ratio 25 (H) 12 - 20      GFR est AA >60 >60 ml/min/1.73m2    GFR est non-AA >60 >60 ml/min/1.73m2    Calcium 8.6 8.5 - 10.1 MG/DL    Bilirubin, total 0.3 0.2 - 1.0 MG/DL    ALT (SGPT) 21 12 - 78 U/L    AST (SGOT) 15 15 - 37 U/L    Alk. phosphatase 118 (H) 45 - 117 U/L    Protein, total 6.5 6.4 - 8.2 g/dL    Albumin 3.4 (L) 3.5 - 5.0 g/dL    Globulin 3.1 2.0 - 4.0 g/dL    A-G Ratio 1.1 1.1 - 2.2         Pre-medications  were administered as ordered and chemotherapy was initiated. Medications Administered     0.9% sodium chloride infusion     Admin Date  10/22/2019 Action  New Bag Dose  25 mL/hr Rate  25 mL/hr Route  IntraVENous Administered By  Zachary Yadav          durvalumab (IMFINZI) 732 mg in 0.9% sodium chloride 100 mL, overfill volume 10 mL IVPB     Admin Date  10/22/2019 Action  New Bag Dose  732 mg Rate  124.6 mL/hr Route  IntraVENous Administered By  Zachary Yadav          heparin (porcine) pf 300-500 Units     Admin Date  10/22/2019 Action  Given Dose  500 Units Route  InterCATHeter Administered By  Garlanddamion Yadav          saline peripheral flush soln 10 mL     Admin Date  10/22/2019 Action  Given Dose  10 mL Route  InterCATHeter Administered By  Garlanddamion Yadav          sodium chloride 0.9% injection 10 mL     Admin Date  10/22/2019 Action  Given Dose  10 mL Route  IntraVENous Administered By  Zachary Yadav                    Mr. Fritz tolerated treatment well, PICC line flushed per facility protocol, patient was discharged from [place] [address] in stable condition at 1335.      Future Appointments   Date Time Provider Aquilino Steel   11/5/2019 10:00 AM SS INF2 CH1 >4H [facility]ZANO   11/19/2019 10:00 [facility]   11/19/2019 10:15 AM Buzz Mcneil NP [address], [address]   12/3/2019 10:00 AM [facility]  October 22, 2019

## 2019-10-31 RX ORDER — SODIUM CHLORIDE 0.9 % (FLUSH) 0.9 %
10 SYRINGE (ML) INJECTION AS NEEDED
Status: CANCELLED
Start: 2019-12-17

## 2019-10-31 RX ORDER — DIPHENHYDRAMINE HYDROCHLORIDE 50 MG/ML
50 INJECTION, SOLUTION INTRAMUSCULAR; INTRAVENOUS AS NEEDED
Status: CANCELLED
Start: 2019-12-17

## 2019-10-31 RX ORDER — ACETAMINOPHEN 325 MG/1
650 TABLET ORAL AS NEEDED
Status: CANCELLED
Start: 2019-12-30

## 2019-10-31 RX ORDER — SODIUM CHLORIDE 9 MG/ML
10 INJECTION INTRAMUSCULAR; INTRAVENOUS; SUBCUTANEOUS AS NEEDED
Status: CANCELLED | OUTPATIENT
Start: 2020-01-14

## 2019-10-31 RX ORDER — SODIUM CHLORIDE 0.9 % (FLUSH) 0.9 %
10 SYRINGE (ML) INJECTION AS NEEDED
Status: CANCELLED
Start: 2019-12-30

## 2019-10-31 RX ORDER — DIPHENHYDRAMINE HYDROCHLORIDE 50 MG/ML
50 INJECTION, SOLUTION INTRAMUSCULAR; INTRAVENOUS AS NEEDED
Status: CANCELLED
Start: 2020-01-14

## 2019-10-31 RX ORDER — HEPARIN 100 UNIT/ML
300-500 SYRINGE INTRAVENOUS AS NEEDED
Status: CANCELLED
Start: 2020-01-14

## 2019-10-31 RX ORDER — ONDANSETRON 2 MG/ML
8 INJECTION INTRAMUSCULAR; INTRAVENOUS AS NEEDED
Status: CANCELLED | OUTPATIENT
Start: 2019-12-17

## 2019-10-31 RX ORDER — ACETAMINOPHEN 325 MG/1
650 TABLET ORAL
Status: CANCELLED | OUTPATIENT
Start: 2019-12-17

## 2019-10-31 RX ORDER — ONDANSETRON 2 MG/ML
8 INJECTION INTRAMUSCULAR; INTRAVENOUS AS NEEDED
Status: CANCELLED | OUTPATIENT
Start: 2019-12-30

## 2019-10-31 RX ORDER — HEPARIN 100 UNIT/ML
300-500 SYRINGE INTRAVENOUS AS NEEDED
Status: CANCELLED
Start: 2019-12-30

## 2019-10-31 RX ORDER — SODIUM CHLORIDE 9 MG/ML
10 INJECTION INTRAMUSCULAR; INTRAVENOUS; SUBCUTANEOUS AS NEEDED
Status: CANCELLED | OUTPATIENT
Start: 2019-12-17

## 2019-10-31 RX ORDER — HYDROCORTISONE SODIUM SUCCINATE 100 MG/2ML
100 INJECTION, POWDER, FOR SOLUTION INTRAMUSCULAR; INTRAVENOUS AS NEEDED
Status: CANCELLED | OUTPATIENT
Start: 2019-12-30

## 2019-10-31 RX ORDER — ACETAMINOPHEN 325 MG/1
650 TABLET ORAL AS NEEDED
Status: CANCELLED
Start: 2020-01-14

## 2019-10-31 RX ORDER — HYDROCORTISONE SODIUM SUCCINATE 100 MG/2ML
100 INJECTION, POWDER, FOR SOLUTION INTRAMUSCULAR; INTRAVENOUS AS NEEDED
Status: CANCELLED | OUTPATIENT
Start: 2020-01-14

## 2019-10-31 RX ORDER — DIPHENHYDRAMINE HYDROCHLORIDE 50 MG/ML
50 INJECTION, SOLUTION INTRAMUSCULAR; INTRAVENOUS AS NEEDED
Status: CANCELLED
Start: 2019-12-30

## 2019-10-31 RX ORDER — EPINEPHRINE 1 MG/ML
0.3 INJECTION, SOLUTION, CONCENTRATE INTRAVENOUS AS NEEDED
Status: CANCELLED | OUTPATIENT
Start: 2019-12-30

## 2019-10-31 RX ORDER — SODIUM CHLORIDE 0.9 % (FLUSH) 0.9 %
10 SYRINGE (ML) INJECTION AS NEEDED
Status: CANCELLED
Start: 2020-01-14

## 2019-10-31 RX ORDER — SODIUM CHLORIDE 9 MG/ML
25 INJECTION, SOLUTION INTRAVENOUS CONTINUOUS
Status: CANCELLED | OUTPATIENT
Start: 2020-01-14

## 2019-10-31 RX ORDER — ACETAMINOPHEN 325 MG/1
650 TABLET ORAL
Status: CANCELLED | OUTPATIENT
Start: 2020-01-14

## 2019-10-31 RX ORDER — ALBUTEROL SULFATE 0.83 MG/ML
2.5 SOLUTION RESPIRATORY (INHALATION) AS NEEDED
Status: CANCELLED
Start: 2020-01-14

## 2019-10-31 RX ORDER — ALBUTEROL SULFATE 0.83 MG/ML
2.5 SOLUTION RESPIRATORY (INHALATION) AS NEEDED
Status: CANCELLED
Start: 2019-12-17

## 2019-10-31 RX ORDER — DIPHENHYDRAMINE HYDROCHLORIDE 50 MG/ML
50 INJECTION, SOLUTION INTRAMUSCULAR; INTRAVENOUS
Status: CANCELLED | OUTPATIENT
Start: 2020-01-14

## 2019-10-31 RX ORDER — EPINEPHRINE 1 MG/ML
0.3 INJECTION, SOLUTION, CONCENTRATE INTRAVENOUS AS NEEDED
Status: CANCELLED | OUTPATIENT
Start: 2019-12-17

## 2019-10-31 RX ORDER — SODIUM CHLORIDE 9 MG/ML
25 INJECTION, SOLUTION INTRAVENOUS CONTINUOUS
Status: CANCELLED | OUTPATIENT
Start: 2019-12-17

## 2019-10-31 RX ORDER — DIPHENHYDRAMINE HYDROCHLORIDE 50 MG/ML
50 INJECTION, SOLUTION INTRAMUSCULAR; INTRAVENOUS
Status: CANCELLED | OUTPATIENT
Start: 2019-12-17

## 2019-10-31 RX ORDER — EPINEPHRINE 1 MG/ML
0.3 INJECTION, SOLUTION, CONCENTRATE INTRAVENOUS AS NEEDED
Status: CANCELLED | OUTPATIENT
Start: 2020-01-14

## 2019-10-31 RX ORDER — ONDANSETRON 2 MG/ML
8 INJECTION INTRAMUSCULAR; INTRAVENOUS AS NEEDED
Status: CANCELLED | OUTPATIENT
Start: 2020-01-14

## 2019-10-31 RX ORDER — HYDROCORTISONE SODIUM SUCCINATE 100 MG/2ML
100 INJECTION, POWDER, FOR SOLUTION INTRAMUSCULAR; INTRAVENOUS AS NEEDED
Status: CANCELLED | OUTPATIENT
Start: 2019-12-17

## 2019-10-31 RX ORDER — SODIUM CHLORIDE 9 MG/ML
25 INJECTION, SOLUTION INTRAVENOUS CONTINUOUS
Status: CANCELLED | OUTPATIENT
Start: 2019-12-30

## 2019-10-31 RX ORDER — HEPARIN 100 UNIT/ML
300-500 SYRINGE INTRAVENOUS AS NEEDED
Status: CANCELLED
Start: 2019-12-17

## 2019-10-31 RX ORDER — ACETAMINOPHEN 325 MG/1
650 TABLET ORAL AS NEEDED
Status: CANCELLED
Start: 2019-12-17

## 2019-10-31 RX ORDER — ALBUTEROL SULFATE 0.83 MG/ML
2.5 SOLUTION RESPIRATORY (INHALATION) AS NEEDED
Status: CANCELLED
Start: 2019-12-30

## 2019-10-31 RX ORDER — SODIUM CHLORIDE 9 MG/ML
10 INJECTION INTRAMUSCULAR; INTRAVENOUS; SUBCUTANEOUS AS NEEDED
Status: CANCELLED | OUTPATIENT
Start: 2019-12-30

## 2019-11-05 ENCOUNTER — HOSPITAL ENCOUNTER (OUTPATIENT)
Dept: INFUSION THERAPY | Age: 44
Discharge: HOME | End: 2019-11-05
Payer: COMMERCIAL

## 2019-11-05 VITALS
RESPIRATION RATE: 18 BRPM | WEIGHT: 165.5 LBS | TEMPERATURE: 97.5 F | OXYGEN SATURATION: 99 % | HEIGHT: 69 IN | HEART RATE: 66 BPM | DIASTOLIC BLOOD PRESSURE: 87 MMHG | BODY MASS INDEX: 24.51 KG/M2 | SYSTOLIC BLOOD PRESSURE: 137 MMHG

## 2019-11-05 DIAGNOSIS — C34.11 MALIGNANT NEOPLASM OF UPPER LOBE OF RIGHT LUNG (HCC): Primary | ICD-10-CM

## 2019-11-05 LAB
ALBUMIN SERPL-MCNC: 3.6 G/DL (ref 3.5–5)
ALBUMIN/GLOB SERPL: 1.1 {RATIO} (ref 1.1–2.2)
ALP SERPL-CCNC: 124 U/L (ref 45–117)
ALT SERPL-CCNC: 20 U/L (ref 12–78)
ANION GAP SERPL CALC-SCNC: 3 MMOL/L (ref 5–15)
AST SERPL-CCNC: 15 U/L (ref 15–37)
BASOPHILS # BLD: 0 K/UL (ref 0–0.1)
BASOPHILS NFR BLD: 0 % (ref 0–1)
BILIRUB SERPL-MCNC: 0.4 MG/DL (ref 0.2–1)
BUN SERPL-MCNC: 20 MG/DL (ref 6–20)
BUN/CREAT SERPL: 24 (ref 12–20)
CALCIUM SERPL-MCNC: 8.8 MG/DL (ref 8.5–10.1)
CHLORIDE SERPL-SCNC: 106 MMOL/L (ref 97–108)
CO2 SERPL-SCNC: 29 MMOL/L (ref 21–32)
CREAT SERPL-MCNC: 0.82 MG/DL (ref 0.7–1.3)
DIFFERENTIAL METHOD BLD: ABNORMAL
EOSINOPHIL # BLD: 0.1 K/UL (ref 0–0.4)
EOSINOPHIL NFR BLD: 4 % (ref 0–7)
ERYTHROCYTE [DISTWIDTH] IN BLOOD BY AUTOMATED COUNT: 13.2 % (ref 11.5–14.5)
GLOBULIN SER CALC-MCNC: 3.2 G/DL (ref 2–4)
GLUCOSE SERPL-MCNC: 105 MG/DL (ref 65–100)
HCT VFR BLD AUTO: 35.2 % (ref 36.6–50.3)
HGB BLD-MCNC: 11.2 G/DL (ref 12.1–17)
IMM GRANULOCYTES # BLD AUTO: 0 K/UL (ref 0–0.04)
IMM GRANULOCYTES NFR BLD AUTO: 0 % (ref 0–0.5)
LYMPHOCYTES # BLD: 0.4 K/UL (ref 0.8–3.5)
LYMPHOCYTES NFR BLD: 14 % (ref 12–49)
MCH RBC QN AUTO: 24 PG (ref 26–34)
MCHC RBC AUTO-ENTMCNC: 31.8 G/DL (ref 30–36.5)
MCV RBC AUTO: 75.4 FL (ref 80–99)
MONOCYTES # BLD: 0.5 K/UL (ref 0–1)
MONOCYTES NFR BLD: 15 % (ref 5–13)
NEUTS SEG # BLD: 2 K/UL (ref 1.8–8)
NEUTS SEG NFR BLD: 67 % (ref 32–75)
NRBC # BLD: 0 K/UL (ref 0–0.01)
NRBC BLD-RTO: 0 PER 100 WBC
PLATELET # BLD AUTO: 106 K/UL (ref 150–400)
POTASSIUM SERPL-SCNC: 4.1 MMOL/L (ref 3.5–5.1)
PROT SERPL-MCNC: 6.8 G/DL (ref 6.4–8.2)
RBC # BLD AUTO: 4.67 M/UL (ref 4.1–5.7)
RBC MORPH BLD: ABNORMAL
SODIUM SERPL-SCNC: 138 MMOL/L (ref 136–145)
WBC # BLD AUTO: 3 K/UL (ref 4.1–11.1)

## 2019-11-05 PROCEDURE — 80053 COMPREHEN METABOLIC PANEL: CPT

## 2019-11-05 PROCEDURE — 74011250636 HC RX REV CODE- 250/636: Performed by: NURSE PRACTITIONER

## 2019-11-05 PROCEDURE — 96413 CHEMO IV INFUSION 1 HR: CPT

## 2019-11-05 PROCEDURE — 36415 COLL VENOUS BLD VENIPUNCTURE: CPT

## 2019-11-05 PROCEDURE — 74011000258 HC RX REV CODE- 258: Performed by: NURSE PRACTITIONER

## 2019-11-05 PROCEDURE — 85025 COMPLETE CBC W/AUTO DIFF WBC: CPT

## 2019-11-05 RX ORDER — SODIUM CHLORIDE 9 MG/ML
25 INJECTION, SOLUTION INTRAVENOUS CONTINUOUS
Status: DISPENSED | OUTPATIENT
Start: 2019-11-05 | End: 2019-11-05

## 2019-11-05 RX ORDER — SODIUM CHLORIDE 9 MG/ML
10 INJECTION INTRAMUSCULAR; INTRAVENOUS; SUBCUTANEOUS AS NEEDED
Status: ACTIVE | OUTPATIENT
Start: 2019-11-05 | End: 2019-11-05

## 2019-11-05 RX ORDER — SODIUM CHLORIDE 0.9 % (FLUSH) 0.9 %
10 SYRINGE (ML) INJECTION AS NEEDED
Status: ACTIVE | OUTPATIENT
Start: 2019-11-05 | End: 2019-11-05

## 2019-11-05 RX ORDER — HEPARIN 100 UNIT/ML
300-500 SYRINGE INTRAVENOUS AS NEEDED
Status: ACTIVE | OUTPATIENT
Start: 2019-11-05 | End: 2019-11-05

## 2019-11-05 RX ADMIN — Medication 500 UNITS: at 13:10

## 2019-11-05 RX ADMIN — SODIUM CHLORIDE 732 MG: 9 INJECTION, SOLUTION INTRAVENOUS at 12:02

## 2019-11-05 RX ADMIN — Medication 10 ML: at 13:10

## 2019-11-05 RX ADMIN — Medication 500 UNITS: at 13:09

## 2019-11-05 RX ADMIN — Medication 10 ML: at 13:09

## 2019-11-05 RX ADMIN — SODIUM CHLORIDE 25 ML/HR: 900 INJECTION, SOLUTION INTRAVENOUS at 11:56

## 2019-11-05 NOTE — PROGRESS NOTES
[facility] - Chemotherapy Progress Note    1005 Pt admit to [facility] for C6D1 Durvalumab ambulatory in stable condition. Assessment completed. No new concerns voiced. Double lumen PICC line accessed with positive blood return. Labs drawn and sent for processing. Results are within parameters to treat. Chemotherapy Flowsheet 11/5/2019   Cycle C6D1   Date 11/5/2019   Drug / Regimen Durvalumab   Notes -       Visit Vitals  /82 (BP 1 Location: Right arm, BP Patient Position: Sitting)   Pulse 73   Temp 98 °F (36.7 °C)   Resp 16   Ht 5' 9\" (1.753 m)   Wt 75.1 kg (165 lb 8 oz)   SpO2 99%   BMI 24.44 kg/m²     Patient Vitals for the past 12 hrs:   Temp Pulse Resp BP SpO2   11/05/19 1308 97.5 °F (36.4 °C) 66 18 137/87 --   11/05/19 1007 98 °F (36.7 °C) 73 16 130/82 99 %       Recent Results (from the past 12 hour(s))   METABOLIC PANEL, COMPREHENSIVE    Collection Time: 11/05/19 10:20 AM   Result Value Ref Range    Sodium 138 136 - 145 mmol/L    Potassium 4.1 3.5 - 5.1 mmol/L    Chloride 106 97 - 108 mmol/L    CO2 29 21 - 32 mmol/L    Anion gap 3 (L) 5 - 15 mmol/L    Glucose 105 (H) 65 - 100 mg/dL    BUN 20 6 - 20 MG/DL    Creatinine 0.82 0.70 - 1.30 MG/DL    BUN/Creatinine ratio 24 (H) 12 - 20      GFR est AA >60 >60 ml/min/1.73m2    GFR est non-AA >60 >60 ml/min/1.73m2    Calcium 8.8 8.5 - 10.1 MG/DL    Bilirubin, total 0.4 0.2 - 1.0 MG/DL    ALT (SGPT) 20 12 - 78 U/L    AST (SGOT) 15 15 - 37 U/L    Alk.  phosphatase 124 (H) 45 - 117 U/L    Protein, total 6.8 6.4 - 8.2 g/dL    Albumin 3.6 3.5 - 5.0 g/dL    Globulin 3.2 2.0 - 4.0 g/dL    A-G Ratio 1.1 1.1 - 2.2     CBC WITH AUTOMATED DIFF    Collection Time: 11/05/19 10:20 AM   Result Value Ref Range    WBC 3.0 (L) 4.1 - 11.1 K/uL    RBC 4.67 4.10 - 5.70 M/uL    HGB 11.2 (L) 12.1 - 17.0 g/dL    HCT 35.2 (L) 36.6 - 50.3 %    MCV 75.4 (L) 80.0 - 99.0 FL    MCH 24.0 (L) 26.0 - 34.0 PG    MCHC 31.8 30.0 - 36.5 g/dL    RDW 13.2 11.5 - 14.5 %    PLATELET 106 (L) 150 - 400 K/uL    NRBC 0.0 0  WBC    ABSOLUTE NRBC 0.00 0.00 - 0.01 K/uL    NEUTROPHILS 67 32 - 75 %    LYMPHOCYTES 14 12 - 49 %    MONOCYTES 15 (H) 5 - 13 %    EOSINOPHILS 4 0 - 7 %    BASOPHILS 0 0 - 1 %    IMMATURE GRANULOCYTES 0 0.0 - 0.5 %    ABS. NEUTROPHILS 2.0 1.8 - 8.0 K/UL    ABS. LYMPHOCYTES 0.4 (L) 0.8 - 3.5 K/UL    ABS. MONOCYTES 0.5 0.0 - 1.0 K/UL    ABS. EOSINOPHILS 0.1 0.0 - 0.4 K/UL    ABS. BASOPHILS 0.0 0.0 - 0.1 K/UL    ABS. IMM. GRANS. 0.0 0.00 - 0.04 K/UL    DF SMEAR SCANNED      RBC COMMENTS HYPOCHROMIA  PRESENT        RBC COMMENTS TEARDROP CELLS  PRESENT        RBC COMMENTS TARGET CELLS  PRESENT        RBC COMMENTS MICROCYTOSIS  PRESENT           Medications:  Medications Administered     0.9% sodium chloride infusion     Admin Date  11/05/2019 Action  New Bag Dose  25 mL/hr Rate  25 mL/hr Route  IntraVENous Administered By  Caty Johnson RN          durvalumab (IMFINZI) 732 mg in 0.9% sodium chloride 100 mL, overfill volume 10 mL IVPB     Admin Date  11/05/2019 Action  New Bag Dose  732 mg Rate  124.6 mL/hr Route  IntraVENous Administered By  Caty Johnson RN          heparin (porcine) pf 300-500 Units     Admin Date  11/05/2019 Action  Given Dose  500 Units Route  InterCATHeter Administered By  Caty Johnson RN           Admin Date  11/05/2019 Action  Given Dose  500 Units Route  InterCATHeter Administered By  Valencia Arrieta RN          saline peripheral flush soln 10 mL     Admin Date  11/05/2019 Action  Given Dose  10 mL Route  InterCATHeter Administered By  Caty Johnson RN           Admin Date  11/05/2019 Action  Given Dose  10 mL Route  InterCATHeter Administered By  Valencia Arrieta RN                1310 Pt tolerated treatment well. PICC maintained positive blood return throughout treatment, flushed with positive blood return at conclusion and throughout. Double lumen PICC flushed with positive blood return and heparinized per protocol.  D/c home ambulatory in no distress.  Pt aware of next appointment scheduled for     Future Appointments   Date Time Provider Aquilino Steel   11/12/2019 12:30 PM [facility] 1 [facility]   11/19/2019 10:00 AM SS INF1 CH1 >4H [facility]   11/19/2019 10:15 AM Erum He NP [facility]   12/3/2019 10:00 AM SS INF1 CH1 >4H [facility] [address] [place] [address]

## 2019-11-12 ENCOUNTER — HOSPITAL ENCOUNTER (OUTPATIENT)
Dept: CT IMAGING | Age: 44
Discharge: HOME | End: 2019-11-12
Attending: NURSE PRACTITIONER
Payer: COMMERCIAL

## 2019-11-12 DIAGNOSIS — C34.11 MALIGNANT NEOPLASM OF UPPER LOBE OF RIGHT LUNG (HCC): Chronic | ICD-10-CM

## 2019-11-12 PROCEDURE — 74011636320 HC RX REV CODE- 636/320: Performed by: RADIOLOGY

## 2019-11-12 PROCEDURE — 71260 CT THORAX DX C+: CPT

## 2019-11-12 RX ADMIN — IOPAMIDOL 100 ML: 612 INJECTION, SOLUTION INTRAVENOUS at 12:27

## 2019-11-18 NOTE — PROGRESS NOTES
[facility] [place] at [address] [facility]  Phuong Eulalio: [phone]  F: [phone]      Reason for Visit:   Zac Pittman is a 39 y.o. male who is seen for follow up of non-small cell lung cancer. Treatment History:   · Diagnosed at [facility]  · Biopsy of right level 4 node: non small cell carcinoma, favored squamous cell carcinoma, insufficient sample for further testing  · Stage III Non-small cell lung cancer (Squamous cell)  · Definitive radiation with Dr. Cynda Peabody completed mid-2019  · Concurrent chemotherapy with carboplatin and paclitaxel by Dr. Alma Odom, stopped during second infusion due to reaction to paclitaxel  · Concurrent chemotherapy with cisplatin and etoposide 2019 by Dr. Alma Odom  · CT Chest 2019: There has been no significant change in size of the large right paratracheal mediastinal mass. There are many new areas of peripheral consolidation in the right upper lobe, which may represent some combination of progressive disease, posttreatment change, or infection. Attention on follow-up is needed. A previously seen right upper lobe nodule in the posterior segment appears to have decreased in size. The large right pleural effusion on this exam is significantly increased in size from prior. · CT A/P 2019: no metastatic disease in abdomen or pelvis  · MRI Brain 2019: no intracranial metastatic disease  · EBUS by Dr. Kandy Pulliam 2019: Squamous cell, PDL1 QNS, insufficient tissue for molecular studies  · Thoracentesis 2019: cytology negative  · Initiated maintenance therapy with Durvalumab on 2019    History of Present Illness:   Having more trouble with cough and SOB. He is coughing more with lying flat. SOB with activity, none with rest. Recovers quickly with rest.     No dizziness, falls. No weakness of legs. No change in appetite. Denies nausea or vomiting. Bowels are moving normally.      He is accompanied by his mother today. He lives about an hour [address] of here, mother staying with him to help care for him. Not currently working due to his illness, previously was a . He quit tobacco at diagnosis, though he was a light smoker (1-2 packs per week). PAST HISTORY: The following sections were reviewed and updated in the EMR as appropriate: PMH, SH, FH, Medications, Allergies. No Known Allergies     Review of Systems: A complete review of systems was obtained, reviewed, and scanned into the EMR. Pertinent findings reviewed above. Physical Exam:     Visit Vitals  BP (!) 145/91 (BP 1 Location: Right arm, BP Patient Position: Sitting) Comment: . Pulse 71   Temp 98.6 °F (37 °C) (Temporal)   Resp 20   Ht 5' 9\" (1.753 m)   Wt 169 lb (76.7 kg)   SpO2 98%   BMI 24.96 kg/m²     ECOG PS: 1  General: No distress  Eyes: PERRLA, anicteric sclerae  HENT: Atraumatic, OP clear  Neck: Supple  Lymphatic: No cervical, supraclavicular, or inguinal adenopathy  Respiratory: CTAB, normal respiratory effort  CV: Normal rate, regular rhythm, no murmurs, no peripheral edema  GI: Soft, nontender, nondistended, no masses, no hepatomegaly, no splenomegaly  MS: Normal gait and station. Digits without clubbing or cyanosis. Skin: No rashes, ecchymoses, or petechiae. Normal temperature, turgor, and texture. Psych: Alert, oriented, appropriate affect, normal judgment/insight    Results:     Lab Results   Component Value Date/Time    WBC 2.9 (L) 11/19/2019 10:06 AM    HGB 12.1 11/19/2019 10:06 AM    HCT 37.3 11/19/2019 10:06 AM    PLATELET 478 (L) 95/76/1350 10:06 AM    MCV 75.2 (L) 11/19/2019 10:06 AM    ABS.  NEUTROPHILS 1.9 11/19/2019 10:06 AM     Lab Results   Component Value Date/Time    Sodium 140 11/19/2019 10:06 AM    Potassium 4.2 11/19/2019 10:06 AM    Chloride 107 11/19/2019 10:06 AM    CO2 27 11/19/2019 10:06 AM    Glucose 101 (H) 11/19/2019 10:06 AM    BUN 27 (H) 11/19/2019 10:06 AM    Creatinine 1.00 11/19/2019 10:06 AM    GFR est AA >60 11/19/2019 10:06 AM    GFR est non-AA >60 11/19/2019 10:06 AM    Calcium 9.0 11/19/2019 10:06 AM     Lab Results   Component Value Date/Time    Bilirubin, total 0.2 11/19/2019 10:06 AM    ALT (SGPT) 21 11/19/2019 10:06 AM    AST (SGOT) 15 11/19/2019 10:06 AM    Alk. phosphatase 132 (H) 11/19/2019 10:06 AM    Protein, total 6.8 11/19/2019 10:06 AM    Albumin 3.5 11/19/2019 10:06 AM    Globulin 3.3 11/19/2019 10:06 AM     Lab Results   Component Value Date/Time    Reticulocyte count 1.7 08/27/2019 11:19 AM    Iron % saturation 44 08/27/2019 11:19 AM    TIBC 236 (L) 08/27/2019 11:19 AM    Ferritin 698 (H) 08/27/2019 11:19 AM    Vitamin B12 265 08/27/2019 11:19 AM    Folate 9.2 08/27/2019 11:19 AM    TSH 1.90 11/19/2019 10:06 AM    Lipase 136 07/27/2019 11:31 AM     Lab Results   Component Value Date/Time    INR 1.2 (H) 07/30/2019 07:19 AM    aPTT 25.4 07/30/2019 07:19 AM         PET 8/21/2019:  1. Decreased size and activity of the mediastinal mass. 2. RUL nodular densities remain ametabolic. 3. No new finding. CT Chest 11/12/2019:  Stable exam by RECIST criteria. Increase in moderate right pleural effusion with medial right lower lobe airspace disease. Minimal T2, T4, and T6 superior endplate vertebral compression deformities are likely subacute/chronic. Assessment:   1) Non-small cell lung cancer  Stage IIIB  He has at least Stage IIIB disease, and has been treated with concurrent radiation and chemotherapy (cisplatin and etoposide). Repeat PET demonstrates a partial response to therapy. No definite evidence of distant metastatic disease at this time, though he does have a concerning pleural effusion. He is currently on maintenance immunotherapy with Durvalumab. He is tolerating therapy well so far. We will proceed with his next treatment today. CT reviewed in detail with stable disease. No sign of progression. We will move follow up to every 4 weeks.   Reimage every 12 weeks.  Unfortunately, his two biopsies have yielded scant material, insufficient for molecular studies or PDL1 testing. At progression, a repeat biopsy may be needed for NGS. 2) SVC syndrome  Persists, but improving clinically. Off steroids. Monitor. 3) Pleural effusion  Cytology negative. Worsening on recent imaging. Having more symptoms with cough and PEDROZA. Will repeat thoracentesis with cytology. 4) Poor IV access  With SVC syndrome, he cannot have a port or UE PICC. Currently has femoral PICC in place. This is working well. Home health for dressing changes. 5) Thrombocytopenia  Improving. Monitor. Secondary to chemotherapy. 6) Anemia  Improving. No longer on oral iron therapy. No folate or Vit B12 def identified. 7) Hyponatremia? Normal sodium in our system on last several checks. He is off salt tablets now and repeat sodium level is normal.  Monitor. 8) Hypokalemia? Normal potassium in our system on last several checks. Now off potassium and Lasix, repeat levels today. 9) Neuropathy, chemotherapy induced  Initiated Cymbalta 30mg since last visit Improvement in symptoms noted, will continue this dose. 10) Cough, non productive  No improvement with Claritin. He is more symptomatic of effusion now, will drain as above. 11) Emotional Well Being  No psychosocial concerns identified today. Patient has adequate support. Plan:     · Proceed today with C7 Durvalumab (10mg/kg) given every 2 weeks for one year  · Labs: CBC, CMP prior to each cycle, TSH every 6 weeks  · Continue cymbalta 30mg PO daily  · US guided thoracentesis  · Return to [facility] in 2 weeks, return to see me in 4 weeks    Patient was seen in conjunction with Kelly Gates NP.     Signed By: Pita Lee MD

## 2019-11-19 ENCOUNTER — HOSPITAL ENCOUNTER (OUTPATIENT)
Dept: INFUSION THERAPY | Age: 44
Discharge: HOME | End: 2019-11-19
Payer: COMMERCIAL

## 2019-11-19 ENCOUNTER — OFFICE VISIT (OUTPATIENT)
Dept: ONCOLOGY | Age: 44
End: 2019-11-19

## 2019-11-19 VITALS
SYSTOLIC BLOOD PRESSURE: 145 MMHG | TEMPERATURE: 98.6 F | BODY MASS INDEX: 25.03 KG/M2 | WEIGHT: 169 LBS | RESPIRATION RATE: 20 BRPM | OXYGEN SATURATION: 98 % | DIASTOLIC BLOOD PRESSURE: 91 MMHG | HEART RATE: 71 BPM | HEIGHT: 69 IN

## 2019-11-19 VITALS
HEART RATE: 66 BPM | RESPIRATION RATE: 18 BRPM | DIASTOLIC BLOOD PRESSURE: 88 MMHG | TEMPERATURE: 97.6 F | OXYGEN SATURATION: 100 % | HEIGHT: 69 IN | SYSTOLIC BLOOD PRESSURE: 122 MMHG | BODY MASS INDEX: 25.07 KG/M2 | WEIGHT: 169.3 LBS

## 2019-11-19 DIAGNOSIS — J90 RECURRENT RIGHT PLEURAL EFFUSION: ICD-10-CM

## 2019-11-19 DIAGNOSIS — C34.11 MALIGNANT NEOPLASM OF UPPER LOBE OF RIGHT LUNG (HCC): Primary | ICD-10-CM

## 2019-11-19 DIAGNOSIS — C34.11 MALIGNANT NEOPLASM OF UPPER LOBE OF RIGHT LUNG (HCC): Primary | Chronic | ICD-10-CM

## 2019-11-19 LAB
ALBUMIN SERPL-MCNC: 3.5 G/DL (ref 3.5–5)
ALBUMIN/GLOB SERPL: 1.1 {RATIO} (ref 1.1–2.2)
ALP SERPL-CCNC: 132 U/L (ref 45–117)
ALT SERPL-CCNC: 21 U/L (ref 12–78)
ANION GAP SERPL CALC-SCNC: 6 MMOL/L (ref 5–15)
AST SERPL-CCNC: 15 U/L (ref 15–37)
BASOPHILS # BLD: 0 K/UL (ref 0–0.1)
BASOPHILS NFR BLD: 0 % (ref 0–1)
BILIRUB SERPL-MCNC: 0.2 MG/DL (ref 0.2–1)
BUN SERPL-MCNC: 27 MG/DL (ref 6–20)
BUN/CREAT SERPL: 27 (ref 12–20)
CALCIUM SERPL-MCNC: 9 MG/DL (ref 8.5–10.1)
CHLORIDE SERPL-SCNC: 107 MMOL/L (ref 97–108)
CO2 SERPL-SCNC: 27 MMOL/L (ref 21–32)
CREAT SERPL-MCNC: 1 MG/DL (ref 0.7–1.3)
DIFFERENTIAL METHOD BLD: ABNORMAL
EOSINOPHIL # BLD: 0.1 K/UL (ref 0–0.4)
EOSINOPHIL NFR BLD: 2 % (ref 0–7)
ERYTHROCYTE [DISTWIDTH] IN BLOOD BY AUTOMATED COUNT: 13.5 % (ref 11.5–14.5)
GLOBULIN SER CALC-MCNC: 3.3 G/DL (ref 2–4)
GLUCOSE SERPL-MCNC: 101 MG/DL (ref 65–100)
HCT VFR BLD AUTO: 37.3 % (ref 36.6–50.3)
HGB BLD-MCNC: 12.1 G/DL (ref 12.1–17)
IMM GRANULOCYTES # BLD AUTO: 0 K/UL (ref 0–0.04)
IMM GRANULOCYTES NFR BLD AUTO: 0 % (ref 0–0.5)
LYMPHOCYTES # BLD: 0.4 K/UL (ref 0.8–3.5)
LYMPHOCYTES NFR BLD: 14 % (ref 12–49)
MCH RBC QN AUTO: 24.4 PG (ref 26–34)
MCHC RBC AUTO-ENTMCNC: 32.4 G/DL (ref 30–36.5)
MCV RBC AUTO: 75.2 FL (ref 80–99)
MONOCYTES # BLD: 0.5 K/UL (ref 0–1)
MONOCYTES NFR BLD: 17 % (ref 5–13)
NEUTS SEG # BLD: 1.9 K/UL (ref 1.8–8)
NEUTS SEG NFR BLD: 67 % (ref 32–75)
NRBC # BLD: 0 K/UL (ref 0–0.01)
NRBC BLD-RTO: 0 PER 100 WBC
PLATELET # BLD AUTO: 106 K/UL (ref 150–400)
POTASSIUM SERPL-SCNC: 4.2 MMOL/L (ref 3.5–5.1)
PROT SERPL-MCNC: 6.8 G/DL (ref 6.4–8.2)
RBC # BLD AUTO: 4.96 M/UL (ref 4.1–5.7)
RBC MORPH BLD: ABNORMAL
SODIUM SERPL-SCNC: 140 MMOL/L (ref 136–145)
TSH SERPL DL<=0.05 MIU/L-ACNC: 1.9 UIU/ML (ref 0.36–3.74)
WBC # BLD AUTO: 2.9 K/UL (ref 4.1–11.1)

## 2019-11-19 PROCEDURE — 74011250636 HC RX REV CODE- 250/636: Performed by: NURSE PRACTITIONER

## 2019-11-19 PROCEDURE — 85025 COMPLETE CBC W/AUTO DIFF WBC: CPT

## 2019-11-19 PROCEDURE — 36415 COLL VENOUS BLD VENIPUNCTURE: CPT

## 2019-11-19 PROCEDURE — 74011000258 HC RX REV CODE- 258: Performed by: NURSE PRACTITIONER

## 2019-11-19 PROCEDURE — 96413 CHEMO IV INFUSION 1 HR: CPT

## 2019-11-19 PROCEDURE — 84443 ASSAY THYROID STIM HORMONE: CPT

## 2019-11-19 PROCEDURE — 77030020847 HC STATLOK BARD -A

## 2019-11-19 PROCEDURE — 80053 COMPREHEN METABOLIC PANEL: CPT

## 2019-11-19 RX ORDER — SODIUM CHLORIDE 9 MG/ML
10 INJECTION INTRAMUSCULAR; INTRAVENOUS; SUBCUTANEOUS AS NEEDED
Status: ACTIVE | OUTPATIENT
Start: 2019-11-19 | End: 2019-11-19

## 2019-11-19 RX ORDER — HEPARIN 100 UNIT/ML
300-500 SYRINGE INTRAVENOUS AS NEEDED
Status: ACTIVE | OUTPATIENT
Start: 2019-11-19 | End: 2019-11-19

## 2019-11-19 RX ORDER — SODIUM CHLORIDE 9 MG/ML
25 INJECTION, SOLUTION INTRAVENOUS CONTINUOUS
Status: DISPENSED | OUTPATIENT
Start: 2019-11-19 | End: 2019-11-19

## 2019-11-19 RX ORDER — SODIUM CHLORIDE 0.9 % (FLUSH) 0.9 %
10 SYRINGE (ML) INJECTION AS NEEDED
Status: ACTIVE | OUTPATIENT
Start: 2019-11-19 | End: 2019-11-19

## 2019-11-19 RX ADMIN — Medication 500 UNITS: at 12:51

## 2019-11-19 RX ADMIN — SODIUM CHLORIDE 25 ML/HR: 900 INJECTION, SOLUTION INTRAVENOUS at 11:26

## 2019-11-19 RX ADMIN — Medication 500 UNITS: at 12:52

## 2019-11-19 RX ADMIN — SODIUM CHLORIDE 732 MG: 9 INJECTION, SOLUTION INTRAVENOUS at 11:48

## 2019-11-19 RX ADMIN — Medication 10 ML: at 12:51

## 2019-11-19 RX ADMIN — Medication 10 ML: at 12:52

## 2019-11-19 RX ADMIN — Medication 10 ML: at 10:12

## 2019-11-19 NOTE — PROGRESS NOTES
[facility] Progress Note    Date: 2019    Name: Gee Loredo    MRN: [id number]         : 1975    Mr. Aiyana Rothman Arrived ambulatory and in no distress for C7D1 of Infinizi Regimen. Assessment was completed, no acute issues at this time, no new complaints voiced. R thigh Double lumen PICC accessed without difficulty, labs drawn & sent for processing. PICC line dressing changed    Chemotherapy Flowsheet 2019   Cycle C7D1   Date 2019   Drug / Regimen Imfinzi   Notes -        Patient proceed to appointment with Dr. Sowmya Maier. Mr. Fritz's vitals were reviewed. Visit Vitals  /88   Pulse 75   Temp 97.8 °F (36.6 °C)   Resp 18   Ht 5' 9\" (1.753 m)   Wt 76.8 kg (169 lb 4.8 oz)   SpO2 97%   BMI 25.00 kg/m²       Lab results were obtained and reviewed. Recent Results (from the past 12 hour(s))   CBC WITH AUTOMATED DIFF    Collection Time: 19 10:06 AM   Result Value Ref Range    WBC 2.9 (L) 4.1 - 11.1 K/uL    RBC 4.96 4.10 - 5.70 M/uL    HGB 12.1 12.1 - 17.0 g/dL    HCT 37.3 36.6 - 50.3 %    MCV 75.2 (L) 80.0 - 99.0 FL    MCH 24.4 (L) 26.0 - 34.0 PG    MCHC 32.4 30.0 - 36.5 g/dL    RDW 13.5 11.5 - 14.5 %    PLATELET 719 (L) 442 - 400 K/uL    NRBC 0.0 0  WBC    ABSOLUTE NRBC 0.00 0.00 - 0.01 K/uL    NEUTROPHILS 67 32 - 75 %    LYMPHOCYTES 14 12 - 49 %    MONOCYTES 17 (H) 5 - 13 %    EOSINOPHILS 2 0 - 7 %    BASOPHILS 0 0 - 1 %    IMMATURE GRANULOCYTES 0 0.0 - 0.5 %    ABS. NEUTROPHILS 1.9 1.8 - 8.0 K/UL    ABS. LYMPHOCYTES 0.4 (L) 0.8 - 3.5 K/UL    ABS. MONOCYTES 0.5 0.0 - 1.0 K/UL    ABS. EOSINOPHILS 0.1 0.0 - 0.4 K/UL    ABS. BASOPHILS 0.0 0.0 - 0.1 K/UL    ABS. IMM.  GRANS. 0.0 0.00 - 0.04 K/UL    DF SMEAR SCANNED      RBC COMMENTS ANISOCYTOSIS  1+        RBC COMMENTS HYPOCHROMIA  1+        RBC COMMENTS POLYCHROMASIA  PRESENT        RBC COMMENTS TEARDROP CELLS  PRESENT       METABOLIC PANEL, COMPREHENSIVE    Collection Time: 19 10:06 AM   Result Value Ref Range    Sodium 140 136 - 145 mmol/L    Potassium 4.2 3.5 - 5.1 mmol/L    Chloride 107 97 - 108 mmol/L    CO2 27 21 - 32 mmol/L    Anion gap 6 5 - 15 mmol/L    Glucose 101 (H) 65 - 100 mg/dL    BUN 27 (H) 6 - 20 MG/DL    Creatinine 1.00 0.70 - 1.30 MG/DL    BUN/Creatinine ratio 27 (H) 12 - 20      GFR est AA >60 >60 ml/min/1.73m2    GFR est non-AA >60 >60 ml/min/1.73m2    Calcium 9.0 8.5 - 10.1 MG/DL    Bilirubin, total 0.2 0.2 - 1.0 MG/DL    ALT (SGPT) 21 12 - 78 U/L    AST (SGOT) 15 15 - 37 U/L    Alk.  phosphatase 132 (H) 45 - 117 U/L    Protein, total 6.8 6.4 - 8.2 g/dL    Albumin 3.5 3.5 - 5.0 g/dL    Globulin 3.3 2.0 - 4.0 g/dL    A-G Ratio 1.1 1.1 - 2.2     TSH 3RD GENERATION    Collection Time: 11/19/19 10:06 AM   Result Value Ref Range    TSH 1.90 0.36 - 3.74 uIU/mL       Medications:  Medications Administered     0.9% sodium chloride infusion     Admin Date  11/19/2019 Action  New Bag Dose  25 mL/hr Rate  25 mL/hr Route  IntraVENous Administered By  Miller Heck RN          durvalumab (IMFINZI) 732 mg in 0.9% sodium chloride 100 mL, overfill volume 10 mL IVPB     Admin Date  11/19/2019 Action  New Bag Dose  732 mg Rate  124.6 mL/hr Route  IntraVENous Administered By  Miller Heck RN          heparin (porcine) pf 300-500 Units     Admin Date  11/19/2019 Action  Given Dose  500 Units Route  InterCATHeter Administered By  Miller Heck RN           Admin Date  11/19/2019 Action  Given Dose  500 Units Route  InterCATHeter Administered By  Miller Heck RN          saline peripheral flush soln 10 mL     Admin Date  11/19/2019 Action  Given Dose  10 mL Route  InterCATHeter Administered By  Miller Heck RN           Admin Date  11/19/2019 Action  Given Dose  10 mL Route  InterCATHeter Administered By  Miller Heck RN           Admin Date  11/19/2019 Action  Given Dose  10 mL Route  InterCATHeter Administered By  MARK Harrington Milvia Walker tolerated treatment well and was discharged from [place] [id number] in stable condition at 1300. PICC de-accessed, flushed & heparinized per protocol. He is to return on December 3 at 1000 for his next appointment.     Arlen Soulier, RN  November 19, 2019

## 2019-11-19 NOTE — PROGRESS NOTES
Chayito Winters is a 39 y.o. male follow up for lung cancer. 1. Have you been to the ER, urgent care clinic since your last visit? Hospitalized since your last visit?no     2. Have you seen or consulted any other health care providers outside of the [address] [place] [address] since your last visit? Include any pap smears or colon screening.  no

## 2019-11-27 ENCOUNTER — HOSPITAL ENCOUNTER (OUTPATIENT)
Dept: ULTRASOUND IMAGING | Age: 44
Discharge: HOME | End: 2019-11-27
Attending: NURSE PRACTITIONER
Payer: COMMERCIAL

## 2019-11-27 VITALS
DIASTOLIC BLOOD PRESSURE: 72 MMHG | HEART RATE: 75 BPM | RESPIRATION RATE: 16 BRPM | OXYGEN SATURATION: 99 % | SYSTOLIC BLOOD PRESSURE: 107 MMHG

## 2019-11-27 DIAGNOSIS — J90 RECURRENT RIGHT PLEURAL EFFUSION: ICD-10-CM

## 2019-11-27 DIAGNOSIS — C34.11 MALIGNANT NEOPLASM OF UPPER LOBE OF RIGHT LUNG (HCC): Chronic | ICD-10-CM

## 2019-11-27 PROCEDURE — 88112 CYTOPATH CELL ENHANCE TECH: CPT

## 2019-11-27 PROCEDURE — 74011000250 HC RX REV CODE- 250: Performed by: RADIOLOGY

## 2019-11-27 PROCEDURE — 77030032034 HC SYS EVAC ASEPT DISP BBMI -B

## 2019-11-27 PROCEDURE — 32555 ASPIRATE PLEURA W/ IMAGING: CPT

## 2019-11-27 PROCEDURE — 88305 TISSUE EXAM BY PATHOLOGIST: CPT

## 2019-11-27 RX ORDER — LIDOCAINE HYDROCHLORIDE 10 MG/ML
10 INJECTION, SOLUTION EPIDURAL; INFILTRATION; INTRACAUDAL; PERINEURAL
Status: COMPLETED | OUTPATIENT
Start: 2019-11-27 | End: 2019-11-27

## 2019-11-27 RX ADMIN — LIDOCAINE HYDROCHLORIDE 10 ML: 10 INJECTION, SOLUTION EPIDURAL; INFILTRATION; INTRACAUDAL; PERINEURAL at 09:00

## 2019-11-27 NOTE — DISCHARGE INSTRUCTIONS
Patient Education        Thoracentesis: What to Expect at Home  Your Recovery  Thoracentesis (say \"wsbr-sh-qnq-SARAY-sis\") is a procedure to remove fluid from the space between the lungs and the chest wall (pleural space). This procedure may also be called a \"chest tap. \" It is normal to have a small amount of fluid in the pleural space. But too much fluid can build up because of problems such as infection, heart failure, or lung cancer. The procedure may have been done to help with shortness of breath and pain caused by the fluid buildup. Or you may have had this procedure so the doctor could test the fluid to find the cause of the buildup. Your chest may be sore where the doctor put the needle or catheter into your skin (the puncture site). This usually gets better after a day or two. You can go back to work or your normal activities as soon as you feel up to it. If the doctor sent the fluid to a lab for testing, it may take several days to get the results. The doctor or nurse will discuss the results with you. This care sheet gives you a general idea about how long it will take for you to recover. But each person recovers at a different pace. Follow the steps below to feel better as quickly as possible. How can you care for yourself at home? Activity    · Rest when you feel tired. Getting enough sleep will help you recover.     · Avoid strenuous activities, such as bicycle riding, jogging, weight lifting, or aerobic exercise, until your doctor says it is okay.     · You may shower. Do not take a bath until the puncture site has healed, or until your doctor tells you it is okay.     · Ask your doctor when you can drive again.     · You may need to take 1 or 2 days off from work. It depends on the type of work you do and how you feel. Diet    · You can eat your normal diet.     · Drink plenty of fluids (unless your doctor tells you not to).    Medicines    · Your doctor will tell you if and when you can restart your medicines. He or she will also give you instructions about taking any new medicines.     · If you take blood thinners, such as warfarin (Coumadin), clopidogrel (Plavix), or aspirin, be sure to talk to your doctor. He or she will tell you if and when to start taking those medicines again. Make sure that you understand exactly what your doctor wants you to do.     · Be safe with medicines. Take pain medicines exactly as directed. ? If the doctor gave you a prescription medicine for pain, take it as prescribed. ? If you are not taking a prescription pain medicine, ask your doctor if you can take an over-the-counter medicine. ? Do not take two or more pain medicines at the same time unless the doctor told you to. Many pain medicines have acetaminophen, which is Tylenol. Too much acetaminophen (Tylenol) can be harmful.     · If you think your pain medicine is making you sick to your stomach:  ? Take your medicine after meals (unless your doctor has told you not to). ? Ask your doctor for a different pain medicine.     · If your doctor prescribed antibiotics, take them as directed. Do not stop taking them just because you feel better. You need to take the full course of antibiotics.    Care of the puncture site    · Wash the area daily with warm, soapy water, and pat it dry. Don't use hydrogen peroxide or alcohol, which may delay healing. You may cover the area with a gauze bandage if it weeps or rubs against clothing. Change the bandage every day.     · Keep the area clean and dry. Follow-up care is a key part of your treatment and safety. Be sure to make and go to all appointments, and call your doctor if you are having problems. It's also a good idea to know your test results and keep a list of the medicines you take. When should you call for help? Call 911 anytime you think you may need emergency care. For example, call if:    · You passed out (lost consciousness).     · You have severe trouble breathing.   · You have sudden chest pain and shortness of breath, or you cough up blood.    Call your doctor now or seek immediate medical care if:    · You have shortness of breath that is new or getting worse.     · You have new or worse pain in your chest, especially when you take a deep breath.     · You are sick to your stomach or cannot keep fluids down.     · You have a fever over 100°F.     · Bright red blood has soaked through the bandage over your puncture site.     · You have signs of infection, such as:  ? Increased pain, swelling, warmth, or redness. ? Red streaks leading from the puncture site. ? Pus draining from the puncture site. ? Swollen lymph nodes in your neck, armpits, or groin. ? A fever.     · You cough up a lot more mucus than normal, or your mucus changes color.    Watch closely for changes in your health, and be sure to contact your doctor if you have any problems. Where can you learn more? Go to http://jeanette-venus.info/. Enter K769 in the search box to learn more about \"Thoracentesis: What to Expect at Home. \"  Current as of: June 9, 2019  Content Version: 12.2  © 9368-1305 [employer]. Care instructions adapted under license by [employer] (which disclaims liability or warranty for this information). If you have questions about a medical condition or this instruction, always ask your healthcare professional. [place] [address] any warranty or liability for your use of this information.

## 2019-11-27 NOTE — PROGRESS NOTES
Patient arrived to ultrasound, ambulatory with steady gait. Patient placed in hospital gown.  image taken by Khari Peres, consent obtained from patient. Patient verbalized understanding of procedure as he has had same in past.     Surgeon In [phone]  Time out 0851  Start 0900 - specimen collected for cytology. End 0915. Sterile bandage applied to right posterior flank that is C/D/I. Patient denies pain or shortness of breath, see flow sheet for vitals. Total drainage: 960mls clear trevor fluid. Patient given discharge information, prefers to ambulate to awaiting transportation home. Steady gait with RN. No further intervention.

## 2019-12-02 NOTE — PROGRESS NOTES
Cancer [place] at [facility]  [address] [facility]  Pavithra Ser: [phone]  F: [phone]      Reason for Visit:   Saray Swanson is a 39 y.o. male who is seen for follow up of non-small cell lung cancer. Treatment History:   · Diagnosed at [facility]  · Biopsy of right level 4 node: non small cell carcinoma, favored squamous cell carcinoma, insufficient sample for further testing  · Stage III Non-small cell lung cancer (Squamous cell)  · Definitive radiation with Dr. Zena Jarrett completed mid-July 2019  · Concurrent chemotherapy with carboplatin and paclitaxel by Dr. Ant Perez, stopped during second infusion due to reaction to paclitaxel  · Concurrent chemotherapy with cisplatin and etoposide 7/16/2019 by Dr. Ant Perez  · CT Chest 7/27/2019: There has been no significant change in size of the large right paratracheal mediastinal mass. There are many new areas of peripheral consolidation in the right upper lobe, which may represent some combination of progressive disease, posttreatment change, or infection. Attention on follow-up is needed. A previously seen right upper lobe nodule in the posterior segment appears to have decreased in size. The large right pleural effusion on this exam is significantly increased in size from prior. · CT A/P 7/29/2019: no metastatic disease in abdomen or pelvis  · MRI Brain 7/29/2019: no intracranial metastatic disease  · EBUS by Dr. Adela Thao 7/30/2019: Squamous cell, PDL1 QNS, insufficient tissue for molecular studies  · Thoracentesis 8/5/2019: cytology negative  · Initiated maintenance therapy with Durvalumab on 8/27/2019    History of Present Illness:   Underwent thoracentesis since last visit, on 11/27, tolerated this well. Breathing much better since this was done. Still some cough, but less often, dry. Denies pain. Femoral line doing ok. He is accompanied by his mother today.   He lives about an hour [address] of here, mother staying with him to help care for him. Not currently working due to his illness, previously was a . He quit tobacco at diagnosis, though he was a light smoker (1-2 packs per week). PAST HISTORY: The following sections were reviewed and updated in the EMR as appropriate: PMH, SH, FH, Medications, Allergies. No Known Allergies     Review of Systems: A complete review of systems was obtained, reviewed, and scanned into the EMR. Pertinent findings reviewed above. Physical Exam:     Visit Vitals  /80 (BP 1 Location: Left arm, BP Patient Position: Sitting)   Pulse 65   Temp 97.9 °F (36.6 °C) (Temporal)   Resp 16   Ht 5' 10\" (1.778 m)   Wt 172 lb (78 kg)   SpO2 98%   BMI 24.68 kg/m²     ECOG PS: 1  General: No distress  Eyes: PERRLA, anicteric sclerae  HENT: Atraumatic, OP clear  Neck: Supple  Lymphatic: No cervical, supraclavicular, or inguinal adenopathy  Respiratory: CTAB, normal respiratory effort  CV: Normal rate, regular rhythm, no murmurs, no peripheral edema  GI: Soft, nontender, nondistended, no masses, no hepatomegaly, no splenomegaly  MS: Normal gait and station. Digits without clubbing or cyanosis. Skin: No rashes, ecchymoses, or petechiae. Normal temperature, turgor, and texture. Psych: Alert, oriented, appropriate affect, normal judgment/insight    Results:     Lab Results   Component Value Date/Time    WBC 2.9 (L) 12/03/2019 10:12 AM    HGB 11.4 (L) 12/03/2019 10:12 AM    HCT 35.7 (L) 12/03/2019 10:12 AM    PLATELET 98 (L) 77/30/1638 10:12 AM    MCV 74.2 (L) 12/03/2019 10:12 AM    ABS.  NEUTROPHILS 1.9 12/03/2019 10:12 AM     Lab Results   Component Value Date/Time    Sodium 140 11/19/2019 10:06 AM    Potassium 4.2 11/19/2019 10:06 AM    Chloride 107 11/19/2019 10:06 AM    CO2 27 11/19/2019 10:06 AM    Glucose 101 (H) 11/19/2019 10:06 AM    BUN 27 (H) 11/19/2019 10:06 AM    Creatinine 1.00 11/19/2019 10:06 AM    GFR est AA >60 11/19/2019 10:06 AM    GFR est non-AA >60 11/19/2019 10:06 AM    Calcium 9.0 11/19/2019 10:06 AM     Lab Results   Component Value Date/Time    Bilirubin, total 0.2 11/19/2019 10:06 AM    ALT (SGPT) 21 11/19/2019 10:06 AM    AST (SGOT) 15 11/19/2019 10:06 AM    Alk. phosphatase 132 (H) 11/19/2019 10:06 AM    Protein, total 6.8 11/19/2019 10:06 AM    Albumin 3.5 11/19/2019 10:06 AM    Globulin 3.3 11/19/2019 10:06 AM     Lab Results   Component Value Date/Time    Reticulocyte count 1.7 08/27/2019 11:19 AM    Iron % saturation 44 08/27/2019 11:19 AM    TIBC 236 (L) 08/27/2019 11:19 AM    Ferritin 698 (H) 08/27/2019 11:19 AM    Vitamin B12 265 08/27/2019 11:19 AM    Folate 9.2 08/27/2019 11:19 AM    TSH 1.90 11/19/2019 10:06 AM    Lipase 136 07/27/2019 11:31 AM     Lab Results   Component Value Date/Time    INR 1.2 (H) 07/30/2019 07:19 AM    aPTT 25.4 07/30/2019 07:19 AM         PET 8/21/2019:  1. Decreased size and activity of the mediastinal mass. 2. RUL nodular densities remain ametabolic. 3. No new finding. CT Chest 11/12/2019:  Stable exam by RECIST criteria. Increase in moderate right pleural effusion with medial right lower lobe airspace disease. Minimal T2, T4, and T6 superior endplate vertebral compression deformities are likely subacute/chronic. Assessment:   1) Non-small cell lung cancer  Stage IIIB  He has at least Stage IIIB disease, and has been treated with concurrent radiation and chemotherapy (cisplatin and etoposide). Repeat PET demonstrates a partial response to therapy. No definite evidence of distant metastatic disease at this time. Pleural effusion cytology negative x2. Dipika Shadow He is currently on maintenance immunotherapy with Durvalumab. He is tolerating therapy well so far. We will proceed with his next treatment today. We will move follow up to every 4 weeks after his next visit. Reimage every 12 weeks. Unfortunately, his two biopsies have yielded scant material, insufficient for molecular studies or PDL1 testing.   At progression, a repeat biopsy may be needed for NGS. 2) SVC syndrome  Much improved. Monitor. 3) Pleural effusion  Cytology negative. Worsening on recent imaging, underwent repeat thoracentesis on 11/27/2019. Cytology negative again. 4) Poor IV access  With SVC syndrome, he cannot have a port or UE PICC. Currently has femoral PICC in place. This is working well. Home health for dressing changes. 5) Thrombocytopenia  Improved, but not resolved. Presumably secondary to his prior chemotherapy, but surprising that it is persisting. Monitor. Check a few additional labs. 6) Anemia  Improving. No longer on oral iron therapy. No folate or Vit B12 def identified. 7) Neuropathy, chemotherapy induced  Initiated Cymbalta 30mg since last visit Improvement in symptoms noted, will continue this dose. 8) Cough, non productive  Likely due to effusion, improved. 9) Emotional Well Being  No psychosocial concerns identified today. Patient has adequate support.      Plan:     · Proceed today with C8 Durvalumab (10mg/kg) given every 2 weeks for one year  · Labs: CBC, CMP prior to each cycle, TSH every 6 weeks  · Additional labs to evaluate thrombocytopenia  · Continue cymbalta 30mg PO daily  · Return to see us in 2 weeks      Signed By: Dawna Cruz MD

## 2019-12-03 ENCOUNTER — OFFICE VISIT (OUTPATIENT)
Dept: ONCOLOGY | Age: 44
End: 2019-12-03

## 2019-12-03 ENCOUNTER — HOSPITAL ENCOUNTER (OUTPATIENT)
Dept: INFUSION THERAPY | Age: 44
Discharge: HOME | End: 2019-12-03
Payer: COMMERCIAL

## 2019-12-03 VITALS
RESPIRATION RATE: 18 BRPM | HEIGHT: 70 IN | OXYGEN SATURATION: 98 % | DIASTOLIC BLOOD PRESSURE: 85 MMHG | HEART RATE: 70 BPM | WEIGHT: 172.9 LBS | BODY MASS INDEX: 24.75 KG/M2 | SYSTOLIC BLOOD PRESSURE: 124 MMHG | TEMPERATURE: 98 F

## 2019-12-03 VITALS
BODY MASS INDEX: 24.62 KG/M2 | OXYGEN SATURATION: 98 % | RESPIRATION RATE: 16 BRPM | HEART RATE: 65 BPM | WEIGHT: 172 LBS | DIASTOLIC BLOOD PRESSURE: 80 MMHG | TEMPERATURE: 97.9 F | SYSTOLIC BLOOD PRESSURE: 118 MMHG | HEIGHT: 70 IN

## 2019-12-03 DIAGNOSIS — C34.11 MALIGNANT NEOPLASM OF UPPER LOBE OF RIGHT LUNG (HCC): Primary | ICD-10-CM

## 2019-12-03 DIAGNOSIS — D69.6 THROMBOCYTOPENIA (HCC): Primary | ICD-10-CM

## 2019-12-03 DIAGNOSIS — C34.11 MALIGNANT NEOPLASM OF UPPER LOBE OF RIGHT LUNG (HCC): ICD-10-CM

## 2019-12-03 DIAGNOSIS — J90 RECURRENT RIGHT PLEURAL EFFUSION: ICD-10-CM

## 2019-12-03 LAB
ALBUMIN SERPL-MCNC: 3.4 G/DL (ref 3.5–5)
ALBUMIN/GLOB SERPL: 1.1 {RATIO} (ref 1.1–2.2)
ALP SERPL-CCNC: 129 U/L (ref 45–117)
ALT SERPL-CCNC: 22 U/L (ref 12–78)
ANION GAP SERPL CALC-SCNC: 2 MMOL/L (ref 5–15)
APTT PPP: 27.3 SEC (ref 22.1–32)
AST SERPL-CCNC: 14 U/L (ref 15–37)
BASOPHILS # BLD: 0 K/UL (ref 0–0.1)
BASOPHILS NFR BLD: 1 % (ref 0–1)
BILIRUB SERPL-MCNC: 0.3 MG/DL (ref 0.2–1)
BUN SERPL-MCNC: 30 MG/DL (ref 6–20)
BUN/CREAT SERPL: 31 (ref 12–20)
CALCIUM SERPL-MCNC: 8.7 MG/DL (ref 8.5–10.1)
CHLORIDE SERPL-SCNC: 111 MMOL/L (ref 97–108)
CO2 SERPL-SCNC: 27 MMOL/L (ref 21–32)
CREAT SERPL-MCNC: 0.98 MG/DL (ref 0.7–1.3)
DIFFERENTIAL METHOD BLD: ABNORMAL
EOSINOPHIL # BLD: 0.1 K/UL (ref 0–0.4)
EOSINOPHIL NFR BLD: 4 % (ref 0–7)
ERYTHROCYTE [DISTWIDTH] IN BLOOD BY AUTOMATED COUNT: 13.7 % (ref 11.5–14.5)
FIBRINOGEN PPP-MCNC: 243 MG/DL (ref 200–475)
GLOBULIN SER CALC-MCNC: 3.1 G/DL (ref 2–4)
GLUCOSE SERPL-MCNC: 104 MG/DL (ref 65–100)
HBV SURFACE AB SER QL: NONREACTIVE
HBV SURFACE AB SER-ACNC: <3.1 MIU/ML
HBV SURFACE AG SER QL: 0.1 INDEX
HBV SURFACE AG SER QL: NEGATIVE
HCT VFR BLD AUTO: 35.7 % (ref 36.6–50.3)
HCV AB SERPL QL IA: NONREACTIVE
HCV COMMENT,HCGAC: NORMAL
HGB BLD-MCNC: 11.4 G/DL (ref 12.1–17)
HIV 1+2 AB+HIV1 P24 AG SERPL QL IA: NONREACTIVE
HIV12 RESULT COMMENT, HHIVC: NORMAL
IMM GRANULOCYTES # BLD AUTO: 0 K/UL (ref 0–0.04)
IMM GRANULOCYTES NFR BLD AUTO: 1 % (ref 0–0.5)
INR PPP: 1 (ref 0.9–1.1)
LYMPHOCYTES # BLD: 0.4 K/UL (ref 0.8–3.5)
LYMPHOCYTES NFR BLD: 13 % (ref 12–49)
MCH RBC QN AUTO: 23.7 PG (ref 26–34)
MCHC RBC AUTO-ENTMCNC: 31.9 G/DL (ref 30–36.5)
MCV RBC AUTO: 74.2 FL (ref 80–99)
MONOCYTES # BLD: 0.5 K/UL (ref 0–1)
MONOCYTES NFR BLD: 18 % (ref 5–13)
NEUTS SEG # BLD: 1.9 K/UL (ref 1.8–8)
NEUTS SEG NFR BLD: 63 % (ref 32–75)
NRBC # BLD: 0 K/UL (ref 0–0.01)
NRBC BLD-RTO: 0 PER 100 WBC
PLATELET # BLD AUTO: 98 K/UL (ref 150–400)
POTASSIUM SERPL-SCNC: 4.2 MMOL/L (ref 3.5–5.1)
PROT SERPL-MCNC: 6.5 G/DL (ref 6.4–8.2)
PROTHROMBIN TIME: 10.5 SEC (ref 9–11.1)
RBC # BLD AUTO: 4.81 M/UL (ref 4.1–5.7)
RBC MORPH BLD: ABNORMAL
SODIUM SERPL-SCNC: 140 MMOL/L (ref 136–145)
THERAPEUTIC RANGE,PTTT: NORMAL SECS (ref 58–77)
WBC # BLD AUTO: 2.9 K/UL (ref 4.1–11.1)

## 2019-12-03 PROCEDURE — 36415 COLL VENOUS BLD VENIPUNCTURE: CPT

## 2019-12-03 PROCEDURE — 86803 HEPATITIS C AB TEST: CPT

## 2019-12-03 PROCEDURE — 86706 HEP B SURFACE ANTIBODY: CPT

## 2019-12-03 PROCEDURE — 85610 PROTHROMBIN TIME: CPT

## 2019-12-03 PROCEDURE — 74011250636 HC RX REV CODE- 250/636: Performed by: NURSE PRACTITIONER

## 2019-12-03 PROCEDURE — 87340 HEPATITIS B SURFACE AG IA: CPT

## 2019-12-03 PROCEDURE — 80053 COMPREHEN METABOLIC PANEL: CPT

## 2019-12-03 PROCEDURE — 85384 FIBRINOGEN ACTIVITY: CPT

## 2019-12-03 PROCEDURE — 86704 HEP B CORE ANTIBODY TOTAL: CPT

## 2019-12-03 PROCEDURE — 85025 COMPLETE CBC W/AUTO DIFF WBC: CPT

## 2019-12-03 PROCEDURE — 87389 HIV-1 AG W/HIV-1&-2 AB AG IA: CPT

## 2019-12-03 PROCEDURE — 96413 CHEMO IV INFUSION 1 HR: CPT

## 2019-12-03 PROCEDURE — 74011000258 HC RX REV CODE- 258: Performed by: NURSE PRACTITIONER

## 2019-12-03 PROCEDURE — 85730 THROMBOPLASTIN TIME PARTIAL: CPT

## 2019-12-03 RX ORDER — SODIUM CHLORIDE 0.9 % (FLUSH) 0.9 %
10 SYRINGE (ML) INJECTION AS NEEDED
Status: ACTIVE | OUTPATIENT
Start: 2019-12-03 | End: 2019-12-03

## 2019-12-03 RX ORDER — HEPARIN 100 UNIT/ML
300-500 SYRINGE INTRAVENOUS AS NEEDED
Status: ACTIVE | OUTPATIENT
Start: 2019-12-03 | End: 2019-12-03

## 2019-12-03 RX ORDER — SODIUM CHLORIDE 9 MG/ML
10 INJECTION INTRAMUSCULAR; INTRAVENOUS; SUBCUTANEOUS AS NEEDED
Status: ACTIVE | OUTPATIENT
Start: 2019-12-03 | End: 2019-12-03

## 2019-12-03 RX ORDER — SODIUM CHLORIDE 9 MG/ML
25 INJECTION, SOLUTION INTRAVENOUS CONTINUOUS
Status: DISPENSED | OUTPATIENT
Start: 2019-12-03 | End: 2019-12-03

## 2019-12-03 RX ADMIN — Medication 500 UNITS: at 13:20

## 2019-12-03 RX ADMIN — SODIUM CHLORIDE 10 ML: 9 INJECTION INTRAMUSCULAR; INTRAVENOUS; SUBCUTANEOUS at 13:18

## 2019-12-03 RX ADMIN — SODIUM CHLORIDE 25 ML/HR: 900 INJECTION, SOLUTION INTRAVENOUS at 11:49

## 2019-12-03 RX ADMIN — SODIUM CHLORIDE 732 MG: 9 INJECTION, SOLUTION INTRAVENOUS at 11:58

## 2019-12-03 RX ADMIN — SODIUM CHLORIDE 10 ML: 9 INJECTION INTRAMUSCULAR; INTRAVENOUS; SUBCUTANEOUS at 13:17

## 2019-12-03 RX ADMIN — Medication 500 UNITS: at 13:19

## 2019-12-03 NOTE — PROGRESS NOTES
Feliciano Nubia is a 39 y.o. male follow up for lung cancer. 1. Have you been to the ER, urgent care clinic since your last visit? Hospitalized since your last visit?no     2. Have you seen or consulted any other health care providers outside of the [address] [place] [address] since your last visit? Include any pap smears or colon screening.  no

## 2019-12-03 NOTE — PROGRESS NOTES
[facility] Progress Note    Date: December 3, 2019    Name: Ash Kingsley    MRN: [id number]         : 1975    Mr. Tia Simmonds Arrived ambulatory and in no distress for C8D1 of Imfinzi Regimen. Assessment was completed, no acute issues at this time, no new complaints voiced. Double lumen PICC accessed without difficulty, labs drawn & sent for processing. Chemotherapy Flowsheet 12/3/2019   Cycle C8D1   Date 12/3/2019   Drug / Regimen imfinzi   Notes -       0181 Patient proceed to appointment with Dr. Jody Wallace. Mr. Fritz's vitals were reviewed. Visit Vitals  /76   Pulse 76   Temp 98 °F (36.7 °C)   Resp 18   Ht 5' 10\" (1.778 m)   Wt 78.4 kg (172 lb 14.4 oz)   SpO2 98%   BMI 24.81 kg/m²       Recent Results (from the past 12 hour(s))   CBC WITH AUTOMATED DIFF    Collection Time: 19 10:12 AM   Result Value Ref Range    WBC 2.9 (L) 4.1 - 11.1 K/uL    RBC 4.81 4.10 - 5.70 M/uL    HGB 11.4 (L) 12.1 - 17.0 g/dL    HCT 35.7 (L) 36.6 - 50.3 %    MCV 74.2 (L) 80.0 - 99.0 FL    MCH 23.7 (L) 26.0 - 34.0 PG    MCHC 31.9 30.0 - 36.5 g/dL    RDW 13.7 11.5 - 14.5 %    PLATELET 98 (L) 400 - 400 K/uL    NRBC 0.0 0  WBC    ABSOLUTE NRBC 0.00 0.00 - 0.01 K/uL    NEUTROPHILS 63 32 - 75 %    LYMPHOCYTES 13 12 - 49 %    MONOCYTES 18 (H) 5 - 13 %    EOSINOPHILS 4 0 - 7 %    BASOPHILS 1 0 - 1 %    IMMATURE GRANULOCYTES 1 (H) 0.0 - 0.5 %    ABS. NEUTROPHILS 1.9 1.8 - 8.0 K/UL    ABS. LYMPHOCYTES 0.4 (L) 0.8 - 3.5 K/UL    ABS. MONOCYTES 0.5 0.0 - 1.0 K/UL    ABS. EOSINOPHILS 0.1 0.0 - 0.4 K/UL    ABS. BASOPHILS 0.0 0.0 - 0.1 K/UL    ABS. IMM.  GRANS. 0.0 0.00 - 0.04 K/UL    DF SMEAR SCANNED      RBC COMMENTS MICROCYTOSIS  1+       METABOLIC PANEL, COMPREHENSIVE    Collection Time: 19 10:12 AM   Result Value Ref Range    Sodium 140 136 - 145 mmol/L    Potassium 4.2 3.5 - 5.1 mmol/L    Chloride 111 (H) 97 - 108 mmol/L    CO2 27 21 - 32 mmol/L    Anion gap 2 (L) 5 - 15 mmol/L    Glucose 104 (H) 65 - 100 mg/dL    BUN 30 (H) 6 - 20 MG/DL    Creatinine 0.98 0.70 - 1.30 MG/DL    BUN/Creatinine ratio 31 (H) 12 - 20      GFR est AA >60 >60 ml/min/1.73m2    GFR est non-AA >60 >60 ml/min/1.73m2    Calcium 8.7 8.5 - 10.1 MG/DL    Bilirubin, total 0.3 0.2 - 1.0 MG/DL    ALT (SGPT) 22 12 - 78 U/L    AST (SGOT) 14 (L) 15 - 37 U/L    Alk.  phosphatase 129 (H) 45 - 117 U/L    Protein, total 6.5 6.4 - 8.2 g/dL    Albumin 3.4 (L) 3.5 - 5.0 g/dL    Globulin 3.1 2.0 - 4.0 g/dL    A-G Ratio 1.1 1.1 - 2.2     PTT    Collection Time: 12/03/19 11:22 AM   Result Value Ref Range    aPTT 27.3 22.1 - 32.0 sec    aPTT, therapeutic range     58.0 - 77.0 SECS   PROTHROMBIN TIME + INR    Collection Time: 12/03/19 11:22 AM   Result Value Ref Range    INR 1.0 0.9 - 1.1      Prothrombin time 10.5 9.0 - 11.1 sec   FIBRINOGEN    Collection Time: 12/03/19 11:22 AM   Result Value Ref Range    Fibrinogen 243 200 - 475 mg/dL         Medications:  Medications Administered     0.9% sodium chloride infusion     Admin Date  12/03/2019 Action  New Bag Dose  25 mL/hr Rate  25 mL/hr Route  IntraVENous Administered By  Duane Jonas RN          0.9% sodium chloride injection 10 mL     Admin Date  12/03/2019 Action  Given Dose  10 mL Route  IntraVENous Administered By  Duane Jonas RN           Admin Date  12/03/2019 Action  Given Dose  10 mL Route  IntraVENous Administered By  Duane Jonas RN          durvalumab (IMFINZI) 732 mg in 0.9% sodium chloride 100 mL, overfill volume 10 mL IVPB     Admin Date  12/03/2019 Action  New Bag Dose  732 mg Rate  124.6 mL/hr Route  IntraVENous Administered By  Duane Jonas RN          heparin (porcine) pf 300-500 Units     Admin Date  12/03/2019 Action  Given Dose  500 Units Route  InterCATHeter Administered By  Duane Jonas RN           Admin Date  12/03/2019 Action  Given Dose  500 Units Route  InterCATHeter Administered By  Duane Jonas RN              Mr. Tatiana Julian tolerated treatment well and was discharged from [place] [address] in stable conditio. PICC line  flushed & heparinized per protocol. Both ends changed and green tops placed. He is to return on 12/17/19 for his next appointment.     Houston Pop RN  December 3, 2019

## 2019-12-04 LAB — HBV CORE AB SERPL QL IA: NEGATIVE

## 2019-12-16 NOTE — PROGRESS NOTES
Cancer [place] at [place] [address] [facility]  [place]: [phone]  F: [phone]      Reason for Visit:   Alba Schofield is a 39 y.o. male who is seen for follow up of non-small cell lung cancer. Treatment History:   · Diagnosed at [facility]  · Biopsy of right level 4 node: non small cell carcinoma, favored squamous cell carcinoma, insufficient sample for further testing  · Stage III Non-small cell lung cancer (Squamous cell)  · Definitive radiation with Dr. Jose Jaquez completed mid-July 2019  · Concurrent chemotherapy with carboplatin and paclitaxel by Dr. Brandon Monday, stopped during second infusion due to reaction to paclitaxel  · Concurrent chemotherapy with cisplatin and etoposide 7/16/2019 by Dr. Brandon Monday  · CT Chest 7/27/2019: There has been no significant change in size of the large right paratracheal mediastinal mass. There are many new areas of peripheral consolidation in the right upper lobe, which may represent some combination of progressive disease, posttreatment change, or infection. Attention on follow-up is needed. A previously seen right upper lobe nodule in the posterior segment appears to have decreased in size. The large right pleural effusion on this exam is significantly increased in size from prior. · CT A/P 7/29/2019: no metastatic disease in abdomen or pelvis  · MRI Brain 7/29/2019: no intracranial metastatic disease  · EBUS by Dr. Debi Bence 7/30/2019: Squamous cell, PDL1 QNS, insufficient tissue for molecular studies  · Thoracentesis 8/5/2019: cytology negative  · Initiated maintenance therapy with Durvalumab on 8/27/2019    History of Present Illness:   He notes some swelling over his right upper chest, feels a knot in that area, not painful. No pain elsewhere. Breathing ok, improving over time. Still with some cough. No arm swelling. Inguinal PICC line is doing ok, no drainage or redness around catheter per patient.     He is accompanied by his mother today. He lives about an hour [address] of here, mother staying with him to help care for him. Not currently working due to his illness, previously was a . He quit tobacco at diagnosis, though he was a light smoker (1-2 packs per week). PAST HISTORY: The following sections were reviewed and updated in the EMR as appropriate: PMH, SH, FH, Medications, Allergies. No Known Allergies     Review of Systems: A complete review of systems was obtained, reviewed, and scanned into the EMR. Pertinent findings reviewed above. Physical Exam:     Visit Vitals  /85 (BP 1 Location: Right arm, BP Patient Position: Sitting)   Pulse 72   Temp 98.2 °F (36.8 °C) (Temporal)   Resp 16   Ht 5' 10\" (1.778 m)   Wt 173 lb (78.5 kg)   SpO2 97%   BMI 24.82 kg/m²     ECOG PS: 1  General: No distress  Eyes: PERRLA, anicteric sclerae  HENT: Atraumatic, OP clear  Neck: Supple  Lymphatic: No cervical, supraclavicular, or inguinal adenopathy  Respiratory: CTAB, normal respiratory effort  CV: Normal rate, regular rhythm, no murmurs, no peripheral edema  GI: Soft, nontender, nondistended, no masses, no hepatomegaly, no splenomegaly  MS: Normal gait and station. Digits without clubbing or cyanosis. Skin: No rashes, ecchymoses, or petechiae. Normal temperature, turgor, and texture. Psych: Alert, oriented, appropriate affect, normal judgment/insight    Results:     Lab Results   Component Value Date/Time    WBC 2.9 (L) 12/03/2019 10:12 AM    HGB 11.4 (L) 12/03/2019 10:12 AM    HCT 35.7 (L) 12/03/2019 10:12 AM    PLATELET 98 (L) 46/01/4731 10:12 AM    MCV 74.2 (L) 12/03/2019 10:12 AM    ABS.  NEUTROPHILS 1.9 12/03/2019 10:12 AM     Lab Results   Component Value Date/Time    Sodium 140 12/03/2019 10:12 AM    Potassium 4.2 12/03/2019 10:12 AM    Chloride 111 (H) 12/03/2019 10:12 AM    CO2 27 12/03/2019 10:12 AM    Glucose 104 (H) 12/03/2019 10:12 AM    BUN 30 (H) 12/03/2019 10:12 AM    Creatinine 0.98 12/03/2019 10:12 AM    GFR est AA >60 12/03/2019 10:12 AM    GFR est non-AA >60 12/03/2019 10:12 AM    Calcium 8.7 12/03/2019 10:12 AM     Lab Results   Component Value Date/Time    Bilirubin, total 0.3 12/03/2019 10:12 AM    ALT (SGPT) 22 12/03/2019 10:12 AM    AST (SGOT) 14 (L) 12/03/2019 10:12 AM    Alk. phosphatase 129 (H) 12/03/2019 10:12 AM    Protein, total 6.5 12/03/2019 10:12 AM    Albumin 3.4 (L) 12/03/2019 10:12 AM    Globulin 3.1 12/03/2019 10:12 AM     Lab Results   Component Value Date/Time    Reticulocyte count 1.7 08/27/2019 11:19 AM    Iron % saturation 44 08/27/2019 11:19 AM    TIBC 236 (L) 08/27/2019 11:19 AM    Ferritin 698 (H) 08/27/2019 11:19 AM    Vitamin B12 265 08/27/2019 11:19 AM    Folate 9.2 08/27/2019 11:19 AM    TSH 1.90 11/19/2019 10:06 AM    Lipase 136 07/27/2019 11:31 AM    Hep C  virus Ab Interp. NONREACTIVE 12/03/2019 11:22 AM     Lab Results   Component Value Date/Time    INR 1.0 12/03/2019 11:22 AM    aPTT 27.3 12/03/2019 11:22 AM    Fibrinogen 243 12/03/2019 11:22 AM         PET 8/21/2019:  1. Decreased size and activity of the mediastinal mass. 2. RUL nodular densities remain ametabolic. 3. No new finding. CT Chest 11/12/2019:  Stable exam by RECIST criteria. Increase in moderate right pleural effusion with medial right lower lobe airspace disease. Minimal T2, T4, and T6 superior endplate vertebral compression deformities are likely subacute/chronic. Assessment:   1) Non-small cell lung cancer  Stage IIIB  He has at least Stage IIIB disease, and has been treated with concurrent radiation and chemotherapy (cisplatin and etoposide). Repeat PET demonstrates a partial response to therapy. No definite evidence of distant metastatic disease at this time. Pleural effusion cytology negative x2. He is currently on maintenance immunotherapy with Durvalumab. He is tolerating therapy well so far. We will proceed with his next treatment today.     We will move follow up to every 4 weeks after his next visit. Reimage every 12 weeks. Unfortunately, his two biopsies have yielded scant material, insufficient for molecular studies or PDL1 testing. At progression, a repeat biopsy may be needed for NGS. 2) SVC syndrome  Much improved. Monitor. Some edema now in upper right chest which I suspect is related. If this continues to worsen, will move up his next scans. 3) Pleural effusion  Cytology negative. Worsening on recent imaging, underwent repeat thoracentesis on 11/27/2019. Cytology negative again. 4) Poor IV access  With SVC syndrome, he cannot have a port or UE PICC. Currently has femoral PICC in place. This is working well. Home health for dressing changes. 5) Thrombocytopenia  Improved, but not resolved. Presumably secondary to his prior chemotherapy, but surprising that it is persisting. Additional lab work up 12/3/2019 unremarkable. Monitor for now. Could consider bone marrow biopsy if worsening significantly. 6) Anemia  Improving. No longer on oral iron therapy. No folate or Vit B12 def identified. 7) Neuropathy, chemotherapy induced  Initiated Cymbalta 30mg since last visit Improvement in symptoms noted, will continue this dose. 8) Cough, non productive  Likely due to effusion, improved. 9) Emotional Well Being  No psychosocial concerns identified today. Patient has adequate support.      Plan:     · Proceed today with C9 Durvalumab (10mg/kg) given every 2 weeks for one year  · Labs: CBC, CMP prior to each cycle, TSH every 6 weeks  · Continue cymbalta 30mg PO daily  · CT imaging after C12  · Return to see us in 4 weeks      Signed By: Sathya Curiel MD

## 2019-12-17 ENCOUNTER — HOSPITAL ENCOUNTER (OUTPATIENT)
Dept: INFUSION THERAPY | Age: 44
Discharge: HOME | End: 2019-12-17
Payer: COMMERCIAL

## 2019-12-17 ENCOUNTER — OFFICE VISIT (OUTPATIENT)
Dept: ONCOLOGY | Age: 44
End: 2019-12-17

## 2019-12-17 VITALS
WEIGHT: 173 LBS | HEIGHT: 70 IN | RESPIRATION RATE: 16 BRPM | DIASTOLIC BLOOD PRESSURE: 85 MMHG | HEART RATE: 72 BPM | BODY MASS INDEX: 24.77 KG/M2 | OXYGEN SATURATION: 97 % | TEMPERATURE: 98.2 F | SYSTOLIC BLOOD PRESSURE: 124 MMHG

## 2019-12-17 VITALS
OXYGEN SATURATION: 97 % | WEIGHT: 173.4 LBS | DIASTOLIC BLOOD PRESSURE: 89 MMHG | SYSTOLIC BLOOD PRESSURE: 124 MMHG | HEIGHT: 70 IN | HEART RATE: 72 BPM | RESPIRATION RATE: 16 BRPM | BODY MASS INDEX: 24.82 KG/M2 | TEMPERATURE: 97.6 F

## 2019-12-17 DIAGNOSIS — C34.11 MALIGNANT NEOPLASM OF UPPER LOBE OF RIGHT LUNG (HCC): Primary | ICD-10-CM

## 2019-12-17 LAB
ALBUMIN SERPL-MCNC: 3.6 G/DL (ref 3.5–5)
ALBUMIN/GLOB SERPL: 1.1 {RATIO} (ref 1.1–2.2)
ALP SERPL-CCNC: 131 U/L (ref 45–117)
ALT SERPL-CCNC: 22 U/L (ref 12–78)
ANION GAP SERPL CALC-SCNC: 3 MMOL/L (ref 5–15)
AST SERPL-CCNC: 23 U/L (ref 15–37)
BASOPHILS # BLD: 0 K/UL (ref 0–0.1)
BASOPHILS NFR BLD: 0 % (ref 0–1)
BILIRUB SERPL-MCNC: 0.3 MG/DL (ref 0.2–1)
BUN SERPL-MCNC: 26 MG/DL (ref 6–20)
BUN/CREAT SERPL: 28 (ref 12–20)
CALCIUM SERPL-MCNC: 8.9 MG/DL (ref 8.5–10.1)
CHLORIDE SERPL-SCNC: 108 MMOL/L (ref 97–108)
CO2 SERPL-SCNC: 28 MMOL/L (ref 21–32)
CREAT SERPL-MCNC: 0.93 MG/DL (ref 0.7–1.3)
DIFFERENTIAL METHOD BLD: ABNORMAL
EOSINOPHIL # BLD: 0.1 K/UL (ref 0–0.4)
EOSINOPHIL NFR BLD: 4 % (ref 0–7)
ERYTHROCYTE [DISTWIDTH] IN BLOOD BY AUTOMATED COUNT: 14.5 % (ref 11.5–14.5)
GLOBULIN SER CALC-MCNC: 3.4 G/DL (ref 2–4)
GLUCOSE SERPL-MCNC: 101 MG/DL (ref 65–100)
HCT VFR BLD AUTO: 37.8 % (ref 36.6–50.3)
HGB BLD-MCNC: 12.1 G/DL (ref 12.1–17)
IMM GRANULOCYTES # BLD AUTO: 0 K/UL (ref 0–0.04)
IMM GRANULOCYTES NFR BLD AUTO: 1 % (ref 0–0.5)
LYMPHOCYTES # BLD: 0.4 K/UL (ref 0.8–3.5)
LYMPHOCYTES NFR BLD: 13 % (ref 12–49)
MCH RBC QN AUTO: 23.4 PG (ref 26–34)
MCHC RBC AUTO-ENTMCNC: 32 G/DL (ref 30–36.5)
MCV RBC AUTO: 73.1 FL (ref 80–99)
MONOCYTES # BLD: 0.6 K/UL (ref 0–1)
MONOCYTES NFR BLD: 20 % (ref 5–13)
NEUTS SEG # BLD: 1.9 K/UL (ref 1.8–8)
NEUTS SEG NFR BLD: 62 % (ref 32–75)
NRBC # BLD: 0 K/UL (ref 0–0.01)
NRBC BLD-RTO: 0 PER 100 WBC
PLATELET # BLD AUTO: 102 K/UL (ref 150–400)
PMV BLD AUTO: ABNORMAL FL (ref 8.9–12.9)
POTASSIUM SERPL-SCNC: 4.3 MMOL/L (ref 3.5–5.1)
PROT SERPL-MCNC: 7 G/DL (ref 6.4–8.2)
RBC # BLD AUTO: 5.17 M/UL (ref 4.1–5.7)
RBC MORPH BLD: ABNORMAL
SODIUM SERPL-SCNC: 139 MMOL/L (ref 136–145)
WBC # BLD AUTO: 3 K/UL (ref 4.1–11.1)

## 2019-12-17 PROCEDURE — 36415 COLL VENOUS BLD VENIPUNCTURE: CPT

## 2019-12-17 PROCEDURE — 85025 COMPLETE CBC W/AUTO DIFF WBC: CPT

## 2019-12-17 PROCEDURE — 96413 CHEMO IV INFUSION 1 HR: CPT

## 2019-12-17 PROCEDURE — 74011250636 HC RX REV CODE- 250/636: Performed by: NURSE PRACTITIONER

## 2019-12-17 PROCEDURE — 80053 COMPREHEN METABOLIC PANEL: CPT

## 2019-12-17 PROCEDURE — 74011000258 HC RX REV CODE- 258: Performed by: NURSE PRACTITIONER

## 2019-12-17 RX ORDER — HEPARIN 100 UNIT/ML
300-500 SYRINGE INTRAVENOUS AS NEEDED
Status: DISCONTINUED | OUTPATIENT
Start: 2019-12-17 | End: 2019-12-18 | Stop reason: HOSPADM

## 2019-12-17 RX ORDER — SODIUM CHLORIDE 0.9 % (FLUSH) 0.9 %
10 SYRINGE (ML) INJECTION AS NEEDED
Status: DISCONTINUED | OUTPATIENT
Start: 2019-12-17 | End: 2019-12-18 | Stop reason: HOSPADM

## 2019-12-17 RX ORDER — SODIUM CHLORIDE 9 MG/ML
10 INJECTION INTRAMUSCULAR; INTRAVENOUS; SUBCUTANEOUS AS NEEDED
Status: DISCONTINUED | OUTPATIENT
Start: 2019-12-17 | End: 2019-12-18 | Stop reason: HOSPADM

## 2019-12-17 RX ORDER — SODIUM CHLORIDE 9 MG/ML
25 INJECTION, SOLUTION INTRAVENOUS CONTINUOUS
Status: DISCONTINUED | OUTPATIENT
Start: 2019-12-17 | End: 2019-12-18 | Stop reason: HOSPADM

## 2019-12-17 RX ADMIN — Medication 500 UNITS: at 14:11

## 2019-12-17 RX ADMIN — SODIUM CHLORIDE 732 MG: 9 INJECTION, SOLUTION INTRAVENOUS at 13:00

## 2019-12-17 RX ADMIN — Medication 10 ML: at 14:10

## 2019-12-17 RX ADMIN — SODIUM CHLORIDE 25 ML/HR: 900 INJECTION, SOLUTION INTRAVENOUS at 12:51

## 2019-12-17 RX ADMIN — SODIUM CHLORIDE 10 ML: 9 INJECTION INTRAMUSCULAR; INTRAVENOUS; SUBCUTANEOUS at 10:15

## 2019-12-17 NOTE — PROGRESS NOTES
[facility] Progress Note    Date: 2019    Name: Emma Pierre    MRN: [id number]         : 1975    1000:  Mr. Dominic House Arrived ambulatory and in no distress for C9D1 of Imfinzi. Assessment was completed, no acute issues at this time, no new complaints voiced. Right thigh double lumen PICC with good blood return in both lumens, labs drawn & sent for processing. Chemotherapy Flowsheet 2019   Cycle C9D1   Date 2019   Drug / Regimen Imfinzi   Pre Meds -   Notes given       Patient proceed to appointment with Dr. Josué Anthony. Mr. Fritz's vitals were reviewed. Patient Vitals for the past 24 hrs:   Temp Pulse Resp BP SpO2   19 1407 97.6 °F (36.4 °C) 72 16 124/89 --   19 1006 98.1 °F (36.7 °C) 73 16 120/88 97 %       Lab results were obtained and reviewed. Recent Results (from the past 12 hour(s))   CBC WITH AUTOMATED DIFF    Collection Time: 19 10:13 AM   Result Value Ref Range    WBC 3.0 (L) 4.1 - 11.1 K/uL    RBC 5.17 4.10 - 5.70 M/uL    HGB 12.1 12.1 - 17.0 g/dL    HCT 37.8 36.6 - 50.3 %    MCV 73.1 (L) 80.0 - 99.0 FL    MCH 23.4 (L) 26.0 - 34.0 PG    MCHC 32.0 30.0 - 36.5 g/dL    RDW 14.5 11.5 - 14.5 %    PLATELET 859 (L) 451 - 400 K/uL    MPV ABNORMAL 8.9 - 12.9 FL    NRBC 0.0 0  WBC    ABSOLUTE NRBC 0.00 0.00 - 0.01 K/uL    NEUTROPHILS 62 32 - 75 %    LYMPHOCYTES 13 12 - 49 %    MONOCYTES 20 (H) 5 - 13 %    EOSINOPHILS 4 0 - 7 %    BASOPHILS 0 0 - 1 %    IMMATURE GRANULOCYTES 1 (H) 0.0 - 0.5 %    ABS. NEUTROPHILS 1.9 1.8 - 8.0 K/UL    ABS. LYMPHOCYTES 0.4 (L) 0.8 - 3.5 K/UL    ABS. MONOCYTES 0.6 0.0 - 1.0 K/UL    ABS. EOSINOPHILS 0.1 0.0 - 0.4 K/UL    ABS. BASOPHILS 0.0 0.0 - 0.1 K/UL    ABS. IMM.  GRANS. 0.0 0.00 - 0.04 K/UL    DF SMEAR SCANNED      RBC COMMENTS NORMOCYTIC, NORMOCHROMIC     METABOLIC PANEL, COMPREHENSIVE    Collection Time: 19 10:13 AM   Result Value Ref Range    Sodium 139 136 - 145 mmol/L    Potassium 4.3 3.5 - 5.1 mmol/L    Chloride 108 97 - 108 mmol/L    CO2 28 21 - 32 mmol/L    Anion gap 3 (L) 5 - 15 mmol/L    Glucose 101 (H) 65 - 100 mg/dL    BUN 26 (H) 6 - 20 MG/DL    Creatinine 0.93 0.70 - 1.30 MG/DL    BUN/Creatinine ratio 28 (H) 12 - 20      GFR est AA >60 >60 ml/min/1.73m2    GFR est non-AA >60 >60 ml/min/1.73m2    Calcium 8.9 8.5 - 10.1 MG/DL    Bilirubin, total 0.3 0.2 - 1.0 MG/DL    ALT (SGPT) 22 12 - 78 U/L    AST (SGOT) 23 15 - 37 U/L    Alk. phosphatase 131 (H) 45 - 117 U/L    Protein, total 7.0 6.4 - 8.2 g/dL    Albumin 3.6 3.5 - 5.0 g/dL    Globulin 3.4 2.0 - 4.0 g/dL    A-G Ratio 1.1 1.1 - 2.2         Medications:  Medications Administered     0.9% sodium chloride infusion     Admin Date  12/17/2019 Action  New Bag Dose  25 mL/hr Rate  25 mL/hr Route  IntraVENous Administered By  Felipa Wood RN          0.9% sodium chloride injection 10 mL     Admin Date  12/17/2019 Action  Given Dose  10 mL Route  IntraVENous Administered By  Felipa Wood RN          durvalumab (IMFINZI) 732 mg in 0.9% sodium chloride 100 mL, overfill volume 10 mL IVPB     Admin Date  12/17/2019 Action  New Bag Dose  732 mg Rate  124.6 mL/hr Route  IntraVENous Administered By  Felipa Wood RN          heparin (porcine) pf 300-500 Units     Admin Date  12/17/2019 Action  Given Dose  500 Units Route  InterCATHeter Administered By  Felipa Wood RN          saline peripheral flush soln 10 mL     Admin Date  12/17/2019 Action  Given Dose  10 mL Route  InterCATHeter Administered By  Felipa Wood RN                Mr. Brianne Clement tolerated treatment well and was discharged from [place] [address] in stable condition at 1415. Both red and purple ports on PICC line were flushed with saline and heparin, cap ends changed. He is to return on December 30 at 1000 for his next appointment.     Anne Montanez RN  December 17, 2019

## 2019-12-17 NOTE — PROGRESS NOTES
Aden Browne is a 39 y.o. male follow up for lung cancer. 1. Have you been to the ER, urgent care clinic since your last visit? Hospitalized since your last visit?no     2. Have you seen or consulted any other health care providers outside of the [address] [place] [address] since your last visit? Include any pap smears or colon screening.  no

## 2019-12-30 ENCOUNTER — HOSPITAL ENCOUNTER (OUTPATIENT)
Dept: INFUSION THERAPY | Age: 44
Discharge: HOME | End: 2019-12-30
Payer: COMMERCIAL

## 2019-12-30 VITALS
BODY MASS INDEX: 25.56 KG/M2 | TEMPERATURE: 98 F | DIASTOLIC BLOOD PRESSURE: 78 MMHG | SYSTOLIC BLOOD PRESSURE: 128 MMHG | HEART RATE: 83 BPM | RESPIRATION RATE: 18 BRPM | WEIGHT: 178.5 LBS | HEIGHT: 70 IN

## 2019-12-30 DIAGNOSIS — C34.11 MALIGNANT NEOPLASM OF UPPER LOBE OF RIGHT LUNG (HCC): Primary | ICD-10-CM

## 2019-12-30 LAB
ALBUMIN SERPL-MCNC: 3.6 G/DL (ref 3.5–5)
ALBUMIN/GLOB SERPL: 1.1 {RATIO} (ref 1.1–2.2)
ALP SERPL-CCNC: 121 U/L (ref 45–117)
ALT SERPL-CCNC: 30 U/L (ref 12–78)
ANION GAP SERPL CALC-SCNC: 5 MMOL/L (ref 5–15)
AST SERPL-CCNC: 24 U/L (ref 15–37)
BASO+EOS+MONOS # BLD AUTO: 0.5 K/UL (ref 0.2–1.2)
BASO+EOS+MONOS NFR BLD AUTO: 17 % (ref 3.2–16.9)
BILIRUB SERPL-MCNC: 0.2 MG/DL (ref 0.2–1)
BUN SERPL-MCNC: 26 MG/DL (ref 6–20)
BUN/CREAT SERPL: 26 (ref 12–20)
CALCIUM SERPL-MCNC: 8.6 MG/DL (ref 8.5–10.1)
CHLORIDE SERPL-SCNC: 108 MMOL/L (ref 97–108)
CO2 SERPL-SCNC: 26 MMOL/L (ref 21–32)
CREAT SERPL-MCNC: 0.99 MG/DL (ref 0.7–1.3)
DIFFERENTIAL METHOD BLD: ABNORMAL
ERYTHROCYTE [DISTWIDTH] IN BLOOD BY AUTOMATED COUNT: 16 % (ref 11.8–15.8)
GLOBULIN SER CALC-MCNC: 3.4 G/DL (ref 2–4)
GLUCOSE SERPL-MCNC: 98 MG/DL (ref 65–100)
HCT VFR BLD AUTO: 34.1 % (ref 36.6–50.3)
HGB BLD-MCNC: 11.5 G/DL (ref 12.1–17)
LYMPHOCYTES # BLD: 0.4 K/UL (ref 0.8–3.5)
LYMPHOCYTES NFR BLD: 11 % (ref 12–49)
MCH RBC QN AUTO: 24.2 PG (ref 26–34)
MCHC RBC AUTO-ENTMCNC: 33.7 G/DL (ref 30–36.5)
MCV RBC AUTO: 71.6 FL (ref 80–99)
NEUTS SEG # BLD: 2.3 K/UL (ref 1.8–8)
NEUTS SEG NFR BLD: 72 % (ref 32–75)
PLATELET # BLD AUTO: 108 K/UL (ref 150–400)
POTASSIUM SERPL-SCNC: 4 MMOL/L (ref 3.5–5.1)
PROT SERPL-MCNC: 7 G/DL (ref 6.4–8.2)
RBC # BLD AUTO: 4.76 M/UL (ref 4.1–5.7)
SODIUM SERPL-SCNC: 139 MMOL/L (ref 136–145)
T4 FREE SERPL-MCNC: 0.7 NG/DL (ref 0.8–1.5)
TSH SERPL DL<=0.05 MIU/L-ACNC: 4.03 UIU/ML (ref 0.36–3.74)
WBC # BLD AUTO: 3.2 K/UL (ref 4.1–11.1)

## 2019-12-30 PROCEDURE — 80053 COMPREHEN METABOLIC PANEL: CPT

## 2019-12-30 PROCEDURE — 74011250636 HC RX REV CODE- 250/636: Performed by: NURSE PRACTITIONER

## 2019-12-30 PROCEDURE — 84439 ASSAY OF FREE THYROXINE: CPT

## 2019-12-30 PROCEDURE — 36415 COLL VENOUS BLD VENIPUNCTURE: CPT

## 2019-12-30 PROCEDURE — 74011000258 HC RX REV CODE- 258: Performed by: NURSE PRACTITIONER

## 2019-12-30 PROCEDURE — 84443 ASSAY THYROID STIM HORMONE: CPT

## 2019-12-30 PROCEDURE — 77030020847 HC STATLOK BARD -A

## 2019-12-30 PROCEDURE — 96413 CHEMO IV INFUSION 1 HR: CPT

## 2019-12-30 PROCEDURE — 85025 COMPLETE CBC W/AUTO DIFF WBC: CPT

## 2019-12-30 RX ORDER — SODIUM CHLORIDE 0.9 % (FLUSH) 0.9 %
10 SYRINGE (ML) INJECTION AS NEEDED
Status: ACTIVE | OUTPATIENT
Start: 2019-12-30 | End: 2019-12-30

## 2019-12-30 RX ORDER — SODIUM CHLORIDE 9 MG/ML
25 INJECTION, SOLUTION INTRAVENOUS CONTINUOUS
Status: DISPENSED | OUTPATIENT
Start: 2019-12-30 | End: 2019-12-30

## 2019-12-30 RX ORDER — HEPARIN 100 UNIT/ML
300-500 SYRINGE INTRAVENOUS AS NEEDED
Status: ACTIVE | OUTPATIENT
Start: 2019-12-30 | End: 2019-12-30

## 2019-12-30 RX ORDER — SODIUM CHLORIDE 9 MG/ML
10 INJECTION INTRAMUSCULAR; INTRAVENOUS; SUBCUTANEOUS AS NEEDED
Status: ACTIVE | OUTPATIENT
Start: 2019-12-30 | End: 2019-12-30

## 2019-12-30 RX ADMIN — SODIUM CHLORIDE 10 ML: 9 INJECTION INTRAMUSCULAR; INTRAVENOUS; SUBCUTANEOUS at 13:28

## 2019-12-30 RX ADMIN — SODIUM CHLORIDE 810 MG: 9 INJECTION, SOLUTION INTRAVENOUS at 12:12

## 2019-12-30 RX ADMIN — Medication 500 UNITS: at 13:27

## 2019-12-30 RX ADMIN — SODIUM CHLORIDE 25 ML/HR: 900 INJECTION, SOLUTION INTRAVENOUS at 12:07

## 2019-12-30 NOTE — PROGRESS NOTES
[facility] Progress Note    Date: 2019    Name: Haydee Barry    MRN: [id number]         : 1975    Mr. Bell Kessler Arrived ambulatory and in no distress for C10D1 of Imfinzi Regimen. Assessment was completed, no acute issues at this time, no new complaints voiced. Double lumen PICC to right thigh with positive blood return in both lumens. Labs drawn and sent for processing. Dressing changed today. Chemotherapy Flowsheet 2019   Cycle C10D1   Date 2019   Drug / Regimen Imfinzi   Pre Meds -   Notes -          Mr. Fritz's vitals were reviewed. Visit Vitals  /76 (BP 1 Location: Right arm)   Pulse 89   Temp 98 °F (36.7 °C)   Resp 18   Ht 5' 10\" (1.778 m)   Wt 81 kg (178 lb 8 oz)   BMI 25.61 kg/m²       Lab results were obtained and reviewed. Recent Results (from the past 12 hour(s))   CBC WITH 3 PART DIFF    Collection Time: 19 10:08 AM   Result Value Ref Range    WBC 3.2 (L) 4.1 - 11.1 K/uL    RBC 4.76 4.10 - 5.70 M/uL    HGB 11.5 (L) 12.1 - 17.0 g/dL    HCT 34.1 (L) 36.6 - 50.3 %    MCV 71.6 (L) 80.0 - 99.0 FL    MCH 24.2 (L) 26.0 - 34.0 PG    MCHC 33.7 30.0 - 36.5 g/dL    RDW 16.0 (H) 11.8 - 15.8 %    PLATELET 246 (L) 116 - 400 K/uL    NEUTROPHILS 72 32 - 75 %    MIXED CELLS 17 (H) 3.2 - 16.9 %    LYMPHOCYTES 11 (L) 12 - 49 %    ABS. NEUTROPHILS 2.3 1.8 - 8.0 K/UL    ABS. MIXED CELLS 0.5 0.2 - 1.2 K/uL    ABS.  LYMPHOCYTES 0.4 (L) 0.8 - 3.5 K/UL    DF AUTOMATED         Medications:  Medications Administered     0.9% sodium chloride infusion     Admin Date  2019 Action  New Bag Dose  25 mL/hr Rate  25 mL/hr Route  IntraVENous Administered By  Mary Kay Ruth RN          0.9% sodium chloride injection 10 mL     Admin Date  2019 Action  Given Dose  10 mL Route  IntraVENous Administered By  Mary Kay Ruth RN          durvalumab (IMFINZI) 810 mg in 0.9% sodium chloride 100 mL, overfill volume 10 mL IVPB     Admin Date  2019 Action  New Bag Dose  810 mg Rate  126.2 mL/hr Route  IntraVENous Administered By  Romel Paz RN          heparin (porcine) pf 300-500 Units     Admin Date  12/30/2019 Action  Given Dose  500 Units Route  InterCATHeter Administered By  Romel Paz RN                  Mr. Sarah Carlisle tolerated treatment well and was discharged from [place] [address] in stable condition. Port de-accessed, flushed & heparinized per protocol. He is to return on 1/14/19 for his next appointment.     David Chandler RN  December 30, 2019

## 2020-01-09 RX ORDER — DIPHENHYDRAMINE HYDROCHLORIDE 50 MG/ML
50 INJECTION, SOLUTION INTRAMUSCULAR; INTRAVENOUS AS NEEDED
Status: CANCELLED
Start: 2020-02-11

## 2020-01-09 RX ORDER — HYDROCORTISONE SODIUM SUCCINATE 100 MG/2ML
100 INJECTION, POWDER, FOR SOLUTION INTRAMUSCULAR; INTRAVENOUS AS NEEDED
Status: CANCELLED | OUTPATIENT
Start: 2020-02-25

## 2020-01-09 RX ORDER — SODIUM CHLORIDE 9 MG/ML
10 INJECTION INTRAMUSCULAR; INTRAVENOUS; SUBCUTANEOUS AS NEEDED
Status: CANCELLED | OUTPATIENT
Start: 2020-02-25

## 2020-01-09 RX ORDER — DIPHENHYDRAMINE HYDROCHLORIDE 50 MG/ML
50 INJECTION, SOLUTION INTRAMUSCULAR; INTRAVENOUS
Status: CANCELLED | OUTPATIENT
Start: 2020-01-28

## 2020-01-09 RX ORDER — DIPHENHYDRAMINE HYDROCHLORIDE 50 MG/ML
50 INJECTION, SOLUTION INTRAMUSCULAR; INTRAVENOUS AS NEEDED
Status: CANCELLED
Start: 2020-02-25

## 2020-01-09 RX ORDER — ALBUTEROL SULFATE 0.83 MG/ML
2.5 SOLUTION RESPIRATORY (INHALATION) AS NEEDED
Status: CANCELLED
Start: 2020-02-11

## 2020-01-09 RX ORDER — SODIUM CHLORIDE 9 MG/ML
25 INJECTION, SOLUTION INTRAVENOUS CONTINUOUS
Status: CANCELLED | OUTPATIENT
Start: 2020-02-11

## 2020-01-09 RX ORDER — SODIUM CHLORIDE 0.9 % (FLUSH) 0.9 %
10 SYRINGE (ML) INJECTION AS NEEDED
Status: CANCELLED
Start: 2020-01-28

## 2020-01-09 RX ORDER — ACETAMINOPHEN 325 MG/1
650 TABLET ORAL
Status: CANCELLED | OUTPATIENT
Start: 2020-02-25

## 2020-01-09 RX ORDER — ACETAMINOPHEN 325 MG/1
650 TABLET ORAL AS NEEDED
Status: CANCELLED
Start: 2020-02-25

## 2020-01-09 RX ORDER — DIPHENHYDRAMINE HYDROCHLORIDE 50 MG/ML
50 INJECTION, SOLUTION INTRAMUSCULAR; INTRAVENOUS
Status: CANCELLED | OUTPATIENT
Start: 2020-02-25

## 2020-01-09 RX ORDER — HEPARIN 100 UNIT/ML
300-500 SYRINGE INTRAVENOUS AS NEEDED
Status: CANCELLED
Start: 2020-01-28

## 2020-01-09 RX ORDER — SODIUM CHLORIDE 0.9 % (FLUSH) 0.9 %
10 SYRINGE (ML) INJECTION AS NEEDED
Status: CANCELLED
Start: 2020-02-25

## 2020-01-09 RX ORDER — ACETAMINOPHEN 325 MG/1
650 TABLET ORAL AS NEEDED
Status: CANCELLED
Start: 2020-02-11

## 2020-01-09 RX ORDER — ACETAMINOPHEN 325 MG/1
650 TABLET ORAL
Status: CANCELLED | OUTPATIENT
Start: 2020-01-28

## 2020-01-09 RX ORDER — SODIUM CHLORIDE 9 MG/ML
10 INJECTION INTRAMUSCULAR; INTRAVENOUS; SUBCUTANEOUS AS NEEDED
Status: CANCELLED | OUTPATIENT
Start: 2020-01-28

## 2020-01-09 RX ORDER — SODIUM CHLORIDE 9 MG/ML
25 INJECTION, SOLUTION INTRAVENOUS CONTINUOUS
Status: CANCELLED | OUTPATIENT
Start: 2020-01-28

## 2020-01-09 RX ORDER — SODIUM CHLORIDE 9 MG/ML
25 INJECTION, SOLUTION INTRAVENOUS CONTINUOUS
Status: CANCELLED | OUTPATIENT
Start: 2020-02-25

## 2020-01-09 RX ORDER — ONDANSETRON 2 MG/ML
8 INJECTION INTRAMUSCULAR; INTRAVENOUS AS NEEDED
Status: CANCELLED | OUTPATIENT
Start: 2020-02-25

## 2020-01-09 RX ORDER — ALBUTEROL SULFATE 0.83 MG/ML
2.5 SOLUTION RESPIRATORY (INHALATION) AS NEEDED
Status: CANCELLED
Start: 2020-02-25

## 2020-01-09 RX ORDER — DIPHENHYDRAMINE HYDROCHLORIDE 50 MG/ML
50 INJECTION, SOLUTION INTRAMUSCULAR; INTRAVENOUS AS NEEDED
Status: CANCELLED
Start: 2020-01-28

## 2020-01-09 RX ORDER — SODIUM CHLORIDE 9 MG/ML
10 INJECTION INTRAMUSCULAR; INTRAVENOUS; SUBCUTANEOUS AS NEEDED
Status: CANCELLED | OUTPATIENT
Start: 2020-02-11

## 2020-01-09 RX ORDER — SODIUM CHLORIDE 0.9 % (FLUSH) 0.9 %
10 SYRINGE (ML) INJECTION AS NEEDED
Status: CANCELLED
Start: 2020-02-11

## 2020-01-09 RX ORDER — EPINEPHRINE 1 MG/ML
0.3 INJECTION, SOLUTION, CONCENTRATE INTRAVENOUS AS NEEDED
Status: CANCELLED | OUTPATIENT
Start: 2020-02-11

## 2020-01-09 RX ORDER — ONDANSETRON 2 MG/ML
8 INJECTION INTRAMUSCULAR; INTRAVENOUS AS NEEDED
Status: CANCELLED | OUTPATIENT
Start: 2020-02-11

## 2020-01-09 RX ORDER — HEPARIN 100 UNIT/ML
300-500 SYRINGE INTRAVENOUS AS NEEDED
Status: CANCELLED
Start: 2020-02-25

## 2020-01-09 RX ORDER — EPINEPHRINE 1 MG/ML
0.3 INJECTION, SOLUTION, CONCENTRATE INTRAVENOUS AS NEEDED
Status: CANCELLED | OUTPATIENT
Start: 2020-02-25

## 2020-01-09 RX ORDER — HYDROCORTISONE SODIUM SUCCINATE 100 MG/2ML
100 INJECTION, POWDER, FOR SOLUTION INTRAMUSCULAR; INTRAVENOUS AS NEEDED
Status: CANCELLED | OUTPATIENT
Start: 2020-02-11

## 2020-01-09 RX ORDER — ACETAMINOPHEN 325 MG/1
650 TABLET ORAL AS NEEDED
Status: CANCELLED
Start: 2020-01-28

## 2020-01-09 RX ORDER — HYDROCORTISONE SODIUM SUCCINATE 100 MG/2ML
100 INJECTION, POWDER, FOR SOLUTION INTRAMUSCULAR; INTRAVENOUS AS NEEDED
Status: CANCELLED | OUTPATIENT
Start: 2020-01-28

## 2020-01-09 RX ORDER — EPINEPHRINE 1 MG/ML
0.3 INJECTION, SOLUTION, CONCENTRATE INTRAVENOUS AS NEEDED
Status: CANCELLED | OUTPATIENT
Start: 2020-01-28

## 2020-01-09 RX ORDER — ALBUTEROL SULFATE 0.83 MG/ML
2.5 SOLUTION RESPIRATORY (INHALATION) AS NEEDED
Status: CANCELLED
Start: 2020-01-28

## 2020-01-09 RX ORDER — HEPARIN 100 UNIT/ML
300-500 SYRINGE INTRAVENOUS AS NEEDED
Status: CANCELLED
Start: 2020-02-11

## 2020-01-09 RX ORDER — ONDANSETRON 2 MG/ML
8 INJECTION INTRAMUSCULAR; INTRAVENOUS AS NEEDED
Status: CANCELLED | OUTPATIENT
Start: 2020-01-28

## 2020-01-13 NOTE — PROGRESS NOTES
[facility] [place] [facility]  [address], [address]  Isaac Font: [phone]  F: [phone]      Reason for Visit:   Jose Carlos Skinner is a 38 y.o. male who is seen for follow up of non-small cell lung cancer. Treatment History:   · Diagnosed at [facility]  · Biopsy of right level 4 node: non small cell carcinoma, favored squamous cell carcinoma, insufficient sample for further testing  · Stage III Non-small cell lung cancer (Squamous cell)  · Definitive radiation with Dr. Ariel Zavala completed mid-July 2019  · Concurrent chemotherapy with carboplatin and paclitaxel by Dr. Rd Pina, stopped during second infusion due to reaction to paclitaxel  · Concurrent chemotherapy with cisplatin and etoposide 7/16/2019 by Dr. Rd Pina  · CT Chest 7/27/2019: There has been no significant change in size of the large right paratracheal mediastinal mass. There are many new areas of peripheral consolidation in the right upper lobe, which may represent some combination of progressive disease, posttreatment change, or infection. Attention on follow-up is needed. A previously seen right upper lobe nodule in the posterior segment appears to have decreased in size. The large right pleural effusion on this exam is significantly increased in size from prior. · CT A/P 7/29/2019: no metastatic disease in abdomen or pelvis  · MRI Brain 7/29/2019: no intracranial metastatic disease  · EBUS by Dr. Antonio Bates 7/30/2019: Squamous cell, PDL1 QNS, insufficient tissue for molecular studies  · Thoracentesis 8/5/2019: cytology negative  · Initiated maintenance therapy with Durvalumab on 8/27/2019    History of Present Illness:   Presents for follow up on therapy. Continues to have swelling in right upper chest. Some SOB with activity. With coughing and bending over. Coughing continues, non productive. Cough with sitting and laying flat, no change. No increase in SOB when laying flat. Appetite has been stable.  No nausea or vomiting. He is accompanied by his mother today. He lives about an hour [address] of here, mother staying with him to help care for him. Not currently working due to his illness, previously was a . He quit tobacco at diagnosis, though he was a light smoker (1-2 packs per week). PAST HISTORY: The following sections were reviewed and updated in the EMR as appropriate: PMH, SH, FH, Medications, Allergies. No Known Allergies     Review of Systems: A complete review of systems was obtained, reviewed, and scanned into the EMR. Pertinent findings reviewed above. Physical Exam:     Visit Vitals  BP (!) 149/102 (BP 1 Location: Left arm, BP Patient Position: Sitting) Comment: . Pulse 72   Temp 97 °F (36.1 °C) (Temporal)   Resp 18   Ht 5' 10\" (1.778 m)   Wt 182 lb (82.6 kg)   SpO2 99%   BMI 26.11 kg/m²     ECOG PS: 1  General: No distress  Eyes: PERRLA, anicteric sclerae  HENT: Atraumatic, OP clear  Neck: Supple  Lymphatic: No cervical, supraclavicular, or inguinal adenopathy  Respiratory: CTAB, diminished right base, normal respiratory effort  CV: Normal rate, regular rhythm, no murmurs, no peripheral edema  GI: Soft, nontender, nondistended, no masses, no hepatomegaly, no splenomegaly  MS: Normal gait and station. Digits without clubbing or cyanosis. Skin: No rashes, ecchymoses, or petechiae. Normal temperature, turgor, and texture. Psych: Alert, oriented, appropriate affect, normal judgment/insight    Results:     Lab Results   Component Value Date/Time    WBC 2.9 (L) 01/14/2020 10:30 AM    HGB 11.8 (L) 01/14/2020 10:30 AM    HCT 36.2 (L) 01/14/2020 10:30 AM    PLATELET 709 (L) 88/26/7377 10:30 AM    MCV 71.4 (L) 01/14/2020 10:30 AM    ABS.  NEUTROPHILS 2.1 01/14/2020 10:30 AM     Lab Results   Component Value Date/Time    Sodium 137 01/14/2020 10:30 AM    Potassium 4.1 01/14/2020 10:30 AM    Chloride 108 01/14/2020 10:30 AM    CO2 28 01/14/2020 10:30 AM    Glucose 92 01/14/2020 10:30 AM    BUN 23 (H) 01/14/2020 10:30 AM    Creatinine 0.96 01/14/2020 10:30 AM    GFR est AA >60 01/14/2020 10:30 AM    GFR est non-AA >60 01/14/2020 10:30 AM    Calcium 8.9 01/14/2020 10:30 AM     Lab Results   Component Value Date/Time    Bilirubin, total 0.3 01/14/2020 10:30 AM    ALT (SGPT) 46 01/14/2020 10:30 AM    AST (SGOT) 24 01/14/2020 10:30 AM    Alk. phosphatase 104 01/14/2020 10:30 AM    Protein, total 6.7 01/14/2020 10:30 AM    Albumin 3.5 01/14/2020 10:30 AM    Globulin 3.2 01/14/2020 10:30 AM     Lab Results   Component Value Date/Time    Reticulocyte count 1.7 08/27/2019 11:19 AM    Iron % saturation 44 08/27/2019 11:19 AM    TIBC 236 (L) 08/27/2019 11:19 AM    Ferritin 698 (H) 08/27/2019 11:19 AM    Vitamin B12 265 08/27/2019 11:19 AM    Folate 9.2 08/27/2019 11:19 AM    TSH 2.69 01/14/2020 11:55 AM    Lipase 136 07/27/2019 11:31 AM    Hep C  virus Ab Interp. NONREACTIVE 12/03/2019 11:22 AM     Lab Results   Component Value Date/Time    INR 1.0 12/03/2019 11:22 AM    aPTT 27.3 12/03/2019 11:22 AM    Fibrinogen 243 12/03/2019 11:22 AM         PET 8/21/2019:  1. Decreased size and activity of the mediastinal mass. 2. RUL nodular densities remain ametabolic. 3. No new finding. CT Chest 11/12/2019:  Stable exam by RECIST criteria. Increase in moderate right pleural effusion with medial right lower lobe airspace disease. Minimal T2, T4, and T6 superior endplate vertebral compression deformities are likely subacute/chronic. Assessment:   1) Non-small cell lung cancer  Stage IIIB  He has at least Stage IIIB disease, and has been treated with concurrent radiation and chemotherapy (cisplatin and etoposide). Repeat PET demonstrates a partial response to therapy. No definite evidence of distant metastatic disease at this time. Pleural effusion cytology negative x2. He is currently on maintenance immunotherapy with Durvalumab. He is tolerating therapy well so far.   We will proceed with his next treatment today. Will assess disease with CT of chest after next cycle of therapy, ordered. We will move follow up to every 4 weeks after his next visit. Reimage every 12 weeks. Unfortunately, his two biopsies have yielded scant material, insufficient for molecular studies or PDL1 testing. At progression, a repeat biopsy may be needed for NGS. 2) SVC syndrome   Monitor. Some edema now in upper right chest which I suspect is related. He feels this is stable in the last month, not worsening. Repeat imaging in 3 weeks to assess. 3) Pleural effusion  Cytology negative. Worsening on recent imaging, underwent repeat thoracentesis on 11/27/2019. Cytology negative again. He is getting to the point today that he may require another thoracentesis. Right lung diminished. He is more SOB with cough, although he feels symptoms are stable in the last 4 weeks. I have offered to obtain chest xray and move toward thoracentesis, but he wants to hold off until next CT. 4) Poor IV access  With SVC syndrome, he cannot have a port or UE PICC. Currently has femoral PICC in place. This is working well. Home health for dressing changes. 5) Thrombocytopenia  Improved, but not resolved. Presumably secondary to his prior chemotherapy, but surprising that it is persisting. Additional lab work up 12/3/2019 unremarkable. Monitor for now. Could consider bone marrow biopsy if worsening significantly. 6) Anemia  Improving. No longer on oral iron therapy. No folate or Vit B12 def identified. 7) Neuropathy, chemotherapy induced  Initiated Cymbalta 30mg since last visit Improvement in symptoms noted, will continue this dose. 8) Cough, non productive  Likely due to effusion. Worsening. See #3    9) weight gain  There is no sign of edema on exam.  He is mildly hypothyroid. Repeat TSH and free T4 today. May require thyroid replacement for weight gain and fatigue.      10) Emotional Well Being  No psychosocial concerns identified today. Patient has adequate support. Plan:     · Proceed today with C11 Durvalumab (10mg/kg) given every 2 weeks for one year  · Labs: CBC, CMP prior to each cycle, TSH every 6 weeks  · Continue cymbalta 30mg PO daily  · CT imaging after C12  · Return to see us in 4 weeks    Patient was seen in conjunction with Chayo Driscoll NP.     Signed By: Nadja Salazar MD

## 2020-01-14 ENCOUNTER — OFFICE VISIT (OUTPATIENT)
Dept: ONCOLOGY | Age: 45
End: 2020-01-14

## 2020-01-14 ENCOUNTER — HOSPITAL ENCOUNTER (OUTPATIENT)
Dept: INFUSION THERAPY | Age: 45
Discharge: HOME | End: 2020-01-14
Payer: COMMERCIAL

## 2020-01-14 VITALS
WEIGHT: 184 LBS | OXYGEN SATURATION: 99 % | TEMPERATURE: 98 F | BODY MASS INDEX: 26.34 KG/M2 | HEIGHT: 70 IN | RESPIRATION RATE: 18 BRPM | HEART RATE: 72 BPM | DIASTOLIC BLOOD PRESSURE: 69 MMHG | SYSTOLIC BLOOD PRESSURE: 106 MMHG

## 2020-01-14 VITALS
WEIGHT: 182 LBS | DIASTOLIC BLOOD PRESSURE: 102 MMHG | RESPIRATION RATE: 18 BRPM | HEART RATE: 72 BPM | HEIGHT: 70 IN | OXYGEN SATURATION: 99 % | TEMPERATURE: 97 F | SYSTOLIC BLOOD PRESSURE: 149 MMHG | BODY MASS INDEX: 26.05 KG/M2

## 2020-01-14 DIAGNOSIS — G62.0 CHEMOTHERAPY-INDUCED NEUROPATHY (HCC): ICD-10-CM

## 2020-01-14 DIAGNOSIS — T45.1X5A CHEMOTHERAPY-INDUCED NEUROPATHY (HCC): ICD-10-CM

## 2020-01-14 DIAGNOSIS — C34.11 MALIGNANT NEOPLASM OF UPPER LOBE OF RIGHT LUNG (HCC): Primary | Chronic | ICD-10-CM

## 2020-01-14 DIAGNOSIS — C34.11 MALIGNANT NEOPLASM OF UPPER LOBE OF RIGHT LUNG (HCC): Primary | ICD-10-CM

## 2020-01-14 LAB
ALBUMIN SERPL-MCNC: 3.5 G/DL (ref 3.5–5)
ALBUMIN/GLOB SERPL: 1.1 {RATIO} (ref 1.1–2.2)
ALP SERPL-CCNC: 104 U/L (ref 45–117)
ALT SERPL-CCNC: 46 U/L (ref 12–78)
ANION GAP SERPL CALC-SCNC: 1 MMOL/L (ref 5–15)
AST SERPL-CCNC: 24 U/L (ref 15–37)
BASO+EOS+MONOS # BLD AUTO: 0.4 K/UL (ref 0.2–1.2)
BASO+EOS+MONOS NFR BLD AUTO: 15 % (ref 3.2–16.9)
BILIRUB SERPL-MCNC: 0.3 MG/DL (ref 0.2–1)
BUN SERPL-MCNC: 23 MG/DL (ref 6–20)
BUN/CREAT SERPL: 24 (ref 12–20)
CALCIUM SERPL-MCNC: 8.9 MG/DL (ref 8.5–10.1)
CHLORIDE SERPL-SCNC: 108 MMOL/L (ref 97–108)
CO2 SERPL-SCNC: 28 MMOL/L (ref 21–32)
CREAT SERPL-MCNC: 0.96 MG/DL (ref 0.7–1.3)
DIFFERENTIAL METHOD BLD: ABNORMAL
ERYTHROCYTE [DISTWIDTH] IN BLOOD BY AUTOMATED COUNT: 16.8 % (ref 11.8–15.8)
GLOBULIN SER CALC-MCNC: 3.2 G/DL (ref 2–4)
GLUCOSE SERPL-MCNC: 92 MG/DL (ref 65–100)
HCT VFR BLD AUTO: 36.2 % (ref 36.6–50.3)
HGB BLD-MCNC: 11.8 G/DL (ref 12.1–17)
LYMPHOCYTES # BLD: 0.4 K/UL (ref 0.8–3.5)
LYMPHOCYTES NFR BLD: 15 % (ref 12–49)
MCH RBC QN AUTO: 23.3 PG (ref 26–34)
MCHC RBC AUTO-ENTMCNC: 32.6 G/DL (ref 30–36.5)
MCV RBC AUTO: 71.4 FL (ref 80–99)
NEUTS SEG # BLD: 2.1 K/UL (ref 1.8–8)
NEUTS SEG NFR BLD: 70 % (ref 32–75)
PLATELET # BLD AUTO: 105 K/UL (ref 150–400)
POTASSIUM SERPL-SCNC: 4.1 MMOL/L (ref 3.5–5.1)
PROT SERPL-MCNC: 6.7 G/DL (ref 6.4–8.2)
RBC # BLD AUTO: 5.07 M/UL (ref 4.1–5.7)
SODIUM SERPL-SCNC: 137 MMOL/L (ref 136–145)
T4 FREE SERPL-MCNC: 0.7 NG/DL (ref 0.8–1.5)
TSH SERPL DL<=0.05 MIU/L-ACNC: 2.69 UIU/ML (ref 0.36–3.74)
WBC # BLD AUTO: 2.9 K/UL (ref 4.1–11.1)

## 2020-01-14 PROCEDURE — 74011250636 HC RX REV CODE- 250/636: Performed by: NURSE PRACTITIONER

## 2020-01-14 PROCEDURE — 85025 COMPLETE CBC W/AUTO DIFF WBC: CPT

## 2020-01-14 PROCEDURE — 84443 ASSAY THYROID STIM HORMONE: CPT

## 2020-01-14 PROCEDURE — 74011000258 HC RX REV CODE- 258: Performed by: NURSE PRACTITIONER

## 2020-01-14 PROCEDURE — 84439 ASSAY OF FREE THYROXINE: CPT

## 2020-01-14 PROCEDURE — 36415 COLL VENOUS BLD VENIPUNCTURE: CPT

## 2020-01-14 PROCEDURE — 80053 COMPREHEN METABOLIC PANEL: CPT

## 2020-01-14 PROCEDURE — 96413 CHEMO IV INFUSION 1 HR: CPT

## 2020-01-14 RX ORDER — DULOXETIN HYDROCHLORIDE 30 MG/1
30 CAPSULE, DELAYED RELEASE ORAL DAILY
Qty: 30 CAP | Refills: 2 | Status: SHIPPED | OUTPATIENT
Start: 2020-01-14 | End: 2020-06-30

## 2020-01-14 RX ORDER — HEPARIN 100 UNIT/ML
300-500 SYRINGE INTRAVENOUS AS NEEDED
Status: ACTIVE | OUTPATIENT
Start: 2020-01-14 | End: 2020-01-14

## 2020-01-14 RX ORDER — SODIUM CHLORIDE 0.9 % (FLUSH) 0.9 %
10 SYRINGE (ML) INJECTION AS NEEDED
Status: ACTIVE | OUTPATIENT
Start: 2020-01-14 | End: 2020-01-14

## 2020-01-14 RX ORDER — SODIUM CHLORIDE 9 MG/ML
10 INJECTION INTRAMUSCULAR; INTRAVENOUS; SUBCUTANEOUS AS NEEDED
Status: ACTIVE | OUTPATIENT
Start: 2020-01-14 | End: 2020-01-14

## 2020-01-14 RX ORDER — SODIUM CHLORIDE 9 MG/ML
25 INJECTION, SOLUTION INTRAVENOUS CONTINUOUS
Status: DISPENSED | OUTPATIENT
Start: 2020-01-14 | End: 2020-01-14

## 2020-01-14 RX ADMIN — SODIUM CHLORIDE 810 MG: 9 INJECTION, SOLUTION INTRAVENOUS at 12:27

## 2020-01-14 RX ADMIN — SODIUM CHLORIDE 25 ML/HR: 900 INJECTION, SOLUTION INTRAVENOUS at 12:21

## 2020-01-14 RX ADMIN — Medication 500 UNITS: at 13:28

## 2020-01-14 RX ADMIN — Medication 500 UNITS: at 13:29

## 2020-01-14 NOTE — PROGRESS NOTES
[facility] Progress Note    Date: 2020    Name: Leticia Kirkland    MRN: [id number]         : 1975    Mr. Alis Nava Arrived ambulatory and in no distress for C11D1 of Durvalumab Regimen. Assessment was completed, no acute issues at this time, no new complaints voiced. R PICC double lumen accessed without difficulty, labs drawn & sent for processing. Chemotherapy Flowsheet 2020   Cycle C11D1   Date 2020   Drug / Regimen Imfinzi   Pre Meds -   Notes -       Patient proceed to appointment with Dr. Buzz Fournier. Mr. Fritz's vitals were reviewed. Visit Vitals  /74 (BP 1 Location: Right arm)   Pulse 76   Temp 97.3 °F (36.3 °C)   Resp 18   Ht 5' 10\" (1.778 m)   Wt 83.5 kg (184 lb)   SpO2 98%   BMI 26.40 kg/m²       Lab results were obtained and reviewed. Recent Results (from the past 12 hour(s))   CBC WITH 3 PART DIFF    Collection Time: 20 10:30 AM   Result Value Ref Range    WBC 2.9 (L) 4.1 - 11.1 K/uL    RBC 5.07 4. 10 - 5.70 M/uL    HGB 11.8 (L) 12.1 - 17.0 g/dL    HCT 36.2 (L) 36.6 - 50.3 %    MCV 71.4 (L) 80.0 - 99.0 FL    MCH 23.3 (L) 26.0 - 34.0 PG    MCHC 32.6 30.0 - 36.5 g/dL    RDW 16.8 (H) 11.8 - 15.8 %    PLATELET 614 (L) 991 - 400 K/uL    NEUTROPHILS 70 32 - 75 %    MIXED CELLS 15 3.2 - 16.9 %    LYMPHOCYTES 15 12 - 49 %    ABS. NEUTROPHILS 2.1 1.8 - 8.0 K/UL    ABS. MIXED CELLS 0.4 0.2 - 1.2 K/uL    ABS. LYMPHOCYTES 0.4 (L) 0.8 - 3.5 K/UL    DF AUTOMATED       Recent Results (from the past 12 hour(s))   CBC WITH 3 PART DIFF    Collection Time: 20 10:30 AM   Result Value Ref Range    WBC 2.9 (L) 4.1 - 11.1 K/uL    RBC 5.07 4. 10 - 5.70 M/uL    HGB 11.8 (L) 12.1 - 17.0 g/dL    HCT 36.2 (L) 36.6 - 50.3 %    MCV 71.4 (L) 80.0 - 99.0 FL    MCH 23.3 (L) 26.0 - 34.0 PG    MCHC 32.6 30.0 - 36.5 g/dL    RDW 16.8 (H) 11.8 - 15.8 %    PLATELET 512 (L) 457 - 400 K/uL    NEUTROPHILS 70 32 - 75 %    MIXED CELLS 15 3.2 - 16.9 %    LYMPHOCYTES 15 12 - 49 %    ABS.  NEUTROPHILS 2.1 1.8 - 8.0 K/UL    ABS. MIXED CELLS 0.4 0.2 - 1.2 K/uL    ABS. LYMPHOCYTES 0.4 (L) 0.8 - 3.5 K/UL    DF AUTOMATED     METABOLIC PANEL, COMPREHENSIVE    Collection Time: 01/14/20 10:30 AM   Result Value Ref Range    Sodium 137 136 - 145 mmol/L    Potassium 4.1 3.5 - 5.1 mmol/L    Chloride 108 97 - 108 mmol/L    CO2 28 21 - 32 mmol/L    Anion gap 1 (L) 5 - 15 mmol/L    Glucose 92 65 - 100 mg/dL    BUN 23 (H) 6 - 20 MG/DL    Creatinine 0.96 0.70 - 1.30 MG/DL    BUN/Creatinine ratio 24 (H) 12 - 20      GFR est AA >60 >60 ml/min/1.73m2    GFR est non-AA >60 >60 ml/min/1.73m2    Calcium 8.9 8.5 - 10.1 MG/DL    Bilirubin, total 0.3 0.2 - 1.0 MG/DL    ALT (SGPT) 46 12 - 78 U/L    AST (SGOT) 24 15 - 37 U/L    Alk. phosphatase 104 45 - 117 U/L    Protein, total 6.7 6.4 - 8.2 g/dL    Albumin 3.5 3.5 - 5.0 g/dL    Globulin 3.2 2.0 - 4.0 g/dL    A-G Ratio 1.1 1.1 - 2.2     TSH 3RD GENERATION    Collection Time: 01/14/20 11:55 AM   Result Value Ref Range    TSH 2.69 0.36 - 3.74 uIU/mL       Medications:  Medications Administered     0.9% sodium chloride infusion     Admin Date  01/14/2020 Action  New Bag Dose  25 mL/hr Rate  25 mL/hr Route  IntraVENous Administered By  Karsten Lau RN          durvalumab (IMFINZI) 810 mg in 0.9% sodium chloride 100 mL, overfill volume 10 mL IVPB     Admin Date  01/14/2020 Action  New Bag Dose  810 mg Rate  126.2 mL/hr Route  IntraVENous Administered By  Karsten Lau RN          heparin (porcine) pf 300-500 Units     Admin Date  01/14/2020 Action  Given Dose  500 Units Route  InterCATHeter Administered By  Karsten Lau RN           Admin Date  01/14/2020 Action  Given Dose  500 Units Route  InterCATHeter Administered By  Karsten Lau RN                  Mr. Jesus Bautista tolerated treatment well and was discharged from [place] [address] in stable condition at 1335. PICC flushed & heparinized per protocol.  He is to return on January 28 at 10 for his next appointment.     Jaziel Marie RN  January 14, 2020

## 2020-01-14 NOTE — PROGRESS NOTES
Ariadna Sadler is a 38 y.o. male follow up for lung cancer. 1. Have you been to the ER, urgent care clinic since your last visit? Hospitalized since your last visit?no     2. Have you seen or consulted any other health care providers outside of the [address] [place] [address] since your last visit? Include any pap smears or colon screening.  no

## 2020-01-28 ENCOUNTER — HOSPITAL ENCOUNTER (OUTPATIENT)
Dept: INFUSION THERAPY | Age: 45
Discharge: HOME | End: 2020-01-28
Payer: COMMERCIAL

## 2020-01-28 VITALS
RESPIRATION RATE: 18 BRPM | WEIGHT: 182.5 LBS | OXYGEN SATURATION: 98 % | BODY MASS INDEX: 26.19 KG/M2 | TEMPERATURE: 97.9 F | HEART RATE: 61 BPM | SYSTOLIC BLOOD PRESSURE: 117 MMHG | DIASTOLIC BLOOD PRESSURE: 82 MMHG

## 2020-01-28 DIAGNOSIS — C34.11 MALIGNANT NEOPLASM OF UPPER LOBE OF RIGHT LUNG (HCC): Primary | ICD-10-CM

## 2020-01-28 LAB
ALBUMIN SERPL-MCNC: 3.8 G/DL (ref 3.5–5)
ALBUMIN/GLOB SERPL: 1.2 {RATIO} (ref 1.1–2.2)
ALP SERPL-CCNC: 104 U/L (ref 45–117)
ALT SERPL-CCNC: 21 U/L (ref 12–78)
ANION GAP SERPL CALC-SCNC: 6 MMOL/L (ref 5–15)
AST SERPL-CCNC: 17 U/L (ref 15–37)
BASOPHILS # BLD: 0 K/UL (ref 0–0.1)
BASOPHILS NFR BLD: 1 % (ref 0–1)
BILIRUB SERPL-MCNC: 0.4 MG/DL (ref 0.2–1)
BUN SERPL-MCNC: 21 MG/DL (ref 6–20)
BUN/CREAT SERPL: 20 (ref 12–20)
CALCIUM SERPL-MCNC: 9.1 MG/DL (ref 8.5–10.1)
CHLORIDE SERPL-SCNC: 105 MMOL/L (ref 97–108)
CO2 SERPL-SCNC: 28 MMOL/L (ref 21–32)
CREAT SERPL-MCNC: 1.06 MG/DL (ref 0.7–1.3)
DIFFERENTIAL METHOD BLD: ABNORMAL
EOSINOPHIL # BLD: 0.1 K/UL (ref 0–0.4)
EOSINOPHIL NFR BLD: 5 % (ref 0–7)
ERYTHROCYTE [DISTWIDTH] IN BLOOD BY AUTOMATED COUNT: 15.9 % (ref 11.5–14.5)
GLOBULIN SER CALC-MCNC: 3.3 G/DL (ref 2–4)
GLUCOSE SERPL-MCNC: 91 MG/DL (ref 65–100)
HCT VFR BLD AUTO: 39 % (ref 36.6–50.3)
HGB BLD-MCNC: 12.7 G/DL (ref 12.1–17)
IMM GRANULOCYTES # BLD AUTO: 0 K/UL (ref 0–0.04)
IMM GRANULOCYTES NFR BLD AUTO: 0 % (ref 0–0.5)
LYMPHOCYTES # BLD: 0.5 K/UL (ref 0.8–3.5)
LYMPHOCYTES NFR BLD: 17 % (ref 12–49)
MCH RBC QN AUTO: 23.5 PG (ref 26–34)
MCHC RBC AUTO-ENTMCNC: 32.6 G/DL (ref 30–36.5)
MCV RBC AUTO: 72.2 FL (ref 80–99)
MONOCYTES # BLD: 0.5 K/UL (ref 0–1)
MONOCYTES NFR BLD: 17 % (ref 5–13)
NEUTS SEG # BLD: 1.7 K/UL (ref 1.8–8)
NEUTS SEG NFR BLD: 60 % (ref 32–75)
NRBC # BLD: 0 K/UL (ref 0–0.01)
NRBC BLD-RTO: 0 PER 100 WBC
PLATELET # BLD AUTO: 99 K/UL (ref 150–400)
POTASSIUM SERPL-SCNC: 4.2 MMOL/L (ref 3.5–5.1)
PROT SERPL-MCNC: 7.1 G/DL (ref 6.4–8.2)
RBC # BLD AUTO: 5.4 M/UL (ref 4.1–5.7)
RBC MORPH BLD: ABNORMAL
SODIUM SERPL-SCNC: 139 MMOL/L (ref 136–145)
WBC # BLD AUTO: 2.8 K/UL (ref 4.1–11.1)

## 2020-01-28 PROCEDURE — 85025 COMPLETE CBC W/AUTO DIFF WBC: CPT

## 2020-01-28 PROCEDURE — 74011250636 HC RX REV CODE- 250/636: Performed by: NURSE PRACTITIONER

## 2020-01-28 PROCEDURE — 96413 CHEMO IV INFUSION 1 HR: CPT

## 2020-01-28 PROCEDURE — 74011000258 HC RX REV CODE- 258: Performed by: NURSE PRACTITIONER

## 2020-01-28 PROCEDURE — 80053 COMPREHEN METABOLIC PANEL: CPT

## 2020-01-28 PROCEDURE — 36415 COLL VENOUS BLD VENIPUNCTURE: CPT

## 2020-01-28 RX ORDER — HEPARIN 100 UNIT/ML
300-500 SYRINGE INTRAVENOUS AS NEEDED
Status: ACTIVE | OUTPATIENT
Start: 2020-01-28 | End: 2020-01-28

## 2020-01-28 RX ORDER — SODIUM CHLORIDE 9 MG/ML
10 INJECTION INTRAMUSCULAR; INTRAVENOUS; SUBCUTANEOUS AS NEEDED
Status: ACTIVE | OUTPATIENT
Start: 2020-01-28 | End: 2020-01-28

## 2020-01-28 RX ORDER — SODIUM CHLORIDE 9 MG/ML
25 INJECTION, SOLUTION INTRAVENOUS CONTINUOUS
Status: DISCONTINUED | OUTPATIENT
Start: 2020-01-28 | End: 2020-01-29 | Stop reason: HOSPADM

## 2020-01-28 RX ORDER — SODIUM CHLORIDE 0.9 % (FLUSH) 0.9 %
10 SYRINGE (ML) INJECTION AS NEEDED
Status: ACTIVE | OUTPATIENT
Start: 2020-01-28 | End: 2020-01-28

## 2020-01-28 RX ADMIN — SODIUM CHLORIDE 25 ML/HR: 900 INJECTION, SOLUTION INTRAVENOUS at 12:27

## 2020-01-28 RX ADMIN — SODIUM CHLORIDE 810 MG: 9 INJECTION, SOLUTION INTRAVENOUS at 12:55

## 2020-01-28 RX ADMIN — Medication 500 UNITS: at 13:58

## 2020-01-28 RX ADMIN — Medication 10 ML: at 10:35

## 2020-01-28 RX ADMIN — Medication 10 ML: at 13:57

## 2020-01-28 NOTE — PROGRESS NOTES
[facility] VISIT NOTE    1025. Pt arrived at [facility] ambulatory and in no distress for Imfinzi C12. Assessment completed, pt c/o PEDROZA and some numbness/tingling in feet. RLE PICC flushed with positive blood return noted in both lumens and labs drawn. Pt reported dressing was changed on Monday, 1/27 by home health nurse. Dressing clean, dry and intact. Labs resulted and are within parameters to treat. Medications received:  NS KVO  Imfinzi IV    Tolerated treatment well, no adverse reaction noted. PICC flushed per protocol. Positive blood return noted in both lumens. Patient Vitals for the past 12 hrs:   Temp Pulse Resp BP SpO2   01/28/20 1356 97.9 °F (36.6 °C) 61 18 117/82 --   01/28/20 1026 97 °F (36.1 °C) 63 18 125/72 98 %     Recent Results (from the past 12 hour(s))   METABOLIC PANEL, COMPREHENSIVE    Collection Time: 01/28/20 10:34 AM   Result Value Ref Range    Sodium 139 136 - 145 mmol/L    Potassium 4.2 3.5 - 5.1 mmol/L    Chloride 105 97 - 108 mmol/L    CO2 28 21 - 32 mmol/L    Anion gap 6 5 - 15 mmol/L    Glucose 91 65 - 100 mg/dL    BUN 21 (H) 6 - 20 MG/DL    Creatinine 1.06 0.70 - 1.30 MG/DL    BUN/Creatinine ratio 20 12 - 20      GFR est AA >60 >60 ml/min/1.73m2    GFR est non-AA >60 >60 ml/min/1.73m2    Calcium 9.1 8.5 - 10.1 MG/DL    Bilirubin, total 0.4 0.2 - 1.0 MG/DL    ALT (SGPT) 21 12 - 78 U/L    AST (SGOT) 17 15 - 37 U/L    Alk. phosphatase 104 45 - 117 U/L    Protein, total 7.1 6.4 - 8.2 g/dL    Albumin 3.8 3.5 - 5.0 g/dL    Globulin 3.3 2.0 - 4.0 g/dL    A-G Ratio 1.2 1.1 - 2.2     CBC WITH AUTOMATED DIFF    Collection Time: 01/28/20 10:47 AM   Result Value Ref Range    WBC 2.8 (L) 4.1 - 11.1 K/uL    RBC 5.40 4. 10 - 5.70 M/uL    HGB 12.7 12.1 - 17.0 g/dL    HCT 39.0 36.6 - 50.3 %    MCV 72.2 (L) 80.0 - 99.0 FL    MCH 23.5 (L) 26.0 - 34.0 PG    MCHC 32.6 30.0 - 36.5 g/dL    RDW 15.9 (H) 11.5 - 14.5 %    PLATELET 99 (L) 166 - 400 K/uL    NRBC 0.0 0  WBC    ABSOLUTE NRBC 0. 00 0.00 - 0.01 K/uL    NEUTROPHILS 60 32 - 75 %    LYMPHOCYTES 17 12 - 49 %    MONOCYTES 17 (H) 5 - 13 %    EOSINOPHILS 5 0 - 7 %    BASOPHILS 1 0 - 1 %    IMMATURE GRANULOCYTES 0 0.0 - 0.5 %    ABS. NEUTROPHILS 1.7 (L) 1.8 - 8.0 K/UL    ABS. LYMPHOCYTES 0.5 (L) 0.8 - 3.5 K/UL    ABS. MONOCYTES 0.5 0.0 - 1.0 K/UL    ABS. EOSINOPHILS 0.1 0.0 - 0.4 K/UL    ABS. BASOPHILS 0.0 0.0 - 0.1 K/UL    ABS. IMM. GRANS. 0.0 0.00 - 0.04 K/UL    DF SMEAR SCANNED      RBC COMMENTS HYPOCHROMIA  1+        RBC COMMENTS MICROCYTOSIS  1+        RBC COMMENTS OVALOCYTES  PRESENT         1400. D/C'd from [facility] ambulatory and in no distress accompanied by mother.  Next appointment is 2/11/20 at 10:00 am.

## 2020-02-03 ENCOUNTER — HOSPITAL ENCOUNTER (OUTPATIENT)
Dept: CT IMAGING | Age: 45
Discharge: HOME | End: 2020-02-03
Attending: NURSE PRACTITIONER
Payer: COMMERCIAL

## 2020-02-03 DIAGNOSIS — C34.11 MALIGNANT NEOPLASM OF UPPER LOBE OF RIGHT LUNG (HCC): Chronic | ICD-10-CM

## 2020-02-03 PROCEDURE — 74011636320 HC RX REV CODE- 636/320: Performed by: RADIOLOGY

## 2020-02-03 PROCEDURE — 71260 CT THORAX DX C+: CPT

## 2020-02-03 RX ADMIN — IOPAMIDOL 100 ML: 612 INJECTION, SOLUTION INTRAVENOUS at 08:16

## 2020-02-07 ENCOUNTER — TELEPHONE (OUTPATIENT)
Dept: ONCOLOGY | Age: 45
End: 2020-02-07

## 2020-02-07 NOTE — TELEPHONE ENCOUNTER
[employer] appeals dept left a vm message   About an appeal   They didn't specify an appeal on what   Or any details   Other than we would receive a letter  The appeal ID is [id number]  Thanks

## 2020-02-07 NOTE — PROGRESS NOTES
[facility] at [address] [place]: [phone]  F: [phone]      Reason for Visit:   Demetrius Garcia is a 38 y.o. male who is seen for follow up of non-small cell lung cancer. Treatment History:   · Diagnosed at [facility]  · Biopsy of right level 4 node: non small cell carcinoma, favored squamous cell carcinoma, insufficient sample for further testing  · Stage III Non-small cell lung cancer (Squamous cell)  · Definitive radiation with Dr. Saab Both completed mid-July 2019  · Concurrent chemotherapy with carboplatin and paclitaxel by Dr. Candy Azar, stopped during second infusion due to reaction to paclitaxel  · Concurrent chemotherapy with cisplatin and etoposide 7/16/2019 by Dr. Candy Azar  · CT Chest 7/27/2019: There has been no significant change in size of the large right paratracheal mediastinal mass. There are many new areas of peripheral consolidation in the right upper lobe, which may represent some combination of progressive disease, posttreatment change, or infection. Attention on follow-up is needed. A previously seen right upper lobe nodule in the posterior segment appears to have decreased in size. The large right pleural effusion on this exam is significantly increased in size from prior. · CT A/P 7/29/2019: no metastatic disease in abdomen or pelvis  · MRI Brain 7/29/2019: no intracranial metastatic disease  · EBUS by Dr. Valles Factor 7/30/2019: Squamous cell, PDL1 QNS, insufficient tissue for molecular studies  · Thoracentesis 8/5/2019: cytology negative  · Initiated maintenance therapy with Durvalumab on 8/27/2019    History of Present Illness:   Presents for follow up on therapy. He is eating and drinking well. States appetite has been great. No nausea or vomiting. Bowels are moving well. No diarrhea or constipation. No swelling of lower extremities.  Does feel fullness/swelling in right upper chest.     Pain in mid back started about 3 weeks ago. No fall or injury. States doesn't hurt all the time, just \"pulls\" when he raises his arm above head. Doesn't require pain medications. No pain or weakness in legs. He is accompanied by his mother today. He lives about an hour [address] of here, mother staying with him to help care for him. Not currently working due to his illness, previously was a . He quit tobacco at diagnosis, though he was a light smoker (1-2 packs per week). PAST HISTORY: The following sections were reviewed and updated in the EMR as appropriate: PMH, SH, FH, Medications, Allergies. No Known Allergies     Review of Systems: A complete review of systems was obtained, reviewed, and scanned into the EMR. Pertinent findings reviewed above. Physical Exam:     Visit Vitals  /90 (BP 1 Location: Left arm, BP Patient Position: Sitting) Comment: . Pulse 60   Temp 97.6 °F (36.4 °C) (Temporal)   Resp 16   Ht 5' 9\" (1.753 m)   Wt 184 lb (83.5 kg)   SpO2 96%   BMI 27.17 kg/m²     ECOG PS: 1  General: No distress  Eyes: PERRLA, anicteric sclerae  HENT: Atraumatic, OP clear  Neck: Supple  Lymphatic: No cervical, supraclavicular, or inguinal adenopathy  Respiratory: CTAB, diminished right base, normal respiratory effort  CV: Normal rate, regular rhythm, no murmurs, no peripheral edema  GI: Soft, nontender, nondistended, no masses, no hepatomegaly, no splenomegaly  MS: Normal gait and station. Digits without clubbing or cyanosis. Skin: No rashes, ecchymoses, or petechiae. Normal temperature, turgor, and texture. Psych: Alert, oriented, appropriate affect, normal judgment/insight    Results:     Lab Results   Component Value Date/Time    WBC 3.3 (L) 02/11/2020 10:03 AM    HGB 12.7 02/11/2020 10:03 AM    HCT 38.7 02/11/2020 10:03 AM    PLATELET 377 (L) 39/31/6440 10:03 AM    MCV 71.7 (L) 02/11/2020 10:03 AM    ABS.  NEUTROPHILS 2.2 02/11/2020 10:03 AM     Lab Results   Component Value Date/Time    Sodium 138 02/11/2020 10:03 AM    Potassium 4.3 02/11/2020 10:03 AM    Chloride 107 02/11/2020 10:03 AM    CO2 26 02/11/2020 10:03 AM    Glucose 96 02/11/2020 10:03 AM    BUN 25 (H) 02/11/2020 10:03 AM    Creatinine 1.04 02/11/2020 10:03 AM    GFR est AA >60 02/11/2020 10:03 AM    GFR est non-AA >60 02/11/2020 10:03 AM    Calcium 8.5 02/11/2020 10:03 AM     Lab Results   Component Value Date/Time    Bilirubin, total 0.4 02/11/2020 10:03 AM    ALT (SGPT) 20 02/11/2020 10:03 AM    AST (SGOT) 14 (L) 02/11/2020 10:03 AM    Alk. phosphatase 103 02/11/2020 10:03 AM    Protein, total 7.0 02/11/2020 10:03 AM    Albumin 3.8 02/11/2020 10:03 AM    Globulin 3.2 02/11/2020 10:03 AM     Lab Results   Component Value Date/Time    Reticulocyte count 1.7 08/27/2019 11:19 AM    Iron % saturation 44 08/27/2019 11:19 AM    TIBC 236 (L) 08/27/2019 11:19 AM    Ferritin 698 (H) 08/27/2019 11:19 AM    Vitamin B12 265 08/27/2019 11:19 AM    Folate 9.2 08/27/2019 11:19 AM    TSH 3.15 02/11/2020 10:03 AM    Lipase 136 07/27/2019 11:31 AM    Hep C  virus Ab Interp. NONREACTIVE 12/03/2019 11:22 AM     Lab Results   Component Value Date/Time    INR 1.0 12/03/2019 11:22 AM    aPTT 27.3 12/03/2019 11:22 AM    Fibrinogen 243 12/03/2019 11:22 AM         PET 8/21/2019:  1. Decreased size and activity of the mediastinal mass. 2. RUL nodular densities remain ametabolic. 3. No new finding. CT Chest 11/12/2019:  Stable exam by RECIST criteria. Increase in moderate right pleural effusion with medial right lower lobe airspace disease. Minimal T2, T4, and T6 superior endplate vertebral compression deformities are likely subacute/chronic. CT Chest 2/3/2020:  Discordant change.  Decrease in size of right paratracheal node the increase in size of right upper lobe mass  Decrease in volume of right lung with increased right pleural effusion density  Occluded SVC with extensive collateralization as described  New T6 compression fracture    Assessment:   1) Non-small cell lung cancer  Stage IIIB  He has at least Stage IIIB disease, and has been treated with concurrent radiation and chemotherapy (cisplatin and etoposide). Repeat PET demonstrates a partial response to therapy. No definite evidence of distant metastatic disease at this time. Pleural effusion cytology negative x2. He is currently on maintenance immunotherapy with Durvalumab. He is tolerating therapy well so far. CT obtained since last cycle reviewed and concerning for new T6 compression fracture. Unsure if this is related to disease. Will obtain bone scan to review further. Continue Durvalumab. We will move follow up to every 2 weeks office follow up. Reimage every 12 weeks. Unfortunately, his two biopsies have yielded scant material, insufficient for molecular studies or PDL1 testing. At progression, a repeat biopsy may be needed for NGS. 2) SVC syndrome  Monitor. Stable on imaging. 3) Pleural effusion  Cytology negative. Worsening on recent imaging, underwent repeat thoracentesis on 11/27/2019. Cytology negative again. Recent CT with recurrent effusion, will plan another thoracentesis with cytology. 4) Poor IV access  With SVC syndrome, he cannot have a port or UE PICC. Currently has femoral PICC in place. This is working well. Home health for dressing changes. 5) Thrombocytopenia  Improved, but not resolved. Presumably secondary to his prior chemotherapy, but surprising that it is persisting. Additional lab work up 12/3/2019 unremarkable. Monitor for now. Could consider bone marrow biopsy if worsening significantly. 6) Anemia  Improving. No longer on oral iron therapy. No folate or Vit B12 def identified. 7) Neuropathy, chemotherapy induced  Continue Cymbalta 30mg daily. 8) Cough, non productive  Likely due to effusion. Worsening. Hope to see improvement with thoracentesis.      9) weight gain  There is no sign of edema on exam.  Monitor thyroid studies closely. 10) Compression fracture  Unsure if due to disease. Further imaging with bone scan to evaluate. Consider kyphoplasty vs radiation therapy if symptoms develop. Currently he is minimally symptomatic to site with mild pain/discomfort with elevation of right arm above head or twisting of upper torso. 11) Emotional Well Being  No psychosocial concerns identified today. Patient has adequate support. Plan:     · Proceed today with C13 Durvalumab (10mg/kg) given every 2 weeks for one year  · Labs: CBC, CMP prior to each cycle, TSH every 6 weeks  · Continue cymbalta 30mg PO daily  · Bone scan  · Thoracentesis with cytology  · Return to see us in 2 weeks    Patient was seen in conjunction with Karely Kaur NP.     Signed By: Jed Allen MD

## 2020-02-11 ENCOUNTER — OFFICE VISIT (OUTPATIENT)
Dept: ONCOLOGY | Age: 45
End: 2020-02-11

## 2020-02-11 ENCOUNTER — HOSPITAL ENCOUNTER (OUTPATIENT)
Dept: INFUSION THERAPY | Age: 45
Discharge: HOME | End: 2020-02-11
Payer: COMMERCIAL

## 2020-02-11 VITALS
HEART RATE: 64 BPM | DIASTOLIC BLOOD PRESSURE: 85 MMHG | HEIGHT: 69 IN | OXYGEN SATURATION: 97 % | TEMPERATURE: 98.1 F | SYSTOLIC BLOOD PRESSURE: 125 MMHG | WEIGHT: 184.4 LBS | BODY MASS INDEX: 27.31 KG/M2

## 2020-02-11 VITALS
DIASTOLIC BLOOD PRESSURE: 90 MMHG | HEIGHT: 69 IN | RESPIRATION RATE: 16 BRPM | HEART RATE: 60 BPM | SYSTOLIC BLOOD PRESSURE: 130 MMHG | OXYGEN SATURATION: 96 % | WEIGHT: 184 LBS | TEMPERATURE: 97.6 F | BODY MASS INDEX: 27.25 KG/M2

## 2020-02-11 DIAGNOSIS — C34.11 MALIGNANT NEOPLASM OF UPPER LOBE OF RIGHT LUNG (HCC): Primary | ICD-10-CM

## 2020-02-11 DIAGNOSIS — J90 RECURRENT RIGHT PLEURAL EFFUSION: ICD-10-CM

## 2020-02-11 DIAGNOSIS — S22.050A COMPRESSION FRACTURE OF T6 VERTEBRA, INITIAL ENCOUNTER (HCC): ICD-10-CM

## 2020-02-11 DIAGNOSIS — C34.11 MALIGNANT NEOPLASM OF UPPER LOBE OF RIGHT LUNG (HCC): Primary | Chronic | ICD-10-CM

## 2020-02-11 LAB
ALBUMIN SERPL-MCNC: 3.8 G/DL (ref 3.5–5)
ALBUMIN/GLOB SERPL: 1.2 {RATIO} (ref 1.1–2.2)
ALP SERPL-CCNC: 103 U/L (ref 45–117)
ALT SERPL-CCNC: 20 U/L (ref 12–78)
ANION GAP SERPL CALC-SCNC: 5 MMOL/L (ref 5–15)
AST SERPL-CCNC: 14 U/L (ref 15–37)
BASOPHILS # BLD: 0 K/UL (ref 0–0.1)
BASOPHILS NFR BLD: 1 % (ref 0–1)
BILIRUB SERPL-MCNC: 0.4 MG/DL (ref 0.2–1)
BUN SERPL-MCNC: 25 MG/DL (ref 6–20)
BUN/CREAT SERPL: 24 (ref 12–20)
CALCIUM SERPL-MCNC: 8.5 MG/DL (ref 8.5–10.1)
CHLORIDE SERPL-SCNC: 107 MMOL/L (ref 97–108)
CO2 SERPL-SCNC: 26 MMOL/L (ref 21–32)
CREAT SERPL-MCNC: 1.04 MG/DL (ref 0.7–1.3)
DIFFERENTIAL METHOD BLD: ABNORMAL
EOSINOPHIL # BLD: 0.2 K/UL (ref 0–0.4)
EOSINOPHIL NFR BLD: 5 % (ref 0–7)
ERYTHROCYTE [DISTWIDTH] IN BLOOD BY AUTOMATED COUNT: 15.5 % (ref 11.5–14.5)
GLOBULIN SER CALC-MCNC: 3.2 G/DL (ref 2–4)
GLUCOSE SERPL-MCNC: 96 MG/DL (ref 65–100)
HCT VFR BLD AUTO: 38.7 % (ref 36.6–50.3)
HGB BLD-MCNC: 12.7 G/DL (ref 12.1–17)
IMM GRANULOCYTES # BLD AUTO: 0 K/UL (ref 0–0.04)
IMM GRANULOCYTES NFR BLD AUTO: 1 % (ref 0–0.5)
LYMPHOCYTES # BLD: 0.5 K/UL (ref 0.8–3.5)
LYMPHOCYTES NFR BLD: 15 % (ref 12–49)
MCH RBC QN AUTO: 23.5 PG (ref 26–34)
MCHC RBC AUTO-ENTMCNC: 32.8 G/DL (ref 30–36.5)
MCV RBC AUTO: 71.7 FL (ref 80–99)
MONOCYTES # BLD: 0.4 K/UL (ref 0–1)
MONOCYTES NFR BLD: 13 % (ref 5–13)
NEUTS SEG # BLD: 2.2 K/UL (ref 1.8–8)
NEUTS SEG NFR BLD: 65 % (ref 32–75)
NRBC # BLD: 0 K/UL (ref 0–0.01)
NRBC BLD-RTO: 0 PER 100 WBC
PLATELET # BLD AUTO: 105 K/UL (ref 150–400)
PMV BLD AUTO: 9.4 FL (ref 8.9–12.9)
POTASSIUM SERPL-SCNC: 4.3 MMOL/L (ref 3.5–5.1)
PROT SERPL-MCNC: 7 G/DL (ref 6.4–8.2)
RBC # BLD AUTO: 5.4 M/UL (ref 4.1–5.7)
RBC MORPH BLD: ABNORMAL
SODIUM SERPL-SCNC: 138 MMOL/L (ref 136–145)
TSH SERPL DL<=0.05 MIU/L-ACNC: 3.15 UIU/ML (ref 0.36–3.74)
WBC # BLD AUTO: 3.3 K/UL (ref 4.1–11.1)

## 2020-02-11 PROCEDURE — 74011250636 HC RX REV CODE- 250/636: Performed by: NURSE PRACTITIONER

## 2020-02-11 PROCEDURE — 85025 COMPLETE CBC W/AUTO DIFF WBC: CPT

## 2020-02-11 PROCEDURE — 96413 CHEMO IV INFUSION 1 HR: CPT

## 2020-02-11 PROCEDURE — 74011000258 HC RX REV CODE- 258: Performed by: NURSE PRACTITIONER

## 2020-02-11 PROCEDURE — 36415 COLL VENOUS BLD VENIPUNCTURE: CPT

## 2020-02-11 PROCEDURE — 84443 ASSAY THYROID STIM HORMONE: CPT

## 2020-02-11 PROCEDURE — 80053 COMPREHEN METABOLIC PANEL: CPT

## 2020-02-11 RX ORDER — SODIUM CHLORIDE 9 MG/ML
25 INJECTION, SOLUTION INTRAVENOUS CONTINUOUS
Status: DISPENSED | OUTPATIENT
Start: 2020-02-11 | End: 2020-02-11

## 2020-02-11 RX ORDER — HEPARIN 100 UNIT/ML
300-500 SYRINGE INTRAVENOUS AS NEEDED
Status: ACTIVE | OUTPATIENT
Start: 2020-02-11 | End: 2020-02-11

## 2020-02-11 RX ORDER — SODIUM CHLORIDE 9 MG/ML
10 INJECTION INTRAMUSCULAR; INTRAVENOUS; SUBCUTANEOUS AS NEEDED
Status: ACTIVE | OUTPATIENT
Start: 2020-02-11 | End: 2020-02-11

## 2020-02-11 RX ORDER — SODIUM CHLORIDE 0.9 % (FLUSH) 0.9 %
10 SYRINGE (ML) INJECTION AS NEEDED
Status: DISPENSED | OUTPATIENT
Start: 2020-02-11 | End: 2020-02-11

## 2020-02-11 RX ADMIN — SODIUM CHLORIDE 10 ML: 9 INJECTION INTRAMUSCULAR; INTRAVENOUS; SUBCUTANEOUS at 13:30

## 2020-02-11 RX ADMIN — SODIUM CHLORIDE 810 MG: 9 INJECTION, SOLUTION INTRAVENOUS at 12:29

## 2020-02-11 RX ADMIN — Medication 500 UNITS: at 13:30

## 2020-02-11 RX ADMIN — SODIUM CHLORIDE 25 ML/HR: 900 INJECTION, SOLUTION INTRAVENOUS at 12:20

## 2020-02-11 RX ADMIN — SODIUM CHLORIDE 10 ML: 9 INJECTION INTRAMUSCULAR; INTRAVENOUS; SUBCUTANEOUS at 13:29

## 2020-02-11 NOTE — PROGRESS NOTES
Lavonnelaurita Boles is a 38 y.o. male follow up for lung cancer. 1. Have you been to the ER, urgent care clinic since your last visit? Hospitalized since your last visit?no     2. Have you seen or consulted any other health care providers outside of the [address] [place] [address] since your last visit? Include any pap smears or colon screening.

## 2020-02-11 NOTE — PROGRESS NOTES
[facility] Progress Note    Date: 2020    Name: Haydee Barry    MRN: [id number]         : 1975    Mr. Bell Kessler Arrived ambulatory and in no distress for C13D1 of Imfinzi Regimen. Assessment was completed, no acute issues at this time, no new complaints voiced. Double lumen PICC to right thigh with positive blood return. Chemotherapy Flowsheet 2020   Cycle C13D1   Date 2020   Drug / Regimen Imfinzi   Pre Meds -   Notes -       3152 Patient proceed to appointment with Dr. Jonny Cohen. Mr. Fritz's vitals were reviewed. Visit Vitals  /85   Pulse 67   Temp 98.1 °F (36.7 °C)   Ht 5' 9\" (1.753 m)   Wt 83.6 kg (184 lb 6.4 oz)   SpO2 97%   BMI 27.23 kg/m²       Lab results were obtained and reviewed. Recent Results (from the past 12 hour(s))   CBC WITH AUTOMATED DIFF    Collection Time: 20 10:03 AM   Result Value Ref Range    WBC 3.3 (L) 4.1 - 11.1 K/uL    RBC 5.40 4. 10 - 5.70 M/uL    HGB 12.7 12.1 - 17.0 g/dL    HCT 38.7 36.6 - 50.3 %    MCV 71.7 (L) 80.0 - 99.0 FL    MCH 23.5 (L) 26.0 - 34.0 PG    MCHC 32.8 30.0 - 36.5 g/dL    RDW 15.5 (H) 11.5 - 14.5 %    PLATELET 682 (L) 815 - 400 K/uL    MPV 9.4 8.9 - 12.9 FL    NRBC 0.0 0  WBC    ABSOLUTE NRBC 0.00 0.00 - 0.01 K/uL    NEUTROPHILS PENDING %    LYMPHOCYTES PENDING %    MONOCYTES PENDING %    EOSINOPHILS PENDING %    BASOPHILS PENDING %    IMMATURE GRANULOCYTES PENDING %    ABS. NEUTROPHILS PENDING K/UL    ABS. LYMPHOCYTES PENDING K/UL    ABS. MONOCYTES PENDING K/UL    ABS. EOSINOPHILS PENDING K/UL    ABS. BASOPHILS PENDING K/UL    ABS. IMM. GRANS.  PENDING K/UL    DF PENDING        Medications:  Medications Administered     0.9% sodium chloride infusion     Admin Date  2020 Action  New Bag Dose  25 mL/hr Rate  25 mL/hr Route  IntraVENous Administered By  Mary Kay Ruth RN          0.9% sodium chloride injection 10 mL     Admin Date  2020 Action  Given Dose  10 mL Route  IntraVENous Administered By  Antony Gonzalez RN           Admin Date  02/11/2020 Action  Given Dose  10 mL Route  IntraVENous Administered By  Antony Gonzalez RN          durvalumab (IMFINZI) 810 mg in 0.9% sodium chloride 100 mL, overfill volume 10 mL IVPB     Admin Date  02/11/2020 Action  New Bag Dose  810 mg Rate  126.2 mL/hr Route  IntraVENous Administered By  Antony Gonzalez RN          heparin (porcine) pf 300-500 Units     Admin Date  02/11/2020 Action  Given Dose  500 Units Route  InterCATHeter Administered By  Antony Gonzalez RN                  Mr. Kathryn Thakur tolerated treatment well and was discharged from [place] [address] in stable condition. Port de-accessed, flushed & heparinized per protocol. He is to return on 2/26/20 for his next appointment.     Forrest Bird RN  February 11, 2020

## 2020-02-14 ENCOUNTER — HOSPITAL ENCOUNTER (OUTPATIENT)
Dept: GENERAL RADIOLOGY | Age: 45
Discharge: HOME | End: 2020-02-14
Attending: RADIOLOGY
Payer: COMMERCIAL

## 2020-02-14 ENCOUNTER — HOSPITAL ENCOUNTER (OUTPATIENT)
Dept: ULTRASOUND IMAGING | Age: 45
Discharge: HOME | End: 2020-02-14
Attending: NURSE PRACTITIONER
Payer: COMMERCIAL

## 2020-02-14 VITALS
WEIGHT: 184 LBS | SYSTOLIC BLOOD PRESSURE: 134 MMHG | OXYGEN SATURATION: 100 % | HEIGHT: 69 IN | BODY MASS INDEX: 27.25 KG/M2 | DIASTOLIC BLOOD PRESSURE: 81 MMHG | RESPIRATION RATE: 19 BRPM | HEART RATE: 69 BPM

## 2020-02-14 DIAGNOSIS — C34.11 MALIGNANT NEOPLASM OF UPPER LOBE OF RIGHT LUNG (HCC): Chronic | ICD-10-CM

## 2020-02-14 DIAGNOSIS — J90 RECURRENT RIGHT PLEURAL EFFUSION: ICD-10-CM

## 2020-02-14 LAB
COMMENT, HOLDF: NORMAL
SAMPLES BEING HELD,HOLD: NORMAL

## 2020-02-14 PROCEDURE — 88112 CYTOPATH CELL ENHANCE TECH: CPT

## 2020-02-14 PROCEDURE — 88305 TISSUE EXAM BY PATHOLOGIST: CPT

## 2020-02-14 PROCEDURE — 71045 X-RAY EXAM CHEST 1 VIEW: CPT

## 2020-02-14 PROCEDURE — 32555 ASPIRATE PLEURA W/ IMAGING: CPT

## 2020-02-14 PROCEDURE — 74011000250 HC RX REV CODE- 250: Performed by: RADIOLOGY

## 2020-02-14 RX ORDER — LIDOCAINE HYDROCHLORIDE 10 MG/ML
10 INJECTION, SOLUTION EPIDURAL; INFILTRATION; INTRACAUDAL; PERINEURAL
Status: COMPLETED | OUTPATIENT
Start: 2020-02-14 | End: 2020-02-14

## 2020-02-14 RX ADMIN — LIDOCAINE HYDROCHLORIDE 10 ML: 10 INJECTION, SOLUTION EPIDURAL; INFILTRATION; INTRACAUDAL; PERINEURAL at 08:50

## 2020-02-14 NOTE — PROGRESS NOTES
Pt received to [address] ambulatory. Confirmed NKA. Vitals stable. Dr Kirstie Benavides for consent. Timeout at [phone], start time 0839. Stop time [id number] 90. Pt tolerated well, total of 1220 cc clear yellow fluid removed from R posterior back. Clean dressing applied. Post op xray ordered. 910--Xray complete and pt is OK for discharge, reviewed instructions. Pt's ride not available yet, so brought back to holding area to wait for his ride.

## 2020-02-14 NOTE — DISCHARGE INSTRUCTIONS
Patient Education        Thoracentesis: What to Expect at Home  Your Recovery  Thoracentesis (say \"jurn-ji-eie-SARAY-sis\") is a procedure to remove fluid from the space between the lungs and the chest wall (pleural space). This procedure may also be called a \"chest tap. \" It is normal to have a small amount of fluid in the pleural space. But too much fluid can build up because of problems such as infection, heart failure, or lung cancer. The procedure may have been done to help with shortness of breath and pain caused by the fluid buildup. Or you may have had this procedure so the doctor could test the fluid to find the cause of the buildup. Your chest may be sore where the doctor put the needle or catheter into your skin (the puncture site). This usually gets better after a day or two. You can go back to work or your normal activities as soon as you feel up to it. If the doctor sent the fluid to a lab for testing, it may take several days to get the results. The doctor or nurse will discuss the results with you. This care sheet gives you a general idea about how long it will take for you to recover. But each person recovers at a different pace. Follow the steps below to feel better as quickly as possible. How can you care for yourself at home? Activity    · Rest when you feel tired. Getting enough sleep will help you recover.     · Avoid strenuous activities, such as bicycle riding, jogging, weight lifting, or aerobic exercise, until your doctor says it is okay.     · You may shower. Do not take a bath until the puncture site has healed, or until your doctor tells you it is okay.     · Ask your doctor when you can drive again.     · You may need to take 1 or 2 days off from work. It depends on the type of work you do and how you feel. Diet    · You can eat your normal diet.     · Drink plenty of fluids (unless your doctor tells you not to).    Medicines    · Your doctor will tell you if and when you can restart your medicines. He or she will also give you instructions about taking any new medicines.     · If you take blood thinners, such as warfarin (Coumadin), clopidogrel (Plavix), or aspirin, be sure to talk to your doctor. He or she will tell you if and when to start taking those medicines again. Make sure that you understand exactly what your doctor wants you to do.     · Be safe with medicines. Take pain medicines exactly as directed. ? If the doctor gave you a prescription medicine for pain, take it as prescribed. ? If you are not taking a prescription pain medicine, ask your doctor if you can take an over-the-counter medicine. ? Do not take two or more pain medicines at the same time unless the doctor told you to. Many pain medicines have acetaminophen, which is Tylenol. Too much acetaminophen (Tylenol) can be harmful.     · If you think your pain medicine is making you sick to your stomach:  ? Take your medicine after meals (unless your doctor has told you not to). ? Ask your doctor for a different pain medicine.     · If your doctor prescribed antibiotics, take them as directed. Do not stop taking them just because you feel better. You need to take the full course of antibiotics.    Care of the puncture site    · Wash the area daily with warm, soapy water, and pat it dry. Don't use hydrogen peroxide or alcohol, which may delay healing. You may cover the area with a gauze bandage if it weeps or rubs against clothing. Change the bandage every day.     · Keep the area clean and dry. Follow-up care is a key part of your treatment and safety. Be sure to make and go to all appointments, and call your doctor if you are having problems. It's also a good idea to know your test results and keep a list of the medicines you take. When should you call for help? Call 911 anytime you think you may need emergency care. For example, call if:    · You passed out (lost consciousness).     · You have severe trouble breathing.   · You have sudden chest pain and shortness of breath, or you cough up blood.    Call your doctor now or seek immediate medical care if:    · You have shortness of breath that is new or getting worse.     · You have new or worse pain in your chest, especially when you take a deep breath.     · You are sick to your stomach or cannot keep fluids down.     · You have a fever over 100°F.     · Bright red blood has soaked through the bandage over your puncture site.     · You have signs of infection, such as:  ? Increased pain, swelling, warmth, or redness. ? Red streaks leading from the puncture site. ? Pus draining from the puncture site. ? Swollen lymph nodes in your neck, armpits, or groin. ? A fever.     · You cough up a lot more mucus than normal, or your mucus changes color.    Watch closely for changes in your health, and be sure to contact your doctor if you have any problems. Where can you learn more? Go to http://jeanette-venus.info/. Enter P343 in the search box to learn more about \"Thoracentesis: What to Expect at Home. \"  Current as of: June 9, 2019  Content Version: 12.2  © 2692-3551 [employer]. Care instructions adapted under license by [employer] (which disclaims liability or warranty for this information). If you have questions about a medical condition or this instruction, always ask your healthcare professional. [address] any warranty or liability for your use of this information.

## 2020-02-20 ENCOUNTER — HOSPITAL ENCOUNTER (OUTPATIENT)
Dept: NUCLEAR MEDICINE | Age: 45
Discharge: HOME | End: 2020-02-20
Attending: NURSE PRACTITIONER
Payer: COMMERCIAL

## 2020-02-20 DIAGNOSIS — C34.11 MALIGNANT NEOPLASM OF UPPER LOBE OF RIGHT LUNG (HCC): Chronic | ICD-10-CM

## 2020-02-20 DIAGNOSIS — S22.050A COMPRESSION FRACTURE OF T6 VERTEBRA, INITIAL ENCOUNTER (HCC): ICD-10-CM

## 2020-02-20 PROCEDURE — 78306 BONE IMAGING WHOLE BODY: CPT

## 2020-02-20 PROCEDURE — A9503 TC99M MEDRONATE: HCPCS

## 2020-02-21 NOTE — PROGRESS NOTES
[facility] [place] [facility] [place]  [address] [facility], [address] [facility] [address] [place] S[place]: [phone]  F: [phone]      Reason for Visit:   Cydney Bass is a 38 y.o. male who is seen for follow up of non-small cell lung cancer. Treatment History:   · Diagnosed at [facility]  · Biopsy of right level 4 node: non small cell carcinoma, favored squamous cell carcinoma, insufficient sample for further testing  · Stage III Non-small cell lung cancer (Squamous cell)  · Definitive radiation with Dr. Boo Fan completed mid-July 2019  · Concurrent chemotherapy with carboplatin and paclitaxel by Dr. Fina Boyd, stopped during second infusion due to reaction to paclitaxel  · Concurrent chemotherapy with cisplatin and etoposide 7/16/2019 by Dr. Fina Boyd  · CT Chest 7/27/2019: There has been no significant change in size of the large right paratracheal mediastinal mass. There are many new areas of peripheral consolidation in the right upper lobe, which may represent some combination of progressive disease, posttreatment change, or infection. Attention on follow-up is needed. A previously seen right upper lobe nodule in the posterior segment appears to have decreased in size. The large right pleural effusion on this exam is significantly increased in size from prior. · CT A/P 7/29/2019: no metastatic disease in abdomen or pelvis  · MRI Brain 7/29/2019: no intracranial metastatic disease  · EBUS by Dr. Dominguez Thaddeus 7/30/2019: Squamous cell, PDL1 QNS, insufficient tissue for molecular studies  · Thoracentesis 8/5/2019: cytology negative  · Initiated maintenance therapy with Durvalumab on 8/27/2019    History of Present Illness:   Presents for follow up on therapy. Feeling well. Breathing is better after thoracentesis. No SOB with activity. Mild cough continues, non productive. Cough all through the day and night. No fever or chills. Some fatigue, he is staying active. He is accompanied by his mother today.   He Pt calling because her hemorrhoid medication was not called in when she was here on Friday, it was sent to internal pharmacy.   lives about an hour [address] of here, mother staying with him to help care for him. Not currently working due to his illness, previously was a . He quit tobacco at diagnosis, though he was a light smoker (1-2 packs per week). PAST HISTORY: The following sections were reviewed and updated in the EMR as appropriate: PMH, SH, FH, Medications, Allergies. No Known Allergies     Review of Systems: A complete review of systems was obtained, reviewed, and scanned into the EMR. Pertinent findings reviewed above. Physical Exam:     Visit Vitals  /77 (BP 1 Location: Left arm, BP Patient Position: Sitting)   Pulse 98   Temp 98.3 °F (36.8 °C) (Temporal)   Resp 16   Ht 5' 9\" (1.753 m)   Wt 186 lb (84.4 kg)   SpO2 98%   BMI 27.47 kg/m²     ECOG PS: 1  General: No distress  Eyes: PERRLA, anicteric sclerae  HENT: Atraumatic, OP clear  Neck: Supple  Lymphatic: No cervical, supraclavicular, or inguinal adenopathy  Respiratory: CTAB, diminished right base, normal respiratory effort  CV: Normal rate, regular rhythm, no murmurs, no peripheral edema  GI: Soft, nontender, nondistended, no masses, no hepatomegaly, no splenomegaly  MS: Normal gait and station. Digits without clubbing or cyanosis. Skin: No rashes, ecchymoses, or petechiae. Normal temperature, turgor, and texture. Psych: Alert, oriented, appropriate affect, normal judgment/insight    Results:     Lab Results   Component Value Date/Time    WBC 3.6 (L) 02/25/2020 10:04 AM    HGB 12.3 02/25/2020 10:04 AM    HCT 37.4 02/25/2020 10:04 AM    PLATELET 337 (L) 54/72/8921 10:04 AM    MCV 71.5 (L) 02/25/2020 10:04 AM    ABS.  NEUTROPHILS 2.3 02/25/2020 10:04 AM     Lab Results   Component Value Date/Time    Sodium 139 02/25/2020 10:04 AM    Potassium 4.3 02/25/2020 10:04 AM    Chloride 112 (H) 02/25/2020 10:04 AM    CO2 23 02/25/2020 10:04 AM    Glucose 106 (H) 02/25/2020 10:04 AM    BUN 31 (H) 02/25/2020 10:04 AM    Creatinine 1.15 02/25/2020 10:04 AM GFR est AA >60 02/25/2020 10:04 AM    GFR est non-AA >60 02/25/2020 10:04 AM    Calcium 8.4 (L) 02/25/2020 10:04 AM     Lab Results   Component Value Date/Time    Bilirubin, total 0.2 02/25/2020 10:04 AM    ALT (SGPT) 24 02/25/2020 10:04 AM    AST (SGOT) 19 02/25/2020 10:04 AM    Alk. phosphatase 121 (H) 02/25/2020 10:04 AM    Protein, total 7.0 02/25/2020 10:04 AM    Albumin 3.6 02/25/2020 10:04 AM    Globulin 3.4 02/25/2020 10:04 AM     Lab Results   Component Value Date/Time    Reticulocyte count 1.7 08/27/2019 11:19 AM    Iron % saturation 44 08/27/2019 11:19 AM    TIBC 236 (L) 08/27/2019 11:19 AM    Ferritin 698 (H) 08/27/2019 11:19 AM    Vitamin B12 265 08/27/2019 11:19 AM    Folate 9.2 08/27/2019 11:19 AM    TSH 3.15 02/11/2020 10:03 AM    Lipase 136 07/27/2019 11:31 AM    Hep C  virus Ab Interp. NONREACTIVE 12/03/2019 11:22 AM     Lab Results   Component Value Date/Time    INR 1.0 12/03/2019 11:22 AM    aPTT 27.3 12/03/2019 11:22 AM    Fibrinogen 243 12/03/2019 11:22 AM         PET 8/21/2019:  1. Decreased size and activity of the mediastinal mass. 2. RUL nodular densities remain ametabolic. 3. No new finding. CT Chest 11/12/2019:  Stable exam by RECIST criteria. Increase in moderate right pleural effusion with medial right lower lobe airspace disease. Minimal T2, T4, and T6 superior endplate vertebral compression deformities are likely subacute/chronic. CT Chest 2/3/2020:  Discordant change. Decrease in size of right paratracheal node the increase in size of right upper lobe mass  Decrease in volume of right lung with increased right pleural effusion density  Occluded SVC with extensive collateralization as described  New T6 compression fracture    Bone scan 2/20/2020: There is minimal low-grade activity at T6 vertebral body. Remainder of the study is unremarkable.     Assessment:   1) Non-small cell lung cancer  Stage IIIB  He has at least Stage IIIB disease, and has been treated with concurrent radiation and chemotherapy (cisplatin and etoposide). Repeat PET demonstrates a partial response to therapy. No definite evidence of distant metastatic disease at this time. Pleural effusion cytology negative x3. He is currently on maintenance immunotherapy with Durvalumab. He is tolerating therapy well so far. CT obtained concerning for new T6 compression fracture, ordered bone scan to evaluate for disease. Reviewed results of this today, normal bone scan with only minimal activity at T6. No evidence of bony metastatic disease. Continue Durvalumab. We will move follow up to every 4 weeks office follow up. Reimage every 12 weeks. Unfortunately, his two biopsies have yielded scant material, insufficient for molecular studies or PDL1 testing. At progression, a repeat biopsy may be needed for NGS. 2) SVC syndrome  Monitor. Stable on imaging. 3) Pleural effusion  Cytology negative. Worsening on recent imaging, underwent repeat thoracentesis on 11/27/2019 and 2/14/2020 with negative cytology. 4) Poor IV access  With SVC syndrome, he cannot have a port or UE PICC. Currently has femoral PICC in place. This is working well. Home health for dressing changes. 5) Thrombocytopenia  Improved, but not resolved. Presumably secondary to his prior chemotherapy, but surprising that it is persisting. Additional lab work up 12/3/2019 unremarkable. Monitor for now. Could consider bone marrow biopsy if worsening significantly. 6) Anemia  Improving. No longer on oral iron therapy. No folate or Vit B12 def identified. 7) Neuropathy, chemotherapy induced  Continue Cymbalta 30mg daily. 8) Cough, non productive  Likely due to effusion. 9) weight gain  There is no sign of edema on exam.  Monitor thyroid studies closely. 10) Compression fracture  Bone scan negative. Consider kyphoplasty vs radiation therapy if symptoms develop.  Currently he is minimally symptomatic to site with mild pain/discomfort with elevation of right arm above head or twisting of upper torso. Will evaluate bone density with DEXA    11) Emotional Well Being  No psychosocial concerns identified today. Patient has adequate support. Plan:     · Proceed today with C14 Durvalumab (10mg/kg) given every 2 weeks for one year  · Labs: CBC, CMP prior to each cycle, TSH every 6 weeks  · Continue cymbalta 30mg PO daily  · Return to see us in 4 weeks    Patient was seen in conjunction with Claus Turner NP.     Signed By: Chloe Ríos MD

## 2020-02-25 ENCOUNTER — HOSPITAL ENCOUNTER (OUTPATIENT)
Dept: INFUSION THERAPY | Age: 45
Discharge: HOME | End: 2020-02-25
Payer: COMMERCIAL

## 2020-02-25 ENCOUNTER — OFFICE VISIT (OUTPATIENT)
Dept: ONCOLOGY | Age: 45
End: 2020-02-25

## 2020-02-25 VITALS
DIASTOLIC BLOOD PRESSURE: 69 MMHG | RESPIRATION RATE: 18 BRPM | BODY MASS INDEX: 27.58 KG/M2 | HEART RATE: 78 BPM | SYSTOLIC BLOOD PRESSURE: 109 MMHG | OXYGEN SATURATION: 100 % | WEIGHT: 186.2 LBS | TEMPERATURE: 96.6 F | HEIGHT: 69 IN

## 2020-02-25 VITALS
BODY MASS INDEX: 27.55 KG/M2 | OXYGEN SATURATION: 98 % | SYSTOLIC BLOOD PRESSURE: 116 MMHG | TEMPERATURE: 98.3 F | HEART RATE: 98 BPM | RESPIRATION RATE: 16 BRPM | WEIGHT: 186 LBS | HEIGHT: 69 IN | DIASTOLIC BLOOD PRESSURE: 77 MMHG

## 2020-02-25 DIAGNOSIS — C34.11 MALIGNANT NEOPLASM OF UPPER LOBE OF RIGHT LUNG (HCC): Primary | Chronic | ICD-10-CM

## 2020-02-25 DIAGNOSIS — C34.11 MALIGNANT NEOPLASM OF UPPER LOBE OF RIGHT LUNG (HCC): Primary | ICD-10-CM

## 2020-02-25 DIAGNOSIS — S22.050D COMPRESSION FRACTURE OF T6 VERTEBRA WITH ROUTINE HEALING, SUBSEQUENT ENCOUNTER: ICD-10-CM

## 2020-02-25 LAB
ALBUMIN SERPL-MCNC: 3.6 G/DL (ref 3.5–5)
ALBUMIN/GLOB SERPL: 1.1 {RATIO} (ref 1.1–2.2)
ALP SERPL-CCNC: 121 U/L (ref 45–117)
ALT SERPL-CCNC: 24 U/L (ref 12–78)
ANION GAP SERPL CALC-SCNC: 4 MMOL/L (ref 5–15)
AST SERPL-CCNC: 19 U/L (ref 15–37)
BASO+EOS+MONOS # BLD AUTO: 0.8 K/UL (ref 0.2–1.2)
BASO+EOS+MONOS NFR BLD AUTO: 21 % (ref 3.2–16.9)
BILIRUB SERPL-MCNC: 0.2 MG/DL (ref 0.2–1)
BUN SERPL-MCNC: 31 MG/DL (ref 6–20)
BUN/CREAT SERPL: 27 (ref 12–20)
CALCIUM SERPL-MCNC: 8.4 MG/DL (ref 8.5–10.1)
CHLORIDE SERPL-SCNC: 112 MMOL/L (ref 97–108)
CO2 SERPL-SCNC: 23 MMOL/L (ref 21–32)
CREAT SERPL-MCNC: 1.15 MG/DL (ref 0.7–1.3)
DIFFERENTIAL METHOD BLD: ABNORMAL
ERYTHROCYTE [DISTWIDTH] IN BLOOD BY AUTOMATED COUNT: 16.7 % (ref 11.8–15.8)
GLOBULIN SER CALC-MCNC: 3.4 G/DL (ref 2–4)
GLUCOSE SERPL-MCNC: 106 MG/DL (ref 65–100)
HCT VFR BLD AUTO: 37.4 % (ref 36.6–50.3)
HGB BLD-MCNC: 12.3 G/DL (ref 12.1–17)
LYMPHOCYTES # BLD: 0.5 K/UL (ref 0.8–3.5)
LYMPHOCYTES NFR BLD: 13 % (ref 12–49)
MCH RBC QN AUTO: 23.5 PG (ref 26–34)
MCHC RBC AUTO-ENTMCNC: 32.9 G/DL (ref 30–36.5)
MCV RBC AUTO: 71.5 FL (ref 80–99)
NEUTS SEG # BLD: 2.3 K/UL (ref 1.8–8)
NEUTS SEG NFR BLD: 66 % (ref 32–75)
PLATELET # BLD AUTO: 130 K/UL (ref 150–400)
POTASSIUM SERPL-SCNC: 4.3 MMOL/L (ref 3.5–5.1)
PROT SERPL-MCNC: 7 G/DL (ref 6.4–8.2)
RBC # BLD AUTO: 5.23 M/UL (ref 4.1–5.7)
SODIUM SERPL-SCNC: 139 MMOL/L (ref 136–145)
WBC # BLD AUTO: 3.6 K/UL (ref 4.1–11.1)

## 2020-02-25 PROCEDURE — 85025 COMPLETE CBC W/AUTO DIFF WBC: CPT

## 2020-02-25 PROCEDURE — 74011000258 HC RX REV CODE- 258: Performed by: NURSE PRACTITIONER

## 2020-02-25 PROCEDURE — 80053 COMPREHEN METABOLIC PANEL: CPT

## 2020-02-25 PROCEDURE — 36415 COLL VENOUS BLD VENIPUNCTURE: CPT

## 2020-02-25 PROCEDURE — 74011250636 HC RX REV CODE- 250/636: Performed by: NURSE PRACTITIONER

## 2020-02-25 PROCEDURE — 96413 CHEMO IV INFUSION 1 HR: CPT

## 2020-02-25 RX ORDER — HEPARIN 100 UNIT/ML
300-500 SYRINGE INTRAVENOUS AS NEEDED
Status: ACTIVE | OUTPATIENT
Start: 2020-02-25 | End: 2020-02-25

## 2020-02-25 RX ORDER — SODIUM CHLORIDE 0.9 % (FLUSH) 0.9 %
10 SYRINGE (ML) INJECTION AS NEEDED
Status: DISPENSED | OUTPATIENT
Start: 2020-02-25 | End: 2020-02-25

## 2020-02-25 RX ORDER — SODIUM CHLORIDE 9 MG/ML
25 INJECTION, SOLUTION INTRAVENOUS CONTINUOUS
Status: DISPENSED | OUTPATIENT
Start: 2020-02-25 | End: 2020-02-25

## 2020-02-25 RX ORDER — SODIUM CHLORIDE 9 MG/ML
10 INJECTION INTRAMUSCULAR; INTRAVENOUS; SUBCUTANEOUS AS NEEDED
Status: ACTIVE | OUTPATIENT
Start: 2020-02-25 | End: 2020-02-25

## 2020-02-25 RX ADMIN — SODIUM CHLORIDE 25 ML/HR: 900 INJECTION, SOLUTION INTRAVENOUS at 12:01

## 2020-02-25 RX ADMIN — SODIUM CHLORIDE 810 MG: 9 INJECTION, SOLUTION INTRAVENOUS at 12:04

## 2020-02-25 NOTE — PROGRESS NOTES
Lavonneadityarisara Boles is a 38 y.o. male follow up for lung cancer. 1. Have you been to the ER, urgent care clinic since your last visit? Hospitalized since your last visit?no     2. Have you seen or consulted any other health care providers outside of the [address] [place] [address] since your last visit? Include any pap smears or colon screening.  no

## 2020-02-25 NOTE — PROGRESS NOTES
[facility] Progress Note    Date: 2020    Name: Jb Ulrich    MRN: [id number]         : 1975    Mr. Alejandro Baron Arrived ambulatory and in no distress for C14D1 of Imfinzi Regimen. Assessment was completed, no acute issues at this time, no new complaints voiced. R thigh double lumen PICC utilized, labs drawn & sent for processing. Chemotherapy Flowsheet 2020   Cycle C14D1   Date 2020   Drug / Regimen imfinzi   Pre Meds -   Notes given        Patient proceed to appointment with Dr. Emilia Humphries. Mr. Fritz's vitals were reviewed. Visit Vitals  /69   Pulse 78   Temp 96.6 °F (35.9 °C)   Resp 18   Ht 5' 9\" (1.753 m)   Wt 84.5 kg (186 lb 3.2 oz)   SpO2 100%   BMI 27.50 kg/m²       Lab results were obtained and reviewed. Recent Results (from the past 12 hour(s))   CBC WITH 3 PART DIFF    Collection Time: 20 10:04 AM   Result Value Ref Range    WBC 3.6 (L) 4.1 - 11.1 K/uL    RBC 5.23 4. 10 - 5.70 M/uL    HGB 12.3 12.1 - 17.0 g/dL    HCT 37.4 36.6 - 50.3 %    MCV 71.5 (L) 80.0 - 99.0 FL    MCH 23.5 (L) 26.0 - 34.0 PG    MCHC 32.9 30.0 - 36.5 g/dL    RDW 16.7 (H) 11.8 - 15.8 %    PLATELET 627 (L) 039 - 400 K/uL    NEUTROPHILS 66 32 - 75 %    MIXED CELLS 21 (H) 3.2 - 16.9 %    LYMPHOCYTES 13 12 - 49 %    ABS. NEUTROPHILS 2.3 1.8 - 8.0 K/UL    ABS. MIXED CELLS 0.8 0.2 - 1.2 K/uL    ABS.  LYMPHOCYTES 0.5 (L) 0.8 - 3.5 K/UL    DF AUTOMATED     METABOLIC PANEL, COMPREHENSIVE    Collection Time: 20 10:04 AM   Result Value Ref Range    Sodium 139 136 - 145 mmol/L    Potassium 4.3 3.5 - 5.1 mmol/L    Chloride 112 (H) 97 - 108 mmol/L    CO2 23 21 - 32 mmol/L    Anion gap 4 (L) 5 - 15 mmol/L    Glucose 106 (H) 65 - 100 mg/dL    BUN 31 (H) 6 - 20 MG/DL    Creatinine 1.15 0.70 - 1.30 MG/DL    BUN/Creatinine ratio 27 (H) 12 - 20      GFR est AA >60 >60 ml/min/1.73m2    GFR est non-AA >60 >60 ml/min/1.73m2    Calcium 8.4 (L) 8.5 - 10.1 MG/DL    Bilirubin, total 0.2 0.2 - 1.0 MG/DL    ALT (SGPT) 24 12 - 78 U/L    AST (SGOT) 19 15 - 37 U/L    Alk. phosphatase 121 (H) 45 - 117 U/L    Protein, total 7.0 6.4 - 8.2 g/dL    Albumin 3.6 3.5 - 5.0 g/dL    Globulin 3.4 2.0 - 4.0 g/dL    A-G Ratio 1.1 1.1 - 2.2         Medications:  Medications Administered     0.9% sodium chloride infusion     Admin Date  02/25/2020 Action  New Bag Dose  25 mL/hr Rate  25 mL/hr Route  IntraVENous Administered By  Herman Herrera RN          durvalumab (IMFINZI) 810 mg in 0.9% sodium chloride 100 mL, overfill volume 10 mL IVPB     Admin Date  02/25/2020 Action  New Bag Dose  810 mg Rate  126.2 mL/hr Route  IntraVENous Administered By  Herman Herrera RN                  Mr. Bell Kessler tolerated treatment well and was discharged from [place] [address] in stable condition. PICC, flushed & heparinized per protocol. He is to return on March 10 at 1000 for his next appointment.     Alessandra Mccoy RN  February 25, 2020

## 2020-03-05 RX ORDER — ALBUTEROL SULFATE 0.83 MG/ML
2.5 SOLUTION RESPIRATORY (INHALATION) AS NEEDED
Status: CANCELLED
Start: 2020-04-07

## 2020-03-05 RX ORDER — ACETAMINOPHEN 325 MG/1
650 TABLET ORAL AS NEEDED
Status: CANCELLED
Start: 2020-04-07

## 2020-03-05 RX ORDER — SODIUM CHLORIDE 9 MG/ML
25 INJECTION, SOLUTION INTRAVENOUS CONTINUOUS
Status: CANCELLED | OUTPATIENT
Start: 2020-04-21

## 2020-03-05 RX ORDER — ALBUTEROL SULFATE 0.83 MG/ML
2.5 SOLUTION RESPIRATORY (INHALATION) AS NEEDED
Status: CANCELLED
Start: 2020-03-10

## 2020-03-05 RX ORDER — HEPARIN 100 UNIT/ML
300-500 SYRINGE INTRAVENOUS AS NEEDED
Status: CANCELLED
Start: 2020-04-07

## 2020-03-05 RX ORDER — EPINEPHRINE 1 MG/ML
0.3 INJECTION, SOLUTION, CONCENTRATE INTRAVENOUS AS NEEDED
Status: CANCELLED | OUTPATIENT
Start: 2020-04-21

## 2020-03-05 RX ORDER — DIPHENHYDRAMINE HYDROCHLORIDE 50 MG/ML
50 INJECTION, SOLUTION INTRAMUSCULAR; INTRAVENOUS AS NEEDED
Status: CANCELLED
Start: 2020-03-10

## 2020-03-05 RX ORDER — ACETAMINOPHEN 325 MG/1
650 TABLET ORAL AS NEEDED
Status: CANCELLED
Start: 2020-03-24

## 2020-03-05 RX ORDER — DIPHENHYDRAMINE HYDROCHLORIDE 50 MG/ML
50 INJECTION, SOLUTION INTRAMUSCULAR; INTRAVENOUS AS NEEDED
Status: CANCELLED
Start: 2020-04-07

## 2020-03-05 RX ORDER — DIPHENHYDRAMINE HYDROCHLORIDE 50 MG/ML
50 INJECTION, SOLUTION INTRAMUSCULAR; INTRAVENOUS AS NEEDED
Status: CANCELLED
Start: 2020-04-21

## 2020-03-05 RX ORDER — SODIUM CHLORIDE 9 MG/ML
25 INJECTION, SOLUTION INTRAVENOUS CONTINUOUS
Status: CANCELLED | OUTPATIENT
Start: 2020-03-10

## 2020-03-05 RX ORDER — ACETAMINOPHEN 325 MG/1
650 TABLET ORAL AS NEEDED
Status: CANCELLED
Start: 2020-04-21

## 2020-03-05 RX ORDER — ALBUTEROL SULFATE 0.83 MG/ML
2.5 SOLUTION RESPIRATORY (INHALATION) AS NEEDED
Status: CANCELLED
Start: 2020-04-21

## 2020-03-05 RX ORDER — ONDANSETRON 2 MG/ML
8 INJECTION INTRAMUSCULAR; INTRAVENOUS AS NEEDED
Status: CANCELLED | OUTPATIENT
Start: 2020-03-10

## 2020-03-05 RX ORDER — HYDROCORTISONE SODIUM SUCCINATE 100 MG/2ML
100 INJECTION, POWDER, FOR SOLUTION INTRAMUSCULAR; INTRAVENOUS AS NEEDED
Status: CANCELLED | OUTPATIENT
Start: 2020-03-10

## 2020-03-05 RX ORDER — SODIUM CHLORIDE 9 MG/ML
10 INJECTION INTRAMUSCULAR; INTRAVENOUS; SUBCUTANEOUS AS NEEDED
Status: CANCELLED | OUTPATIENT
Start: 2020-04-21

## 2020-03-05 RX ORDER — EPINEPHRINE 1 MG/ML
0.3 INJECTION, SOLUTION, CONCENTRATE INTRAVENOUS AS NEEDED
Status: CANCELLED | OUTPATIENT
Start: 2020-04-07

## 2020-03-05 RX ORDER — SODIUM CHLORIDE 0.9 % (FLUSH) 0.9 %
10 SYRINGE (ML) INJECTION AS NEEDED
Status: CANCELLED
Start: 2020-03-10

## 2020-03-05 RX ORDER — DIPHENHYDRAMINE HYDROCHLORIDE 50 MG/ML
50 INJECTION, SOLUTION INTRAMUSCULAR; INTRAVENOUS
Status: CANCELLED | OUTPATIENT
Start: 2020-03-10

## 2020-03-05 RX ORDER — HEPARIN 100 UNIT/ML
300-500 SYRINGE INTRAVENOUS AS NEEDED
Status: CANCELLED
Start: 2020-04-21

## 2020-03-05 RX ORDER — ACETAMINOPHEN 325 MG/1
650 TABLET ORAL
Status: CANCELLED | OUTPATIENT
Start: 2020-04-07

## 2020-03-05 RX ORDER — DIPHENHYDRAMINE HYDROCHLORIDE 50 MG/ML
50 INJECTION, SOLUTION INTRAMUSCULAR; INTRAVENOUS
Status: CANCELLED | OUTPATIENT
Start: 2020-04-21

## 2020-03-05 RX ORDER — ACETAMINOPHEN 325 MG/1
650 TABLET ORAL
Status: CANCELLED | OUTPATIENT
Start: 2020-03-10

## 2020-03-05 RX ORDER — EPINEPHRINE 1 MG/ML
0.3 INJECTION, SOLUTION, CONCENTRATE INTRAVENOUS AS NEEDED
Status: CANCELLED | OUTPATIENT
Start: 2020-03-24

## 2020-03-05 RX ORDER — SODIUM CHLORIDE 0.9 % (FLUSH) 0.9 %
10 SYRINGE (ML) INJECTION AS NEEDED
Status: CANCELLED
Start: 2020-04-07

## 2020-03-05 RX ORDER — SODIUM CHLORIDE 9 MG/ML
25 INJECTION, SOLUTION INTRAVENOUS CONTINUOUS
Status: CANCELLED | OUTPATIENT
Start: 2020-04-07

## 2020-03-05 RX ORDER — SODIUM CHLORIDE 9 MG/ML
25 INJECTION, SOLUTION INTRAVENOUS CONTINUOUS
Status: CANCELLED | OUTPATIENT
Start: 2020-03-24

## 2020-03-05 RX ORDER — ALBUTEROL SULFATE 0.83 MG/ML
2.5 SOLUTION RESPIRATORY (INHALATION) AS NEEDED
Status: CANCELLED
Start: 2020-03-24

## 2020-03-05 RX ORDER — ONDANSETRON 2 MG/ML
8 INJECTION INTRAMUSCULAR; INTRAVENOUS AS NEEDED
Status: CANCELLED | OUTPATIENT
Start: 2020-03-24

## 2020-03-05 RX ORDER — SODIUM CHLORIDE 0.9 % (FLUSH) 0.9 %
10 SYRINGE (ML) INJECTION AS NEEDED
Status: CANCELLED
Start: 2020-04-21

## 2020-03-05 RX ORDER — SODIUM CHLORIDE 9 MG/ML
10 INJECTION INTRAMUSCULAR; INTRAVENOUS; SUBCUTANEOUS AS NEEDED
Status: CANCELLED | OUTPATIENT
Start: 2020-03-10

## 2020-03-05 RX ORDER — DIPHENHYDRAMINE HYDROCHLORIDE 50 MG/ML
50 INJECTION, SOLUTION INTRAMUSCULAR; INTRAVENOUS AS NEEDED
Status: CANCELLED
Start: 2020-03-24

## 2020-03-05 RX ORDER — SODIUM CHLORIDE 0.9 % (FLUSH) 0.9 %
10 SYRINGE (ML) INJECTION AS NEEDED
Status: CANCELLED
Start: 2020-03-24

## 2020-03-05 RX ORDER — ONDANSETRON 2 MG/ML
8 INJECTION INTRAMUSCULAR; INTRAVENOUS AS NEEDED
Status: CANCELLED | OUTPATIENT
Start: 2020-04-07

## 2020-03-05 RX ORDER — HEPARIN 100 UNIT/ML
300-500 SYRINGE INTRAVENOUS AS NEEDED
Status: CANCELLED
Start: 2020-03-24

## 2020-03-05 RX ORDER — ACETAMINOPHEN 325 MG/1
650 TABLET ORAL AS NEEDED
Status: CANCELLED
Start: 2020-03-10

## 2020-03-05 RX ORDER — HYDROCORTISONE SODIUM SUCCINATE 100 MG/2ML
100 INJECTION, POWDER, FOR SOLUTION INTRAMUSCULAR; INTRAVENOUS AS NEEDED
Status: CANCELLED | OUTPATIENT
Start: 2020-03-24

## 2020-03-05 RX ORDER — HYDROCORTISONE SODIUM SUCCINATE 100 MG/2ML
100 INJECTION, POWDER, FOR SOLUTION INTRAMUSCULAR; INTRAVENOUS AS NEEDED
Status: CANCELLED | OUTPATIENT
Start: 2020-04-07

## 2020-03-05 RX ORDER — EPINEPHRINE 1 MG/ML
0.3 INJECTION, SOLUTION, CONCENTRATE INTRAVENOUS AS NEEDED
Status: CANCELLED | OUTPATIENT
Start: 2020-03-10

## 2020-03-05 RX ORDER — ACETAMINOPHEN 325 MG/1
650 TABLET ORAL
Status: CANCELLED | OUTPATIENT
Start: 2020-04-21

## 2020-03-05 RX ORDER — SODIUM CHLORIDE 9 MG/ML
10 INJECTION INTRAMUSCULAR; INTRAVENOUS; SUBCUTANEOUS AS NEEDED
Status: CANCELLED | OUTPATIENT
Start: 2020-03-24

## 2020-03-05 RX ORDER — ONDANSETRON 2 MG/ML
8 INJECTION INTRAMUSCULAR; INTRAVENOUS AS NEEDED
Status: CANCELLED | OUTPATIENT
Start: 2020-04-21

## 2020-03-05 RX ORDER — HYDROCORTISONE SODIUM SUCCINATE 100 MG/2ML
100 INJECTION, POWDER, FOR SOLUTION INTRAMUSCULAR; INTRAVENOUS AS NEEDED
Status: CANCELLED | OUTPATIENT
Start: 2020-04-21

## 2020-03-05 RX ORDER — SODIUM CHLORIDE 9 MG/ML
10 INJECTION INTRAMUSCULAR; INTRAVENOUS; SUBCUTANEOUS AS NEEDED
Status: CANCELLED | OUTPATIENT
Start: 2020-04-07

## 2020-03-05 RX ORDER — DIPHENHYDRAMINE HYDROCHLORIDE 50 MG/ML
50 INJECTION, SOLUTION INTRAMUSCULAR; INTRAVENOUS
Status: CANCELLED | OUTPATIENT
Start: 2020-04-07

## 2020-03-05 RX ORDER — HEPARIN 100 UNIT/ML
300-500 SYRINGE INTRAVENOUS AS NEEDED
Status: CANCELLED
Start: 2020-03-10

## 2020-03-10 ENCOUNTER — HOSPITAL ENCOUNTER (OUTPATIENT)
Dept: INFUSION THERAPY | Age: 45
Discharge: HOME | End: 2020-03-10
Payer: COMMERCIAL

## 2020-03-10 VITALS
HEART RATE: 65 BPM | TEMPERATURE: 98.5 F | SYSTOLIC BLOOD PRESSURE: 129 MMHG | HEIGHT: 69 IN | WEIGHT: 190.7 LBS | BODY MASS INDEX: 28.24 KG/M2 | RESPIRATION RATE: 16 BRPM | DIASTOLIC BLOOD PRESSURE: 90 MMHG

## 2020-03-10 DIAGNOSIS — C34.11 MALIGNANT NEOPLASM OF UPPER LOBE OF RIGHT LUNG (HCC): Primary | ICD-10-CM

## 2020-03-10 LAB
ALBUMIN SERPL-MCNC: 3.7 G/DL (ref 3.5–5)
ALBUMIN/GLOB SERPL: 1.2 {RATIO} (ref 1.1–2.2)
ALP SERPL-CCNC: 102 U/L (ref 45–117)
ALT SERPL-CCNC: 23 U/L (ref 12–78)
ANION GAP SERPL CALC-SCNC: 3 MMOL/L (ref 5–15)
AST SERPL-CCNC: 16 U/L (ref 15–37)
BASOPHILS # BLD: 0 K/UL (ref 0–0.1)
BASOPHILS NFR BLD: 1 % (ref 0–1)
BILIRUB SERPL-MCNC: 0.2 MG/DL (ref 0.2–1)
BUN SERPL-MCNC: 28 MG/DL (ref 6–20)
BUN/CREAT SERPL: 24 (ref 12–20)
CALCIUM SERPL-MCNC: 8.3 MG/DL (ref 8.5–10.1)
CHLORIDE SERPL-SCNC: 104 MMOL/L (ref 97–108)
CO2 SERPL-SCNC: 29 MMOL/L (ref 21–32)
CREAT SERPL-MCNC: 1.15 MG/DL (ref 0.7–1.3)
DIFFERENTIAL METHOD BLD: ABNORMAL
EOSINOPHIL # BLD: 0.1 K/UL (ref 0–0.4)
EOSINOPHIL NFR BLD: 4 % (ref 0–7)
ERYTHROCYTE [DISTWIDTH] IN BLOOD BY AUTOMATED COUNT: 15.6 % (ref 11.5–14.5)
GLOBULIN SER CALC-MCNC: 3.2 G/DL (ref 2–4)
GLUCOSE SERPL-MCNC: 102 MG/DL (ref 65–100)
HCT VFR BLD AUTO: 37.5 % (ref 36.6–50.3)
HGB BLD-MCNC: 12.2 G/DL (ref 12.1–17)
IMM GRANULOCYTES # BLD AUTO: 0 K/UL (ref 0–0.04)
IMM GRANULOCYTES NFR BLD AUTO: 1 % (ref 0–0.5)
LYMPHOCYTES # BLD: 0.4 K/UL (ref 0.8–3.5)
LYMPHOCYTES NFR BLD: 12 % (ref 12–49)
MCH RBC QN AUTO: 23.8 PG (ref 26–34)
MCHC RBC AUTO-ENTMCNC: 32.5 G/DL (ref 30–36.5)
MCV RBC AUTO: 73.2 FL (ref 80–99)
MONOCYTES # BLD: 0.4 K/UL (ref 0–1)
MONOCYTES NFR BLD: 14 % (ref 5–13)
NEUTS SEG # BLD: 2.3 K/UL (ref 1.8–8)
NEUTS SEG NFR BLD: 68 % (ref 32–75)
NRBC # BLD: 0 K/UL (ref 0–0.01)
NRBC BLD-RTO: 0 PER 100 WBC
PLATELET # BLD AUTO: 100 K/UL (ref 150–400)
POTASSIUM SERPL-SCNC: 4.2 MMOL/L (ref 3.5–5.1)
PROT SERPL-MCNC: 6.9 G/DL (ref 6.4–8.2)
RBC # BLD AUTO: 5.12 M/UL (ref 4.1–5.7)
RBC MORPH BLD: ABNORMAL
SODIUM SERPL-SCNC: 136 MMOL/L (ref 136–145)
WBC # BLD AUTO: 3.2 K/UL (ref 4.1–11.1)

## 2020-03-10 PROCEDURE — 36415 COLL VENOUS BLD VENIPUNCTURE: CPT

## 2020-03-10 PROCEDURE — 77030012965 HC NDL HUBR BBMI -A

## 2020-03-10 PROCEDURE — 74011000258 HC RX REV CODE- 258: Performed by: NURSE PRACTITIONER

## 2020-03-10 PROCEDURE — 96413 CHEMO IV INFUSION 1 HR: CPT

## 2020-03-10 PROCEDURE — 74011250636 HC RX REV CODE- 250/636: Performed by: NURSE PRACTITIONER

## 2020-03-10 PROCEDURE — 85025 COMPLETE CBC W/AUTO DIFF WBC: CPT

## 2020-03-10 PROCEDURE — 80053 COMPREHEN METABOLIC PANEL: CPT

## 2020-03-10 RX ORDER — SODIUM CHLORIDE 0.9 % (FLUSH) 0.9 %
10 SYRINGE (ML) INJECTION AS NEEDED
Status: DISPENSED | OUTPATIENT
Start: 2020-03-10 | End: 2020-03-10

## 2020-03-10 RX ORDER — HEPARIN 100 UNIT/ML
300-500 SYRINGE INTRAVENOUS AS NEEDED
Status: ACTIVE | OUTPATIENT
Start: 2020-03-10 | End: 2020-03-10

## 2020-03-10 RX ORDER — SODIUM CHLORIDE 9 MG/ML
10 INJECTION INTRAMUSCULAR; INTRAVENOUS; SUBCUTANEOUS AS NEEDED
Status: ACTIVE | OUTPATIENT
Start: 2020-03-10 | End: 2020-03-10

## 2020-03-10 RX ORDER — SODIUM CHLORIDE 9 MG/ML
25 INJECTION, SOLUTION INTRAVENOUS CONTINUOUS
Status: DISPENSED | OUTPATIENT
Start: 2020-03-10 | End: 2020-03-10

## 2020-03-10 RX ADMIN — Medication 10 ML: at 10:03

## 2020-03-10 RX ADMIN — Medication 500 UNITS: at 13:09

## 2020-03-10 RX ADMIN — SODIUM CHLORIDE 810 MG: 9 INJECTION, SOLUTION INTRAVENOUS at 11:58

## 2020-03-10 RX ADMIN — SODIUM CHLORIDE 25 ML/HR: 900 INJECTION, SOLUTION INTRAVENOUS at 11:54

## 2020-03-10 RX ADMIN — Medication 500 UNITS: at 13:08

## 2020-03-10 RX ADMIN — SODIUM CHLORIDE 10 ML: 9 INJECTION INTRAMUSCULAR; INTRAVENOUS; SUBCUTANEOUS at 13:08

## 2020-03-10 NOTE — PROGRESS NOTES
[facility] Progress Note    Date: March 10, 2020    Name: Jb Ulrich    MRN: [id number]         : 1975    Mr. Alejandro Baron Arrived ambulatory and in no distress for C15D1 of Imfinizi Regimen. Assessment was completed, no acute issues at this time, no new complaints voiced. PICC line accessed without difficulty, labs drawn & sent for processing. Mr. Fritz's vitals were reviewed. Visit Vitals  /90   Pulse 73   Temp 98.4 °F (36.9 °C)   Resp 16   Ht 5' 9\" (1.753 m)   Wt 86.5 kg (190 lb 11.2 oz)   BMI 28.16 kg/m²       Lab results were obtained and reviewed. Recent Results (from the past 12 hour(s))   METABOLIC PANEL, COMPREHENSIVE    Collection Time: 03/10/20 10:03 AM   Result Value Ref Range    Sodium 136 136 - 145 mmol/L    Potassium 4.2 3.5 - 5.1 mmol/L    Chloride 104 97 - 108 mmol/L    CO2 29 21 - 32 mmol/L    Anion gap 3 (L) 5 - 15 mmol/L    Glucose 102 (H) 65 - 100 mg/dL    BUN 28 (H) 6 - 20 MG/DL    Creatinine 1.15 0.70 - 1.30 MG/DL    BUN/Creatinine ratio 24 (H) 12 - 20      GFR est AA >60 >60 ml/min/1.73m2    GFR est non-AA >60 >60 ml/min/1.73m2    Calcium 8.3 (L) 8.5 - 10.1 MG/DL    Bilirubin, total 0.2 0.2 - 1.0 MG/DL    ALT (SGPT) 23 12 - 78 U/L    AST (SGOT) 16 15 - 37 U/L    Alk.  phosphatase 102 45 - 117 U/L    Protein, total 6.9 6.4 - 8.2 g/dL    Albumin 3.7 3.5 - 5.0 g/dL    Globulin 3.2 2.0 - 4.0 g/dL    A-G Ratio 1.2 1.1 - 2.2     CBC WITH AUTOMATED DIFF    Collection Time: 03/10/20 10:24 AM   Result Value Ref Range    WBC 3.2 (L) 4.1 - 11.1 K/uL    RBC 5.12 4.10 - 5.70 M/uL    HGB 12.2 12.1 - 17.0 g/dL    HCT 37.5 36.6 - 50.3 %    MCV 73.2 (L) 80.0 - 99.0 FL    MCH 23.8 (L) 26.0 - 34.0 PG    MCHC 32.5 30.0 - 36.5 g/dL    RDW 15.6 (H) 11.5 - 14.5 %    PLATELET 513 (L) 550 - 400 K/uL    NRBC 0.0 0  WBC    ABSOLUTE NRBC 0.00 0.00 - 0.01 K/uL    NEUTROPHILS 68 32 - 75 %    LYMPHOCYTES 12 12 - 49 %    MONOCYTES 14 (H) 5 - 13 %    EOSINOPHILS 4 0 - 7 %    BASOPHILS 1 0 - 1 % IMMATURE GRANULOCYTES 1 (H) 0.0 - 0.5 %    ABS. NEUTROPHILS 2.3 1.8 - 8.0 K/UL    ABS. LYMPHOCYTES 0.4 (L) 0.8 - 3.5 K/UL    ABS. MONOCYTES 0.4 0.0 - 1.0 K/UL    ABS. EOSINOPHILS 0.1 0.0 - 0.4 K/UL    ABS. BASOPHILS 0.0 0.0 - 0.1 K/UL    ABS. IMM. GRANS. 0.0 0.00 - 0.04 K/UL    DF SMEAR SCANNED      RBC COMMENTS NORMOCYTIC, NORMOCHROMIC         Medications:  Imfinizi IV    Mr. Sharlie Boas tolerated treatment well and was discharged from [place] [address] in stable condition. PICC line flushed & heparinized per protocol. He is to return on 3/24/20 at 10:00 for his next appointment.     Brenda Watson RN  March 10, 2020

## 2020-03-17 ENCOUNTER — HOSPITAL ENCOUNTER (OUTPATIENT)
Dept: MAMMOGRAPHY | Age: 45
Discharge: HOME | End: 2020-03-17
Attending: NURSE PRACTITIONER
Payer: COMMERCIAL

## 2020-03-17 DIAGNOSIS — S22.050D COMPRESSION FRACTURE OF T6 VERTEBRA WITH ROUTINE HEALING, SUBSEQUENT ENCOUNTER: ICD-10-CM

## 2020-03-17 DIAGNOSIS — C34.11 MALIGNANT NEOPLASM OF UPPER LOBE OF RIGHT LUNG (HCC): Chronic | ICD-10-CM

## 2020-03-17 PROCEDURE — 77080 DXA BONE DENSITY AXIAL: CPT

## 2020-03-23 NOTE — PROGRESS NOTES
[facility] [place] [facility] [place]  [address] [facility], [address] [facility] [[email or web address]] [address] [facility]  Dakota Betancur: [phone]  F: [phone]      Reason for Visit:   Harshal Rasheed is a 38 y.o. male who is seen for follow up of non-small cell lung cancer. Treatment History:   · Diagnosed at [facility]  · Biopsy of right level 4 node: non small cell carcinoma, favored squamous cell carcinoma, insufficient sample for further testing  · Stage III Non-small cell lung cancer (Squamous cell)  · Definitive radiation with Dr. Florentino Banda completed mid-July 2019  · Concurrent chemotherapy with carboplatin and paclitaxel by Dr. Josiane Gallego, stopped during second infusion due to reaction to paclitaxel  · Concurrent chemotherapy with cisplatin and etoposide 7/16/2019 by Dr. Josiane Gallego  · CT Chest 7/27/2019: There has been no significant change in size of the large right paratracheal mediastinal mass. There are many new areas of peripheral consolidation in the right upper lobe, which may represent some combination of progressive disease, posttreatment change, or infection. Attention on follow-up is needed. A previously seen right upper lobe nodule in the posterior segment appears to have decreased in size. The large right pleural effusion on this exam is significantly increased in size from prior. · CT A/P 7/29/2019: no metastatic disease in abdomen or pelvis  · MRI Brain 7/29/2019: no intracranial metastatic disease  · EBUS by Dr. Arnold Arcos 7/30/2019: Squamous cell, PDL1 QNS, insufficient tissue for molecular studies  · Thoracentesis 8/5/2019: cytology negative  · Initiated maintenance therapy with Durvalumab on 8/27/2019    History of Present Illness:   Presents for follow up on therapy. Cough is about the same. Doesn't feel that he coughs as much as he used to. No fever or chills. Breathing has been about the same. He is doing a lot of walking around the house. No nausea or vomiting.      He was taking the trash out yesterday and felt a pull in his chest. Pain only with moving right arm now. Feels like a stretch or a pull discomfort. Feels like a pulled muscle. No change to appetite. No nausea or vomiting. Back pain is \"the same\". Still feels it in the back with holding arm above his head. He is unaccompanied today. He quit tobacco at diagnosis, though he was a light smoker (1-2 packs per week). PAST HISTORY: The following sections were reviewed and updated in the EMR as appropriate: PMH, SH, FH, Medications, Allergies. No Known Allergies     Review of Systems: A complete review of systems was obtained, reviewed, and scanned into the EMR. Pertinent findings reviewed above. Physical Exam:     Visit Vitals  BP (!) 142/92 (BP 1 Location: Left arm, BP Patient Position: Sitting)   Pulse 63   Temp 95.6 °F (35.3 °C) (Oral)   Resp 16   Ht 5' 9\" (1.753 m)   Wt 189 lb (85.7 kg)   SpO2 98%   BMI 27.91 kg/m²     ECOG PS: 1  General: No distress  Eyes: PERRLA, anicteric sclerae  HENT: Atraumatic, OP clear  Neck: Supple  Lymphatic: No cervical, supraclavicular, or inguinal adenopathy  Respiratory: CTAB, diminished right base, normal respiratory effort  CV: Normal rate, regular rhythm, no murmurs, no peripheral edema  GI: Soft, nontender, nondistended, no masses, no hepatomegaly, no splenomegaly  MS: Normal gait and station. Digits without clubbing or cyanosis. Skin: No rashes, ecchymoses, or petechiae. Normal temperature, turgor, and texture. Psych: Alert, oriented, appropriate affect, normal judgment/insight    Results:     Lab Results   Component Value Date/Time    WBC 3.1 (L) 03/24/2020 10:02 AM    HGB 12.5 03/24/2020 10:02 AM    HCT 37.9 03/24/2020 10:02 AM    PLATELET 951 (L) 49/06/2144 10:02 AM    MCV 72.2 (L) 03/24/2020 10:02 AM    ABS.  NEUTROPHILS 2.1 03/24/2020 10:02 AM     Lab Results   Component Value Date/Time    Sodium 136 03/24/2020 10:02 AM    Potassium 4.2 03/24/2020 10:02 AM    Chloride 106 03/24/2020 10:02 AM CO2 27 03/24/2020 10:02 AM    Glucose 94 03/24/2020 10:02 AM    BUN 19 03/24/2020 10:02 AM    Creatinine 1.02 03/24/2020 10:02 AM    GFR est AA >60 03/24/2020 10:02 AM    GFR est non-AA >60 03/24/2020 10:02 AM    Calcium 8.7 03/24/2020 10:02 AM     Lab Results   Component Value Date/Time    Bilirubin, total 0.4 03/24/2020 10:02 AM    ALT (SGPT) 26 03/24/2020 10:02 AM    AST (SGOT) 21 03/24/2020 10:02 AM    Alk. phosphatase 102 03/24/2020 10:02 AM    Protein, total 7.0 03/24/2020 10:02 AM    Albumin 3.5 03/24/2020 10:02 AM    Globulin 3.5 03/24/2020 10:02 AM     Lab Results   Component Value Date/Time    Reticulocyte count 1.7 08/27/2019 11:19 AM    Iron % saturation 44 08/27/2019 11:19 AM    TIBC 236 (L) 08/27/2019 11:19 AM    Ferritin 698 (H) 08/27/2019 11:19 AM    Vitamin B12 265 08/27/2019 11:19 AM    Folate 9.2 08/27/2019 11:19 AM    TSH 2.02 03/24/2020 10:02 AM    Lipase 136 07/27/2019 11:31 AM    Hep C  virus Ab Interp. NONREACTIVE 12/03/2019 11:22 AM     Lab Results   Component Value Date/Time    INR 1.0 12/03/2019 11:22 AM    aPTT 27.3 12/03/2019 11:22 AM    Fibrinogen 243 12/03/2019 11:22 AM         PET 8/21/2019:  1. Decreased size and activity of the mediastinal mass. 2. RUL nodular densities remain ametabolic. 3. No new finding. CT Chest 11/12/2019:  Stable exam by RECIST criteria. Increase in moderate right pleural effusion with medial right lower lobe airspace disease. Minimal T2, T4, and T6 superior endplate vertebral compression deformities are likely subacute/chronic. CT Chest 2/3/2020:  Discordant change. Decrease in size of right paratracheal node the increase in size of right upper lobe mass  Decrease in volume of right lung with increased right pleural effusion density  Occluded SVC with extensive collateralization as described  New T6 compression fracture    Bone scan 2/20/2020: There is minimal low-grade activity at T6 vertebral body.  Remainder of the study is unremarkable. Assessment:   1) Non-small cell lung cancer  Stage IIIB  He has at least Stage IIIB disease, and has been treated with concurrent radiation and chemotherapy (cisplatin and etoposide). Repeat PET demonstrates a partial response to therapy. No definite evidence of distant metastatic disease at this time. Pleural effusion cytology negative x3. He is currently on maintenance immunotherapy with Durvalumab. He is tolerating therapy well so far with minimal toxicity noted. Continue Durvalumab. We will move follow up to every 4 weeks office follow up. Reimage every 12 weeks. Unfortunately, his two biopsies have yielded scant material, insufficient for molecular studies or PDL1 testing. At progression, a repeat biopsy may be needed for NGS. 2) SVC syndrome  Monitor. Stable on imaging. 3) Pleural effusion  Cytology negative. Worsening on recent imaging, underwent repeat thoracentesis on 11/27/2019 and 2/14/2020 with negative cytology. 4) Poor IV access  With SVC syndrome, he cannot have a port or UE PICC. Currently has femoral PICC in place. This is working well. Home health for dressing changes. 5) Thrombocytopenia  Improved, but not resolved. Presumably secondary to his prior chemotherapy, but surprising that it is persisting. Additional lab work up 12/3/2019 unremarkable. Monitor for now. Could consider bone marrow biopsy if worsening significantly. 6) Anemia  Improving. No longer on oral iron therapy. No folate or Vit B12 def identified. 7) Neuropathy, chemotherapy induced  Continue Cymbalta 30mg daily. 8) Cough, non productive  Likely due to effusion. 9) weight gain  There is no sign of edema on exam.  Monitor thyroid studies closely. 10) Compression fracture  Bone scan negative. Consider kyphoplasty vs radiation therapy if symptoms develop.  Currently he is minimally symptomatic to site with mild pain/discomfort with elevation of right arm above head or twisting of upper torso. DEXA showing osteopenia 3/17/2020. Recommend Calcium and Vit D replacement and weight bearing exercise as tolerated. 11) Emotional Well Being  No psychosocial concerns identified today. Patient has adequate support. Plan:     · Proceed today with C16 Durvalumab (10mg/kg) given every 2 weeks for one year  · Labs: CBC, CMP prior to each cycle, TSH every 6 weeks  · Continue cymbalta 30mg PO daily  · Return to see us in 4 weeks    Patient was seen in conjunction with Severo Pipe, NP.     Signed By: Trice Rendon MD

## 2020-03-24 ENCOUNTER — OFFICE VISIT (OUTPATIENT)
Dept: ONCOLOGY | Age: 45
End: 2020-03-24

## 2020-03-24 ENCOUNTER — HOSPITAL ENCOUNTER (OUTPATIENT)
Dept: INFUSION THERAPY | Age: 45
Discharge: HOME | End: 2020-03-24
Payer: COMMERCIAL

## 2020-03-24 VITALS
RESPIRATION RATE: 16 BRPM | TEMPERATURE: 95.6 F | OXYGEN SATURATION: 98 % | HEART RATE: 63 BPM | BODY MASS INDEX: 27.99 KG/M2 | DIASTOLIC BLOOD PRESSURE: 92 MMHG | SYSTOLIC BLOOD PRESSURE: 142 MMHG | WEIGHT: 189 LBS | HEIGHT: 69 IN

## 2020-03-24 VITALS
DIASTOLIC BLOOD PRESSURE: 80 MMHG | OXYGEN SATURATION: 100 % | TEMPERATURE: 96.7 F | BODY MASS INDEX: 28.01 KG/M2 | HEART RATE: 58 BPM | SYSTOLIC BLOOD PRESSURE: 119 MMHG | RESPIRATION RATE: 18 BRPM | WEIGHT: 189.7 LBS

## 2020-03-24 DIAGNOSIS — C34.11 MALIGNANT NEOPLASM OF UPPER LOBE OF RIGHT LUNG (HCC): Primary | Chronic | ICD-10-CM

## 2020-03-24 DIAGNOSIS — D61.818 PANCYTOPENIA (HCC): ICD-10-CM

## 2020-03-24 DIAGNOSIS — C34.11 MALIGNANT NEOPLASM OF UPPER LOBE OF RIGHT LUNG (HCC): Primary | ICD-10-CM

## 2020-03-24 LAB
ALBUMIN SERPL-MCNC: 3.5 G/DL (ref 3.5–5)
ALBUMIN/GLOB SERPL: 1 {RATIO} (ref 1.1–2.2)
ALP SERPL-CCNC: 102 U/L (ref 45–117)
ALT SERPL-CCNC: 26 U/L (ref 12–78)
ANION GAP SERPL CALC-SCNC: 3 MMOL/L (ref 5–15)
AST SERPL-CCNC: 21 U/L (ref 15–37)
BASO+EOS+MONOS # BLD AUTO: 0.5 K/UL (ref 0.2–1.2)
BASO+EOS+MONOS NFR BLD AUTO: 18 % (ref 3.2–16.9)
BILIRUB SERPL-MCNC: 0.4 MG/DL (ref 0.2–1)
BUN SERPL-MCNC: 19 MG/DL (ref 6–20)
BUN/CREAT SERPL: 19 (ref 12–20)
CALCIUM SERPL-MCNC: 8.7 MG/DL (ref 8.5–10.1)
CHLORIDE SERPL-SCNC: 106 MMOL/L (ref 97–108)
CO2 SERPL-SCNC: 27 MMOL/L (ref 21–32)
CREAT SERPL-MCNC: 1.02 MG/DL (ref 0.7–1.3)
DIFFERENTIAL METHOD BLD: ABNORMAL
ERYTHROCYTE [DISTWIDTH] IN BLOOD BY AUTOMATED COUNT: 16.1 % (ref 11.8–15.8)
GLOBULIN SER CALC-MCNC: 3.5 G/DL (ref 2–4)
GLUCOSE SERPL-MCNC: 94 MG/DL (ref 65–100)
HCT VFR BLD AUTO: 37.9 % (ref 36.6–50.3)
HGB BLD-MCNC: 12.5 G/DL (ref 12.1–17)
LYMPHOCYTES # BLD: 0.5 K/UL (ref 0.8–3.5)
LYMPHOCYTES NFR BLD: 16 % (ref 12–49)
MCH RBC QN AUTO: 23.8 PG (ref 26–34)
MCHC RBC AUTO-ENTMCNC: 33 G/DL (ref 30–36.5)
MCV RBC AUTO: 72.2 FL (ref 80–99)
NEUTS SEG # BLD: 2.1 K/UL (ref 1.8–8)
NEUTS SEG NFR BLD: 66 % (ref 32–75)
PLATELET # BLD AUTO: 123 K/UL (ref 150–400)
POTASSIUM SERPL-SCNC: 4.2 MMOL/L (ref 3.5–5.1)
PROT SERPL-MCNC: 7 G/DL (ref 6.4–8.2)
RBC # BLD AUTO: 5.25 M/UL (ref 4.1–5.7)
SODIUM SERPL-SCNC: 136 MMOL/L (ref 136–145)
TSH SERPL DL<=0.05 MIU/L-ACNC: 2.02 UIU/ML (ref 0.36–3.74)
WBC # BLD AUTO: 3.1 K/UL (ref 4.1–11.1)

## 2020-03-24 PROCEDURE — 74011250636 HC RX REV CODE- 250/636: Performed by: NURSE PRACTITIONER

## 2020-03-24 PROCEDURE — 80053 COMPREHEN METABOLIC PANEL: CPT

## 2020-03-24 PROCEDURE — 36415 COLL VENOUS BLD VENIPUNCTURE: CPT

## 2020-03-24 PROCEDURE — 85025 COMPLETE CBC W/AUTO DIFF WBC: CPT

## 2020-03-24 PROCEDURE — 74011000258 HC RX REV CODE- 258: Performed by: NURSE PRACTITIONER

## 2020-03-24 PROCEDURE — 96413 CHEMO IV INFUSION 1 HR: CPT

## 2020-03-24 PROCEDURE — 84443 ASSAY THYROID STIM HORMONE: CPT

## 2020-03-24 RX ORDER — SODIUM CHLORIDE 9 MG/ML
10 INJECTION INTRAMUSCULAR; INTRAVENOUS; SUBCUTANEOUS AS NEEDED
Status: ACTIVE | OUTPATIENT
Start: 2020-03-24 | End: 2020-03-24

## 2020-03-24 RX ORDER — SODIUM CHLORIDE 9 MG/ML
25 INJECTION, SOLUTION INTRAVENOUS CONTINUOUS
Status: DISPENSED | OUTPATIENT
Start: 2020-03-24 | End: 2020-03-24

## 2020-03-24 RX ORDER — HEPARIN 100 UNIT/ML
300-500 SYRINGE INTRAVENOUS AS NEEDED
Status: ACTIVE | OUTPATIENT
Start: 2020-03-24 | End: 2020-03-24

## 2020-03-24 RX ORDER — SODIUM CHLORIDE 0.9 % (FLUSH) 0.9 %
10 SYRINGE (ML) INJECTION AS NEEDED
Status: DISPENSED | OUTPATIENT
Start: 2020-03-24 | End: 2020-03-24

## 2020-03-24 RX ADMIN — Medication 10 ML: at 13:02

## 2020-03-24 RX ADMIN — Medication 500 UNITS: at 13:02

## 2020-03-24 RX ADMIN — SODIUM CHLORIDE 810 MG: 9 INJECTION, SOLUTION INTRAVENOUS at 12:00

## 2020-03-24 RX ADMIN — SODIUM CHLORIDE 25 ML/HR: 900 INJECTION, SOLUTION INTRAVENOUS at 11:55

## 2020-03-24 NOTE — PATIENT INSTRUCTIONS
You should take Vitamin D and Calcium in two or three doses a day. The total Vitamin D dose should be between 800 and 1,000 IU per day. The total Calcium dose should be between 1,200 and 1,500 mg per day. *    Vitamin D and Calcium are best absorbed when divided into more than one dose a day and with a meal.        *Over the counter forms of Vitamin D and Calcium include the following:    Citracal+D® or Os-Raf + Extra D®, Caltrate Select®, or Caltrate Plus ®. Os-Raf and Caltrate also have chewable forms.

## 2020-03-24 NOTE — PROGRESS NOTES
[facility] Progress Note    Date: 2020    Name: Shannon Jones    MRN: [id number]         : 1975    Mr. Kev Brooks Arrived ambulatory and in no distress for C16D1 of Imfinzi Regimen. Assessment was completed, no acute issues at this time, no new complaints voiced. R PICC line accessed without difficulty, labs drawn & sent for processing. Chemotherapy Flowsheet 3/24/2020   Cycle C16D1   Date 3/24/2020   Drug / Regimen Imfinzi   Pre Meds -   Notes given        Patient proceed to appointment with Dr. Daisy Lopez. Mr. Fritz's vitals were reviewed. Visit Vitals  /80   Pulse (!) 58   Temp 96.7 °F (35.9 °C)   Resp 18   Wt 86 kg (189 lb 11.2 oz)   SpO2 100%   BMI 28.01 kg/m²       Lab results were obtained and reviewed. Recent Results (from the past 12 hour(s))   CBC WITH 3 PART DIFF    Collection Time: 20 10:02 AM   Result Value Ref Range    WBC 3.1 (L) 4.1 - 11.1 K/uL    RBC 5.25 4. 10 - 5.70 M/uL    HGB 12.5 12.1 - 17.0 g/dL    HCT 37.9 36.6 - 50.3 %    MCV 72.2 (L) 80.0 - 99.0 FL    MCH 23.8 (L) 26.0 - 34.0 PG    MCHC 33.0 30.0 - 36.5 g/dL    RDW 16.1 (H) 11.8 - 15.8 %    PLATELET 364 (L) 962 - 400 K/uL    NEUTROPHILS 66 32 - 75 %    MIXED CELLS 18 (H) 3.2 - 16.9 %    LYMPHOCYTES 16 12 - 49 %    ABS. NEUTROPHILS 2.1 1.8 - 8.0 K/UL    ABS. MIXED CELLS 0.5 0.2 - 1.2 K/uL    ABS.  LYMPHOCYTES 0.5 (L) 0.8 - 3.5 K/UL    DF AUTOMATED     METABOLIC PANEL, COMPREHENSIVE    Collection Time: 20 10:02 AM   Result Value Ref Range    Sodium 136 136 - 145 mmol/L    Potassium 4.2 3.5 - 5.1 mmol/L    Chloride 106 97 - 108 mmol/L    CO2 27 21 - 32 mmol/L    Anion gap 3 (L) 5 - 15 mmol/L    Glucose 94 65 - 100 mg/dL    BUN 19 6 - 20 MG/DL    Creatinine 1.02 0.70 - 1.30 MG/DL    BUN/Creatinine ratio 19 12 - 20      GFR est AA >60 >60 ml/min/1.73m2    GFR est non-AA >60 >60 ml/min/1.73m2    Calcium 8.7 8.5 - 10.1 MG/DL    Bilirubin, total 0.4 0.2 - 1.0 MG/DL    ALT (SGPT) 26 12 - 78 U/L    AST (SGOT) 21 15 - 37 U/L    Alk. phosphatase 102 45 - 117 U/L    Protein, total 7.0 6.4 - 8.2 g/dL    Albumin 3.5 3.5 - 5.0 g/dL    Globulin 3.5 2.0 - 4.0 g/dL    A-G Ratio 1.0 (L) 1.1 - 2.2     TSH 3RD GENERATION    Collection Time: 03/24/20 10:02 AM   Result Value Ref Range    TSH 2.02 0.36 - 3.74 uIU/mL       Medications:  Medications Administered     0.9% sodium chloride infusion     Admin Date  03/24/2020 Action  New Bag Dose  25 mL/hr Rate  25 mL/hr Route  IntraVENous Administered By  Marcus Friedman RN          durvalumab (IMFINZI) 810 mg in 0.9% sodium chloride 100 mL, overfill volume 10 mL IVPB     Admin Date  03/24/2020 Action  New Bag Dose  810 mg Rate  126.2 mL/hr Route  IntraVENous Administered By  Marcus Friedman RN          heparin (porcine) pf 300-500 Units     Admin Date  03/24/2020 Action  Given Dose  500 Units Route  InterCATHeter Administered By  Marcus Friedman RN          saline peripheral flush soln 10 mL     Admin Date  03/24/2020 Action  Given Dose  10 mL Route  InterCATHeter Administered By  Marcus Friedman RN                  Mr. Meenakshi Terry tolerated treatment well and was discharged from [place] [address] in stable condition at 1310. PICC line flushed & heparinized per protocol. He is to return on April 7 at 1000 for his next appointment.     Morgan Garcia RN  March 24, 2020

## 2020-03-24 NOTE — PROGRESS NOTES
Gino Rosario is a 38 y.o. male follow up for lung cancer. 1. Have you been to the ER, urgent care clinic since your last visit? Hospitalized since your last visit?no    2. Have you seen or consulted any other health care providers outside of the [address] [place] [address] since your last visit? Include any pap smears or colon screening.  no

## 2020-04-07 ENCOUNTER — HOSPITAL ENCOUNTER (OUTPATIENT)
Dept: INFUSION THERAPY | Age: 45
Discharge: HOME | End: 2020-04-07
Payer: COMMERCIAL

## 2020-04-07 VITALS
BODY MASS INDEX: 28.01 KG/M2 | HEIGHT: 69 IN | SYSTOLIC BLOOD PRESSURE: 122 MMHG | HEART RATE: 62 BPM | RESPIRATION RATE: 16 BRPM | TEMPERATURE: 97 F | DIASTOLIC BLOOD PRESSURE: 78 MMHG | WEIGHT: 189.1 LBS

## 2020-04-07 DIAGNOSIS — C34.11 MALIGNANT NEOPLASM OF UPPER LOBE OF RIGHT LUNG (HCC): Primary | ICD-10-CM

## 2020-04-07 LAB
ALBUMIN SERPL-MCNC: 3.6 G/DL (ref 3.5–5)
ALBUMIN/GLOB SERPL: 1 {RATIO} (ref 1.1–2.2)
ALP SERPL-CCNC: 108 U/L (ref 45–117)
ALT SERPL-CCNC: 24 U/L (ref 12–78)
ANION GAP SERPL CALC-SCNC: 3 MMOL/L (ref 5–15)
AST SERPL-CCNC: 21 U/L (ref 15–37)
BASOPHILS # BLD: 0 K/UL (ref 0–0.1)
BASOPHILS NFR BLD: 0 % (ref 0–1)
BILIRUB SERPL-MCNC: 0.3 MG/DL (ref 0.2–1)
BUN SERPL-MCNC: 26 MG/DL (ref 6–20)
BUN/CREAT SERPL: 22 (ref 12–20)
CALCIUM SERPL-MCNC: 8.6 MG/DL (ref 8.5–10.1)
CHLORIDE SERPL-SCNC: 106 MMOL/L (ref 97–108)
CO2 SERPL-SCNC: 28 MMOL/L (ref 21–32)
CREAT SERPL-MCNC: 1.16 MG/DL (ref 0.7–1.3)
DIFFERENTIAL METHOD BLD: ABNORMAL
EOSINOPHIL # BLD: 0.1 K/UL (ref 0–0.4)
EOSINOPHIL NFR BLD: 4 % (ref 0–7)
ERYTHROCYTE [DISTWIDTH] IN BLOOD BY AUTOMATED COUNT: 14.9 % (ref 11.5–14.5)
GLOBULIN SER CALC-MCNC: 3.6 G/DL (ref 2–4)
GLUCOSE SERPL-MCNC: 107 MG/DL (ref 65–100)
HCT VFR BLD AUTO: 38.3 % (ref 36.6–50.3)
HGB BLD-MCNC: 12.4 G/DL (ref 12.1–17)
IMM GRANULOCYTES # BLD AUTO: 0 K/UL (ref 0–0.04)
IMM GRANULOCYTES NFR BLD AUTO: 1 % (ref 0–0.5)
LYMPHOCYTES # BLD: 0.5 K/UL (ref 0.8–3.5)
LYMPHOCYTES NFR BLD: 15 % (ref 12–49)
MCH RBC QN AUTO: 23.8 PG (ref 26–34)
MCHC RBC AUTO-ENTMCNC: 32.4 G/DL (ref 30–36.5)
MCV RBC AUTO: 73.5 FL (ref 80–99)
MONOCYTES # BLD: 0.5 K/UL (ref 0–1)
MONOCYTES NFR BLD: 17 % (ref 5–13)
NEUTS SEG # BLD: 2 K/UL (ref 1.8–8)
NEUTS SEG NFR BLD: 63 % (ref 32–75)
NRBC # BLD: 0 K/UL (ref 0–0.01)
NRBC BLD-RTO: 0 PER 100 WBC
PLATELET # BLD AUTO: 111 K/UL (ref 150–400)
POTASSIUM SERPL-SCNC: 4.2 MMOL/L (ref 3.5–5.1)
PROT SERPL-MCNC: 7.2 G/DL (ref 6.4–8.2)
RBC # BLD AUTO: 5.21 M/UL (ref 4.1–5.7)
RBC MORPH BLD: ABNORMAL
SODIUM SERPL-SCNC: 137 MMOL/L (ref 136–145)
WBC # BLD AUTO: 3.1 K/UL (ref 4.1–11.1)

## 2020-04-07 PROCEDURE — 74011000258 HC RX REV CODE- 258: Performed by: NURSE PRACTITIONER

## 2020-04-07 PROCEDURE — 36415 COLL VENOUS BLD VENIPUNCTURE: CPT

## 2020-04-07 PROCEDURE — 96413 CHEMO IV INFUSION 1 HR: CPT

## 2020-04-07 PROCEDURE — 80053 COMPREHEN METABOLIC PANEL: CPT

## 2020-04-07 PROCEDURE — 74011250636 HC RX REV CODE- 250/636: Performed by: NURSE PRACTITIONER

## 2020-04-07 PROCEDURE — 85025 COMPLETE CBC W/AUTO DIFF WBC: CPT

## 2020-04-07 RX ORDER — SODIUM CHLORIDE 9 MG/ML
25 INJECTION, SOLUTION INTRAVENOUS CONTINUOUS
Status: DISPENSED | OUTPATIENT
Start: 2020-04-07 | End: 2020-04-07

## 2020-04-07 RX ORDER — HEPARIN 100 UNIT/ML
300-500 SYRINGE INTRAVENOUS AS NEEDED
Status: ACTIVE | OUTPATIENT
Start: 2020-04-07 | End: 2020-04-07

## 2020-04-07 RX ORDER — SODIUM CHLORIDE 0.9 % (FLUSH) 0.9 %
10 SYRINGE (ML) INJECTION AS NEEDED
Status: DISPENSED | OUTPATIENT
Start: 2020-04-07 | End: 2020-04-07

## 2020-04-07 RX ADMIN — SODIUM CHLORIDE 810 MG: 9 INJECTION, SOLUTION INTRAVENOUS at 12:24

## 2020-04-07 RX ADMIN — Medication 500 UNITS: at 13:33

## 2020-04-07 RX ADMIN — SODIUM CHLORIDE 25 ML/HR: 900 INJECTION, SOLUTION INTRAVENOUS at 12:16

## 2020-04-07 RX ADMIN — Medication 10 ML: at 13:33

## 2020-04-07 NOTE — PROGRESS NOTES
[facility] VISIT NOTE  Date: 2020    Name: Qi Humphries    MRN: [id number]         : 1975    0955  Mr. Angelita Carrington Arrived ambulatory and in no distress for C17D1 of Imfiniz Regimen. Assessment was completed, no acute issues at this time, no new complaints voiced. Right femoral double lumen PICC flushed with positive blood return, labs drawn and sent for processing. Chemotherapy Flowsheet 2020   Cycle C17D1   Date 2020   Drug / Regimen Imfinzi   Pre Meds -   Notes given       Vitals:  /78   Pulse 62   Temp 97 °F (36.1 °C)   Resp 16   Ht 5' 9\" (1.753 m)   Wt 85.8 kg (189 lb 1.6 oz)   BMI 27.93 kg/m²      Lab results were obtained and reviewed. Recent Results (from the past 12 hour(s))   METABOLIC PANEL, COMPREHENSIVE    Collection Time: 20 10:10 AM   Result Value Ref Range    Sodium 137 136 - 145 mmol/L    Potassium 4.2 3.5 - 5.1 mmol/L    Chloride 106 97 - 108 mmol/L    CO2 28 21 - 32 mmol/L    Anion gap 3 (L) 5 - 15 mmol/L    Glucose 107 (H) 65 - 100 mg/dL    BUN 26 (H) 6 - 20 MG/DL    Creatinine 1.16 0.70 - 1.30 MG/DL    BUN/Creatinine ratio 22 (H) 12 - 20      GFR est AA >60 >60 ml/min/1.73m2    GFR est non-AA >60 >60 ml/min/1.73m2    Calcium 8.6 8.5 - 10.1 MG/DL    Bilirubin, total 0.3 0.2 - 1.0 MG/DL    ALT (SGPT) 24 12 - 78 U/L    AST (SGOT) 21 15 - 37 U/L    Alk.  phosphatase 108 45 - 117 U/L    Protein, total 7.2 6.4 - 8.2 g/dL    Albumin 3.6 3.5 - 5.0 g/dL    Globulin 3.6 2.0 - 4.0 g/dL    A-G Ratio 1.0 (L) 1.1 - 2.2     CBC WITH AUTOMATED DIFF    Collection Time: 20 10:10 AM   Result Value Ref Range    WBC 3.1 (L) 4.1 - 11.1 K/uL    RBC 5.21 4.10 - 5.70 M/uL    HGB 12.4 12.1 - 17.0 g/dL    HCT 38.3 36.6 - 50.3 %    MCV 73.5 (L) 80.0 - 99.0 FL    MCH 23.8 (L) 26.0 - 34.0 PG    MCHC 32.4 30.0 - 36.5 g/dL    RDW 14.9 (H) 11.5 - 14.5 %    PLATELET 707 (L) 351 - 400 K/uL    NRBC 0.0 0  WBC    ABSOLUTE NRBC 0.00 0.00 - 0.01 K/uL    NEUTROPHILS 63 32 - 75 %    LYMPHOCYTES 15 12 - 49 %    MONOCYTES 17 (H) 5 - 13 %    EOSINOPHILS 4 0 - 7 %    BASOPHILS 0 0 - 1 %    IMMATURE GRANULOCYTES 1 (H) 0.0 - 0.5 %    ABS. NEUTROPHILS 2.0 1.8 - 8.0 K/UL    ABS. LYMPHOCYTES 0.5 (L) 0.8 - 3.5 K/UL    ABS. MONOCYTES 0.5 0.0 - 1.0 K/UL    ABS. EOSINOPHILS 0.1 0.0 - 0.4 K/UL    ABS. BASOPHILS 0.0 0.0 - 0.1 K/UL    ABS. IMM. GRANS. 0.0 0.00 - 0.04 K/UL    DF SMEAR SCANNED      RBC COMMENTS NORMOCYTIC, NORMOCHROMIC         Medications received:  Medications Administered     0.9% sodium chloride infusion     Admin Date  04/07/2020 Action  New Bag Dose  25 mL/hr Rate  25 mL/hr Route  IntraVENous Administered By  Bret Zacarias RN          durvalumab (IMFINZI) 810 mg in 0.9% sodium chloride 100 mL, overfill volume 10 mL IVPB     Admin Date  04/07/2020 Action  New Bag Dose  810 mg Rate  126.2 mL/hr Route  IntraVENous Administered By  Bret Zacarias RN          heparin (porcine) pf 300-500 Units     Admin Date  04/07/2020 Action  Given Dose  500 Units Route  InterCATHeter Administered By  Bret Zacarias RN          saline peripheral flush soln 10 mL     Admin Date  04/07/2020 Action  Given Dose  10 mL Route  InterCATHeter Administered By  Bret Zacarias RN                Mr. Dominic House tolerated treatment well and was discharged from [place] [address] in stable condition at 1340. PICC flushed & heparinized per protocol. He is to return on 4/21/2020 at 10:00 for his next appointment.     Seymour Nielsen RN  April 7, 2020    Future Appointments:  Future Appointments   Date Time Provider Aquilino Steel   4/21/2020 10:00 AM SS INF5 CH1 >7H [facility]   4/21/2020 10:45 AM Shea Granados  [facility], [address]   5/5/2020 10:00 AM SS INF5 CH1 >7H [facility]   5/19/2020 10:00 [facility]   6/2/2020 10:00 AM SS INF5 CH1 >7H CRISTY PIZANO   6/16/2020 10:00 AM SS INF5 CH1 >7H CRISTY Campa

## 2020-04-20 NOTE — PROGRESS NOTES
[facility] [place] [facility] [place] [address] [address] [facility]  Anuradha Butter: [phone]  F: [phone]      Reason for Visit:   Maya Murray is a 38 y.o. male who is seen for follow up of non-small cell lung cancer. Treatment History:   · Diagnosed at [facility]  · Biopsy of right level 4 node: non small cell carcinoma, favored squamous cell carcinoma, insufficient sample for further testing  · Stage III Non-small cell lung cancer (Squamous cell)  · Definitive radiation with Dr. Elaina Donato completed mid-July 2019  · Concurrent chemotherapy with carboplatin and paclitaxel by Dr. Lindsay Harper, stopped during second infusion due to reaction to paclitaxel  · Concurrent chemotherapy with cisplatin and etoposide 7/16/2019 by Dr. Lindsay Harper  · CT Chest 7/27/2019: There has been no significant change in size of the large right paratracheal mediastinal mass. There are many new areas of peripheral consolidation in the right upper lobe, which may represent some combination of progressive disease, posttreatment change, or infection. Attention on follow-up is needed. A previously seen right upper lobe nodule in the posterior segment appears to have decreased in size. The large right pleural effusion on this exam is significantly increased in size from prior. · CT A/P 7/29/2019: no metastatic disease in abdomen or pelvis  · MRI Brain 7/29/2019: no intracranial metastatic disease  · EBUS by Dr. Edgar Sellers 7/30/2019: Squamous cell, PDL1 QNS, insufficient tissue for molecular studies  · Thoracentesis 8/5/2019: cytology negative  · Initiated maintenance therapy with Durvalumab on 8/27/2019    History of Present Illness:   Presents for follow up on therapy. Breathing is about the same. Cough is improved. Dry cough \"every now and then. \" No fever or chills. Feeling of pain in shoulder has resolved, thought to be due to pulled muscle. Appetite has \"too good\". Gaining weight.      Home health has been good, changing dressing to catheter. Some fullness in chest like it was before, this is not worsening. He is unaccompanied today. He quit tobacco at diagnosis, though he was a light smoker (1-2 packs per week). PAST HISTORY: The following sections were reviewed and updated in the EMR as appropriate: PMH, SH, FH, Medications, Allergies. No Known Allergies     Review of Systems: A complete review of systems was obtained, reviewed, and scanned into the EMR. Pertinent findings reviewed above. Physical Exam:     Visit Vitals  /87 (BP 1 Location: Right arm, BP Patient Position: Sitting) Comment: . Pulse 71   Temp 96.9 °F (36.1 °C) (Temporal)   Resp 14   Ht 5' 9\" (1.753 m)   Wt 192 lb (87.1 kg)   SpO2 100%   BMI 28.35 kg/m²     ECOG PS: 1  General: No distress  Eyes: PERRLA, anicteric sclerae  HENT: Atraumatic, OP clear  Neck: Supple  Lymphatic: No cervical, supraclavicular, or inguinal adenopathy  Respiratory: CTAB, diminished right base, normal respiratory effort  CV: Normal rate, regular rhythm, no murmurs, no peripheral edema  GI: Soft, nontender, nondistended, no masses, no hepatomegaly, no splenomegaly  MS: Normal gait and station. Digits without clubbing or cyanosis. Skin: No rashes, ecchymoses, or petechiae. Normal temperature, turgor, and texture. Psych: Alert, oriented, appropriate affect, normal judgment/insight    Results:     Lab Results   Component Value Date/Time    WBC 3.4 (L) 04/21/2020 10:15 AM    HGB 13.4 04/21/2020 10:15 AM    HCT 40.5 04/21/2020 10:15 AM    PLATELET 284 (L) 36/53/8127 10:15 AM    MCV 73.0 (L) 04/21/2020 10:15 AM    ABS.  NEUTROPHILS 2.3 04/21/2020 10:15 AM     Lab Results   Component Value Date/Time    Sodium 137 04/21/2020 10:15 AM    Potassium 4.4 04/21/2020 10:15 AM    Chloride 104 04/21/2020 10:15 AM    CO2 26 04/21/2020 10:15 AM    Glucose 98 04/21/2020 10:15 AM    BUN 24 (H) 04/21/2020 10:15 AM    Creatinine 1.06 04/21/2020 10:15 AM    GFR est AA >60 04/21/2020 10:15 AM    GFR est non-AA >60 04/21/2020 10:15 AM    Calcium 8.8 04/21/2020 10:15 AM     Lab Results   Component Value Date/Time    Bilirubin, total 0.3 04/21/2020 10:15 AM    ALT (SGPT) 19 04/21/2020 10:15 AM    AST (SGOT) 15 04/21/2020 10:15 AM    Alk. phosphatase 95 04/21/2020 10:15 AM    Protein, total 7.3 04/21/2020 10:15 AM    Albumin 3.8 04/21/2020 10:15 AM    Globulin 3.5 04/21/2020 10:15 AM     Lab Results   Component Value Date/Time    Reticulocyte count 1.7 08/27/2019 11:19 AM    Iron % saturation 44 08/27/2019 11:19 AM    TIBC 236 (L) 08/27/2019 11:19 AM    Ferritin 698 (H) 08/27/2019 11:19 AM    Vitamin B12 265 08/27/2019 11:19 AM    Folate 9.2 08/27/2019 11:19 AM    TSH 2.02 03/24/2020 10:02 AM    Lipase 136 07/27/2019 11:31 AM    Hep C  virus Ab Interp. NONREACTIVE 12/03/2019 11:22 AM     Lab Results   Component Value Date/Time    INR 1.0 12/03/2019 11:22 AM    aPTT 27.3 12/03/2019 11:22 AM    Fibrinogen 243 12/03/2019 11:22 AM         PET 8/21/2019:  1. Decreased size and activity of the mediastinal mass. 2. RUL nodular densities remain ametabolic. 3. No new finding. CT Chest 11/12/2019:  Stable exam by RECIST criteria. Increase in moderate right pleural effusion with medial right lower lobe airspace disease. Minimal T2, T4, and T6 superior endplate vertebral compression deformities are likely subacute/chronic. CT Chest 2/3/2020:  Discordant change. Decrease in size of right paratracheal node the increase in size of right upper lobe mass  Decrease in volume of right lung with increased right pleural effusion density  Occluded SVC with extensive collateralization as described  New T6 compression fracture    Bone scan 2/20/2020: There is minimal low-grade activity at T6 vertebral body. Remainder of the study is unremarkable.     Assessment:   1) Non-small cell lung cancer  Stage IIIB  He has at least Stage IIIB disease, and has been treated with concurrent radiation and chemotherapy (cisplatin and etoposide). Repeat PET demonstrates a partial response to therapy. No definite evidence of distant metastatic disease at this time. Pleural effusion cytology negative x3. He is currently on maintenance immunotherapy with Durvalumab. He is tolerating therapy well so far with minimal toxicity noted. Continue Durvalumab. He will be seen every other cycle of therapy. Reimage every 12 weeks. Unfortunately, his two biopsies have yielded scant material, insufficient for molecular studies or PDL1 testing. At progression, a repeat biopsy may be needed for NGS. 2) SVC syndrome  Monitor. Stable on imaging. 3) Pleural effusion  Cytology negative. Worsening on recent imaging, underwent repeat thoracentesis on 11/27/2019 and 2/14/2020 with negative cytology. 4) Poor IV access  With SVC syndrome, he cannot have a port or UE PICC. Currently has femoral PICC in place. This is working well. Home health for dressing changes. 5) Thrombocytopenia  Improved, but not resolved. Presumably secondary to his prior chemotherapy, but surprising that it is persisting. Additional lab work up 12/3/2019 unremarkable. Monitor for now. Could consider bone marrow biopsy if worsening significantly. 6) Anemia  Improving. No longer on oral iron therapy. No folate or Vit B12 def identified. 7) Neuropathy, chemotherapy induced  Continue Cymbalta 30mg daily. 8) Cough, non productive  Likely due to effusion. 9) weight gain  There is no sign of edema on exam.  Monitor thyroid studies closely. 10) Compression fracture  Bone scan negative. Consider kyphoplasty vs radiation therapy if symptoms develop. Currently he is minimally symptomatic to site with mild pain/discomfort with elevation of right arm above head or twisting of upper torso. DEXA showing osteopenia 3/17/2020. Recommend Calcium and Vit D replacement and weight bearing exercise as tolerated.      11) Emotional Well Being  No psychosocial concerns identified today. Patient has adequate support. Plan:     · Proceed today with C18 Durvalumab (10mg/kg) given every 2 weeks for one year  · Labs: CBC, CMP prior to each cycle, TSH every 6 weeks  · Continue cymbalta 30mg PO daily  · CT prior to next follow up  · Return to see us in 4 weeks    Patient was seen in conjunction with Veda Moss NP.     Signed By: Michele Rivera MD

## 2020-04-21 ENCOUNTER — OFFICE VISIT (OUTPATIENT)
Dept: ONCOLOGY | Age: 45
End: 2020-04-21

## 2020-04-21 ENCOUNTER — HOSPITAL ENCOUNTER (OUTPATIENT)
Dept: INFUSION THERAPY | Age: 45
Discharge: HOME | End: 2020-04-21
Payer: COMMERCIAL

## 2020-04-21 VITALS
HEART RATE: 67 BPM | RESPIRATION RATE: 16 BRPM | TEMPERATURE: 97.5 F | HEIGHT: 69 IN | SYSTOLIC BLOOD PRESSURE: 114 MMHG | BODY MASS INDEX: 28.47 KG/M2 | OXYGEN SATURATION: 99 % | WEIGHT: 192.2 LBS | DIASTOLIC BLOOD PRESSURE: 78 MMHG

## 2020-04-21 VITALS
TEMPERATURE: 96.9 F | HEIGHT: 69 IN | SYSTOLIC BLOOD PRESSURE: 119 MMHG | DIASTOLIC BLOOD PRESSURE: 87 MMHG | BODY MASS INDEX: 28.44 KG/M2 | HEART RATE: 71 BPM | WEIGHT: 192 LBS | RESPIRATION RATE: 14 BRPM | OXYGEN SATURATION: 100 %

## 2020-04-21 DIAGNOSIS — C34.11 MALIGNANT NEOPLASM OF UPPER LOBE OF RIGHT LUNG (HCC): Primary | Chronic | ICD-10-CM

## 2020-04-21 DIAGNOSIS — C34.11 MALIGNANT NEOPLASM OF UPPER LOBE OF RIGHT LUNG (HCC): Primary | ICD-10-CM

## 2020-04-21 LAB
ALBUMIN SERPL-MCNC: 3.8 G/DL (ref 3.5–5)
ALBUMIN/GLOB SERPL: 1.1 {RATIO} (ref 1.1–2.2)
ALP SERPL-CCNC: 95 U/L (ref 45–117)
ALT SERPL-CCNC: 19 U/L (ref 12–78)
ANION GAP SERPL CALC-SCNC: 7 MMOL/L (ref 5–15)
AST SERPL-CCNC: 15 U/L (ref 15–37)
BASOPHILS # BLD: 0 K/UL (ref 0–0.1)
BASOPHILS NFR BLD: 1 % (ref 0–1)
BILIRUB SERPL-MCNC: 0.3 MG/DL (ref 0.2–1)
BUN SERPL-MCNC: 24 MG/DL (ref 6–20)
BUN/CREAT SERPL: 23 (ref 12–20)
CALCIUM SERPL-MCNC: 8.8 MG/DL (ref 8.5–10.1)
CHLORIDE SERPL-SCNC: 104 MMOL/L (ref 97–108)
CO2 SERPL-SCNC: 26 MMOL/L (ref 21–32)
CREAT SERPL-MCNC: 1.06 MG/DL (ref 0.7–1.3)
DIFFERENTIAL METHOD BLD: ABNORMAL
EOSINOPHIL # BLD: 0.1 K/UL (ref 0–0.4)
EOSINOPHIL NFR BLD: 4 % (ref 0–7)
ERYTHROCYTE [DISTWIDTH] IN BLOOD BY AUTOMATED COUNT: 15.2 % (ref 11.5–14.5)
GLOBULIN SER CALC-MCNC: 3.5 G/DL (ref 2–4)
GLUCOSE SERPL-MCNC: 98 MG/DL (ref 65–100)
HCT VFR BLD AUTO: 40.5 % (ref 36.6–50.3)
HGB BLD-MCNC: 13.4 G/DL (ref 12.1–17)
IMM GRANULOCYTES # BLD AUTO: 0 K/UL (ref 0–0.04)
IMM GRANULOCYTES NFR BLD AUTO: 1 % (ref 0–0.5)
LYMPHOCYTES # BLD: 0.5 K/UL (ref 0.8–3.5)
LYMPHOCYTES NFR BLD: 14 % (ref 12–49)
MCH RBC QN AUTO: 24.1 PG (ref 26–34)
MCHC RBC AUTO-ENTMCNC: 33.1 G/DL (ref 30–36.5)
MCV RBC AUTO: 73 FL (ref 80–99)
MONOCYTES # BLD: 0.5 K/UL (ref 0–1)
MONOCYTES NFR BLD: 15 % (ref 5–13)
NEUTS SEG # BLD: 2.3 K/UL (ref 1.8–8)
NEUTS SEG NFR BLD: 65 % (ref 32–75)
NRBC # BLD: 0 K/UL (ref 0–0.01)
NRBC BLD-RTO: 0 PER 100 WBC
PLATELET # BLD AUTO: 119 K/UL (ref 150–400)
PMV BLD AUTO: 8.9 FL (ref 8.9–12.9)
POTASSIUM SERPL-SCNC: 4.4 MMOL/L (ref 3.5–5.1)
PROT SERPL-MCNC: 7.3 G/DL (ref 6.4–8.2)
RBC # BLD AUTO: 5.55 M/UL (ref 4.1–5.7)
RBC MORPH BLD: ABNORMAL
SODIUM SERPL-SCNC: 137 MMOL/L (ref 136–145)
WBC # BLD AUTO: 3.4 K/UL (ref 4.1–11.1)

## 2020-04-21 PROCEDURE — 74011000258 HC RX REV CODE- 258: Performed by: NURSE PRACTITIONER

## 2020-04-21 PROCEDURE — 36415 COLL VENOUS BLD VENIPUNCTURE: CPT

## 2020-04-21 PROCEDURE — 74011250636 HC RX REV CODE- 250/636: Performed by: NURSE PRACTITIONER

## 2020-04-21 PROCEDURE — 80053 COMPREHEN METABOLIC PANEL: CPT

## 2020-04-21 PROCEDURE — 85025 COMPLETE CBC W/AUTO DIFF WBC: CPT

## 2020-04-21 PROCEDURE — 96413 CHEMO IV INFUSION 1 HR: CPT

## 2020-04-21 RX ORDER — DIPHENHYDRAMINE HYDROCHLORIDE 50 MG/ML
50 INJECTION, SOLUTION INTRAMUSCULAR; INTRAVENOUS
Status: CANCELLED | OUTPATIENT
Start: 2020-06-02

## 2020-04-21 RX ORDER — SODIUM CHLORIDE 0.9 % (FLUSH) 0.9 %
10 SYRINGE (ML) INJECTION AS NEEDED
Status: CANCELLED
Start: 2020-06-18

## 2020-04-21 RX ORDER — DIPHENHYDRAMINE HYDROCHLORIDE 50 MG/ML
50 INJECTION, SOLUTION INTRAMUSCULAR; INTRAVENOUS AS NEEDED
Status: CANCELLED
Start: 2020-05-19

## 2020-04-21 RX ORDER — ACETAMINOPHEN 325 MG/1
650 TABLET ORAL AS NEEDED
Status: CANCELLED
Start: 2020-05-19

## 2020-04-21 RX ORDER — ACETAMINOPHEN 325 MG/1
650 TABLET ORAL
Status: CANCELLED | OUTPATIENT
Start: 2020-05-19

## 2020-04-21 RX ORDER — SODIUM CHLORIDE 9 MG/ML
25 INJECTION, SOLUTION INTRAVENOUS CONTINUOUS
Status: DISCONTINUED | OUTPATIENT
Start: 2020-04-21 | End: 2020-04-22 | Stop reason: HOSPADM

## 2020-04-21 RX ORDER — ALBUTEROL SULFATE 0.83 MG/ML
2.5 SOLUTION RESPIRATORY (INHALATION) AS NEEDED
Status: CANCELLED
Start: 2020-06-18

## 2020-04-21 RX ORDER — HEPARIN 100 UNIT/ML
300-500 SYRINGE INTRAVENOUS AS NEEDED
Status: CANCELLED
Start: 2020-06-18

## 2020-04-21 RX ORDER — ACETAMINOPHEN 325 MG/1
650 TABLET ORAL
Status: CANCELLED | OUTPATIENT
Start: 2020-06-02

## 2020-04-21 RX ORDER — DIPHENHYDRAMINE HYDROCHLORIDE 50 MG/ML
50 INJECTION, SOLUTION INTRAMUSCULAR; INTRAVENOUS AS NEEDED
Status: CANCELLED
Start: 2020-05-05

## 2020-04-21 RX ORDER — HYDROCORTISONE SODIUM SUCCINATE 100 MG/2ML
100 INJECTION, POWDER, FOR SOLUTION INTRAMUSCULAR; INTRAVENOUS AS NEEDED
Status: CANCELLED | OUTPATIENT
Start: 2020-06-18

## 2020-04-21 RX ORDER — DIPHENHYDRAMINE HYDROCHLORIDE 50 MG/ML
50 INJECTION, SOLUTION INTRAMUSCULAR; INTRAVENOUS AS NEEDED
Status: CANCELLED
Start: 2020-06-18

## 2020-04-21 RX ORDER — SODIUM CHLORIDE 9 MG/ML
25 INJECTION, SOLUTION INTRAVENOUS CONTINUOUS
Status: CANCELLED | OUTPATIENT
Start: 2020-05-05

## 2020-04-21 RX ORDER — ALBUTEROL SULFATE 0.83 MG/ML
2.5 SOLUTION RESPIRATORY (INHALATION) AS NEEDED
Status: CANCELLED
Start: 2020-06-02

## 2020-04-21 RX ORDER — SODIUM CHLORIDE 0.9 % (FLUSH) 0.9 %
10 SYRINGE (ML) INJECTION AS NEEDED
Status: DISCONTINUED | OUTPATIENT
Start: 2020-04-21 | End: 2020-04-22 | Stop reason: HOSPADM

## 2020-04-21 RX ORDER — HYDROCORTISONE SODIUM SUCCINATE 100 MG/2ML
100 INJECTION, POWDER, FOR SOLUTION INTRAMUSCULAR; INTRAVENOUS AS NEEDED
Status: CANCELLED | OUTPATIENT
Start: 2020-05-19

## 2020-04-21 RX ORDER — ALBUTEROL SULFATE 0.83 MG/ML
2.5 SOLUTION RESPIRATORY (INHALATION) AS NEEDED
Status: CANCELLED
Start: 2020-05-19

## 2020-04-21 RX ORDER — SODIUM CHLORIDE 9 MG/ML
10 INJECTION INTRAMUSCULAR; INTRAVENOUS; SUBCUTANEOUS AS NEEDED
Status: DISCONTINUED | OUTPATIENT
Start: 2020-04-21 | End: 2020-04-22 | Stop reason: HOSPADM

## 2020-04-21 RX ORDER — ALBUTEROL SULFATE 0.83 MG/ML
2.5 SOLUTION RESPIRATORY (INHALATION) AS NEEDED
Status: CANCELLED
Start: 2020-05-05

## 2020-04-21 RX ORDER — DIPHENHYDRAMINE HYDROCHLORIDE 50 MG/ML
50 INJECTION, SOLUTION INTRAMUSCULAR; INTRAVENOUS AS NEEDED
Status: CANCELLED
Start: 2020-06-02

## 2020-04-21 RX ORDER — EPINEPHRINE 1 MG/ML
0.3 INJECTION, SOLUTION, CONCENTRATE INTRAVENOUS AS NEEDED
Status: CANCELLED | OUTPATIENT
Start: 2020-06-02

## 2020-04-21 RX ORDER — SODIUM CHLORIDE 9 MG/ML
10 INJECTION INTRAMUSCULAR; INTRAVENOUS; SUBCUTANEOUS AS NEEDED
Status: CANCELLED | OUTPATIENT
Start: 2020-06-18

## 2020-04-21 RX ORDER — ACETAMINOPHEN 325 MG/1
650 TABLET ORAL AS NEEDED
Status: CANCELLED
Start: 2020-06-18

## 2020-04-21 RX ORDER — ONDANSETRON 2 MG/ML
8 INJECTION INTRAMUSCULAR; INTRAVENOUS AS NEEDED
Status: CANCELLED | OUTPATIENT
Start: 2020-05-19

## 2020-04-21 RX ORDER — ONDANSETRON 2 MG/ML
8 INJECTION INTRAMUSCULAR; INTRAVENOUS AS NEEDED
Status: CANCELLED | OUTPATIENT
Start: 2020-05-05

## 2020-04-21 RX ORDER — HYDROCORTISONE SODIUM SUCCINATE 100 MG/2ML
100 INJECTION, POWDER, FOR SOLUTION INTRAMUSCULAR; INTRAVENOUS AS NEEDED
Status: CANCELLED | OUTPATIENT
Start: 2020-06-02

## 2020-04-21 RX ORDER — SODIUM CHLORIDE 9 MG/ML
10 INJECTION INTRAMUSCULAR; INTRAVENOUS; SUBCUTANEOUS AS NEEDED
Status: CANCELLED | OUTPATIENT
Start: 2020-05-05

## 2020-04-21 RX ORDER — SODIUM CHLORIDE 0.9 % (FLUSH) 0.9 %
10 SYRINGE (ML) INJECTION AS NEEDED
Status: CANCELLED
Start: 2020-05-19

## 2020-04-21 RX ORDER — SODIUM CHLORIDE 9 MG/ML
25 INJECTION, SOLUTION INTRAVENOUS CONTINUOUS
Status: CANCELLED | OUTPATIENT
Start: 2020-06-02

## 2020-04-21 RX ORDER — EPINEPHRINE 1 MG/ML
0.3 INJECTION, SOLUTION, CONCENTRATE INTRAVENOUS AS NEEDED
Status: CANCELLED | OUTPATIENT
Start: 2020-05-05

## 2020-04-21 RX ORDER — DIPHENHYDRAMINE HYDROCHLORIDE 50 MG/ML
50 INJECTION, SOLUTION INTRAMUSCULAR; INTRAVENOUS
Status: CANCELLED | OUTPATIENT
Start: 2020-05-19

## 2020-04-21 RX ORDER — SODIUM CHLORIDE 9 MG/ML
25 INJECTION, SOLUTION INTRAVENOUS CONTINUOUS
Status: CANCELLED | OUTPATIENT
Start: 2020-06-18

## 2020-04-21 RX ORDER — SODIUM CHLORIDE 9 MG/ML
10 INJECTION INTRAMUSCULAR; INTRAVENOUS; SUBCUTANEOUS AS NEEDED
Status: CANCELLED | OUTPATIENT
Start: 2020-06-02

## 2020-04-21 RX ORDER — ACETAMINOPHEN 325 MG/1
650 TABLET ORAL AS NEEDED
Status: CANCELLED
Start: 2020-06-02

## 2020-04-21 RX ORDER — EPINEPHRINE 1 MG/ML
0.3 INJECTION, SOLUTION, CONCENTRATE INTRAVENOUS AS NEEDED
Status: CANCELLED | OUTPATIENT
Start: 2020-05-19

## 2020-04-21 RX ORDER — SODIUM CHLORIDE 0.9 % (FLUSH) 0.9 %
10 SYRINGE (ML) INJECTION AS NEEDED
Status: CANCELLED
Start: 2020-05-05

## 2020-04-21 RX ORDER — SODIUM CHLORIDE 9 MG/ML
10 INJECTION INTRAMUSCULAR; INTRAVENOUS; SUBCUTANEOUS AS NEEDED
Status: CANCELLED | OUTPATIENT
Start: 2020-05-19

## 2020-04-21 RX ORDER — ONDANSETRON 2 MG/ML
8 INJECTION INTRAMUSCULAR; INTRAVENOUS AS NEEDED
Status: CANCELLED | OUTPATIENT
Start: 2020-06-18

## 2020-04-21 RX ORDER — HEPARIN 100 UNIT/ML
300-500 SYRINGE INTRAVENOUS AS NEEDED
Status: DISCONTINUED | OUTPATIENT
Start: 2020-04-21 | End: 2020-04-22 | Stop reason: HOSPADM

## 2020-04-21 RX ORDER — SODIUM CHLORIDE 9 MG/ML
25 INJECTION, SOLUTION INTRAVENOUS CONTINUOUS
Status: CANCELLED | OUTPATIENT
Start: 2020-05-19

## 2020-04-21 RX ORDER — HEPARIN 100 UNIT/ML
300-500 SYRINGE INTRAVENOUS AS NEEDED
Status: CANCELLED
Start: 2020-05-05

## 2020-04-21 RX ORDER — HYDROCORTISONE SODIUM SUCCINATE 100 MG/2ML
100 INJECTION, POWDER, FOR SOLUTION INTRAMUSCULAR; INTRAVENOUS AS NEEDED
Status: CANCELLED | OUTPATIENT
Start: 2020-05-05

## 2020-04-21 RX ORDER — SODIUM CHLORIDE 0.9 % (FLUSH) 0.9 %
10 SYRINGE (ML) INJECTION AS NEEDED
Status: CANCELLED
Start: 2020-06-02

## 2020-04-21 RX ORDER — EPINEPHRINE 1 MG/ML
0.3 INJECTION, SOLUTION, CONCENTRATE INTRAVENOUS AS NEEDED
Status: CANCELLED | OUTPATIENT
Start: 2020-06-18

## 2020-04-21 RX ORDER — ONDANSETRON 2 MG/ML
8 INJECTION INTRAMUSCULAR; INTRAVENOUS AS NEEDED
Status: CANCELLED | OUTPATIENT
Start: 2020-06-02

## 2020-04-21 RX ORDER — HEPARIN 100 UNIT/ML
300-500 SYRINGE INTRAVENOUS AS NEEDED
Status: CANCELLED
Start: 2020-05-19

## 2020-04-21 RX ORDER — HEPARIN 100 UNIT/ML
300-500 SYRINGE INTRAVENOUS AS NEEDED
Status: CANCELLED
Start: 2020-06-02

## 2020-04-21 RX ORDER — ACETAMINOPHEN 325 MG/1
650 TABLET ORAL AS NEEDED
Status: CANCELLED
Start: 2020-05-05

## 2020-04-21 RX ADMIN — SODIUM CHLORIDE 810 MG: 9 INJECTION, SOLUTION INTRAVENOUS at 13:20

## 2020-04-21 RX ADMIN — SODIUM CHLORIDE 10 ML: 9 INJECTION INTRAMUSCULAR; INTRAVENOUS; SUBCUTANEOUS at 14:33

## 2020-04-21 RX ADMIN — SODIUM CHLORIDE 25 ML/HR: 900 INJECTION, SOLUTION INTRAVENOUS at 12:29

## 2020-04-21 RX ADMIN — Medication 500 UNITS: at 14:33

## 2020-04-21 NOTE — PROGRESS NOTES
Ellen Cam is a 38 y.o. male follow up for lung cancer. 1. Have you been to the ER, urgent care clinic since your last visit? Hospitalized since your last visit?no     2. Have you seen or consulted any other health care providers outside of the [address] [place] [address] since your last visit? Include any pap smears or colon screening.  no

## 2020-04-21 NOTE — PROGRESS NOTES
[facility] VISIT NOTE    Pt arrived at [facility] ambulatory and in no distress for C18D1 Imfinzi. Assessment completed, pt had no complaints. Patient Vitals for the past 12 hrs:   Temp Pulse Resp BP SpO2   04/21/20 1430 97.5 °F (36.4 °C) 67 16 114/78 --   04/21/20 1001 98 °F (36.7 °C) 78 16 111/78 99 %     Right groin double lumen PICC line flushed. Positive blood return noted and labs drawn. Recent Results (from the past 12 hour(s))   METABOLIC PANEL, COMPREHENSIVE    Collection Time: 04/21/20 10:15 AM   Result Value Ref Range    Sodium 137 136 - 145 mmol/L    Potassium 4.4 3.5 - 5.1 mmol/L    Chloride 104 97 - 108 mmol/L    CO2 26 21 - 32 mmol/L    Anion gap 7 5 - 15 mmol/L    Glucose 98 65 - 100 mg/dL    BUN 24 (H) 6 - 20 MG/DL    Creatinine 1.06 0.70 - 1.30 MG/DL    BUN/Creatinine ratio 23 (H) 12 - 20      GFR est AA >60 >60 ml/min/1.73m2    GFR est non-AA >60 >60 ml/min/1.73m2    Calcium 8.8 8.5 - 10.1 MG/DL    Bilirubin, total 0.3 0.2 - 1.0 MG/DL    ALT (SGPT) 19 12 - 78 U/L    AST (SGOT) 15 15 - 37 U/L    Alk. phosphatase 95 45 - 117 U/L    Protein, total 7.3 6.4 - 8.2 g/dL    Albumin 3.8 3.5 - 5.0 g/dL    Globulin 3.5 2.0 - 4.0 g/dL    A-G Ratio 1.1 1.1 - 2.2     CBC WITH AUTOMATED DIFF    Collection Time: 04/21/20 10:15 AM   Result Value Ref Range    WBC 3.4 (L) 4.1 - 11.1 K/uL    RBC 5.55 4.10 - 5.70 M/uL    HGB 13.4 12.1 - 17.0 g/dL    HCT 40.5 36.6 - 50.3 %    MCV 73.0 (L) 80.0 - 99.0 FL    MCH 24.1 (L) 26.0 - 34.0 PG    MCHC 33.1 30.0 - 36.5 g/dL    RDW 15.2 (H) 11.5 - 14.5 %    PLATELET 283 (L) 495 - 400 K/uL    MPV 8.9 8.9 - 12.9 FL    NRBC 0.0 0  WBC    ABSOLUTE NRBC 0.00 0.00 - 0.01 K/uL    NEUTROPHILS 65 32 - 75 %    LYMPHOCYTES 14 12 - 49 %    MONOCYTES 15 (H) 5 - 13 %    EOSINOPHILS 4 0 - 7 %    BASOPHILS 1 0 - 1 %    IMMATURE GRANULOCYTES 1 (H) 0.0 - 0.5 %    ABS. NEUTROPHILS 2.3 1.8 - 8.0 K/UL    ABS. LYMPHOCYTES 0.5 (L) 0.8 - 3.5 K/UL    ABS. MONOCYTES 0.5 0.0 - 1.0 K/UL    ABS. EOSINOPHILS 0.1 0.0 - 0.4 K/UL    ABS. BASOPHILS 0.0 0.0 - 0.1 K/UL    ABS. IMM. GRANS. 0.0 0.00 - 0.04 K/UL    DF SMEAR SCANNED      RBC COMMENTS NORMOCYTIC, NORMOCHROMIC       Medications Administered     0.9% sodium chloride infusion     Admin Date  04/21/2020 Action  New Bag Dose  25 mL/hr Rate  25 mL/hr Route  IntraVENous Administered By  Ajay Valdivia RN          0.9% sodium chloride injection 10 mL     Admin Date  04/21/2020 Action  Given Dose  10 mL Route  IntraVENous Administered By  Ajay Valdivia RN          durvalumab (IMFINZI) 810 mg in 0.9% sodium chloride 100 mL, overfill volume 10 mL IVPB     Admin Date  04/21/2020 Action  New Bag Dose  810 mg Rate  126.2 mL/hr Route  IntraVENous Administered By  Ajay Valdivia RN          heparin (porcine) pf 300-500 Units     Admin Date  04/21/2020 Action  Given Dose  500 Units Route  InterCATHeter Administered By  Ajay Valdivia RN              Tolerated treatment well, no adverse reaction noted. PICC line flushed per protocol. Positive blood return noted in both lumens. D/C'd from [address] [place] [address] and in no distress. Next appointment is 5/5/20 at 10:00.

## 2020-04-29 ENCOUNTER — HOSPITAL ENCOUNTER (OUTPATIENT)
Dept: CT IMAGING | Age: 45
Discharge: HOME | End: 2020-04-29
Attending: NURSE PRACTITIONER
Payer: COMMERCIAL

## 2020-04-29 DIAGNOSIS — C34.11 MALIGNANT NEOPLASM OF UPPER LOBE OF RIGHT LUNG (HCC): Chronic | ICD-10-CM

## 2020-04-29 PROCEDURE — 74011636320 HC RX REV CODE- 636/320: Performed by: RADIOLOGY

## 2020-04-29 PROCEDURE — 71260 CT THORAX DX C+: CPT

## 2020-04-29 RX ADMIN — IOPAMIDOL 100 ML: 612 INJECTION, SOLUTION INTRAVENOUS at 12:13

## 2020-05-05 ENCOUNTER — TELEPHONE (OUTPATIENT)
Dept: ONCOLOGY | Age: 45
End: 2020-05-05

## 2020-05-05 ENCOUNTER — HOSPITAL ENCOUNTER (OUTPATIENT)
Dept: INFUSION THERAPY | Age: 45
Discharge: HOME | End: 2020-05-05
Payer: COMMERCIAL

## 2020-05-05 VITALS
DIASTOLIC BLOOD PRESSURE: 70 MMHG | BODY MASS INDEX: 28.63 KG/M2 | RESPIRATION RATE: 15 BRPM | HEART RATE: 57 BPM | TEMPERATURE: 97.6 F | OXYGEN SATURATION: 99 % | WEIGHT: 193.3 LBS | HEIGHT: 69 IN | SYSTOLIC BLOOD PRESSURE: 109 MMHG

## 2020-05-05 DIAGNOSIS — C34.11 MALIGNANT NEOPLASM OF UPPER LOBE OF RIGHT LUNG (HCC): Primary | ICD-10-CM

## 2020-05-05 LAB
ALBUMIN SERPL-MCNC: 3.8 G/DL (ref 3.5–5)
ALBUMIN/GLOB SERPL: 1.1 {RATIO} (ref 1.1–2.2)
ALP SERPL-CCNC: 95 U/L (ref 45–117)
ALT SERPL-CCNC: 26 U/L (ref 12–78)
ANION GAP SERPL CALC-SCNC: 7 MMOL/L (ref 5–15)
AST SERPL-CCNC: 26 U/L (ref 15–37)
BASOPHILS # BLD: 0 K/UL (ref 0–0.1)
BASOPHILS NFR BLD: 1 % (ref 0–1)
BILIRUB SERPL-MCNC: 0.3 MG/DL (ref 0.2–1)
BUN SERPL-MCNC: 26 MG/DL (ref 6–20)
BUN/CREAT SERPL: 24 (ref 12–20)
CALCIUM SERPL-MCNC: 9.1 MG/DL (ref 8.5–10.1)
CHLORIDE SERPL-SCNC: 104 MMOL/L (ref 97–108)
CO2 SERPL-SCNC: 27 MMOL/L (ref 21–32)
CREAT SERPL-MCNC: 1.1 MG/DL (ref 0.7–1.3)
DIFFERENTIAL METHOD BLD: ABNORMAL
EOSINOPHIL # BLD: 0.1 K/UL (ref 0–0.4)
EOSINOPHIL NFR BLD: 4 % (ref 0–7)
ERYTHROCYTE [DISTWIDTH] IN BLOOD BY AUTOMATED COUNT: 15.1 % (ref 11.5–14.5)
GLOBULIN SER CALC-MCNC: 3.5 G/DL (ref 2–4)
GLUCOSE SERPL-MCNC: 110 MG/DL (ref 65–100)
HCT VFR BLD AUTO: 39.1 % (ref 36.6–50.3)
HGB BLD-MCNC: 12.9 G/DL (ref 12.1–17)
IMM GRANULOCYTES # BLD AUTO: 0 K/UL (ref 0–0.04)
IMM GRANULOCYTES NFR BLD AUTO: 1 % (ref 0–0.5)
LYMPHOCYTES # BLD: 0.5 K/UL (ref 0.8–3.5)
LYMPHOCYTES NFR BLD: 14 % (ref 12–49)
MCH RBC QN AUTO: 23.9 PG (ref 26–34)
MCHC RBC AUTO-ENTMCNC: 33 G/DL (ref 30–36.5)
MCV RBC AUTO: 72.5 FL (ref 80–99)
MONOCYTES # BLD: 0.6 K/UL (ref 0–1)
MONOCYTES NFR BLD: 16 % (ref 5–13)
NEUTS SEG # BLD: 2.4 K/UL (ref 1.8–8)
NEUTS SEG NFR BLD: 64 % (ref 32–75)
NRBC # BLD: 0 K/UL (ref 0–0.01)
NRBC BLD-RTO: 0 PER 100 WBC
PLATELET # BLD AUTO: 120 K/UL (ref 150–400)
PMV BLD AUTO: 9.6 FL (ref 8.9–12.9)
POTASSIUM SERPL-SCNC: 4.2 MMOL/L (ref 3.5–5.1)
PROT SERPL-MCNC: 7.3 G/DL (ref 6.4–8.2)
RBC # BLD AUTO: 5.39 M/UL (ref 4.1–5.7)
RBC MORPH BLD: ABNORMAL
SODIUM SERPL-SCNC: 138 MMOL/L (ref 136–145)
TSH SERPL DL<=0.05 MIU/L-ACNC: 3.94 UIU/ML (ref 0.36–3.74)
WBC # BLD AUTO: 3.6 K/UL (ref 4.1–11.1)

## 2020-05-05 PROCEDURE — 36415 COLL VENOUS BLD VENIPUNCTURE: CPT

## 2020-05-05 PROCEDURE — 96413 CHEMO IV INFUSION 1 HR: CPT

## 2020-05-05 PROCEDURE — 85025 COMPLETE CBC W/AUTO DIFF WBC: CPT

## 2020-05-05 PROCEDURE — 74011000258 HC RX REV CODE- 258: Performed by: NURSE PRACTITIONER

## 2020-05-05 PROCEDURE — 74011000250 HC RX REV CODE- 250: Performed by: NURSE PRACTITIONER

## 2020-05-05 PROCEDURE — 74011250636 HC RX REV CODE- 250/636: Performed by: NURSE PRACTITIONER

## 2020-05-05 PROCEDURE — 84443 ASSAY THYROID STIM HORMONE: CPT

## 2020-05-05 PROCEDURE — 80053 COMPREHEN METABOLIC PANEL: CPT

## 2020-05-05 RX ORDER — SODIUM CHLORIDE 9 MG/ML
10 INJECTION INTRAMUSCULAR; INTRAVENOUS; SUBCUTANEOUS AS NEEDED
Status: ACTIVE | OUTPATIENT
Start: 2020-05-05 | End: 2020-05-05

## 2020-05-05 RX ORDER — SODIUM CHLORIDE 0.9 % (FLUSH) 0.9 %
10 SYRINGE (ML) INJECTION AS NEEDED
Status: DISPENSED | OUTPATIENT
Start: 2020-05-05 | End: 2020-05-05

## 2020-05-05 RX ORDER — SODIUM CHLORIDE 9 MG/ML
25 INJECTION, SOLUTION INTRAVENOUS CONTINUOUS
Status: DISPENSED | OUTPATIENT
Start: 2020-05-05 | End: 2020-05-05

## 2020-05-05 RX ORDER — HEPARIN 100 UNIT/ML
300-500 SYRINGE INTRAVENOUS AS NEEDED
Status: ACTIVE | OUTPATIENT
Start: 2020-05-05 | End: 2020-05-05

## 2020-05-05 RX ADMIN — Medication 10 ML: at 12:50

## 2020-05-05 RX ADMIN — SODIUM CHLORIDE 10 ML: 9 INJECTION, SOLUTION INTRAMUSCULAR; INTRAVENOUS; SUBCUTANEOUS at 10:00

## 2020-05-05 RX ADMIN — Medication 10 ML: at 12:51

## 2020-05-05 RX ADMIN — SODIUM CHLORIDE 810 MG: 9 INJECTION, SOLUTION INTRAVENOUS at 11:40

## 2020-05-05 RX ADMIN — SODIUM CHLORIDE 10 ML: 9 INJECTION, SOLUTION INTRAMUSCULAR; INTRAVENOUS; SUBCUTANEOUS at 09:59

## 2020-05-05 RX ADMIN — Medication 500 UNITS: at 12:50

## 2020-05-05 RX ADMIN — Medication 500 UNITS: at 12:51

## 2020-05-05 RX ADMIN — SODIUM CHLORIDE 25 ML/HR: 900 INJECTION, SOLUTION INTRAVENOUS at 11:35

## 2020-05-05 NOTE — PROGRESS NOTES
[facility] Progress Note    Date: May 5, 2020    Name: Sonia Banda    MRN: [id number]         : 1975    Mr. Aiyana Rothman Arrived ambulatory and in no distress for C19D1 of Imfinizi Regimen. Assessment was completed, no acute issues at this time, no new complaints voiced. PICC line accessed without difficulty, labs drawn & sent for processing. Mr. Fritz's vitals were reviewed. Visit Vitals  /77   Pulse 68   Temp 98.2 °F (36.8 °C)   Resp 18   Ht 5' 9\" (1.753 m)   Wt 87.7 kg (193 lb 4.8 oz)   SpO2 99%   BMI 28.55 kg/m²       Lab results were obtained and reviewed. Recent Results (from the past 12 hour(s))   CBC WITH AUTOMATED DIFF    Collection Time: 20  9:51 AM   Result Value Ref Range    WBC 3.6 (L) 4.1 - 11.1 K/uL    RBC 5.39 4. 10 - 5.70 M/uL    HGB 12.9 12.1 - 17.0 g/dL    HCT 39.1 36.6 - 50.3 %    MCV 72.5 (L) 80.0 - 99.0 FL    MCH 23.9 (L) 26.0 - 34.0 PG    MCHC 33.0 30.0 - 36.5 g/dL    RDW 15.1 (H) 11.5 - 14.5 %    PLATELET 129 (L) 528 - 400 K/uL    MPV 9.6 8.9 - 12.9 FL    NRBC 0.0 0  WBC    ABSOLUTE NRBC 0.00 0.00 - 0.01 K/uL    NEUTROPHILS 64 32 - 75 %    LYMPHOCYTES 14 12 - 49 %    MONOCYTES 16 (H) 5 - 13 %    EOSINOPHILS 4 0 - 7 %    BASOPHILS 1 0 - 1 %    IMMATURE GRANULOCYTES 1 (H) 0.0 - 0.5 %    ABS. NEUTROPHILS 2.4 1.8 - 8.0 K/UL    ABS. LYMPHOCYTES 0.5 (L) 0.8 - 3.5 K/UL    ABS. MONOCYTES 0.6 0.0 - 1.0 K/UL    ABS. EOSINOPHILS 0.1 0.0 - 0.4 K/UL    ABS. BASOPHILS 0.0 0.0 - 0.1 K/UL    ABS. IMM.  GRANS. 0.0 0.00 - 0.04 K/UL    DF SMEAR SCANNED      RBC COMMENTS NORMOCYTIC, NORMOCHROMIC     METABOLIC PANEL, COMPREHENSIVE    Collection Time: 20  9:57 AM   Result Value Ref Range    Sodium 138 136 - 145 mmol/L    Potassium 4.2 3.5 - 5.1 mmol/L    Chloride 104 97 - 108 mmol/L    CO2 27 21 - 32 mmol/L    Anion gap 7 5 - 15 mmol/L    Glucose 110 (H) 65 - 100 mg/dL    BUN 26 (H) 6 - 20 MG/DL    Creatinine 1.10 0.70 - 1.30 MG/DL    BUN/Creatinine ratio 24 (H) 12 - 20      GFR est AA >60 >60 ml/min/1.73m2    GFR est non-AA >60 >60 ml/min/1.73m2    Calcium 9.1 8.5 - 10.1 MG/DL    Bilirubin, total 0.3 0.2 - 1.0 MG/DL    ALT (SGPT) 26 12 - 78 U/L    AST (SGOT) 26 15 - 37 U/L    Alk. phosphatase 95 45 - 117 U/L    Protein, total 7.3 6.4 - 8.2 g/dL    Albumin 3.8 3.5 - 5.0 g/dL    Globulin 3.5 2.0 - 4.0 g/dL    A-G Ratio 1.1 1.1 - 2.2     TSH 3RD GENERATION    Collection Time: 05/05/20  9:57 AM   Result Value Ref Range    TSH 3.94 (H) 0.36 - 3.74 uIU/mL         Medications:  Imfinizi IV    Mr. Alejanrdo Baron tolerated treatment well and was discharged from [place] [address] in stable condition. PICC line flushed & heparinized per protocol. He is to return on 5/19/20 at 10:00 for his next appointment.     Vijay Martinez RN  May 5, 2020

## 2020-05-05 NOTE — TELEPHONE ENCOUNTER
LVM for patient about a virtual appointment so that patient does not have to come up here for their appointment after their treatment or keep as scheduled.

## 2020-05-15 NOTE — PROGRESS NOTES
[facility] at [address] [facility]  Ariana Mary: [phone]  F: [phone]      Reason for Visit:   Melody Nair is a 38 y.o. male who is seen by synchronous (real-time) audio-video technology for follow up of non-small cell lung cancer. Treatment History:   · Diagnosed at [facility]  · Biopsy of right level 4 node: non small cell carcinoma, favored squamous cell carcinoma, insufficient sample for further testing  · Stage III Non-small cell lung cancer (Squamous cell)  · Definitive radiation with Dr. Rachael Weinstein completed mid-July 2019  · Concurrent chemotherapy with carboplatin and paclitaxel by Dr. Alyse Tobar, stopped during second infusion due to reaction to paclitaxel  · Concurrent chemotherapy with cisplatin and etoposide 7/16/2019 by Dr. Alyse Tobar  · CT Chest 7/27/2019: There has been no significant change in size of the large right paratracheal mediastinal mass. There are many new areas of peripheral consolidation in the right upper lobe, which may represent some combination of progressive disease, posttreatment change, or infection. Attention on follow-up is needed. A previously seen right upper lobe nodule in the posterior segment appears to have decreased in size. The large right pleural effusion on this exam is significantly increased in size from prior. · CT A/P 7/29/2019: no metastatic disease in abdomen or pelvis  · MRI Brain 7/29/2019: no intracranial metastatic disease  · EBUS by Dr. Jonatan Kang 7/30/2019: Squamous cell, PDL1 QNS, insufficient tissue for molecular studies  · Thoracentesis 8/5/2019: cytology negative  · Initiated maintenance therapy with Durvalumab on 8/27/2019    History of Present Illness:   He continues on therapy with Durvalumab, tolerating this well. He reports that for the past several weeks he has had a productive cough, yellow suptum. Some dyspnea with this, especially when coughing. No fevers. No sick contacts. Staying isolated, avoiding people. Some chronic back pain, no new pain. PICC line doing ok. Continues eating well. Home health continues to come to his home and assists with the PICC line. He is unaccompanied today. He quit tobacco at diagnosis, though he was a light smoker (1-2 packs per week). PAST HISTORY: The following sections were reviewed and updated in the EMR as appropriate: PMH, SH, FH, Medications, Allergies. No Known Allergies     Review of Systems: A complete review of systems was obtained, reviewed, and scanned into the EMR. Pertinent findings reviewed above. Physical Exam:     There were no vitals taken for this visit. ECOG PS: 1    He had technical issues, was unable to establish a video connection due to his poor Internet connection. We proceeded with audio only. Due to this being a TeleHealth evaluation (During [facility]-31 public health emergency), many elements of the physical examination are unable to be assessed. Evaluation of the following organ systems was limited: Vitals/Constitutional/EENT/Resp/CV/GI//MS/Neuro/Skin/Heme-Lymph-Imm. Results:     Lab Results   Component Value Date/Time    WBC 3.6 (L) 05/05/2020 09:51 AM    HGB 12.9 05/05/2020 09:51 AM    HCT 39.1 05/05/2020 09:51 AM    PLATELET 305 (L) 35/90/8643 09:51 AM    MCV 72.5 (L) 05/05/2020 09:51 AM    ABS.  NEUTROPHILS 2.4 05/05/2020 09:51 AM     Lab Results   Component Value Date/Time    Sodium 138 05/05/2020 09:57 AM    Potassium 4.2 05/05/2020 09:57 AM    Chloride 104 05/05/2020 09:57 AM    CO2 27 05/05/2020 09:57 AM    Glucose 110 (H) 05/05/2020 09:57 AM    BUN 26 (H) 05/05/2020 09:57 AM    Creatinine 1.10 05/05/2020 09:57 AM    GFR est AA >60 05/05/2020 09:57 AM    GFR est non-AA >60 05/05/2020 09:57 AM    Calcium 9.1 05/05/2020 09:57 AM     Lab Results   Component Value Date/Time    Bilirubin, total 0.3 05/05/2020 09:57 AM    ALT (SGPT) 26 05/05/2020 09:57 AM    AST (SGOT) 26 05/05/2020 09:57 AM Alk. phosphatase 95 05/05/2020 09:57 AM    Protein, total 7.3 05/05/2020 09:57 AM    Albumin 3.8 05/05/2020 09:57 AM    Globulin 3.5 05/05/2020 09:57 AM     Lab Results   Component Value Date/Time    Reticulocyte count 1.7 08/27/2019 11:19 AM    Iron % saturation 44 08/27/2019 11:19 AM    TIBC 236 (L) 08/27/2019 11:19 AM    Ferritin 698 (H) 08/27/2019 11:19 AM    Vitamin B12 265 08/27/2019 11:19 AM    Folate 9.2 08/27/2019 11:19 AM    TSH 3.94 (H) 05/05/2020 09:57 AM    Lipase 136 07/27/2019 11:31 AM    Hep C  virus Ab Interp. NONREACTIVE 12/03/2019 11:22 AM     Lab Results   Component Value Date/Time    INR 1.0 12/03/2019 11:22 AM    aPTT 27.3 12/03/2019 11:22 AM    Fibrinogen 243 12/03/2019 11:22 AM         PET 8/21/2019:  1. Decreased size and activity of the mediastinal mass. 2. RUL nodular densities remain ametabolic. 3. No new finding. CT Chest 11/12/2019:  Stable exam by RECIST criteria. Increase in moderate right pleural effusion with medial right lower lobe airspace disease. Minimal T2, T4, and T6 superior endplate vertebral compression deformities are likely subacute/chronic. CT Chest 2/3/2020:  Discordant change. Decrease in size of right paratracheal node the increase in size of right upper lobe mass  Decrease in volume of right lung with increased right pleural effusion density  Occluded SVC with extensive collateralization as described  New T6 compression fracture    Bone scan 2/20/2020: There is minimal low-grade activity at T6 vertebral body. Remainder of the study is unremarkable. CT Chest 4/29/2020:  1. No significant change in the right upper lobe mass and right paratracheal  adenopathy. 2.  No significant change in the right pleural effusion right lower lobe volume  loss. 3.  Mild compression deformity of T6 is again noted.  There is suggestion of  increased sclerosis which may be related to healing    Assessment:   1) Non-small cell lung cancer  Stage IIIB  He has at least Stage IIIB disease, and has been treated with concurrent radiation and chemotherapy (cisplatin and etoposide). Repeat PET demonstrates a partial response to therapy. No definite evidence of distant metastatic disease at this time. Pleural effusion cytology negative x3. He is currently on maintenance immunotherapy with Durvalumab. He is tolerating therapy well so far with minimal toxicity noted. His recent CT is overall stable. Continue Durvalumab. He will be seen every other cycle of therapy. Reimage every 12 weeks. Unfortunately, his two biopsies have yielded scant material, insufficient for molecular studies or PDL1 testing. At progression, a repeat biopsy may be needed for NGS. 2) SVC syndrome  Monitor. Stable on imaging. 3) Pleural effusion  Cytology negative. Worsening on recent imaging, underwent repeat thoracentesis on 11/27/2019 and 2/14/2020 with negative cytology. 4) Poor IV access  With SVC syndrome, he cannot have a port or UE PICC. Currently has femoral PICC in place. This is working well. Home health for dressing changes. Consider removal once his durvalumab is completed. 5) Thrombocytopenia  Improved, but not resolved. Presumably secondary to his prior chemotherapy, but surprising that it is persisting. Additional lab work up 12/3/2019 unremarkable. Monitor for now. Could consider bone marrow biopsy if worsening significantly. 6) Anemia  Resolved. 7) Neuropathy, chemotherapy induced  Continue Cymbalta 30mg daily. 8) Cough  Previously nonproductive, but now changing, productive. Sounds somewhat like sinus drainage causing the cough. Will try an abx as well as flonase and see if this helps. 9) Compression fracture  Bone scan negative. Consider kyphoplasty vs radiation therapy if symptoms develop. Currently he is minimally symptomatic to site with mild pain/discomfort with elevation of right arm above head or twisting of upper torso.     DEXA showing osteopenia 3/17/2020. Recommend Calcium and Vit D replacement and weight bearing exercise as tolerated. 10) Emotional Well Being  No psychosocial concerns identified today. Patient has adequate support. Plan:     · Proceed with Durvalumab (10mg/kg) given every 2 weeks for one year  · Labs: CBC, CMP prior to each cycle, TSH every 6 weeks  · Continue cymbalta 30mg PO daily  · Z-pack  · Flonase  · Return to see us in 4 weeks      Signed By: Vera Munoz MD        I was in the office while conducting this encounter. The patient was at his home. Consent:  He and/or his healthcare decision maker is aware that this patient-initiated Telehealth encounter is a billable service, with coverage as determined by his insurance carrier. He is aware that he may receive a bill and has provided verbal consent to proceed: Yes    Pursuant to the emergency declaration under the [address] and the [facility], 1135 waiver authority and the [employer] Act, this Virtual  Visit was conducted, with patient's (and/or legal guardian's) consent, to reduce the patient's risk of exposure to COVID-19 and provide necessary medical care.

## 2020-05-19 ENCOUNTER — HOSPITAL ENCOUNTER (OUTPATIENT)
Dept: INFUSION THERAPY | Age: 45
Discharge: HOME | End: 2020-05-19
Payer: COMMERCIAL

## 2020-05-19 VITALS
DIASTOLIC BLOOD PRESSURE: 75 MMHG | SYSTOLIC BLOOD PRESSURE: 111 MMHG | WEIGHT: 195.7 LBS | HEART RATE: 68 BPM | BODY MASS INDEX: 28.99 KG/M2 | RESPIRATION RATE: 18 BRPM | TEMPERATURE: 97.5 F | HEIGHT: 69 IN

## 2020-05-19 DIAGNOSIS — C34.11 MALIGNANT NEOPLASM OF UPPER LOBE OF RIGHT LUNG (HCC): Primary | ICD-10-CM

## 2020-05-19 LAB
ALBUMIN SERPL-MCNC: 3.8 G/DL (ref 3.5–5)
ALBUMIN/GLOB SERPL: 1.1 {RATIO} (ref 1.1–2.2)
ALP SERPL-CCNC: 88 U/L (ref 45–117)
ALT SERPL-CCNC: 24 U/L (ref 12–78)
ANION GAP SERPL CALC-SCNC: 4 MMOL/L (ref 5–15)
AST SERPL-CCNC: 20 U/L (ref 15–37)
BASO+EOS+MONOS # BLD AUTO: 0.4 K/UL (ref 0.2–1.2)
BASO+EOS+MONOS NFR BLD AUTO: 14 % (ref 3.2–16.9)
BILIRUB SERPL-MCNC: 0.3 MG/DL (ref 0.2–1)
BUN SERPL-MCNC: 12 MG/DL (ref 6–20)
BUN/CREAT SERPL: 11 (ref 12–20)
CALCIUM SERPL-MCNC: 8.8 MG/DL (ref 8.5–10.1)
CHLORIDE SERPL-SCNC: 106 MMOL/L (ref 97–108)
CO2 SERPL-SCNC: 28 MMOL/L (ref 21–32)
CREAT SERPL-MCNC: 1.06 MG/DL (ref 0.7–1.3)
DIFFERENTIAL METHOD BLD: ABNORMAL
ERYTHROCYTE [DISTWIDTH] IN BLOOD BY AUTOMATED COUNT: 16.4 % (ref 11.8–15.8)
GLOBULIN SER CALC-MCNC: 3.5 G/DL (ref 2–4)
GLUCOSE SERPL-MCNC: 102 MG/DL (ref 65–100)
HCT VFR BLD AUTO: 38.3 % (ref 36.6–50.3)
HGB BLD-MCNC: 12.6 G/DL (ref 12.1–17)
LYMPHOCYTES # BLD: 0.4 K/UL (ref 0.8–3.5)
LYMPHOCYTES NFR BLD: 16 % (ref 12–49)
MCH RBC QN AUTO: 23.8 PG (ref 26–34)
MCHC RBC AUTO-ENTMCNC: 32.9 G/DL (ref 30–36.5)
MCV RBC AUTO: 72.3 FL (ref 80–99)
NEUTS SEG # BLD: 2 K/UL (ref 1.8–8)
NEUTS SEG NFR BLD: 70 % (ref 32–75)
PLATELET # BLD AUTO: 137 K/UL (ref 150–400)
POTASSIUM SERPL-SCNC: 4.2 MMOL/L (ref 3.5–5.1)
PROT SERPL-MCNC: 7.3 G/DL (ref 6.4–8.2)
RBC # BLD AUTO: 5.3 M/UL (ref 4.1–5.7)
SODIUM SERPL-SCNC: 138 MMOL/L (ref 136–145)
WBC # BLD AUTO: 2.8 K/UL (ref 4.1–11.1)

## 2020-05-19 PROCEDURE — 74011000258 HC RX REV CODE- 258: Performed by: NURSE PRACTITIONER

## 2020-05-19 PROCEDURE — 74011250636 HC RX REV CODE- 250/636: Performed by: NURSE PRACTITIONER

## 2020-05-19 PROCEDURE — 85025 COMPLETE CBC W/AUTO DIFF WBC: CPT

## 2020-05-19 PROCEDURE — 36415 COLL VENOUS BLD VENIPUNCTURE: CPT

## 2020-05-19 PROCEDURE — 96413 CHEMO IV INFUSION 1 HR: CPT

## 2020-05-19 PROCEDURE — 80053 COMPREHEN METABOLIC PANEL: CPT

## 2020-05-19 RX ORDER — SODIUM CHLORIDE 9 MG/ML
25 INJECTION, SOLUTION INTRAVENOUS CONTINUOUS
Status: DISPENSED | OUTPATIENT
Start: 2020-05-19 | End: 2020-05-19

## 2020-05-19 RX ORDER — SODIUM CHLORIDE 0.9 % (FLUSH) 0.9 %
10 SYRINGE (ML) INJECTION AS NEEDED
Status: DISPENSED | OUTPATIENT
Start: 2020-05-19 | End: 2020-05-19

## 2020-05-19 RX ORDER — HEPARIN 100 UNIT/ML
300-500 SYRINGE INTRAVENOUS AS NEEDED
Status: ACTIVE | OUTPATIENT
Start: 2020-05-19 | End: 2020-05-19

## 2020-05-19 RX ORDER — SODIUM CHLORIDE 9 MG/ML
10 INJECTION INTRAMUSCULAR; INTRAVENOUS; SUBCUTANEOUS AS NEEDED
Status: ACTIVE | OUTPATIENT
Start: 2020-05-19 | End: 2020-05-19

## 2020-05-19 RX ADMIN — SODIUM CHLORIDE 10 ML: 9 INJECTION INTRAMUSCULAR; INTRAVENOUS; SUBCUTANEOUS at 12:49

## 2020-05-19 RX ADMIN — SODIUM CHLORIDE 810 MG: 9 INJECTION, SOLUTION INTRAVENOUS at 11:45

## 2020-05-19 RX ADMIN — Medication 500 UNITS: at 12:50

## 2020-05-19 RX ADMIN — SODIUM CHLORIDE 10 ML: 9 INJECTION INTRAMUSCULAR; INTRAVENOUS; SUBCUTANEOUS at 12:50

## 2020-05-19 RX ADMIN — SODIUM CHLORIDE 25 ML/HR: 900 INJECTION, SOLUTION INTRAVENOUS at 11:39

## 2020-05-19 RX ADMIN — Medication 500 UNITS: at 12:49

## 2020-05-20 ENCOUNTER — VIRTUAL VISIT (OUTPATIENT)
Dept: ONCOLOGY | Age: 45
End: 2020-05-20

## 2020-05-20 DIAGNOSIS — R05.9 COUGH: Primary | ICD-10-CM

## 2020-05-20 RX ORDER — AZITHROMYCIN 250 MG/1
250 TABLET, FILM COATED ORAL SEE ADMIN INSTRUCTIONS
Qty: 6 TAB | Refills: 0 | Status: SHIPPED | OUTPATIENT
Start: 2020-05-20 | End: 2020-05-25

## 2020-05-20 NOTE — PROGRESS NOTES
Tristen Whaley is a 38 y.o. male follow up for lung cancer. 1. Have you been to the ER, urgent care clinic since your last visit? Hospitalized since your last visit?no    2. Have you seen or consulted any other health care providers outside of the [address] [place] [address] since your last visit? Include any pap smears or colon screening.  no

## 2020-06-02 ENCOUNTER — HOSPITAL ENCOUNTER (OUTPATIENT)
Dept: INFUSION THERAPY | Age: 45
Discharge: HOME | End: 2020-06-02
Payer: COMMERCIAL

## 2020-06-02 VITALS
RESPIRATION RATE: 18 BRPM | HEIGHT: 69 IN | WEIGHT: 194.8 LBS | DIASTOLIC BLOOD PRESSURE: 82 MMHG | TEMPERATURE: 97.7 F | BODY MASS INDEX: 28.85 KG/M2 | SYSTOLIC BLOOD PRESSURE: 143 MMHG | HEART RATE: 61 BPM

## 2020-06-02 DIAGNOSIS — C34.11 MALIGNANT NEOPLASM OF UPPER LOBE OF RIGHT LUNG (HCC): Primary | ICD-10-CM

## 2020-06-02 LAB
ALBUMIN SERPL-MCNC: 3.6 G/DL (ref 3.5–5)
ALBUMIN/GLOB SERPL: 1.1 {RATIO} (ref 1.1–2.2)
ALP SERPL-CCNC: 96 U/L (ref 45–117)
ALT SERPL-CCNC: 31 U/L (ref 12–78)
ANION GAP SERPL CALC-SCNC: 4 MMOL/L (ref 5–15)
AST SERPL-CCNC: 28 U/L (ref 15–37)
BASOPHILS # BLD: 0 K/UL (ref 0–0.1)
BASOPHILS NFR BLD: 1 % (ref 0–1)
BILIRUB SERPL-MCNC: 0.3 MG/DL (ref 0.2–1)
BUN SERPL-MCNC: 16 MG/DL (ref 6–20)
BUN/CREAT SERPL: 16 (ref 12–20)
CALCIUM SERPL-MCNC: 8.7 MG/DL (ref 8.5–10.1)
CHLORIDE SERPL-SCNC: 110 MMOL/L (ref 97–108)
CO2 SERPL-SCNC: 26 MMOL/L (ref 21–32)
CREAT SERPL-MCNC: 1.02 MG/DL (ref 0.7–1.3)
DIFFERENTIAL METHOD BLD: ABNORMAL
EOSINOPHIL # BLD: 0.1 K/UL (ref 0–0.4)
EOSINOPHIL NFR BLD: 4 % (ref 0–7)
ERYTHROCYTE [DISTWIDTH] IN BLOOD BY AUTOMATED COUNT: 14.7 % (ref 11.5–14.5)
GLOBULIN SER CALC-MCNC: 3.4 G/DL (ref 2–4)
GLUCOSE SERPL-MCNC: 97 MG/DL (ref 65–100)
HCT VFR BLD AUTO: 36.1 % (ref 36.6–50.3)
HGB BLD-MCNC: 11.7 G/DL (ref 12.1–17)
IMM GRANULOCYTES # BLD AUTO: 0 K/UL (ref 0–0.04)
IMM GRANULOCYTES NFR BLD AUTO: 0 % (ref 0–0.5)
LYMPHOCYTES # BLD: 0.3 K/UL (ref 0.8–3.5)
LYMPHOCYTES NFR BLD: 14 % (ref 12–49)
MCH RBC QN AUTO: 23.9 PG (ref 26–34)
MCHC RBC AUTO-ENTMCNC: 32.4 G/DL (ref 30–36.5)
MCV RBC AUTO: 73.7 FL (ref 80–99)
MONOCYTES # BLD: 0.3 K/UL (ref 0–1)
MONOCYTES NFR BLD: 14 % (ref 5–13)
NEUTS SEG # BLD: 1.7 K/UL (ref 1.8–8)
NEUTS SEG NFR BLD: 67 % (ref 32–75)
NRBC # BLD: 0 K/UL (ref 0–0.01)
NRBC BLD-RTO: 0 PER 100 WBC
PLATELET # BLD AUTO: 104 K/UL (ref 150–400)
PMV BLD AUTO: 9.1 FL (ref 8.9–12.9)
POTASSIUM SERPL-SCNC: 3.9 MMOL/L (ref 3.5–5.1)
PROT SERPL-MCNC: 7 G/DL (ref 6.4–8.2)
RBC # BLD AUTO: 4.9 M/UL (ref 4.1–5.7)
RBC MORPH BLD: ABNORMAL
RBC MORPH BLD: ABNORMAL
SODIUM SERPL-SCNC: 140 MMOL/L (ref 136–145)
WBC # BLD AUTO: 2.4 K/UL (ref 4.1–11.1)

## 2020-06-02 PROCEDURE — 74011250636 HC RX REV CODE- 250/636: Performed by: NURSE PRACTITIONER

## 2020-06-02 PROCEDURE — 96413 CHEMO IV INFUSION 1 HR: CPT

## 2020-06-02 PROCEDURE — 36415 COLL VENOUS BLD VENIPUNCTURE: CPT

## 2020-06-02 PROCEDURE — 85025 COMPLETE CBC W/AUTO DIFF WBC: CPT

## 2020-06-02 PROCEDURE — 80053 COMPREHEN METABOLIC PANEL: CPT

## 2020-06-02 PROCEDURE — 74011000258 HC RX REV CODE- 258: Performed by: NURSE PRACTITIONER

## 2020-06-02 RX ORDER — SODIUM CHLORIDE 9 MG/ML
10 INJECTION INTRAMUSCULAR; INTRAVENOUS; SUBCUTANEOUS AS NEEDED
Status: ACTIVE | OUTPATIENT
Start: 2020-06-02 | End: 2020-06-02

## 2020-06-02 RX ORDER — SODIUM CHLORIDE 9 MG/ML
25 INJECTION, SOLUTION INTRAVENOUS CONTINUOUS
Status: DISPENSED | OUTPATIENT
Start: 2020-06-02 | End: 2020-06-02

## 2020-06-02 RX ORDER — SODIUM CHLORIDE 0.9 % (FLUSH) 0.9 %
10 SYRINGE (ML) INJECTION AS NEEDED
Status: DISPENSED | OUTPATIENT
Start: 2020-06-02 | End: 2020-06-02

## 2020-06-02 RX ORDER — HEPARIN 100 UNIT/ML
300-500 SYRINGE INTRAVENOUS AS NEEDED
Status: ACTIVE | OUTPATIENT
Start: 2020-06-02 | End: 2020-06-02

## 2020-06-02 RX ADMIN — Medication 500 UNITS: at 13:26

## 2020-06-02 RX ADMIN — Medication 10 ML: at 13:26

## 2020-06-02 RX ADMIN — SODIUM CHLORIDE 25 ML/HR: 900 INJECTION, SOLUTION INTRAVENOUS at 11:55

## 2020-06-02 RX ADMIN — SODIUM CHLORIDE 810 MG: 9 INJECTION, SOLUTION INTRAVENOUS at 11:55

## 2020-06-02 NOTE — PROGRESS NOTES
[facility] VISIT NOTE  Date: 2020    Name: Frankie Villalba    MRN: [id number]         : 1975    0955  Mr. Tia Simmonds Arrived ambulatory and in no distress for C21D1 of Imfinzi Regimen. Assessment was completed, no acute issues at this time, no new complaints voiced. Right femoral double lumen PICC with positive blood return noted, labs drawn and sent for processing. Patient had dressing for PICC line changed 2020 by home health, RN noticed blood still present in lines of both lumens. RN educated patient to make sure that all lines are completely flushed to avoid clotted of the PICC. Patient verbalized understanding. Chemotherapy Flowsheet 2020   Cycle C21D1   Date 2020   Drug / Regimen Imfinzi   Pre Meds -   Notes given       Vitals:  Patient Vitals for the past 12 hrs:   Temp Pulse Resp BP   20 1326 97.7 °F (36.5 °C) 61 18 143/82   20 0957 97.6 °F (36.4 °C) 67 -- 121/80        Lab results were obtained and reviewed. Recent Results (from the past 12 hour(s))   METABOLIC PANEL, COMPREHENSIVE    Collection Time: 20 10:26 AM   Result Value Ref Range    Sodium 140 136 - 145 mmol/L    Potassium 3.9 3.5 - 5.1 mmol/L    Chloride 110 (H) 97 - 108 mmol/L    CO2 26 21 - 32 mmol/L    Anion gap 4 (L) 5 - 15 mmol/L    Glucose 97 65 - 100 mg/dL    BUN 16 6 - 20 MG/DL    Creatinine 1.02 0.70 - 1.30 MG/DL    BUN/Creatinine ratio 16 12 - 20      GFR est AA >60 >60 ml/min/1.73m2    GFR est non-AA >60 >60 ml/min/1.73m2    Calcium 8.7 8.5 - 10.1 MG/DL    Bilirubin, total 0.3 0.2 - 1.0 MG/DL    ALT (SGPT) 31 12 - 78 U/L    AST (SGOT) 28 15 - 37 U/L    Alk. phosphatase 96 45 - 117 U/L    Protein, total 7.0 6.4 - 8.2 g/dL    Albumin 3.6 3.5 - 5.0 g/dL    Globulin 3.4 2.0 - 4.0 g/dL    A-G Ratio 1.1 1.1 - 2.2     CBC WITH AUTOMATED DIFF    Collection Time: 20 10:26 AM   Result Value Ref Range    WBC 2.4 (L) 4.1 - 11.1 K/uL    RBC 4.90 4. 10 - 5.70 M/uL    HGB 11.7 (L) 12.1 - 17.0 g/dL    HCT 36.1 (L) 36.6 - 50.3 %    MCV 73.7 (L) 80.0 - 99.0 FL    MCH 23.9 (L) 26.0 - 34.0 PG    MCHC 32.4 30.0 - 36.5 g/dL    RDW 14.7 (H) 11.5 - 14.5 %    PLATELET 471 (L) 005 - 400 K/uL    MPV 9.1 8.9 - 12.9 FL    NRBC 0.0 0  WBC    ABSOLUTE NRBC 0.00 0.00 - 0.01 K/uL    NEUTROPHILS 67 32 - 75 %    LYMPHOCYTES 14 12 - 49 %    MONOCYTES 14 (H) 5 - 13 %    EOSINOPHILS 4 0 - 7 %    BASOPHILS 1 0 - 1 %    IMMATURE GRANULOCYTES 0 0.0 - 0.5 %    ABS. NEUTROPHILS 1.7 (L) 1.8 - 8.0 K/UL    ABS. LYMPHOCYTES 0.3 (L) 0.8 - 3.5 K/UL    ABS. MONOCYTES 0.3 0.0 - 1.0 K/UL    ABS. EOSINOPHILS 0.1 0.0 - 0.4 K/UL    ABS. BASOPHILS 0.0 0.0 - 0.1 K/UL    ABS. IMM. GRANS. 0.0 0.00 - 0.04 K/UL    DF SMEAR SCANNED      RBC COMMENTS HYPOCHROMIA  1+        RBC COMMENTS MICROCYTOSIS  1+           Medications received:  Medications Administered     0.9% sodium chloride infusion     Admin Date  06/02/2020 Action  New Bag Dose  25 mL/hr Rate  25 mL/hr Route  IntraVENous Administered By  Shahida Damon RN          durvalumab (IMFINZI) 810 mg in 0.9% sodium chloride 100 mL, overfill volume 10 mL IVPB     Admin Date  06/02/2020 Action  New Bag Dose  810 mg Rate  126.2 mL/hr Route  IntraVENous Administered By  Shahida Damon RN          heparin (porcine) pf 300-500 Units     Admin Date  06/02/2020 Action  Given Dose  500 Units Route  InterCATHeter Administered By  Herman Herrera RN          saline peripheral flush soln 10 mL     Admin Date  06/02/2020 Action  Given Dose  10 mL Route  InterCATHeter Administered By  Herman Herrera, MARK                Mr. Bell Kessler tolerated treatment well and was discharged from [place] [address] in stable condition at 1330. PICC flushed,heparinized  & new Curos caps applied per protocol. He is to return on 6/16/2020 at 1000 for his next appointment.     Gregoria Taylor RN  June 2, 2020    Future Appointments:  Future Appointments   Date Time Provider Aquilino Steel   6/16/2020 10:00 AM [facility]   6/16/2020 10:30 [facility]   6/30/2020  9:00 AM SS INF6 [facility]   7/14/2020  9:00 AM SS INF6 CH1 >4H [facility] Devendra Jones

## 2020-06-09 ENCOUNTER — APPOINTMENT (OUTPATIENT)
Dept: CT IMAGING | Age: 45
End: 2020-06-09
Attending: STUDENT IN AN ORGANIZED HEALTH CARE EDUCATION/TRAINING PROGRAM
Payer: COMMERCIAL

## 2020-06-09 ENCOUNTER — HOSPITAL ENCOUNTER (EMERGENCY)
Age: 45
Discharge: HOME | End: 2020-06-09
Attending: STUDENT IN AN ORGANIZED HEALTH CARE EDUCATION/TRAINING PROGRAM | Admitting: STUDENT IN AN ORGANIZED HEALTH CARE EDUCATION/TRAINING PROGRAM
Payer: COMMERCIAL

## 2020-06-09 ENCOUNTER — APPOINTMENT (OUTPATIENT)
Dept: GENERAL RADIOLOGY | Age: 45
End: 2020-06-09
Attending: STUDENT IN AN ORGANIZED HEALTH CARE EDUCATION/TRAINING PROGRAM
Payer: COMMERCIAL

## 2020-06-09 VITALS
BODY MASS INDEX: 28.73 KG/M2 | SYSTOLIC BLOOD PRESSURE: 149 MMHG | TEMPERATURE: 98.3 F | HEIGHT: 69 IN | WEIGHT: 194 LBS | RESPIRATION RATE: 20 BRPM | HEART RATE: 83 BPM | OXYGEN SATURATION: 94 % | DIASTOLIC BLOOD PRESSURE: 93 MMHG

## 2020-06-09 DIAGNOSIS — C34.90 MALIGNANT NEOPLASM OF LUNG, UNSPECIFIED LATERALITY, UNSPECIFIED PART OF LUNG (HCC): ICD-10-CM

## 2020-06-09 DIAGNOSIS — R04.2 HEMOPTYSIS: Primary | ICD-10-CM

## 2020-06-09 DIAGNOSIS — J90 PLEURAL EFFUSION: ICD-10-CM

## 2020-06-09 LAB
ABO + RH BLD: NORMAL
ALBUMIN SERPL-MCNC: 4.1 G/DL (ref 3.5–5)
ALBUMIN/GLOB SERPL: 1.1 {RATIO} (ref 1.1–2.2)
ALP SERPL-CCNC: 101 U/L (ref 45–117)
ALT SERPL-CCNC: 23 U/L (ref 12–78)
ANION GAP SERPL CALC-SCNC: 3 MMOL/L (ref 5–15)
AST SERPL-CCNC: 22 U/L (ref 15–37)
BASOPHILS # BLD: 0 K/UL (ref 0–0.1)
BASOPHILS NFR BLD: 0 % (ref 0–1)
BILIRUB SERPL-MCNC: 0.3 MG/DL (ref 0.2–1)
BLOOD GROUP ANTIBODIES SERPL: NORMAL
BUN SERPL-MCNC: 20 MG/DL (ref 6–20)
BUN/CREAT SERPL: 15 (ref 12–20)
CALCIUM SERPL-MCNC: 8.8 MG/DL (ref 8.5–10.1)
CHLORIDE SERPL-SCNC: 109 MMOL/L (ref 97–108)
CO2 SERPL-SCNC: 28 MMOL/L (ref 21–32)
COMMENT, HOLDF: NORMAL
CREAT SERPL-MCNC: 1.32 MG/DL (ref 0.7–1.3)
D DIMER PPP FEU-MCNC: 2.45 MG/L FEU (ref 0–0.65)
DIFFERENTIAL METHOD BLD: ABNORMAL
EOSINOPHIL # BLD: 0.2 K/UL (ref 0–0.4)
EOSINOPHIL NFR BLD: 5 % (ref 0–7)
ERYTHROCYTE [DISTWIDTH] IN BLOOD BY AUTOMATED COUNT: 16.1 % (ref 11.5–14.5)
GLOBULIN SER CALC-MCNC: 3.6 G/DL (ref 2–4)
GLUCOSE SERPL-MCNC: 86 MG/DL (ref 65–100)
HCT VFR BLD AUTO: 39.8 % (ref 36.6–50.3)
HGB BLD-MCNC: 13.2 G/DL (ref 12.1–17)
IMM GRANULOCYTES # BLD AUTO: 0 K/UL (ref 0–0.04)
IMM GRANULOCYTES NFR BLD AUTO: 0 % (ref 0–0.5)
LYMPHOCYTES # BLD: 0.4 K/UL (ref 0.8–3.5)
LYMPHOCYTES NFR BLD: 13 % (ref 12–49)
MCH RBC QN AUTO: 24.7 PG (ref 26–34)
MCHC RBC AUTO-ENTMCNC: 33.2 G/DL (ref 30–36.5)
MCV RBC AUTO: 74.5 FL (ref 80–99)
MONOCYTES # BLD: 0.5 K/UL (ref 0–1)
MONOCYTES NFR BLD: 17 % (ref 5–13)
NEUTS SEG # BLD: 1.9 K/UL (ref 1.8–8)
NEUTS SEG NFR BLD: 65 % (ref 32–75)
NRBC # BLD: 0 K/UL (ref 0–0.01)
NRBC BLD-RTO: 0 PER 100 WBC
PLATELET # BLD AUTO: 137 K/UL (ref 150–400)
PMV BLD AUTO: 9.2 FL (ref 8.9–12.9)
POTASSIUM SERPL-SCNC: 3.8 MMOL/L (ref 3.5–5.1)
PROT SERPL-MCNC: 7.7 G/DL (ref 6.4–8.2)
RBC # BLD AUTO: 5.34 M/UL (ref 4.1–5.7)
RBC MORPH BLD: ABNORMAL
RBC MORPH BLD: ABNORMAL
SAMPLES BEING HELD,HOLD: NORMAL
SODIUM SERPL-SCNC: 140 MMOL/L (ref 136–145)
SPECIMEN EXP DATE BLD: NORMAL
WBC # BLD AUTO: 3 K/UL (ref 4.1–11.1)

## 2020-06-09 PROCEDURE — 74011636320 HC RX REV CODE- 636/320: Performed by: RADIOLOGY

## 2020-06-09 PROCEDURE — 36415 COLL VENOUS BLD VENIPUNCTURE: CPT

## 2020-06-09 PROCEDURE — 85025 COMPLETE CBC W/AUTO DIFF WBC: CPT

## 2020-06-09 PROCEDURE — 86900 BLOOD TYPING SEROLOGIC ABO: CPT

## 2020-06-09 PROCEDURE — 99285 EMERGENCY DEPT VISIT HI MDM: CPT

## 2020-06-09 PROCEDURE — 93005 ELECTROCARDIOGRAM TRACING: CPT

## 2020-06-09 PROCEDURE — 80053 COMPREHEN METABOLIC PANEL: CPT

## 2020-06-09 PROCEDURE — 71045 X-RAY EXAM CHEST 1 VIEW: CPT

## 2020-06-09 PROCEDURE — 71275 CT ANGIOGRAPHY CHEST: CPT

## 2020-06-09 PROCEDURE — 85379 FIBRIN DEGRADATION QUANT: CPT

## 2020-06-09 RX ADMIN — IOPAMIDOL 100 ML: 755 INJECTION, SOLUTION INTRAVENOUS at 12:04

## 2020-06-09 NOTE — DISCHARGE INSTRUCTIONS
Patient Education        Coughing Up Blood: Care Instructions  Your Care Instructions     Coughing up blood can be frightening. The blood may come from the lungs, stomach, or throat. You may cough up a few thin streaks of bright red blood. This probably is not a cause for concern. Coughing up large amounts of bright red blood or rust-colored mucus from the lungs can be a symptom of a more serious condition. Several conditions can make you cough up blood from the lungs. These include bronchitis and pneumonia, or more serious problems such as cancer or a blood clot in the lung (pulmonary embolus). Depending on what is causing your cough, it may go away after the illness is treated. Your doctor may tell you not to suppress the cough with cough medicine if it is better for you to cough up the blood and spit it out. Follow-up care is a key part of your treatment and safety. Be sure to make and go to all appointments, and call your doctor if you are having problems. It's also a good idea to know your test results and keep a list of the medicines you take. How can you care for yourself at home? · Make a note of when and for how long you cough up blood. Also note if you are coughing up spit with a small amount of blood, or mostly blood. Take this information to your next appointment with your doctor. · Increase your fluid intake to at least 8 to 10 glasses of water every day. This helps keep the mucus thin and helps you cough it up. If you have kidney, heart, or liver disease and have to limit fluids, talk with your doctor before you increase your fluid intake. · If your doctor prescribed antibiotics, take them as directed. Do not stop taking them just because you feel better. You need to take the full course of antibiotics. · Do not take cough medicine without your doctor's guidance. They can cause problems if you have other health problems. They can also interact with other medicine.   · Do not smoke or use other forms of tobacco, especially while you have a cough. Smoking can make coughing worse. If you need help quitting, talk to your doctor about stop-smoking programs and medicines. These can increase your chances of quitting for good. · Avoid exposure to smoke, dust, or other pollutants. When should you call for help? [id number] anytime you think you may need emergency care. For example, call if:  · You have sudden chest pain and shortness of breath. · You have severe trouble breathing. Call your doctor now or seek immediate medical care if:  · You have wheezing and difficulty breathing. · You are dizzy or lightheaded, or you feel like you may faint. · You cough up clots of blood. Watch closely for changes in your health, and be sure to contact your doctor if:  · You do not get better as expected. · You have any new symptoms, such as chest pain with difficulty breathing or a fever. Where can you learn more? Go to http://jeanette-venus.info/  Enter M550 in the search box to learn more about \"Coughing Up Blood: Care Instructions. \"  Current as of: June 26, 2019               Content Version: 12.5  © 7494-6460 [employer]. Care instructions adapted under license by [employer] (which disclaims liability or warranty for this information). If you have questions about a medical condition or this instruction, always ask your healthcare professional. [address] any warranty or liability for your use of this information. Patient Education        Lung Cancer: Care Instructions  Your Care Instructions     Lung cancer occurs when abnormal cells grow out of control in the lung. Lung cancer can start anywhere in the lungs and spread to other parts of the body. Treatment is based on the type and stage of lung cancer and other factors, such as your overall health. Treatment may include surgery, radiation therapy, or chemotherapy.  It may also include immunotherapy or targeted therapy. Being treated for cancer can weaken your body, and you may feel very tired. Home treatment and certain medicines can help you feel better. Finding out that you have cancer is scary. You may feel many emotions and may need some help coping. Seek out family, friends, and counselors for support. You also can do things at home to make yourself feel better while you go through treatment. Call the [employer] (0-101.785.6371) or visit its website at [address] for more information. Follow-up care is a key part of your treatment and safety. Be sure to make and go to all appointments, and call your doctor if you are having problems. It's also a good idea to know your test results and keep a list of the medicines you take. How can you care for yourself at home? · Take your medicines exactly as prescribed. Call your doctor if you think you are having a problem with your medicine. You will get more details on the specific medicines your doctor prescribes. · Follow your doctor's instructions to relieve pain. Use pain medicine when you first feel pain, before it becomes severe. Taking pain medicines regularly is often the best way to keep pain under control. · Eat healthy food. If you do not feel like eating, try to eat food that has protein and extra calories to keep up your strength and prevent weight loss. Drink liquid meal replacements for extra calories and protein. Try to eat your main meal early. Eating smaller portions more often may help as well. · Get some physical activity every day, but do not get too tired. Keep doing the hobbies you enjoy as your energy allows. · Do not smoke. Smoking can make your cancer symptoms worse. If you need help quitting, talk to your doctor about stop-smoking programs and medicines. These can increase your chances of quitting for good. · If you use oxygen, do not smoke, light a cigarette, or use a flame while your oxygen is on. Smoking while using oxygen can lead to fire and even explosion. · If you have nausea, try to eat several small meals a day. When you feel better, eat clear soups and mild foods until all symptoms are gone for 12 to 48 hours. Other good choices include dry toast, crackers, cooked cereal, and gelatin dessert, such as Jell-O.  · If you are vomiting or have diarrhea:  ? Drink plenty of fluids (enough so that your urine is light yellow or clear like water) to prevent dehydration. Choose water and other caffeine-free clear liquids. If you have kidney, heart, or liver disease and have to limit fluids, talk with your doctor before you increase the amount of fluids you drink. ? When you are able to eat, try clear soups, mild foods, and liquids until all symptoms are gone for 12 to 48 hours. Other good choices include dry toast, crackers, cooked cereal, and gelatin dessert, such as Jell-O.  · Take steps to control your stress and workload. Learn relaxation techniques. ? Share your feelings. Stress and tension affect our emotions. By expressing your feelings to others, you may be able to understand and cope with them. ? Consider joining a support group. Talking about a problem with your spouse, a good friend, or other people with similar problems is a good way to reduce tension and stress. ? Express yourself with art. Try writing, crafts, dance, or art to relieve stress. Some dance, writing, or art groups may be available just for people who have cancer. ? Be kind to your body and mind. Getting enough sleep, eating a healthy diet, and taking time to do things you enjoy can contribute to an overall feeling of balance in your life and help reduce stress. ? Get help if you need it. Discuss your concerns with your doctor or counselor. · If you have not already done so, prepare a list of advance directives.  Advance directives are instructions to your doctor and family members about what kind of care you want if you become unable to speak or express yourself. When should you call for help? [id number] anytime you think you may need emergency care. For example, call if:  · You passed out (lost consciousness). · You have severe trouble breathing. · You cough up a lot of blood. Call your doctor now or seek immediate medical care if:  · You have a fever. · You are short of breath. · You have new or worse pain. · You have a new or worse cough. · You think you have an infection. Watch closely for changes in your health, and be sure to contact your doctor if:  · You do not get better as expected. Where can you learn more? Go to http://jeanette-venus.info/  Enter D847 in the search box to learn more about \"Lung Cancer: Care Instructions. \"  Current as of: August 22, 2019               Content Version: 12.5  © 9829-5697 [employer]. Care instructions adapted under license by [employer] (which disclaims liability or warranty for this information). If you have questions about a medical condition or this instruction, always ask your healthcare professional. [address] any warranty or liability for your use of this information.

## 2020-06-09 NOTE — ED TRIAGE NOTES
Pt reports cough with bloody sputum that that started last night. Pt reports SOB that has been on going since he was diagnosed with Lung CA. Pt states \"my throat feels raw and I can hear myself wheeze at night. \"  Pt denies fevers, chest pain.

## 2020-06-09 NOTE — ED PROVIDER NOTES
Patient is a 50-year-old male with a history of lung cancer, on concurrent radiation and chemotherapy, followed by Dr. Eliana Coppola, who presents the emergency department chief complaint of hemoptysis. Patient notes he has had intermittent but persistent productive cough, typically of yellow sputum. Last night, he noticed blood in the sputum. Quantifies this as probably a tablespoon of bright red blood. This morning he woke up and coughed up a blood clot, small size. Decided to present for further evaluation given these new symptoms. Reports continued shortness of breath ever since his diagnosis of lung cancer. Denies any anticoagulation use, history of PE, leg pain or swelling, chest discomfort, fever, sick contacts. No other complaints today. Shortness of Breath   Associated symptoms include cough (with hemoptysis). Pertinent negatives include no fever, no headaches, no chest pain, no vomiting, no abdominal pain and no leg swelling. Cough   Associated symptoms include shortness of breath. Pertinent negatives include no chest pain, no headaches and no vomiting. Past Medical History:   Diagnosis Date    Anemia, iron deficiency     Aphagia     2/2 radiation    Hypertension     Lung cancer ([facility] [address] 7/27/2019    Pneumonia involving right lung        No past surgical history on file.       Family History:   Problem Relation Age of Onset    Cancer Maternal Grandmother         ovarian       Social History     Socioeconomic History    Marital status: SINGLE     Spouse name: Not on file    Number of children: Not on file    Years of education: Not on file    Highest education level: Not on file   Occupational History    Not on file   Social Needs    Financial resource strain: Not on file    Food insecurity     Worry: Not on file     Inability: Not on file    Transportation needs     Medical: Not on file     Non-medical: Not on file   Tobacco Use    Smoking status: Former Smoker     Packs/day: 0.25 Years: 9.00     Pack years: 2.25     Last attempt to quit: 2019     Years since quittin.2    Smokeless tobacco: Never Used    Tobacco comment: cigar smoking 5-6 months and quit   Substance and Sexual Activity    Alcohol use: Not Currently    Drug use: Not on file    Sexual activity: Not Currently   Lifestyle    Physical activity     Days per week: Not on file     Minutes per session: Not on file    Stress: Not on file   Relationships    Social connections     Talks on phone: Not on file     Gets together: Not on file     Attends [employer] service: Not on file     Active member of club or organization: Not on file     Attends meetings of clubs or organizations: Not on file     Relationship status: Not on file    Intimate partner violence     Fear of current or ex partner: Not on file     Emotionally abused: Not on file     Physically abused: Not on file     Forced sexual activity: Not on file   Other Topics Concern    Not on file   Social History Narrative    Not on file         ALLERGIES: Patient has no known allergies. Review of Systems   Constitutional: Negative for fever. Respiratory: Positive for cough (with hemoptysis) and shortness of breath. Cardiovascular: Negative for chest pain and leg swelling. Gastrointestinal: Negative for abdominal pain and vomiting. Neurological: Negative for syncope and headaches. All other systems reviewed and are negative. Vitals:    20 1036   BP: (!) 128/91   Pulse: (!) 102   Resp: 16   Temp: 98.3 °F (36.8 °C)   SpO2: 100%   Weight: 88 kg (194 lb)   Height: 5' 9\" (1.753 m)            Physical Exam  Vitals signs reviewed. Constitutional:       General: He is not in acute distress. Appearance: He is well-developed. HENT:      Head: Normocephalic and atraumatic. Eyes:      Conjunctiva/sclera: Conjunctivae normal.   Neck:      Musculoskeletal: Normal range of motion and neck supple.    Cardiovascular:      Rate and Rhythm: Regular rhythm. Tachycardia present. Pulmonary:      Effort: Pulmonary effort is normal. No respiratory distress. Abdominal:      General: Abdomen is flat. There is no distension. Musculoskeletal:      Right lower leg: No edema. Skin:     General: Skin is warm and dry. Neurological:      General: No focal deficit present. Mental Status: He is alert and oriented to person, place, and time. Motor: No abnormal muscle tone. [facility]       Procedures    Assessment and plan: This is a 54-year-old male with known lung cancer, here with new hemoptysis. This is likely related to cancer diagnosis. Will obtain repeat CT imaging to look for progression or other cause such as PE, pneumonia, etc. disposition depends on results and clinical course. EKG interpretation: 11:10  Rhythm: normal sinus rhythm; and regular . Rate (approx.): 94; Axis: normal; Intervals: normal ; ST/T wave: T wave inverted, no STEMI; EKG documented and interpreted by Magdy Raymond MD, ED MD.    1:40 PM  Spoke with Dr. Chetna Ospina. We briefly discussed patient's H&P as well as available results. He thinks patient can be discharged safely with outpatient follow-up in his office, also requests referral to pulmonology. He has seen Dr. Med Gomez in the past.  Should have patient call his office if symptoms continue. [address] stabilized today.

## 2020-06-10 ENCOUNTER — PATIENT OUTREACH (OUTPATIENT)
Dept: FAMILY MEDICINE CLINIC | Age: 45
End: 2020-06-10

## 2020-06-10 PROBLEM — D61.818 PANCYTOPENIA (HCC): Status: RESOLVED | Noted: 2019-07-27 | Resolved: 2020-06-10

## 2020-06-10 LAB
ATRIAL RATE: 94 BPM
CALCULATED P AXIS, ECG09: 25 DEGREES
CALCULATED R AXIS, ECG10: 13 DEGREES
CALCULATED T AXIS, ECG11: 28 DEGREES
DIAGNOSIS, 93000: NORMAL
P-R INTERVAL, ECG05: 170 MS
Q-T INTERVAL, ECG07: 342 MS
QRS DURATION, ECG06: 74 MS
QTC CALCULATION (BEZET), ECG08: 427 MS
VENTRICULAR RATE, ECG03: 94 BPM

## 2020-06-10 NOTE — PROGRESS NOTES
Patient contacted regarding recent discharge and COVID-19 risk. Discussed COVID-19 related testing which was not done at this time. Test results were not done. Patient informed of results, if available? no    Care Transition Nurse/ Ambulatory Care Manager contacted the patient by telephone to perform post discharge assessment. Verified name and  with patient as identifiers. Patient has following risk factors of: immunocompromised and lung cancer. CTN/ACM reviewed discharge instructions, medical action plan and red flags related to discharge diagnosis. Reviewed and educated them on any new and changed medications related to discharge diagnosis. Advised obtaining a 90-day supply of all daily and as-needed medications. Education provided regarding infection prevention, and signs and symptoms of COVID-19 and when to seek medical attention with patient who verbalized understanding. Discussed exposure protocols and quarantine from [address] you at higher risk for severe illness  and given an opportunity for questions and concerns. The patient agrees to contact the COVID-19 hotline [phone] or PCP office for questions related to their healthcare. CTN/ACM provided contact information for future reference. From CDC: Are you at higher risk for severe illness?  Wash your hands often.  Avoid close contact (6 feet, which is about two arm lengths) with people who are sick.  Put distance between yourself and other people if COVID-19 is spreading in your community.  Clean and disinfect frequently touched surfaces.  Avoid all cruise travel and non-essential air travel.  Call your healthcare professional if you have concerns about COVID-19 and your underlying condition or if you are sick.     For more information on steps you can take to protect yourself, see CDC's How to Protect Yourself      Patient/family/caregiver given information for Sanjeev Gross and agrees to enroll no      Plan for follow-up call in 7-14 days based on severity of symptoms and risk factors.

## 2020-06-12 ENCOUNTER — OFFICE VISIT (OUTPATIENT)
Dept: PRIMARY CARE CLINIC | Age: 45
End: 2020-06-12

## 2020-06-12 ENCOUNTER — TELEPHONE (OUTPATIENT)
Dept: ONCOLOGY | Age: 45
End: 2020-06-12

## 2020-06-12 DIAGNOSIS — Z01.818 PRE-OP EVALUATION: Primary | ICD-10-CM

## 2020-06-12 NOTE — Clinical Note
Unknown procedure just see infusion in chart and pt did not know.   [facility]

## 2020-06-12 NOTE — TELEPHONE ENCOUNTER
Patient called to change his treatment from Tuesday 06/16 due to patient was having a Endoscopy done. Patient was send in the ER Eduarda Wiggins) on Tuesday, June 9th. Spoke with Georges Gomez ( ERLINDA) and she is okay to have the treatment moved by 2 day on the 18th. Information was given to patient of new treatment day and time.

## 2020-06-14 LAB — SARS-COV-2, NAA: NOT DETECTED

## 2020-06-15 NOTE — PROGRESS NOTES
[facility] [place] [address] [facility]  Jesse Nip: [phone]  F: [phone]      Reason for Visit:   Janes Guerrero is a 38 y.o. male who is seen for follow up of non-small cell lung cancer. Treatment History:   · Diagnosed at [facility]  · Biopsy of right level 4 node: non small cell carcinoma, favored squamous cell carcinoma, insufficient sample for further testing  · Stage III Non-small cell lung cancer (Squamous cell)  · Definitive radiation with Dr. Keanu Montague completed mid-July 2019  · Concurrent chemotherapy with carboplatin and paclitaxel by Dr. Angélica Heredia, stopped during second infusion due to reaction to paclitaxel  · Concurrent chemotherapy with cisplatin and etoposide 7/16/2019 by Dr. Angélica Heredia  · CT Chest 7/27/2019: There has been no significant change in size of the large right paratracheal mediastinal mass. There are many new areas of peripheral consolidation in the right upper lobe, which may represent some combination of progressive disease, posttreatment change, or infection. Attention on follow-up is needed. A previously seen right upper lobe nodule in the posterior segment appears to have decreased in size. The large right pleural effusion on this exam is significantly increased in size from prior. · CT A/P 7/29/2019: no metastatic disease in abdomen or pelvis  · MRI Brain 7/29/2019: no intracranial metastatic disease  · EBUS by Dr. Shiv Arechiga 7/30/2019: Squamous cell, PDL1 QNS, insufficient tissue for molecular studies  · Thoracentesis 8/5/2019: cytology negative  · Initiated maintenance therapy with Durvalumab on 8/27/2019    History of Present Illness:   He was in the ED last week for hemoptysis. It was overall a small volume and after some imaging he was discharged home. He had brochoscopy/EUS/EBUS on 6/17/2020. No hemoptysis today. Continues to have occasional productive cough. Eating and drinking well.  Denies nausea/vomiting or changes in bowels. No new pain or fevers. Home health continues to come to his home and assists with the PICC line. He is unaccompanied today. He quit tobacco at diagnosis, though he was a light smoker (1-2 packs per week). PAST HISTORY: The following sections were reviewed and updated in the EMR as appropriate: PMH, SH, FH, Medications, Allergies. No Known Allergies     Review of Systems: A complete review of systems was obtained, reviewed, and scanned into the EMR. Pertinent findings reviewed above. Physical Exam:     Visit Vitals  /77   Pulse 85   Temp 98.1 °F (36.7 °C)   Resp 18   Ht 5' 9\" (1.753 m)   Wt 194 lb (88 kg)   SpO2 97%   BMI 28.65 kg/m²     ECOG PS: 1  General: No distress  Respiratory: Normal respiratory effort  CV: No peripheral edema  Skin: No rashes, ecchymoses, or petechiae  Psych: Alert, oriented, normal mood/affect      Results:     Lab Results   Component Value Date/Time    WBC 2.6 (L) 06/18/2020 08:58 AM    HGB 12.8 06/18/2020 08:58 AM    HCT 36.5 (L) 06/18/2020 08:58 AM    PLATELET 502 (L) 77/79/5643 08:58 AM    MCV 73.1 (L) 06/18/2020 08:58 AM    ABS. NEUTROPHILS 1.8 06/18/2020 08:58 AM     Lab Results   Component Value Date/Time    Sodium 139 06/18/2020 08:58 AM    Potassium 3.8 06/18/2020 08:58 AM    Chloride 109 (H) 06/18/2020 08:58 AM    CO2 27 06/18/2020 08:58 AM    Glucose 98 06/18/2020 08:58 AM    BUN 25 (H) 06/18/2020 08:58 AM    Creatinine 1.02 06/18/2020 08:58 AM    GFR est AA >60 06/18/2020 08:58 AM    GFR est non-AA >60 06/18/2020 08:58 AM    Calcium 8.8 06/18/2020 08:58 AM     Lab Results   Component Value Date/Time    Bilirubin, total 0.3 06/18/2020 08:58 AM    ALT (SGPT) 26 06/18/2020 08:58 AM    Alk.  phosphatase 98 06/18/2020 08:58 AM    Protein, total 7.3 06/18/2020 08:58 AM    Albumin 3.8 06/18/2020 08:58 AM    Globulin 3.5 06/18/2020 08:58 AM     Lab Results   Component Value Date/Time    Reticulocyte count 1.7 08/27/2019 11:19 AM    Iron % saturation 44 08/27/2019 11:19 AM    TIBC 236 (L) 08/27/2019 11:19 AM    Ferritin 698 (H) 08/27/2019 11:19 AM    Vitamin B12 265 08/27/2019 11:19 AM    Folate 9.2 08/27/2019 11:19 AM    TSH 3.49 06/18/2020 08:58 AM    Lipase 136 07/27/2019 11:31 AM    Hep C  virus Ab Interp. NONREACTIVE 12/03/2019 11:22 AM     Lab Results   Component Value Date/Time    INR 1.0 12/03/2019 11:22 AM    aPTT 27.3 12/03/2019 11:22 AM    D-dimer 2.45 (H) 06/09/2020 10:56 AM    Fibrinogen 243 12/03/2019 11:22 AM         PET 8/21/2019:  1. Decreased size and activity of the mediastinal mass. 2. RUL nodular densities remain ametabolic. 3. No new finding. CT Chest 11/12/2019:  Stable exam by RECIST criteria. Increase in moderate right pleural effusion with medial right lower lobe airspace disease. Minimal T2, T4, and T6 superior endplate vertebral compression deformities are likely subacute/chronic. CT Chest 2/3/2020:  Discordant change. Decrease in size of right paratracheal node the increase in size of right upper lobe mass  Decrease in volume of right lung with increased right pleural effusion density  Occluded SVC with extensive collateralization as described  New T6 compression fracture    Bone scan 2/20/2020: There is minimal low-grade activity at T6 vertebral body. Remainder of the study is unremarkable. CT Chest 4/29/2020:  1. No significant change in the right upper lobe mass and right paratracheal  adenopathy. 2.  No significant change in the right pleural effusion right lower lobe volume  loss. 3.  Mild compression deformity of T6 is again noted. There is suggestion of  increased sclerosis which may be related to healing    CTA Chest 6/9/2020:  No evidence for pulmonary embolism  Interval slight decrease in size of right upper lobe mass and right paratracheal node  Stable large right pleural effusion  Stable mild compression fractures of T2 and T6    Bronchoscopy 6/17/2020:  Hyperremic trachea.  Evidence of recent oozing from anterior and posterior distal wall of the trachea. Almost completely collapsed RUL subsegments. No evidence of bleeding from RUL. RML take-off narrowed with extrinsic compression and questionable mass vs. compressed mucosa in the opening. No evidence of bleeding from that site. External compression of RLL subsegments, however no endobronchial lesion or bleeding. Assessment:   1) Non-small cell lung cancer  Stage IIIB  He has at least Stage IIIB disease, and has been treated with concurrent radiation and chemotherapy (cisplatin and etoposide). Repeat PET demonstrates a partial response to therapy. No definite evidence of distant metastatic disease at this time. Pleural effusion cytology negative x3. He is currently on maintenance immunotherapy with Durvalumab. He is tolerating therapy well so far with minimal toxicity noted. Recent imaging is stable. Continue Durvalumab. He will be seen every other cycle of therapy. Reimage every 12 weeks. Unfortunately, his two biopsies have yielded scant material, insufficient for molecular studies or PDL1 testing. At progression, a repeat biopsy may be needed for NGS. 2) SVC syndrome  Monitor. Stable on imaging. 3) Pleural effusion  Cytology negative. Worsening on recent imaging, underwent repeat thoracentesis on 11/27/2019 and 2/14/2020 with negative cytology. 4) Poor IV access  With SVC syndrome, he cannot have a port or UE PICC. Currently has femoral PICC in place. This is working well. Home health for dressing changes. Consider removal once his durvalumab is completed. 5) Thrombocytopenia  Improved, but not resolved. Presumably secondary to his prior chemotherapy, but surprising that it is persisting. Additional lab work up 12/3/2019 unremarkable. Monitor for now. Could consider bone marrow biopsy if worsening significantly. 6) Anemia  Resolved. 7) Neuropathy, chemotherapy induced  Continue Cymbalta 30mg daily. 8) Cough  S/p course of abx; improved. 9) Compression fracture  Bone scan negative. Consider kyphoplasty vs radiation therapy if symptoms develop. Currently he is minimally symptomatic to site with mild pain/discomfort with elevation of right arm above head or twisting of upper torso. DEXA showing osteopenia 3/17/2020. Recommend Calcium and Vit D replacement and weight bearing exercise as tolerated. 10) Hemoptysis  Small volume. Was seen in ED 6/9/2020. Bronchoscopy on 6/17/2020 with Dr. Clayton Malone, I discussed results with her and the bleeding appears to be coming from trachea, likely related to prior radiation. Has follow up with Dr. Anushka Calle next week. 11) Emotional Well Being  No psychosocial concerns identified today. Patient has adequate support. Plan:     · Proceed with Durvalumab (10mg/kg) given every 2 weeks for one year  · Labs: CBC, CMP prior to each cycle, TSH every 6 weeks  · Continue cymbalta 30mg PO daily  · Follow up with Dr. Anushka Calle 6/24/2020  · Return to see us in 4 weeks    Patient was seen in conjunction with Leti Bravo NP.       Signed By: Neha Burgos MD

## 2020-06-17 ENCOUNTER — HOSPITAL ENCOUNTER (OUTPATIENT)
Age: 45
Setting detail: OUTPATIENT SURGERY
Discharge: HOME | End: 2020-06-17
Attending: INTERNAL MEDICINE | Admitting: INTERNAL MEDICINE
Payer: COMMERCIAL

## 2020-06-17 VITALS
RESPIRATION RATE: 19 BRPM | SYSTOLIC BLOOD PRESSURE: 113 MMHG | DIASTOLIC BLOOD PRESSURE: 71 MMHG | TEMPERATURE: 98.2 F | OXYGEN SATURATION: 95 % | BODY MASS INDEX: 28.41 KG/M2 | HEART RATE: 77 BPM | HEIGHT: 69 IN | WEIGHT: 191.8 LBS

## 2020-06-17 PROCEDURE — 99152 MOD SED SAME PHYS/QHP 5/>YRS: CPT

## 2020-06-17 PROCEDURE — 77030022556 HC FCPS BIOP TIS ENDOSC BSC -B: Performed by: INTERNAL MEDICINE

## 2020-06-17 PROCEDURE — 76040000019: Performed by: INTERNAL MEDICINE

## 2020-06-17 PROCEDURE — 74011250636 HC RX REV CODE- 250/636: Performed by: INTERNAL MEDICINE

## 2020-06-17 PROCEDURE — 74011000250 HC RX REV CODE- 250: Performed by: INTERNAL MEDICINE

## 2020-06-17 RX ORDER — MIDAZOLAM HYDROCHLORIDE 1 MG/ML
INJECTION, SOLUTION INTRAMUSCULAR; INTRAVENOUS
Status: DISCONTINUED
Start: 2020-06-17 | End: 2020-06-17 | Stop reason: HOSPADM

## 2020-06-17 RX ORDER — LIDOCAINE HYDROCHLORIDE 20 MG/ML
JELLY TOPICAL ONCE
Status: DISCONTINUED | OUTPATIENT
Start: 2020-06-17 | End: 2020-06-17 | Stop reason: HOSPADM

## 2020-06-17 RX ORDER — LIDOCAINE HYDROCHLORIDE 20 MG/ML
INJECTION, SOLUTION INFILTRATION; PERINEURAL
Status: DISCONTINUED
Start: 2020-06-17 | End: 2020-06-17 | Stop reason: HOSPADM

## 2020-06-17 RX ORDER — MIDAZOLAM HYDROCHLORIDE 1 MG/ML
.25-5 INJECTION, SOLUTION INTRAMUSCULAR; INTRAVENOUS
Status: DISCONTINUED | OUTPATIENT
Start: 2020-06-17 | End: 2020-06-17 | Stop reason: HOSPADM

## 2020-06-17 RX ORDER — LIDOCAINE HYDROCHLORIDE 20 MG/ML
0.3 INJECTION, SOLUTION INFILTRATION; PERINEURAL
Status: DISCONTINUED | OUTPATIENT
Start: 2020-06-17 | End: 2020-06-17 | Stop reason: HOSPADM

## 2020-06-17 RX ORDER — LIDOCAINE HYDROCHLORIDE 20 MG/ML
JELLY TOPICAL
Status: DISCONTINUED
Start: 2020-06-17 | End: 2020-06-17 | Stop reason: HOSPADM

## 2020-06-17 RX ORDER — SODIUM CHLORIDE 9 MG/ML
50 INJECTION, SOLUTION INTRAVENOUS CONTINUOUS
Status: DISCONTINUED | OUTPATIENT
Start: 2020-06-17 | End: 2020-06-17 | Stop reason: HOSPADM

## 2020-06-17 RX ORDER — LIDOCAINE HYDROCHLORIDE 40 MG/ML
SOLUTION TOPICAL
Status: DISCONTINUED
Start: 2020-06-17 | End: 2020-06-17 | Stop reason: HOSPADM

## 2020-06-17 RX ORDER — NALOXONE HYDROCHLORIDE 0.4 MG/ML
INJECTION, SOLUTION INTRAMUSCULAR; INTRAVENOUS; SUBCUTANEOUS
Status: DISCONTINUED
Start: 2020-06-17 | End: 2020-06-17 | Stop reason: WASHOUT

## 2020-06-17 RX ORDER — LIDOCAINE HYDROCHLORIDE AND EPINEPHRINE 20; 10 MG/ML; UG/ML
5 INJECTION, SOLUTION INFILTRATION; PERINEURAL AS NEEDED
Status: DISCONTINUED | OUTPATIENT
Start: 2020-06-17 | End: 2020-06-17 | Stop reason: HOSPADM

## 2020-06-17 RX ORDER — LIDOCAINE HYDROCHLORIDE 40 MG/ML
SOLUTION TOPICAL ONCE
Status: COMPLETED | OUTPATIENT
Start: 2020-06-17 | End: 2020-06-17

## 2020-06-17 RX ORDER — FLUMAZENIL 0.1 MG/ML
INJECTION INTRAVENOUS
Status: DISCONTINUED
Start: 2020-06-17 | End: 2020-06-17 | Stop reason: HOSPADM

## 2020-06-17 RX ORDER — LIDOCAINE HYDROCHLORIDE AND EPINEPHRINE 20; 10 MG/ML; UG/ML
INJECTION, SOLUTION INFILTRATION; PERINEURAL
Status: DISCONTINUED
Start: 2020-06-17 | End: 2020-06-17 | Stop reason: WASHOUT

## 2020-06-17 RX ORDER — FENTANYL CITRATE 50 UG/ML
25 INJECTION, SOLUTION INTRAMUSCULAR; INTRAVENOUS
Status: DISCONTINUED | OUTPATIENT
Start: 2020-06-17 | End: 2020-06-17 | Stop reason: HOSPADM

## 2020-06-17 NOTE — DISCHARGE SUMMARY
Gurpreet [facility]  [id number]  1975      BRONCHOSCOPY / EUS / EBUS DISCHARGE INSTRUCTIONS  Discomfort:  Sore throat- throat lozenges or warm salt water gargle  redness at IV site- apply warm compress to area; if redness or soreness persist- contact your physician  Gaseous discomfort- walking, belching will help relieve any discomfort  You may not operate a vehicle for 12 hours  You may not  engage in an occupation involving machinery or appliances for rest of today  You may not drink alcoholic beverages for at least 12 hours  Avoid making any critical decisions for at least 24 hour  Blood tinged secretions - this should stop within 2-3 hours  DIET  Nothing by mouth- do not eat or drink for two hours. You may eat and drink after 5:30 pm   You may resume your regular diet - however -  remember your colon is empty and a heavy meal will produce gas. Avoid these foods:  vegetables, fried / greasy foods, carbonated drinks  ACTIVITY  You may resume your normal daily activities however it is recommended that you spend the remainder of the day resting -  avoid any strenuous activity. CALL M.D.   ANY SIGN OF   Increasing pain, nausea, vomiting  Abdominal distension (swelling)  New increased bleeding (oral or rectal)  Fever (chills)  Pain in chest area  Bloody discharge from nose or mouth  Shortness of breath    Call physicians office for following  Follow up with Dr. Balbir Payton

## 2020-06-17 NOTE — DISCHARGE INSTRUCTIONS
Asa Fraire  [id number]  1975        BRONCHOSCOPY / EUS / EBUS DISCHARGE INSTRUCTIONS  Discomfort:  Sore throat- throat lozenges or warm salt water gargle  redness at IV site- apply warm compress to area; if redness or soreness persist- contact your physician  Gaseous discomfort- walking, belching will help relieve any discomfort  You may not operate a vehicle for 12 hours  You may not  engage in an occupation involving machinery or appliances for rest of today  You may not drink alcoholic beverages for at least 12 hours  Avoid making any critical decisions for at least 24 hour  Blood tinged secretions - this should stop within 2-3 hours  DIET  Nothing by mouth- do not eat or drink for two hours. You may eat and drink after 5:30 pm              You may resume your regular diet - however -  remember your colon is empty and a heavy meal will produce gas. Avoid these foods:  vegetables, fried / greasy foods, carbonated drinks  ACTIVITY  You may resume your normal daily activities however it is recommended that you spend the remainder of the day resting -  avoid any strenuous activity. CALL M.D.   ANY SIGN OF                                         Increasing pain, nausea, vomiting  Abdominal distension (swelling)  New increased bleeding (oral or rectal)  Fever (chills)  Pain in chest area  Bloody discharge from nose or mouth  Shortness of breath     Call physicians office for following  Follow up with Dr. Meghan Cohen

## 2020-06-17 NOTE — PERIOP NOTES
Pt tolerated procedure. VSS throughout. See MAR for medications. Total of Midazalem 5mg IV, and Fentanyl 100mcg IV admin. Wayne Billing Scopes all washed at bedside by Chidi Garcia. Pt transferred to recovery and report given to [facility] RN regarding procedures, diagnosis, etc. Family updated on POC by Dr. Aaron Palma. VSS, abdoman soft.

## 2020-06-18 ENCOUNTER — HOSPITAL ENCOUNTER (OUTPATIENT)
Dept: INFUSION THERAPY | Age: 45
Discharge: HOME | End: 2020-06-18
Payer: COMMERCIAL

## 2020-06-18 ENCOUNTER — OFFICE VISIT (OUTPATIENT)
Dept: ONCOLOGY | Age: 45
End: 2020-06-18

## 2020-06-18 VITALS
HEIGHT: 69 IN | RESPIRATION RATE: 18 BRPM | BODY MASS INDEX: 28.76 KG/M2 | TEMPERATURE: 98.1 F | SYSTOLIC BLOOD PRESSURE: 117 MMHG | HEART RATE: 67 BPM | DIASTOLIC BLOOD PRESSURE: 77 MMHG | WEIGHT: 194.2 LBS | OXYGEN SATURATION: 97 %

## 2020-06-18 VITALS
BODY MASS INDEX: 28.73 KG/M2 | DIASTOLIC BLOOD PRESSURE: 77 MMHG | RESPIRATION RATE: 18 BRPM | SYSTOLIC BLOOD PRESSURE: 107 MMHG | HEIGHT: 69 IN | OXYGEN SATURATION: 97 % | HEART RATE: 85 BPM | WEIGHT: 194 LBS | TEMPERATURE: 98.1 F

## 2020-06-18 DIAGNOSIS — D61.818 PANCYTOPENIA (HCC): ICD-10-CM

## 2020-06-18 DIAGNOSIS — J90 RECURRENT RIGHT PLEURAL EFFUSION: ICD-10-CM

## 2020-06-18 DIAGNOSIS — C34.11 MALIGNANT NEOPLASM OF UPPER LOBE OF RIGHT LUNG (HCC): Primary | ICD-10-CM

## 2020-06-18 DIAGNOSIS — R04.2 HEMOPTYSIS: ICD-10-CM

## 2020-06-18 DIAGNOSIS — R05.9 COUGH: ICD-10-CM

## 2020-06-18 DIAGNOSIS — T45.1X5A CHEMOTHERAPY-INDUCED NEUROPATHY (HCC): ICD-10-CM

## 2020-06-18 DIAGNOSIS — G62.0 CHEMOTHERAPY-INDUCED NEUROPATHY (HCC): ICD-10-CM

## 2020-06-18 DIAGNOSIS — I87.1 SVC SYNDROME: ICD-10-CM

## 2020-06-18 LAB
ALBUMIN SERPL-MCNC: 3.8 G/DL (ref 3.5–5)
ALBUMIN/GLOB SERPL: 1.1 {RATIO} (ref 1.1–2.2)
ALP SERPL-CCNC: 98 U/L (ref 45–117)
ALT SERPL-CCNC: 26 U/L (ref 12–78)
ANION GAP SERPL CALC-SCNC: 3 MMOL/L (ref 5–15)
AST SERPL-CCNC: 24 U/L (ref 15–37)
BASO+EOS+MONOS # BLD AUTO: 0.4 K/UL (ref 0.2–1.2)
BASO+EOS+MONOS NFR BLD AUTO: 14 % (ref 3.2–16.9)
BILIRUB SERPL-MCNC: 0.3 MG/DL (ref 0.2–1)
BUN SERPL-MCNC: 25 MG/DL (ref 6–20)
BUN/CREAT SERPL: 25 (ref 12–20)
CALCIUM SERPL-MCNC: 8.8 MG/DL (ref 8.5–10.1)
CHLORIDE SERPL-SCNC: 109 MMOL/L (ref 97–108)
CO2 SERPL-SCNC: 27 MMOL/L (ref 21–32)
CREAT SERPL-MCNC: 1.02 MG/DL (ref 0.7–1.3)
DIFFERENTIAL METHOD BLD: ABNORMAL
ERYTHROCYTE [DISTWIDTH] IN BLOOD BY AUTOMATED COUNT: 16.4 % (ref 11.8–15.8)
GLOBULIN SER CALC-MCNC: 3.5 G/DL (ref 2–4)
GLUCOSE SERPL-MCNC: 98 MG/DL (ref 65–100)
HCT VFR BLD AUTO: 36.5 % (ref 36.6–50.3)
HGB BLD-MCNC: 12.8 G/DL (ref 12.1–17)
LYMPHOCYTES # BLD: 0.4 K/UL (ref 0.8–3.5)
LYMPHOCYTES NFR BLD: 15 % (ref 12–49)
MCH RBC QN AUTO: 25.7 PG (ref 26–34)
MCHC RBC AUTO-ENTMCNC: 35.1 G/DL (ref 30–36.5)
MCV RBC AUTO: 73.1 FL (ref 80–99)
NEUTS SEG # BLD: 1.8 K/UL (ref 1.8–8)
NEUTS SEG NFR BLD: 71 % (ref 32–75)
PLATELET # BLD AUTO: 139 K/UL (ref 150–400)
POTASSIUM SERPL-SCNC: 3.8 MMOL/L (ref 3.5–5.1)
PROT SERPL-MCNC: 7.3 G/DL (ref 6.4–8.2)
RBC # BLD AUTO: 4.99 M/UL (ref 4.1–5.7)
SODIUM SERPL-SCNC: 139 MMOL/L (ref 136–145)
TSH SERPL DL<=0.05 MIU/L-ACNC: 3.49 UIU/ML (ref 0.36–3.74)
WBC # BLD AUTO: 2.6 K/UL (ref 4.1–11.1)

## 2020-06-18 PROCEDURE — 36415 COLL VENOUS BLD VENIPUNCTURE: CPT

## 2020-06-18 PROCEDURE — 85025 COMPLETE CBC W/AUTO DIFF WBC: CPT

## 2020-06-18 PROCEDURE — 74011250636 HC RX REV CODE- 250/636: Performed by: INTERNAL MEDICINE

## 2020-06-18 PROCEDURE — 96413 CHEMO IV INFUSION 1 HR: CPT

## 2020-06-18 PROCEDURE — 80053 COMPREHEN METABOLIC PANEL: CPT

## 2020-06-18 PROCEDURE — 74011000258 HC RX REV CODE- 258: Performed by: NURSE PRACTITIONER

## 2020-06-18 PROCEDURE — 74011250636 HC RX REV CODE- 250/636: Performed by: NURSE PRACTITIONER

## 2020-06-18 PROCEDURE — 84443 ASSAY THYROID STIM HORMONE: CPT

## 2020-06-18 RX ORDER — ACETAMINOPHEN 325 MG/1
650 TABLET ORAL
Status: CANCELLED | OUTPATIENT
Start: 2020-07-14

## 2020-06-18 RX ORDER — HYDROCORTISONE SODIUM SUCCINATE 100 MG/2ML
100 INJECTION, POWDER, FOR SOLUTION INTRAMUSCULAR; INTRAVENOUS AS NEEDED
Status: CANCELLED | OUTPATIENT
Start: 2020-07-14

## 2020-06-18 RX ORDER — DIPHENHYDRAMINE HYDROCHLORIDE 50 MG/ML
50 INJECTION, SOLUTION INTRAMUSCULAR; INTRAVENOUS AS NEEDED
Status: CANCELLED
Start: 2020-07-14

## 2020-06-18 RX ORDER — SODIUM CHLORIDE 0.9 % (FLUSH) 0.9 %
10 SYRINGE (ML) INJECTION AS NEEDED
Status: CANCELLED
Start: 2020-07-14

## 2020-06-18 RX ORDER — DIPHENHYDRAMINE HYDROCHLORIDE 50 MG/ML
50 INJECTION, SOLUTION INTRAMUSCULAR; INTRAVENOUS
Status: CANCELLED | OUTPATIENT
Start: 2020-07-14

## 2020-06-18 RX ORDER — ACETAMINOPHEN 325 MG/1
650 TABLET ORAL AS NEEDED
Status: CANCELLED
Start: 2020-07-14

## 2020-06-18 RX ORDER — EPINEPHRINE 1 MG/ML
0.3 INJECTION, SOLUTION, CONCENTRATE INTRAVENOUS AS NEEDED
Status: CANCELLED | OUTPATIENT
Start: 2020-07-14

## 2020-06-18 RX ORDER — ALBUTEROL SULFATE 0.83 MG/ML
2.5 SOLUTION RESPIRATORY (INHALATION) AS NEEDED
Status: CANCELLED
Start: 2020-07-14

## 2020-06-18 RX ORDER — SODIUM CHLORIDE 9 MG/ML
25 INJECTION, SOLUTION INTRAVENOUS CONTINUOUS
Status: DISPENSED | OUTPATIENT
Start: 2020-06-18 | End: 2020-06-18

## 2020-06-18 RX ORDER — SODIUM CHLORIDE 9 MG/ML
10 INJECTION INTRAMUSCULAR; INTRAVENOUS; SUBCUTANEOUS AS NEEDED
Status: CANCELLED | OUTPATIENT
Start: 2020-07-14

## 2020-06-18 RX ORDER — SODIUM CHLORIDE 9 MG/ML
25 INJECTION, SOLUTION INTRAVENOUS CONTINUOUS
Status: CANCELLED | OUTPATIENT
Start: 2020-07-14

## 2020-06-18 RX ORDER — HEPARIN 100 UNIT/ML
500 SYRINGE INTRAVENOUS AS NEEDED
Status: DISCONTINUED | OUTPATIENT
Start: 2020-06-18 | End: 2020-06-19 | Stop reason: HOSPADM

## 2020-06-18 RX ORDER — ONDANSETRON 2 MG/ML
8 INJECTION INTRAMUSCULAR; INTRAVENOUS AS NEEDED
Status: CANCELLED | OUTPATIENT
Start: 2020-07-14

## 2020-06-18 RX ORDER — HEPARIN 100 UNIT/ML
300-500 SYRINGE INTRAVENOUS AS NEEDED
Status: CANCELLED
Start: 2020-07-14

## 2020-06-18 RX ORDER — SODIUM CHLORIDE 0.9 % (FLUSH) 0.9 %
10 SYRINGE (ML) INJECTION AS NEEDED
Status: DISPENSED | OUTPATIENT
Start: 2020-06-18 | End: 2020-06-18

## 2020-06-18 RX ADMIN — Medication 500 UNITS: at 12:21

## 2020-06-18 RX ADMIN — Medication 500 UNITS: at 12:22

## 2020-06-18 RX ADMIN — SODIUM CHLORIDE 25 ML/HR: 900 INJECTION, SOLUTION INTRAVENOUS at 11:07

## 2020-06-18 RX ADMIN — DURVALUMAB 810 MG: 120 INJECTION, SOLUTION INTRAVENOUS at 11:11

## 2020-06-18 RX ADMIN — Medication 10 ML: at 12:22

## 2020-06-18 RX ADMIN — Medication 10 ML: at 12:21

## 2020-06-18 NOTE — PROCEDURES
[address]  [address] [facility] [address]  [address]  [phone]    BabyGlowz  1975  [id number]      Date of Procedure: 6/17/2020    Preoperative diagnosis: hempotysis    Procedure: Procedure(s):  BRONCHOSCOPY    Indication: hemoptysis    :  Javier Hayward MD    Assistant(s): Endoscopy Technician-1: Boo Choi  Endoscopy RN-1: Kim Mcgovern RN    Anesthesia/Sedation:  Versed 6 mg IV and Fentanyl 100 mcg IV      Procedure Details:  After infomed consent was obtained for the procedure, with all risks and benefits of procedure explained the patient was taken to the endoscopy suite and placed in the supine position. Following sequential administration of sedation as per above, the bronchoscope was inserted into the mouth and advanced under direct vision to the tracheobronchial tree. Findings:   Hyperremic trachea. Evidence of recent oozing from anterior and posterior distal wall of the trachea. Almost completely collapsed RUL subsegments. No evidence of bleeding from RUL. RML take-off narrowed with extrinsic compression and questionable mass vs. compressed mucosa in the opening. No evidence of bleeding from that site. External compression of RLL subsegments, however no endobronchial lesion or bleeding. Therapies:   None    Specimens:   None           Complications:   None noted; patient tolerated the procedure well. EBL:  Minimal           Impression:    Hemoptosis      Recommendations:   Close observation.        No Santiago MD  6/17/2020  9:00 PM

## 2020-06-18 NOTE — PROGRESS NOTES
Jonn Michael is a 38 y.o. male follow up for lung cancer. 1. Have you been to the ER, urgent care clinic since your last visit? Hospitalized since your last visit?no     2. Have you seen or consulted any other health care providers outside of the [address] [place] [address] since your last visit? Include any pap smears or colon screening.  No    Vitals 6/18/2020   Blood Pressure 107/77   Pulse 85   Temp 98.1   Resp 18   Height 5' 9\"   Weight 194 lb 3.2 oz   SpO2 97   BSA 2.07 m2   BMI 28.68 kg/m2

## 2020-06-18 NOTE — H&P
37 yo with PMHx of lung cancer presenting for inspection of airways due to recent hemoptysis. Last episode Monday with a small clot. Gen: awake, alert  Lungs: rhonci anteriorly on right side  CV: RRR, no m/g/r  Abd: soft/nt  LE: no edema    - will proceed with inspection bronch.

## 2020-06-18 NOTE — PROGRESS NOTES
[facility]fusion [facility] - Chemotherapy Progress Note    0845 Pt admit to [facility] for [address]  ambulatory in stable condition. Assessment completed. No new concerns voiced. PICC in right thigh flushed with positive blood return. Labs drawn and sent for processing. Patient proceeded to scheduled MD appointment. Patient returned with no change in treatment. Results are within parameters to treat. Chemotherapy Flowsheet 6/18/2020   Cycle C22D1   Date 6/18/2020   Drug / Regimen Imfinzi   Pre Meds -   Notes -       Visit Vitals  BP (P) 107/77 (BP 1 Location: Right arm, BP Patient Position: Sitting)   Pulse (P) 85   Temp (P) 98.1 °F (36.7 °C)   Resp (P) 18   Ht (P) 5' 9\" (1.753 m)   Wt (P) 88.1 kg (194 lb 3.2 oz)   SpO2 (P) 97%   BMI (P) 28.68 kg/m²     Patient Vitals for the past 12 hrs:   Temp Pulse Resp BP SpO2   06/18/20 1226 -- 67 18 117/77 --   06/18/20 0848 98.1 °F (36.7 °C) 85 18 107/77 97 %       Recent Results (from the past 12 hour(s))   CBC WITH 3 PART DIFF    Collection Time: 06/18/20  8:58 AM   Result Value Ref Range    WBC 2.6 (L) 4.1 - 11.1 K/uL    RBC 4.99 4. 10 - 5.70 M/uL    HGB 12.8 12.1 - 17.0 g/dL    HCT 36.5 (L) 36.6 - 50.3 %    MCV 73.1 (L) 80.0 - 99.0 FL    MCH 25.7 (L) 26.0 - 34.0 PG    MCHC 35.1 30.0 - 36.5 g/dL    RDW 16.4 (H) 11.8 - 15.8 %    PLATELET 139 (L) 490 - 400 K/uL    NEUTROPHILS 71 32 - 75 %    MIXED CELLS 14 3.2 - 16.9 %    LYMPHOCYTES 15 12 - 49 %    ABS. NEUTROPHILS 1.8 1.8 - 8.0 K/UL    ABS. MIXED CELLS 0.4 0.2 - 1.2 K/uL    ABS.  LYMPHOCYTES 0.4 (L) 0.8 - 3.5 K/UL    DF AUTOMATED     METABOLIC PANEL, COMPREHENSIVE    Collection Time: 06/18/20  8:58 AM   Result Value Ref Range    Sodium 139 136 - 145 mmol/L    Potassium 3.8 3.5 - 5.1 mmol/L    Chloride 109 (H) 97 - 108 mmol/L    CO2 27 21 - 32 mmol/L    Anion gap 3 (L) 5 - 15 mmol/L    Glucose 98 65 - 100 mg/dL    BUN 25 (H) 6 - 20 MG/DL    Creatinine 1.02 0.70 - 1.30 MG/DL    BUN/Creatinine ratio 25 (H) 12 - 20 GFR est AA >60 >60 ml/min/1.73m2    GFR est non-AA >60 >60 ml/min/1.73m2    Calcium 8.8 8.5 - 10.1 MG/DL    Bilirubin, total 0.3 0.2 - 1.0 MG/DL    ALT (SGPT) 26 12 - 78 U/L    AST (SGOT) 24 15 - 37 U/L    Alk. phosphatase 98 45 - 117 U/L    Protein, total 7.3 6.4 - 8.2 g/dL    Albumin 3.8 3.5 - 5.0 g/dL    Globulin 3.5 2.0 - 4.0 g/dL    A-G Ratio 1.1 1.1 - 2.2     TSH 3RD GENERATION    Collection Time: 06/18/20  8:58 AM   Result Value Ref Range    TSH 3.49 0.36 - 3.74 uIU/mL     Medications:  Medications Administered     0.9% sodium chloride infusion     Admin Date  06/18/2020 Action  New Bag Dose  25 mL/hr Rate  25 mL/hr Route  IntraVENous Administered By  Benson Hernandez RN          durvalumab (IMFINZI) 810 mg in 0.9% sodium chloride 100 mL, overfill volume 10 mL IVPB     Admin Date  06/18/2020 Action  New Bag Dose  810 mg Rate  126.2 mL/hr Route  IntraVENous Administered By  Benson Hernandez RN          heparin (porcine) pf 500 Units     Admin Date  06/18/2020 Action  Given Dose  500 Units Route  IntraVENous Administered By  Benson Hernandez RN           Admin Date  06/18/2020 Action  Given Dose  500 Units Route  IntraVENous Administered By  Benson Hernandez RN          saline peripheral flush soln 10 mL     Admin Date  06/18/2020 Action  Given Dose  10 mL Route  InterCATHeter Administered By  Benson Hernandez RN           Admin Date  06/18/2020 Action  Given Dose  10 mL Route  InterCATHeter Administered By  Benson Hernandez RN              1230 Pt tolerated treatment well. PICC maintained positive blood return throughout treatment, flushed with positive blood return at conclusion and throughout. D/c home ambulatory in no distress.  Pt aware of next appointment scheduled for     Future Appointments   Date Time Provider Aquilino Steel   6/30/2020  9:00 AM SS INF6 CH1 >4H [facility]   7/14/2020  9:00 AM SS INF6 CH1 >4H [facility]   7/14/2020  9:15 AM Beverly Damon NP [facility]   7/28/2020  9:30 AM SS INF6 CH2 >4H [facility]

## 2020-06-24 ENCOUNTER — PATIENT OUTREACH (OUTPATIENT)
Dept: FAMILY MEDICINE CLINIC | Age: 45
End: 2020-06-24

## 2020-06-24 NOTE — PROGRESS NOTES
Patient resolved from Transition of Care episode on 06/24/20  Discussed COVID-19 related testing which was available at this time. Test results were negative. Patient informed of results, if available? Already aware     Patient/family has been provided the following resources and education related to COVID-19:                         Signs, symptoms and red flags related to COVID-19            CDC exposure and quarantine guidelines            [facility]it exposure contact - [phone]            Contact for their local Department of Health                 Patient currently reports that the following symptoms have improved:  cough and shortness of breath. No further outreach scheduled with this CTN/ACM/LPN/HC/ MA. Episode of Care resolved. Patient has this CTN/ACM/LPN/HC/MA contact information if future needs arise.

## 2020-06-29 ENCOUNTER — APPOINTMENT (OUTPATIENT)
Dept: GENERAL RADIOLOGY | Age: 45
DRG: 871 | End: 2020-06-29
Attending: STUDENT IN AN ORGANIZED HEALTH CARE EDUCATION/TRAINING PROGRAM
Payer: COMMERCIAL

## 2020-06-29 ENCOUNTER — HOSPITAL ENCOUNTER (INPATIENT)
Age: 45
LOS: 7 days | Discharge: HOME HEALTH | DRG: 871 | End: 2020-07-06
Attending: STUDENT IN AN ORGANIZED HEALTH CARE EDUCATION/TRAINING PROGRAM | Admitting: INTERNAL MEDICINE
Payer: COMMERCIAL

## 2020-06-29 DIAGNOSIS — A41.9 SEPSIS, DUE TO UNSPECIFIED ORGANISM, UNSPECIFIED WHETHER ACUTE ORGAN DYSFUNCTION PRESENT (HCC): ICD-10-CM

## 2020-06-29 DIAGNOSIS — R78.81 BACTEREMIA: ICD-10-CM

## 2020-06-29 DIAGNOSIS — J18.9 PNEUMONIA OF RIGHT LOWER LOBE DUE TO INFECTIOUS ORGANISM: Primary | ICD-10-CM

## 2020-06-29 DIAGNOSIS — J90 PLEURAL EFFUSION ON RIGHT: ICD-10-CM

## 2020-06-29 DIAGNOSIS — C34.11 MALIGNANT NEOPLASM OF UPPER LOBE OF RIGHT LUNG (HCC): Chronic | ICD-10-CM

## 2020-06-29 DIAGNOSIS — D69.6 THROMBOCYTOPENIA (HCC): ICD-10-CM

## 2020-06-29 LAB
ALBUMIN SERPL-MCNC: 3.6 G/DL (ref 3.5–5)
ALBUMIN/GLOB SERPL: 0.9 {RATIO} (ref 1.1–2.2)
ALP SERPL-CCNC: 210 U/L (ref 45–117)
ALT SERPL-CCNC: 40 U/L (ref 12–78)
ANION GAP SERPL CALC-SCNC: 9 MMOL/L (ref 5–15)
AST SERPL-CCNC: 34 U/L (ref 15–37)
BASOPHILS # BLD: 0 K/UL (ref 0–0.1)
BASOPHILS NFR BLD: 0 % (ref 0–1)
BILIRUB SERPL-MCNC: 1.2 MG/DL (ref 0.2–1)
BNP SERPL-MCNC: 101 PG/ML
BUN SERPL-MCNC: 23 MG/DL (ref 6–20)
BUN/CREAT SERPL: 21 (ref 12–20)
CALCIUM SERPL-MCNC: 8.9 MG/DL (ref 8.5–10.1)
CHLORIDE SERPL-SCNC: 107 MMOL/L (ref 97–108)
CO2 SERPL-SCNC: 23 MMOL/L (ref 21–32)
COMMENT, HOLDF: NORMAL
CREAT SERPL-MCNC: 1.11 MG/DL (ref 0.7–1.3)
DIFFERENTIAL METHOD BLD: ABNORMAL
EOSINOPHIL # BLD: 0 K/UL (ref 0–0.4)
EOSINOPHIL NFR BLD: 0 % (ref 0–7)
ERYTHROCYTE [DISTWIDTH] IN BLOOD BY AUTOMATED COUNT: 14.9 % (ref 11.5–14.5)
GLOBULIN SER CALC-MCNC: 3.8 G/DL (ref 2–4)
GLUCOSE SERPL-MCNC: 111 MG/DL (ref 65–100)
HCT VFR BLD AUTO: 34.9 % (ref 36.6–50.3)
HGB BLD-MCNC: 11.7 G/DL (ref 12.1–17)
IMM GRANULOCYTES # BLD AUTO: 0 K/UL
IMM GRANULOCYTES NFR BLD AUTO: 0 %
LACTATE BLD-SCNC: 0.93 MMOL/L (ref 0.4–2)
LYMPHOCYTES # BLD: 0.6 K/UL (ref 0.8–3.5)
LYMPHOCYTES NFR BLD: 10 % (ref 12–49)
MAGNESIUM SERPL-MCNC: 1.7 MG/DL (ref 1.6–2.4)
MCH RBC QN AUTO: 24.1 PG (ref 26–34)
MCHC RBC AUTO-ENTMCNC: 33.5 G/DL (ref 30–36.5)
MCV RBC AUTO: 72 FL (ref 80–99)
MONOCYTES # BLD: 0.3 K/UL (ref 0–1)
MONOCYTES NFR BLD: 5 % (ref 5–13)
NEUTS BAND NFR BLD MANUAL: 7 % (ref 0–6)
NEUTS SEG # BLD: 5 K/UL (ref 1.8–8)
NEUTS SEG NFR BLD: 78 % (ref 32–75)
NRBC # BLD: 0 K/UL (ref 0–0.01)
NRBC BLD-RTO: 0 PER 100 WBC
PLATELET # BLD AUTO: 45 K/UL (ref 150–400)
PMV BLD AUTO: ABNORMAL FL (ref 8.9–12.9)
POTASSIUM SERPL-SCNC: 3.4 MMOL/L (ref 3.5–5.1)
PROT SERPL-MCNC: 7.4 G/DL (ref 6.4–8.2)
RBC # BLD AUTO: 4.85 M/UL (ref 4.1–5.7)
RBC MORPH BLD: ABNORMAL
SAMPLES BEING HELD,HOLD: NORMAL
SODIUM SERPL-SCNC: 139 MMOL/L (ref 136–145)
TROPONIN I SERPL-MCNC: <0.05 NG/ML
WBC # BLD AUTO: 5.9 K/UL (ref 4.1–11.1)
WBC MORPH BLD: ABNORMAL

## 2020-06-29 PROCEDURE — 94761 N-INVAS EAR/PLS OXIMETRY MLT: CPT

## 2020-06-29 PROCEDURE — 87040 BLOOD CULTURE FOR BACTERIA: CPT

## 2020-06-29 PROCEDURE — 99285 EMERGENCY DEPT VISIT HI MDM: CPT

## 2020-06-29 PROCEDURE — 74011250636 HC RX REV CODE- 250/636: Performed by: STUDENT IN AN ORGANIZED HEALTH CARE EDUCATION/TRAINING PROGRAM

## 2020-06-29 PROCEDURE — 85025 COMPLETE CBC W/AUTO DIFF WBC: CPT

## 2020-06-29 PROCEDURE — 74011250637 HC RX REV CODE- 250/637: Performed by: STUDENT IN AN ORGANIZED HEALTH CARE EDUCATION/TRAINING PROGRAM

## 2020-06-29 PROCEDURE — 80053 COMPREHEN METABOLIC PANEL: CPT

## 2020-06-29 PROCEDURE — 96375 TX/PRO/DX INJ NEW DRUG ADDON: CPT

## 2020-06-29 PROCEDURE — 74011250636 HC RX REV CODE- 250/636: Performed by: INTERNAL MEDICINE

## 2020-06-29 PROCEDURE — 65660000000 HC RM CCU STEPDOWN

## 2020-06-29 PROCEDURE — 83735 ASSAY OF MAGNESIUM: CPT

## 2020-06-29 PROCEDURE — 83605 ASSAY OF LACTIC ACID: CPT

## 2020-06-29 PROCEDURE — 87186 SC STD MICRODIL/AGAR DIL: CPT

## 2020-06-29 PROCEDURE — 96374 THER/PROPH/DIAG INJ IV PUSH: CPT

## 2020-06-29 PROCEDURE — 83880 ASSAY OF NATRIURETIC PEPTIDE: CPT

## 2020-06-29 PROCEDURE — 87077 CULTURE AEROBIC IDENTIFY: CPT

## 2020-06-29 PROCEDURE — 71045 X-RAY EXAM CHEST 1 VIEW: CPT

## 2020-06-29 PROCEDURE — 36415 COLL VENOUS BLD VENIPUNCTURE: CPT

## 2020-06-29 PROCEDURE — 74011000250 HC RX REV CODE- 250: Performed by: STUDENT IN AN ORGANIZED HEALTH CARE EDUCATION/TRAINING PROGRAM

## 2020-06-29 PROCEDURE — 84484 ASSAY OF TROPONIN QUANT: CPT

## 2020-06-29 PROCEDURE — 93005 ELECTROCARDIOGRAM TRACING: CPT

## 2020-06-29 RX ORDER — GUAIFENESIN 600 MG/1
600 TABLET, EXTENDED RELEASE ORAL
Status: DISCONTINUED | OUTPATIENT
Start: 2020-06-29 | End: 2020-07-06 | Stop reason: HOSPADM

## 2020-06-29 RX ORDER — ALBUTEROL SULFATE 0.83 MG/ML
2.5 SOLUTION RESPIRATORY (INHALATION)
Status: DISCONTINUED | OUTPATIENT
Start: 2020-06-29 | End: 2020-07-06 | Stop reason: HOSPADM

## 2020-06-29 RX ORDER — LEVOFLOXACIN 5 MG/ML
750 INJECTION, SOLUTION INTRAVENOUS EVERY 24 HOURS
Status: DISCONTINUED | OUTPATIENT
Start: 2020-06-30 | End: 2020-07-02

## 2020-06-29 RX ORDER — ONDANSETRON 2 MG/ML
4 INJECTION INTRAMUSCULAR; INTRAVENOUS
Status: DISCONTINUED | OUTPATIENT
Start: 2020-06-29 | End: 2020-07-06 | Stop reason: HOSPADM

## 2020-06-29 RX ORDER — SODIUM CHLORIDE 0.9 % (FLUSH) 0.9 %
5-40 SYRINGE (ML) INJECTION AS NEEDED
Status: DISCONTINUED | OUTPATIENT
Start: 2020-06-29 | End: 2020-07-06 | Stop reason: HOSPADM

## 2020-06-29 RX ORDER — SODIUM CHLORIDE 0.9 % (FLUSH) 0.9 %
5-40 SYRINGE (ML) INJECTION EVERY 8 HOURS
Status: DISCONTINUED | OUTPATIENT
Start: 2020-06-29 | End: 2020-07-06 | Stop reason: HOSPADM

## 2020-06-29 RX ORDER — LEVOFLOXACIN 5 MG/ML
750 INJECTION, SOLUTION INTRAVENOUS EVERY 24 HOURS
Status: DISCONTINUED | OUTPATIENT
Start: 2020-06-29 | End: 2020-06-29 | Stop reason: SDUPTHER

## 2020-06-29 RX ORDER — BENZONATATE 100 MG/1
100 CAPSULE ORAL
Status: DISCONTINUED | OUTPATIENT
Start: 2020-06-29 | End: 2020-07-06 | Stop reason: HOSPADM

## 2020-06-29 RX ORDER — ACETAMINOPHEN 325 MG/1
650 TABLET ORAL
Status: DISCONTINUED | OUTPATIENT
Start: 2020-06-29 | End: 2020-07-06 | Stop reason: HOSPADM

## 2020-06-29 RX ORDER — ACETAMINOPHEN 500 MG
1000 TABLET ORAL
Status: COMPLETED | OUTPATIENT
Start: 2020-06-29 | End: 2020-06-29

## 2020-06-29 RX ORDER — IPRATROPIUM BROMIDE AND ALBUTEROL SULFATE 2.5; .5 MG/3ML; MG/3ML
3 SOLUTION RESPIRATORY (INHALATION)
Status: DISCONTINUED | OUTPATIENT
Start: 2020-06-30 | End: 2020-06-30

## 2020-06-29 RX ADMIN — VANCOMYCIN HYDROCHLORIDE 2250 MG: 10 INJECTION, POWDER, LYOPHILIZED, FOR SOLUTION INTRAVENOUS at 23:35

## 2020-06-29 RX ADMIN — WATER 2 G: 1 INJECTION INTRAMUSCULAR; INTRAVENOUS; SUBCUTANEOUS at 22:07

## 2020-06-29 RX ADMIN — LEVOFLOXACIN 750 MG: 750 INJECTION, SOLUTION INTRAVENOUS at 22:13

## 2020-06-29 RX ADMIN — ACETAMINOPHEN 1000 MG: 500 TABLET ORAL at 22:06

## 2020-06-30 ENCOUNTER — HOSPITAL ENCOUNTER (OUTPATIENT)
Dept: INFUSION THERAPY | Age: 45
End: 2020-06-30
Payer: COMMERCIAL

## 2020-06-30 PROBLEM — J18.9 PNEUMONIA: Status: RESOLVED | Noted: 2019-07-27 | Resolved: 2020-06-30

## 2020-06-30 PROBLEM — R78.81 BACTEREMIA: Status: ACTIVE | Noted: 2020-06-30

## 2020-06-30 PROBLEM — D69.6 THROMBOCYTOPENIA (HCC): Status: ACTIVE | Noted: 2020-06-30

## 2020-06-30 LAB
ANION GAP SERPL CALC-SCNC: 6 MMOL/L (ref 5–15)
ATRIAL RATE: 128 BPM
B PERT DNA SPEC QL NAA+PROBE: NOT DETECTED
BASOPHILS # BLD: 0 K/UL (ref 0–0.1)
BASOPHILS NFR BLD: 0 % (ref 0–1)
BORDETELLA PARAPERTUSSIS PCR, BORPAR: NOT DETECTED
BUN SERPL-MCNC: 20 MG/DL (ref 6–20)
BUN/CREAT SERPL: 19 (ref 12–20)
C PNEUM DNA SPEC QL NAA+PROBE: NOT DETECTED
CALCIUM SERPL-MCNC: 7.8 MG/DL (ref 8.5–10.1)
CALCULATED P AXIS, ECG09: 35 DEGREES
CALCULATED R AXIS, ECG10: 21 DEGREES
CALCULATED T AXIS, ECG11: 29 DEGREES
CHLORIDE SERPL-SCNC: 111 MMOL/L (ref 97–108)
CO2 SERPL-SCNC: 24 MMOL/L (ref 21–32)
COVID-19 RAPID TEST, COVR: ABNORMAL
CREAT SERPL-MCNC: 1.03 MG/DL (ref 0.7–1.3)
DIAGNOSIS, 93000: NORMAL
DIFFERENTIAL METHOD BLD: ABNORMAL
EOSINOPHIL # BLD: 0 K/UL (ref 0–0.4)
EOSINOPHIL NFR BLD: 0 % (ref 0–7)
ERYTHROCYTE [DISTWIDTH] IN BLOOD BY AUTOMATED COUNT: 15 % (ref 11.5–14.5)
FLUAV H1 2009 PAND RNA SPEC QL NAA+PROBE: NOT DETECTED
FLUAV H1 RNA SPEC QL NAA+PROBE: NOT DETECTED
FLUAV H3 RNA SPEC QL NAA+PROBE: NOT DETECTED
FLUAV SUBTYP SPEC NAA+PROBE: NOT DETECTED
FLUBV RNA SPEC QL NAA+PROBE: NOT DETECTED
GLUCOSE SERPL-MCNC: 103 MG/DL (ref 65–100)
HADV DNA SPEC QL NAA+PROBE: NOT DETECTED
HCOV 229E RNA SPEC QL NAA+PROBE: NOT DETECTED
HCOV HKU1 RNA SPEC QL NAA+PROBE: NOT DETECTED
HCOV NL63 RNA SPEC QL NAA+PROBE: NOT DETECTED
HCOV OC43 RNA SPEC QL NAA+PROBE: NOT DETECTED
HCT VFR BLD AUTO: 30 % (ref 36.6–50.3)
HGB BLD-MCNC: 9.8 G/DL (ref 12.1–17)
HMPV RNA SPEC QL NAA+PROBE: NOT DETECTED
HPIV1 RNA SPEC QL NAA+PROBE: NOT DETECTED
HPIV2 RNA SPEC QL NAA+PROBE: NOT DETECTED
HPIV3 RNA SPEC QL NAA+PROBE: NOT DETECTED
HPIV4 RNA SPEC QL NAA+PROBE: NOT DETECTED
IMM GRANULOCYTES # BLD AUTO: 0 K/UL
IMM GRANULOCYTES NFR BLD AUTO: 0 %
LYMPHOCYTES # BLD: 0.2 K/UL (ref 0.8–3.5)
LYMPHOCYTES NFR BLD: 2 % (ref 12–49)
M PNEUMO DNA SPEC QL NAA+PROBE: NOT DETECTED
MCH RBC QN AUTO: 24 PG (ref 26–34)
MCHC RBC AUTO-ENTMCNC: 32.7 G/DL (ref 30–36.5)
MCV RBC AUTO: 73.3 FL (ref 80–99)
MONOCYTES # BLD: 0.6 K/UL (ref 0–1)
MONOCYTES NFR BLD: 7 % (ref 5–13)
NEUTS BAND NFR BLD MANUAL: 9 % (ref 0–6)
NEUTS SEG # BLD: 7.5 K/UL (ref 1.8–8)
NEUTS SEG NFR BLD: 82 % (ref 32–75)
NRBC # BLD: 0 K/UL (ref 0–0.01)
NRBC BLD-RTO: 0 PER 100 WBC
P-R INTERVAL, ECG05: 176 MS
PLATELET # BLD AUTO: 45 K/UL (ref 150–400)
POTASSIUM SERPL-SCNC: 3.8 MMOL/L (ref 3.5–5.1)
Q-T INTERVAL, ECG07: 274 MS
QRS DURATION, ECG06: 70 MS
QTC CALCULATION (BEZET), ECG08: 400 MS
RBC # BLD AUTO: 4.09 M/UL (ref 4.1–5.7)
RBC MORPH BLD: ABNORMAL
RBC MORPH BLD: ABNORMAL
RSV RNA SPEC QL NAA+PROBE: NOT DETECTED
RV+EV RNA SPEC QL NAA+PROBE: NOT DETECTED
SARS-COV-2, COV2: NOT DETECTED
SODIUM SERPL-SCNC: 141 MMOL/L (ref 136–145)
SOURCE, COVRS: ABNORMAL
SPECIMEN SOURCE, FCOV2M: ABNORMAL
SPECIMEN SOURCE, FCOV2M: NORMAL
VENTRICULAR RATE, ECG03: 128 BPM
WBC # BLD AUTO: 8.3 K/UL (ref 4.1–11.1)
WBC MORPH BLD: ABNORMAL

## 2020-06-30 PROCEDURE — 80048 BASIC METABOLIC PNL TOTAL CA: CPT

## 2020-06-30 PROCEDURE — 85025 COMPLETE CBC W/AUTO DIFF WBC: CPT

## 2020-06-30 PROCEDURE — 94640 AIRWAY INHALATION TREATMENT: CPT

## 2020-06-30 PROCEDURE — 87635 SARS-COV-2 COVID-19 AMP PRB: CPT

## 2020-06-30 PROCEDURE — 74011250636 HC RX REV CODE- 250/636: Performed by: INTERNAL MEDICINE

## 2020-06-30 PROCEDURE — 74011250637 HC RX REV CODE- 250/637: Performed by: INTERNAL MEDICINE

## 2020-06-30 PROCEDURE — 74011250636 HC RX REV CODE- 250/636: Performed by: STUDENT IN AN ORGANIZED HEALTH CARE EDUCATION/TRAINING PROGRAM

## 2020-06-30 PROCEDURE — 0100U RESPIRATORY PANEL,PCR,NASOPHARYNGEAL: CPT

## 2020-06-30 PROCEDURE — 65660000000 HC RM CCU STEPDOWN

## 2020-06-30 PROCEDURE — 74011000258 HC RX REV CODE- 258: Performed by: STUDENT IN AN ORGANIZED HEALTH CARE EDUCATION/TRAINING PROGRAM

## 2020-06-30 PROCEDURE — 36415 COLL VENOUS BLD VENIPUNCTURE: CPT

## 2020-06-30 PROCEDURE — 87040 BLOOD CULTURE FOR BACTERIA: CPT

## 2020-06-30 RX ORDER — METOPROLOL SUCCINATE 25 MG/1
50 TABLET, EXTENDED RELEASE ORAL DAILY
Status: DISCONTINUED | OUTPATIENT
Start: 2020-07-01 | End: 2020-06-30

## 2020-06-30 RX ORDER — SODIUM CHLORIDE 9 MG/ML
125 INJECTION, SOLUTION INTRAVENOUS CONTINUOUS
Status: DISPENSED | OUTPATIENT
Start: 2020-06-30 | End: 2020-07-01

## 2020-06-30 RX ORDER — ALBUTEROL SULFATE 90 UG/1
2 AEROSOL, METERED RESPIRATORY (INHALATION)
Status: DISCONTINUED | OUTPATIENT
Start: 2020-06-30 | End: 2020-07-06 | Stop reason: HOSPADM

## 2020-06-30 RX ADMIN — IPRATROPIUM BROMIDE 1 PUFF: 17 AEROSOL, METERED RESPIRATORY (INHALATION) at 15:16

## 2020-06-30 RX ADMIN — IPRATROPIUM BROMIDE: 17 AEROSOL, METERED RESPIRATORY (INHALATION) at 09:50

## 2020-06-30 RX ADMIN — LEVOFLOXACIN 750 MG: 5 INJECTION, SOLUTION INTRAVENOUS at 21:49

## 2020-06-30 RX ADMIN — Medication 10 ML: at 00:37

## 2020-06-30 RX ADMIN — Medication 10 ML: at 14:00

## 2020-06-30 RX ADMIN — SODIUM CHLORIDE 75 ML/HR: 900 INJECTION, SOLUTION INTRAVENOUS at 21:49

## 2020-06-30 RX ADMIN — CEFEPIME HYDROCHLORIDE 2 G: 2 INJECTION, POWDER, FOR SOLUTION INTRAVENOUS at 21:48

## 2020-06-30 RX ADMIN — ALBUTEROL SULFATE 2 PUFF: 108 INHALANT RESPIRATORY (INHALATION) at 15:16

## 2020-06-30 RX ADMIN — CEFEPIME HYDROCHLORIDE 2 G: 2 INJECTION, POWDER, FOR SOLUTION INTRAVENOUS at 16:02

## 2020-06-30 RX ADMIN — ALBUTEROL SULFATE: 108 INHALANT RESPIRATORY (INHALATION) at 09:49

## 2020-06-30 RX ADMIN — VANCOMYCIN HYDROCHLORIDE 1250 MG: 1.25 INJECTION, POWDER, LYOPHILIZED, FOR SOLUTION INTRAVENOUS at 12:38

## 2020-06-30 NOTE — PROGRESS NOTES
Admission Medication Reconciliation:     Information obtained from:    Patient via phone call  RxQuery data available¹:  YES    Comments/Recommendations:   Patient able to confirm name, , allergies, and preferred pharmacy  The patient has taken duloxetine 30 mg daily in the past but he stopped it about one month ago. Updated PTA medication list  Verified preferred pharmacy  Reviewed patient's allergies     ¹RxQuery pharmacy benefit data reflects medications filled and processed through the patient's insurance, however   this data does NOT capture whether the medication was picked up or is currently being taken by the patient. Prior to Admission Medications   Prescriptions Last Dose Informant Taking?   calcium carbonate (CALTRATE 600 PO) 2020 at Unknown time Self Yes   Sig: Take 600 mg by mouth daily. metoprolol succinate (TOPROL-XL) 50 mg XL tablet 2020 at Unknown time Self Yes   Sig: Take 50 mg by mouth daily. Facility-Administered Medications: None         Please contact the main inpatient pharmacy with any questions or concerns at [phone] and we will direct you to the clinical pharmacist covering this patient's care while in-house.    Arturo Lai, JudsonD, BCPS

## 2020-06-30 NOTE — PROGRESS NOTES
Bedside shift change report given to [facility] (oncoming nurse) by Cristofer English (offgoing nurse). Report included the following information SBAR, Kardex, ED Summary, Intake/Output, MAR, Recent Results and Cardiac Rhythm NSr.     1745 Covid test negative. Per Dr Stefani Amador, No need to retest.    1930 TRANSFER - OUT REPORT:    Verbal report given to Vinay(name) on Elias Fritz  being transferred to [facility](unit) for routine progression of care       Report consisted of patients Situation, Background, Assessment and   Recommendations(SBAR). Information from the following report(s) SBAR, Kardex, ED Summary, Intake/Output, MAR, Recent Results and Cardiac Rhythm NSR was reviewed with the receiving nurse. Lines:   PICC Double Lumen Right;Other(comment) (Active)   Central Line Being Utilized No 6/30/2020  5:00 PM   Criteria for Appropriate Use Irritant/vesicant medication 6/30/2020  5:00 PM   Site Assessment Clean, dry, & intact 6/30/2020  5:00 PM   Phlebitis Assessment 0 6/30/2020  5:00 PM   Infiltration Assessment 0 6/30/2020  5:00 PM   Dressing Status Clean, dry, & intact 6/30/2020  5:00 PM   Action Taken Open ports on tubing capped 6/30/2020  8:59 AM   Dressing Type Disk with Chlorhexadine gluconate (CHG); Transparent 6/30/2020  8:59 AM       Peripheral IV 06/29/20 Left Antecubital (Active)   Site Assessment Clean, dry, & intact 6/30/2020  5:00 PM   Phlebitis Assessment 0 6/30/2020  5:00 PM   Infiltration Assessment 0 6/30/2020  5:00 PM   Dressing Status Clean, dry, & intact 6/30/2020  5:00 PM   Dressing Type Transparent 6/30/2020  5:00 PM   Hub Color/Line Status Green 6/30/2020  5:00 PM   Action Taken Open ports on tubing capped 6/30/2020  5:00 PM   Alcohol Cap Used Yes 6/30/2020  5:00 PM       Peripheral IV 06/29/20 Right Antecubital (Active)   Site Assessment Clean, dry, & intact 6/30/2020  5:00 PM   Phlebitis Assessment 0 6/30/2020  5:00 PM   Infiltration Assessment 0 6/30/2020  5:00 PM   Dressing Status Clean, dry, & intact 6/30/2020  5:00 PM   Dressing Type Transparent 6/30/2020  5:00 PM   Hub Color/Line Status Pink;End cap changed 6/30/2020  5:00 PM   Action Taken Open ports on tubing capped 6/30/2020  5:00 PM   Alcohol Cap Used Yes 6/30/2020  5:00 PM        Opportunity for questions and clarification was provided.       Patient transported with:   Registered Nurse

## 2020-06-30 NOTE — ED NOTES
This RN notified by Patrizia Galdamez in lab that rapid COVID was indeterminate so other sample will be sent to [facility] [place].

## 2020-06-30 NOTE — CONSULTS
[facility] [place] at [place] [address] [address] [place]: [phone]  F: [phone]      Reason for Visit:   Tremaine Brownlee is a 38 y.o. male who is seen in consultation at the request of Dr. Kalani Shannon for evaluation of h/o lung cancer. Hematology / Oncology Treatment History:   · Diagnosed at [facility]  · Biopsy of right level 4 node: non small cell carcinoma, favored squamous cell carcinoma, insufficient sample for further testing  · Stage III Non-small cell lung cancer (Squamous cell)  · Definitive radiation with Dr. Rossy Roman completed mid-July 2019  · Concurrent chemotherapy with carboplatin and paclitaxel by Dr. Mariel Thomas, stopped during second infusion due to reaction to paclitaxel  · Concurrent chemotherapy with cisplatin and etoposide 7/16/2019 by Dr. Mariel Thomas  · CT Chest 7/27/2019: There has been no significant change in size of the large right paratracheal mediastinal mass. There are many new areas of peripheral consolidation in the right upper lobe, which may represent some combination of progressive disease, posttreatment change, or infection. Attention on follow-up is needed. A previously seen right upper lobe nodule in the posterior segment appears to have decreased in size. The large right pleural effusion on this exam is significantly increased in size from prior. · CT A/P 7/29/2019: no metastatic disease in abdomen or pelvis  · MRI Brain 7/29/2019: no intracranial metastatic disease  · EBUS by Dr. Leidy Harris 7/30/2019: Squamous cell, PDL1 QNS, insufficient tissue for molecular studies  · Thoracentesis 8/5/2019: cytology negative  · Initiated maintenance therapy with Durvalumab on 8/27/2019    History of Present Illness:     Angel Hopson was admitted on 6/29/2020 from the ED when he presented with c/o fever, SOB and cough. ED CXR shows recurrence of rt pleural effusion. ED labs Hgb 11.7 plts 45 Admitted for further eval and management.      Today states breathing has improved; not as SOB as he has been. Continues with dry cough. Denies pain or N/V. Had normal BM this am.  Febrile again this morning, with rigors      Past Medical History:   Diagnosis Date    Anemia, iron deficiency     Aphagia     2/2 radiation    Hypertension     Lung cancer ([address] 2019    Pneumonia involving right lung       History reviewed. No pertinent surgical history.    Social History     Tobacco Use    Smoking status: Former Smoker     Packs/day: 0.25     Years: 9.00     Pack years: 2.25     Last attempt to quit: 2019     Years since quittin.3    Smokeless tobacco: Never Used    Tobacco comment: cigar smoking 5-6 months and quit   Substance Use Topics    Alcohol use: Not Currently      Family History   Problem Relation Age of Onset    Cancer Maternal Grandmother         ovarian     Current Facility-Administered Medications   Medication Dose Route Frequency    prochlorperazine (COMPAZINE) injection 10 mg  10 mg IntraVENous Q4H PRN    metroNIDAZOLE (FLAGYL) IVPB premix 500 mg  500 mg IntraVENous Q12H    vancomycin (VANCOCIN) 1,000 mg in 0.9% sodium chloride (MBP/ADV) 250 mL  1,000 mg IntraVENous Q8H    [START ON 2020] Vancomycin trough -  @ 1130am   Other ONCE    albuterol (PROVENTIL HFA, VENTOLIN HFA, PROAIR HFA) inhaler 2 Puff  2 Puff Inhalation Q6H RT    And    ipratropium (ATROVENT HFA) 17 mcg inhaler  1 Puff Inhalation Q6H RT    0.9% sodium chloride infusion  125 mL/hr IntraVENous CONTINUOUS    cefepime (MAXIPIME) 2 g in 0.9% sodium chloride (MBP/ADV) 100 mL  2 g IntraVENous Q8H    sodium chloride (NS) flush 5-40 mL  5-40 mL IntraVENous Q8H    sodium chloride (NS) flush 5-40 mL  5-40 mL IntraVENous PRN    acetaminophen (TYLENOL) tablet 650 mg  650 mg Oral Q4H PRN    ondansetron (ZOFRAN) injection 4 mg  4 mg IntraVENous Q6H PRN    albuterol (PROVENTIL VENTOLIN) nebulizer solution 2.5 mg  2.5 mg Nebulization Q4H PRN    levoFLOXacin (LEVAQUIN) 750 mg in D5W IVPB  750 mg IntraVENous Q24H    benzonatate (TESSALON) capsule 100 mg  100 mg Oral TID PRN    guaiFENesin ER (MUCINEX) tablet 600 mg  600 mg Oral Q12H PRN      No Known Allergies     Review of Systems: A complete review of systems was obtained, negative except as described above. Physical Exam:     Visit Vitals  /58 (BP 1 Location: Left arm, BP Patient Position: Sitting)   Pulse (!) 113   Temp 98.3 °F (36.8 °C)   Resp 22   Ht 5' 9\" (1.753 m)   Wt 189 lb (85.7 kg)   SpO2 97%   BMI 27.91 kg/m²     ECOG PS: 1-2  General: No distress  Eyes: PERRLA, anicteric sclerae  HENT: Atraumatic with normal appearance of ears and nose; OP clear  Neck: Supple; no thyromegaly   Lymphatic: No cervical, supraclavicular, or axillary adenopathy  Respiratory: decreased breath sounds on rt ; no rhonchi or wheezing noted;, normal respiratory effort; O2 in use. CV: PICC line noted to rt thigh area; Normal rate, regular rhythm, no murmurs, no peripheral edema  GI: Soft, nontender, nondistended, no masses, no hepatomegaly, no splenomegaly  MS: Digits without clubbing or cyanosis. Skin: No rashes, ecchymoses, or petechiae. Normal temperature, turgor, and texture. Neuro/Psych: Alert, oriented, appropriate affect, normal judgment/insight      Results:     Lab Results   Component Value Date/Time    WBC 6.0 07/01/2020 08:50 AM    HGB 8.5 (L) 07/01/2020 08:50 AM    HCT 25.5 (L) 07/01/2020 08:50 AM    PLATELET 40 (LL) 26/49/4461 08:50 AM    MCV 73.3 (L) 07/01/2020 08:50 AM    ABS.  NEUTROPHILS 5.4 07/01/2020 08:50 AM     Lab Results   Component Value Date/Time    Sodium 140 07/01/2020 04:20 AM    Potassium 3.7 07/01/2020 04:20 AM    Chloride 109 (H) 07/01/2020 04:20 AM    CO2 25 07/01/2020 04:20 AM    Glucose 100 07/01/2020 04:20 AM    BUN 12 07/01/2020 04:20 AM    Creatinine 0.82 07/01/2020 04:20 AM    GFR est AA >60 07/01/2020 04:20 AM    GFR est non-AA >60 07/01/2020 04:20 AM    Calcium 8.4 (L) 07/01/2020 04:20 AM Glucose (POC) 113 (H) 07/01/2020 06:08 AM     Lab Results   Component Value Date/Time    Bilirubin, total 0.6 07/01/2020 04:20 AM    ALT (SGPT) 26 07/01/2020 04:20 AM    Alk. phosphatase 129 (H) 07/01/2020 04:20 AM    Protein, total 6.1 (L) 07/01/2020 04:20 AM    Albumin 2.9 (L) 07/01/2020 04:20 AM    Globulin 3.2 07/01/2020 04:20 AM     Lab Results   Component Value Date/Time    Reticulocyte count 1.7 08/27/2019 11:19 AM    Iron % saturation 44 08/27/2019 11:19 AM    TIBC 236 (L) 08/27/2019 11:19 AM    Ferritin 698 (H) 08/27/2019 11:19 AM    Vitamin B12 265 08/27/2019 11:19 AM    Folate 9.2 08/27/2019 11:19 AM    TSH 3.49 06/18/2020 08:58 AM    Lipase 136 07/27/2019 11:31 AM    Hep C  virus Ab Interp. NONREACTIVE 12/03/2019 11:22 AM     Lab Results   Component Value Date/Time    INR 1.0 12/03/2019 11:22 AM    aPTT 27.3 12/03/2019 11:22 AM    D-dimer 2.45 (H) 06/09/2020 10:56 AM    Fibrinogen 243 12/03/2019 11:22 AM     No results found for: CEA, 413050, C199LT, C125, NTYR384, 2729LT, C153LT, PSA, PSALT, LDH, [id number], HCGTLT, HCGN, AFP, CHRGLT    6/29/2020 XR CHEST  IMPRESSION:   1. Right pleural effusion with elevation of the right hemidiaphragm, stable to  slightly increased since the prior study. 2. Stable right upper lobe mass. .           Assessment and Recommendations:     1. Sepsis/ bacteremia  6/29 Blood cultures GNRs ;   6/30 repeat blood cultures: gram pos rods   7/1 repeat cultures pending  abx coverage vanc/cefepime/levo per primary team  Source of bacteremia is unclear. Will add CT scan C/A/P       2. Resp failure  2/2 to pleural effusion ( tapped x 3; last tap 2/14/20)  COVID negative   resp panel : negative  Pulmonary consulted: recommending repeat thora; No plan for procedure today. May need to transfuse PLT if <50 before procedure    3.  Non-small cell lung cancer  Stage IIIB  He has at least Stage IIIB disease, and has been treated with concurrent radiation and chemotherapy (cisplatin and etoposide). Repeat PET demonstrates a partial response to therapy. No definite evidence of distant metastatic disease at this time. Pleural effusion cytology negative x3. He is currently on maintenance immunotherapy with Durvalumab.  -will obtain CT C/A/P scan today     4. Thrombocytopenia  2/2  Sepsis  Hopefully see with improvement of sepsis treatment   Continue to monitor; hold anticoagulation for plts< 50K    5. Anemia, microcytic  Likely 2/2 to treatment   Repeat anemia labs to eval for reversible cause. Continue to monitor and transfuse for Hgb < 7      6. SVC syndrome  Monitor. Stable on imaging    Patient seen in conjunction with Magi Lovelace NP.       Signed By: Kosta Maynard MD

## 2020-06-30 NOTE — CONSULTS
Name: Maryanne Parson: [address]   : 1975 Admit Date: 2020   Phone: [phone]  Room: [place] [address]   PCP: Jp Birmingham MD  MRN: [id number]   Date: 2020  Code: Full Code          Chart and notes reviewed. Data reviewed. I review the patient's current medications in the medical record at each encounter. I have evaluated and examined the patient. HPI:    2:49 PM       History was obtained from patient. I was asked by Libra Huizar MD to see Wale Antoine in consultation for a chief complaint of acute respiratory failure with hypoxia and recurrent pleural effusion. History of Present Illness:  Mr. Melody Toure is a 39 yo gentleman with a history of squamous cell carcinoma and recurrent pleural effusions who is admitted with acute respiratory failure with hypoxia, recurrent pleural effusion and concern for possible pneumonia. He had recently undergone bronchoscopy  for hemoptysis. He presented to [facility] yesterday for fever, shortness of breath and dry cough. No further hemoptysis. Feeling somewhat better since admission. His CXR shows recurrence of right pleural effusion (has been tapped by IR 19, 20) and stable RUL mass. Blood cultures growing GNR's. First COVID indeterminate, repeat pending. Labs notable for Thrombocytopenia. Labs: PLTs 45, d-dimer 2.45, blood cultures GNR's 2/4 bottles    Images:  CXR with right pleural effusion and stable RUL mass      Past Medical History:   Diagnosis Date    Anemia, iron deficiency     Aphagia     2/2 radiation    Hypertension     Lung cancer ([address] 2019    Pneumonia involving right lung        History reviewed. No pertinent surgical history.     Family History   Problem Relation Age of Onset    Cancer Maternal Grandmother         ovarian       Social History     Tobacco Use    Smoking status: Former Smoker     Packs/day: 0.25     Years: 9.00     Pack years: 2.25 Last attempt to quit: 2019     Years since quittin.3    Smokeless tobacco: Never Used    Tobacco comment: cigar smoking 5-6 months and quit   Substance Use Topics    Alcohol use: Not Currently       No Known Allergies    Current Facility-Administered Medications   Medication Dose Route Frequency    albuterol (PROVENTIL HFA, VENTOLIN HFA, PROAIR HFA) inhaler 2 Puff  2 Puff Inhalation Q6H RT    And    ipratropium (ATROVENT HFA) 17 mcg inhaler  1 Puff Inhalation Q6H RT    cefepime (MAXIPIME) 2 g in 0.9% sodium chloride (MBP/ADV) 100 mL  2 g IntraVENous Q8H    sodium chloride (NS) flush 5-40 mL  5-40 mL IntraVENous Q8H    sodium chloride (NS) flush 5-40 mL  5-40 mL IntraVENous PRN    acetaminophen (TYLENOL) tablet 650 mg  650 mg Oral Q4H PRN    ondansetron (ZOFRAN) injection 4 mg  4 mg IntraVENous Q6H PRN    albuterol (PROVENTIL VENTOLIN) nebulizer solution 2.5 mg  2.5 mg Nebulization Q4H PRN    levoFLOXacin (LEVAQUIN) 750 mg in D5W IVPB  750 mg IntraVENous Q24H    benzonatate (TESSALON) capsule 100 mg  100 mg Oral TID PRN    guaiFENesin ER (MUCINEX) tablet 600 mg  600 mg Oral Q12H PRN    vancomycin (VANCOCIN) 1,250 mg in 0.9% sodium chloride (MBP/ADV) 250 mL  1,250 mg IntraVENous Q12H     Current Outpatient Medications   Medication Sig    calcium carbonate (CALTRATE 600 PO) Take 600 mg by mouth daily.  metoprolol succinate (TOPROL-XL) 50 mg XL tablet Take 50 mg by mouth daily. REVIEW OF SYSTEMS   12 point ROS negative except as stated in the HPI. Physical Exam:   Visit Vitals  /83   Pulse 82   Temp 98.1 °F (36.7 °C)   Resp 19   Ht 5' 9\" (1.753 m)   Wt 85.7 kg (189 lb)   SpO2 99%   BMI 27.91 kg/m²       General:  Alert, cooperative, no distress, appears stated age. Head:  Normocephalic, without obvious abnormality, atraumatic. Eyes:  Conjunctivae/corneas clear. Nose: Nares normal. Septum midline.  Mucosa normal.    Throat: Lips, mucosa, and tongue normal.    Neck: Supple, symmetrical   Lungs:   Nearly absent R sided breath sounds, clear on the left   Chest wall:  No tenderness or deformity. Heart:  Regular rate and rhythm, S1, S2 normal, no murmur, click, rub or gallop. Abdomen:   Soft, non-tender. Bowel sounds normal.   Extremities: Extremities normal, atraumatic, no cyanosis or edema. Pulses: 2+ and symmetric all extremities. Skin: Skin color, texture, turgor normal. No rashes or lesions       Neurologic: Grossly nonfocal       Lab Results   Component Value Date/Time    Sodium 141 06/30/2020 03:47 AM    Potassium 3.8 06/30/2020 03:47 AM    Chloride 111 (H) 06/30/2020 03:47 AM    CO2 24 06/30/2020 03:47 AM    BUN 20 06/30/2020 03:47 AM    Creatinine 1.03 06/30/2020 03:47 AM    Glucose 103 (H) 06/30/2020 03:47 AM    Calcium 7.8 (L) 06/30/2020 03:47 AM    Magnesium 1.7 06/29/2020 08:49 PM    Phosphorus 3.3 07/28/2019 02:29 AM    Lactic acid 1.0 07/27/2019 02:54 PM       Lab Results   Component Value Date/Time    WBC 8.3 06/30/2020 03:47 AM    HGB 9.8 (L) 06/30/2020 03:47 AM    PLATELET 45 (LL) 46/52/0762 03:47 AM    MCV 73.3 (L) 06/30/2020 03:47 AM       Lab Results   Component Value Date/Time    INR 1.0 12/03/2019 11:22 AM    aPTT 27.3 12/03/2019 11:22 AM    Alk.  phosphatase 210 (H) 06/29/2020 08:49 PM    Protein, total 7.4 06/29/2020 08:49 PM    Albumin 3.6 06/29/2020 08:49 PM    Globulin 3.8 06/29/2020 08:49 PM       Lab Results   Component Value Date/Time    Iron 103 08/27/2019 11:19 AM    TIBC 236 (L) 08/27/2019 11:19 AM    Iron % saturation 44 08/27/2019 11:19 AM    Ferritin 698 (H) 08/27/2019 11:19 AM       Lab Results   Component Value Date/Time    TSH 3.49 06/18/2020 08:58 AM        No results found for: PH, PHI, PCO2, PCO2I, PO2, PO2I, HCO3, HCO3I, FIO2, FIO2I    Lab Results   Component Value Date/Time    Troponin-I, Qt. <0.05 06/29/2020 08:49 PM        Lab Results   Component Value Date/Time    Culture result: (A) 06/29/2020 08:49 PM     GRAM NEGATIVE RODS GROWING IN 2 OF 4 BOTTLES DRAWN (SITE = LAC AND R. AC)    Culture result: REMAINING BOTTLE(S) HAS/HAVE NO GROWTH SO FAR 06/29/2020 08:49 PM    Culture result: NO GROWTH 5 DAYS 07/27/2019 02:54 PM       Lab Results   Component Value Date/Time    Hepatitis B surface Ag 0.10 12/03/2019 11:22 AM       No results found for: VANCT, CPK    No results found for: COLOR, APPRN, SPGRU, BREANNE, PROTU, GLUCU, KETU, BILU, BLDU, UROU, PAN, LEUKU, WBCU, RBCU, UEPI, BACTU, CASTS, UCRY    IMPRESSION  · Acute respiratory failure with hypoxia  · Recurrent R pleural effusion  · Sepsis  · Pneumonia  · Thrombocytopenia  · Bacteremia, GNR's    PLAN  · Goal satss 88% or greater, wean O2 as tolerated  · Will need 6MWT closer to discharge  · Vanc/cefepime/levo  · Follow-up cultures  · Follow-up final COVID results  · Repeat BCx ordered  · Will need repeat thora, interval too spaced out to likely get benefit from tunneled pleural catheter; will discuss with consult team tomorrow about scheduling the thora with us vs IR  · Repeat PLTS in the morning, depending on level will need to determine if he needs a unit transfused prior to the procedure, will determine this tomorrow      Thank you for allowing us to participate in the care of this patient. We will be happy to follow along in his/her progress with you.     Marisol Hargrove MD

## 2020-06-30 NOTE — H&P
[facility]  HISTORY AND PHYSICAL    Name:  Tanisha Freeman  MR#:  [id number]  :  1975    DATE OF SERVICE:  2020    PRIMARY CARE PHYSICIAN:  Vera Avila MD    CHIEF COMPLAINT:  Shortness of breath, fever, and abnormal chest x-ray findings. HISTORY OF PRESENT ILLNESS: 18-year-old gentleman with a past medical history significant for lung cancer, pneumonia, superior vena cava syndrome, and primary hypertension was brought to the emergency room from [facility] via private vehicle after being evaluated for fever, shortness of breath, and cough. Patient's initial chest x-ray evaluation was consistent with a right pleural effusion with elevation of the right hemidiaphragm. Patient denied any associated headaches, dizziness, visual deficits, chest pains, palpitations, nausea, vomiting, abdominal pain, bladder or bowel irregularities. PAST MEDICAL HISTORY:  1.  Lung cancer. 2.  Pneumonia. 3.  Superior vena cava syndrome. 4.  Hypertension. 5.  Physical debility. PAST SURGICAL HISTORY:  Recent Bronchoscopy. HOME MEDICATIONS:  1. Calcium carbonate 600 mg one tablet p.o. daily. 2.  Cymbalta 30 mg one capsule p.o. daily. 3.  Mucinex 1200 mg p.o. twice a day as needed for congestion. 4.  Norco 5/325 one tablet p.o. every 6 hours as needed for pain. 5.  Toprol XL 50 mg one tablet p.o. daily. ALLERGIES:  NO KNOWN DRUG ALLERGIES. SOCIAL HISTORY:  Is a former smoker, quit in 2019. Denied any alcohol use disorder. Denied any recent travels or known sick contacts. FAMILY HISTORY:  Reviewed and positive for ovarian malignancy in a maternal grandmother. REVIEW OF SYSTEMS:  A complete system review conducted essentially negative, otherwise as outlined in the history of the presenting illness. PHYSICAL EXAMINATION:  GENERAL:  The patient appeared mildly dyspneic, at times unable to speak in complete sentences.   VITAL SIGNS:  Blood pressure initially 93/60 with a repeat of 114/73, heart rate ranging from 108-133, respiratory rate 21-35, maximum temperature 104.2 degrees Fahrenheit, saturation of 96% on nasal cannula. HEENT:  Head exam, normocephalic. Pupils equal and reactive to light without any nystagmus. ENT exam, ear, nose, throat clear. NECK:  No jugular venous distention or carotid bruits. CARDIOVASCULAR:  S1, S2 present without any murmurs. RESPIRATORY:  Lungs with decreased air entry bilaterally, right more than left. Scattered expiratory rhonchi. GI:  Bowel sounds present. The abdomen is soft, nontender. No rebound, no guarding. GENITOURINARY:  No suprapubic or flank tenderness. MUSCULOSKELETAL:  No asymmetry of the extremities. Bipedal edema. SKIN:  No rashes, petechiae, or ecchymosis. CNS:  The patient is awake; alert; oriented to time, date, place, person. LABORATORY/RADIOGRAPHIC FINDINGS:      Portable chest x-ray with right pleural effusion with elevation of the right hemidiaphragm, stable to slightly increased since the prior study. Stable right upper lobe mass. 12-lead EKG personally reviewed with sinus tachycardia at 128 per minute. Metabolic panel with a sodium of 139, potassium 3.4, chloride of 107, bicarb 23, BUN of 23, creatinine of 1.11, glucose of 111, calcium 8.9 with an albumin of 3.6, magnesium 1.7. Total bilirubin 1.2, ALT of 40, AST of 34. First troponin less than 0.05, proBNP of 101. CBC with a WBC of 5.9, hemoglobin 11.7, hematocrit 34.6, platelet count of 45. ASSESSMENT/PLAN:  1. Respiratory:  Probable right lower lobe healthcare-associated pneumonia with associated right pleural effusion versus a parapneumonic effusion. History of recent bronchoscopy. Known right upper lobe mass. FiO2 to maintain saturation at least 94%, nebulizers, inhalers, mucolytics, antitussives, incentive spirometry. For followup chest imaging and Pulmonary consult.   2.  Infectious Disease:  Sepsis secondary to probable right lower lobe healthcare-associated pneumonia, present on admission. Maximum temperature 104.2 degrees Fahrenheit, left shift, bandemia, lymphocytopenia, cultures, inflammatory markers, SARS-CoV-2 PCR. Empiric vancomycin, cefepime, and Levaquin. As-needed Infectious Disease consult. 3.  Electrolytes:  Hypokalemia with a normomagnesemia. Repletion with followup levels. 4.  Hematology:  Acute-on-chronic thrombocytopenia without any bleeding diathesis. Monitoring of counts with as-needed transfusion. 5.  Oncology:  History of lung cancer. Followed up by Dr. Fatemeh Brower. Inpatient Oncology consult. 6.  Cardiovascular:  History of primary hypertension. 7.  Musculoskeletal:  Physical debility. 8.  Prophylaxis for deep venous thrombosis with sequential compression devices in view of the anemia and thrombocytopenia. 9.  Directives:  Full code with a very guarded prognosis. Discussed with the patient. In summary, a 25-year-old gentleman with a past medical history significant for lung cancer, pneumonia, anemia, primary hypertension, and recent bronchoscopy, is being admitted to the inpatient level for acute respiratory distress due to right lower lobe probable healthcare-associated pneumonia with an associated enlarging right pleural effusion versus parapneumonic effusion, sepsis, electrolyte abnormality, anemia, and acute-on-chronic thrombocytopenia, necessitating ongoing close monitoring and management including with respiratory therapies, sepsis protocol, repletion/correction of electrolytes, monitoring of blood counts with as-needed transfusions, Pulmonary and Oncology consults. The entire admission plans discussed in detail with patient. All questions and concerns were addressed. Patient's functional status prior to admission significant for patient being able to ambulate unassisted.     Total time for admission 45 minutes, of which at least 50% of the time was spent in plan of care and counseling of patient.       MD SANDY [id number]  D:  06/30/2020 0:46  T:  06/30/2020 2:00  JOB #:  [id number]

## 2020-06-30 NOTE — ACP (ADVANCE CARE PLANNING)
Advance Care Planning     Advance Care Planning Clinical Specialist  Conversation Note      Date of ACP Conversation: 06/30/20    Conversation Conducted with:  Patient with Decision Making Capacity    ACP Clinical Specialist: Aminah Leyva Decision Maker:    Current Designated Health Care Decision Maker:   Primary Decision Maker: Nabil Smith - Mother - [phone]    If no Decision Maker listed above or available through scanned documents, then:    If no Authorized Decision Maker has previously been identified, then patient chooses Health Care Decision Maker:  \"Who would you like to name as your primary health care decision-maker? \"    Name: Khadra Lozano   Relationship: Mother Phone number: [phone]  Joao Smith this person be reached easily? \" YES  \"Who would you like to name as your back-up decision maker? \"   Name: Vera Kimbrough  Relationship: Aunt Phone number: [phone]  \"Can this person be reached easily? \" YES    Care Preferences    Hospitalization: \"If your health worsens and it becomes clear that your chance of recovery is unlikely, what would your preference be regarding hospitalization? \"    Choice:  []  The patient wants hospitalization  []  The patient prefers comfort-focused treatment without hospitalization. Ventilation: \"If you were in your present state of health and suddenly became very ill and were unable to breathe on your own, what would your preference be about the use of a ventilator (breathing machine) if it were available to you? \"      If patient would desire the use of a ventilator (breathing machine), answer \"yes\", if not \"no\":yes    \"If your health worsens and it becomes clear that your chance of recovery is unlikely, what would your preference be about the use of a ventilator (breathing machine) if it were available to you? \"     Would the patient desire the use of a ventilator (breathing machine)?   YES      Resuscitation  \"CPR works best to restart the heart when there is a sudden event, like a heart attack, in someone who is otherwise healthy. Unfortunately, CPR does not typically restart the heart for people who have serious health conditions or who are very sick. \"    \"In the event your heart stopped as a result of an underlying serious health condition, would you want attempts to be made to restart your heart (answer \"yes\" for attempt to resuscitate) or would you prefer a natural death (answer \"no\" for do not attempt to resuscitate)? \" yes    [x] Yes  [] No   Educated Patient / Kevin Hart regarding differences between Advance Directives and portable DNR orders. Length of ACP Conversation in minutes:      Conversation Outcomes:  [x] ACP discussion completed  [] Existing advance directive reviewed with patient; no changes to patient's previously recorded wishes   [] New Advance Directive completed   [] Portable Do Not Resuscitate prepared for Provider review and signature  [] POLST/POST/MOLST/MOST prepared for Provider review and signature      Follow-up plan:    [] Schedule follow-up conversation to continue planning  [] Referred individual to Provider for additional questions/concerns   [] Advised patient/agent/surrogate to review completed ACP document and update if needed with changes in condition, patient preferences or care setting     [] This note routed to one or more involved healthcare providers      Informed patient that we do have resources for creating an official advanced care plan if he was interested and that he could let us or his doctor know if he has any questions.      Gwendolyn Gaston, BS

## 2020-06-30 NOTE — PROGRESS NOTES
Reason for Admission:   Sepsis                   RUR Score:          13% low           Plan for utilizing home health:      Patient has wound care for a PICC line through [facility] currently    PCP: First and Last name:  Alvena Cranker MD   Name of Practice:    Are you a current patient: Yes/No:  Yes   Approximate date of last visit:  Telehealth about a month ago, in person 3-4 months ago. Can you participate in a virtual visit with your PCP:  Yes                    Current Advanced Directive/Advance Care Plan:  Full code, no AD on file, ACP discussion completed. Transition of Care Plan:                    I completed the assessment and ACP discussion with the patient over the phone. No patient contact at this time due to covid-19 rule out. Patient lives alone in a single story home. There are 3 steps to enter at the front and 6-7 at the back. He states he is independent with ADLs and drives. He has [facility] for wound care of a PICC through [facility] currently. He does not use any DME. His emergency contact is his mother Kortney Pollock, and he also would like her as his healthcare decision maker per ACP note. She can be reached at [phone]. His secondary decision maker and another emergency contact is his aunt Lalitha Anaya, who can be reached at [phone]. He states he will be able to call someone for a ride home when he is ready if needed. Patient usually gets his prescriptions at [facility] and they are usually covered. He sees Dr. Ryan Mooney as his PCP and had a telehealth about a month ago and an in person visit a couple of months before that. He can do telehealth if necessary. May need CASI orders for [facility] at discharge. No other needs at this time. Transition of Care Plan  1. Patient is being admitted  2. Covid-19 rule out  3. Can get a ride home when ready   4. Will need to follow up with PCP, specialties as necessary.    5. CM will continue to follow and assist with discharge planning    Care Management Interventions  PCP Verified by CM: Yes(Dr. Jennings Sat)  Mode of Transport at Discharge:  Other (see comment)(Family or friends)  Transition of Care Consult (CM Consult): Discharge Planning  MyChart Signup: No  Discharge Durable Medical Equipment: No  Physical Therapy Consult: No  Occupational Therapy Consult: No  Speech Therapy Consult: No  Current Support Network: Lives Alone  Confirm Follow Up Transport: Self  Discharge Location  Discharge Placement: Home with family assistance    MADELYN Lee

## 2020-06-30 NOTE — PROGRESS NOTES
[facility] Dosing Services: Antimicrobial Stewardship Progress Note    Consult for antibiotic dosing of vancomycin by Dr Reyes Footman  Pharmacist reviewed antibiotic appropriateness for 38year old , male  for indication of HAP PNA, r/o Covid-19  Day of Therapy 1    Plan:  Vancomycin therapy:  Start Vancomycin therapy, with loading dose of 2250mg then 1250mg ivpb q12h. Dose calculated to approximate a therapeutic trough of 15-20mcg/mL. Plan for level: after 3rd maintenance dose  Pharmacy to follow daily and will make changes to dose and/or frequency based on clinical status. Date Dose & Interval Measured (mcg/mL) Extrapolated (mcg/mL)   ? 6/29/20 ?2250mg ? ?   ?6/30/20 ?1250mg ivpb q12h ? ?   ? ? ? ? Other Antimicrobial  (not dosed by pharmacist)   Cefepime 2gm ivpb q8h, levofloxacin 750mg ivpb q24h   Cultures     Blood cx's, SARS-COV2 testing, respiratory nasal swab   Serum Creatinine     Lab Results   Component Value Date/Time    Creatinine 1.11 06/29/2020 08:49 PM       Creatinine Clearance Estimated Creatinine Clearance: 91.2 mL/min (based on SCr of 1.11 mg/dL).      Procalcitonin    Lab Results   Component Value Date/Time    Procalcitonin <0.1 07/27/2019 02:54 PM        Temp   (!) 104.2 °F (40.1 °C)    WBC   Lab Results   Component Value Date/Time    WBC 5.9 06/29/2020 08:49 PM       H/H   Lab Results   Component Value Date/Time    HGB 11.7 (L) 06/29/2020 08:49 PM      Platelets Lab Results   Component Value Date/Time    PLATELET 45 (LL) 64/43/4807 08:49 PM            Pharmacist: Tanna Aguirre PHARMD Contact information:

## 2020-06-30 NOTE — ED PROVIDER NOTES
Chief Complaint   Patient presents with    Fever    Chills    Shortness of Breath     This is a 79-year-old male with hypertension, lung cancer diagnosed earlier this year now status post radiation therapy, has been chronically short of breath since his diagnosis for reports fever and chills that started yesterday, he went to urgent care and was referred here for evaluation today. Denies any chest pain, abdominal pain, vomiting or diarrhea, no recent sick contacts have been positive for covered related respiratory illness. Does not feel like he is more short of breath than usual.  No other systemic complaints. Symptoms are moderate in nature without any alleviating factors. Past Medical History:   Diagnosis Date    Anemia, iron deficiency     Aphagia     / radiation    Hypertension     Lung cancer ([facility] [address] 2019    Pneumonia involving right lung        No past surgical history on file.       Family History:   Problem Relation Age of Onset    Cancer Maternal Grandmother         ovarian       Social History     Socioeconomic History    Marital status: SINGLE     Spouse name: Not on file    Number of children: Not on file    Years of education: Not on file    Highest education level: Not on file   Occupational History    Not on file   Social Needs    Financial resource strain: Not on file    Food insecurity     Worry: Not on file     Inability: Not on file    Transportation needs     Medical: Not on file     Non-medical: Not on file   Tobacco Use    Smoking status: Former Smoker     Packs/day: 0.25     Years: 9.00     Pack years: 2.25     Last attempt to quit: 2019     Years since quittin.3    Smokeless tobacco: Never Used    Tobacco comment: cigar smoking 5-6 months and quit   Substance and Sexual Activity    Alcohol use: Not Currently    Drug use: Not Currently    Sexual activity: Not Currently   Lifestyle    Physical activity     Days per week: Not on file     Minutes per session: Not on file    Stress: Not on file   Relationships    Social connections     Talks on phone: Not on file     Gets together: Not on file     Attends Spiritism service: Not on file     Active member of club or organization: Not on file     Attends meetings of clubs or organizations: Not on file     Relationship status: Not on file    Intimate partner violence     Fear of current or ex partner: Not on file     Emotionally abused: Not on file     Physically abused: Not on file     Forced sexual activity: Not on file   Other Topics Concern    Not on file   Social History Narrative    Not on file         ALLERGIES: Patient has no known allergies. Review of Systems   Constitutional: Positive for chills and fever. HENT: Negative for congestion. Eyes: Negative for redness. Respiratory: Positive for shortness of breath. Gastrointestinal: Negative for abdominal pain and vomiting. Genitourinary: Negative for difficulty urinating. Skin: Negative for rash. Neurological: Positive for headaches. Psychiatric/Behavioral: Negative for confusion.        Vitals:    06/29/20 2008 06/29/20 2047 06/29/20 2048 06/29/20 2145   BP: 114/73  99/61 108/62   Pulse: (!) 133 (!) 126  (!) 120   Resp: 21 (!) 35  30   Temp: (!) 104.2 °F (40.1 °C)      SpO2: 97% 94%  92%   Weight: 85.7 kg (189 lb)      Height: 5' 9\" (1.753 m)               Physical Exam  General:  Awake and alert, NAD  HEENT:  NC/AT, equal pupils, moist mucous membranes  Neck:   Normal inspection, full range of motion  Cardiac:  Tachycardic, regular rhythm, no murmurs  Respiratory:  Decreased breath sounds on the right, no wheezes or rales, normal effort  Abdomen:  Soft and nontender, nondistended  Extremities: Warm and well perfused, no peripheral edema  Neuro:  Moving all extremities symmetrically without gross motor deficit  Skin:   No rashes or pallor    RESULTS  Recent Results (from the past 12 hour(s))   EKG, 12 LEAD, INITIAL    Collection Time: 06/29/20  8:23 PM   Result Value Ref Range    Ventricular Rate 128 BPM    Atrial Rate 128 BPM    P-R Interval 176 ms    QRS Duration 70 ms    Q-T Interval 274 ms    QTC Calculation (Bezet) 400 ms    Calculated P Axis 35 degrees    Calculated R Axis 21 degrees    Calculated T Axis 29 degrees    Diagnosis       Sinus tachycardia  Otherwise normal ECG  When compared with ECG of 09-JUN-2020 11:10,  No significant change was found     POC LACTIC ACID    Collection Time: 06/29/20  8:42 PM   Result Value Ref Range    Lactic Acid (POC) 0.93 0.40 - 2.00 mmol/L   CBC WITH AUTOMATED DIFF    Collection Time: 06/29/20  8:49 PM   Result Value Ref Range    WBC 5.9 4.1 - 11.1 K/uL    RBC 4.85 4.10 - 5.70 M/uL    HGB 11.7 (L) 12.1 - 17.0 g/dL    HCT 34.9 (L) 36.6 - 50.3 %    MCV 72.0 (L) 80.0 - 99.0 FL    MCH 24.1 (L) 26.0 - 34.0 PG    MCHC 33.5 30.0 - 36.5 g/dL    RDW 14.9 (H) 11.5 - 14.5 %    PLATELET 45 (LL) 689 - 400 K/uL    MPV ABNORMAL 8.9 - 12.9 FL    NRBC 0.0 0  WBC    ABSOLUTE NRBC 0.00 0.00 - 0.01 K/uL    NEUTROPHILS 78 (H) 32 - 75 %    BAND NEUTROPHILS 7 (H) 0 - 6 %    LYMPHOCYTES 10 (L) 12 - 49 %    MONOCYTES 5 5 - 13 %    EOSINOPHILS 0 0 - 7 %    BASOPHILS 0 0 - 1 %    IMMATURE GRANULOCYTES 0 %    ABS. NEUTROPHILS 5.0 1.8 - 8.0 K/UL    ABS. LYMPHOCYTES 0.6 (L) 0.8 - 3.5 K/UL    ABS. MONOCYTES 0.3 0.0 - 1.0 K/UL    ABS. EOSINOPHILS 0.0 0.0 - 0.4 K/UL    ABS. BASOPHILS 0.0 0.0 - 0.1 K/UL    ABS. IMM.  GRANS. 0.0 K/UL    DF MANUAL      RBC COMMENTS ANISOCYTOSIS  1+        RBC COMMENTS TEARDROP CELLS  PRESENT        RBC COMMENTS RBC FRAGMENTS      WBC COMMENTS DOHLE BODIES     METABOLIC PANEL, COMPREHENSIVE    Collection Time: 06/29/20  8:49 PM   Result Value Ref Range    Sodium 139 136 - 145 mmol/L    Potassium 3.4 (L) 3.5 - 5.1 mmol/L    Chloride 107 97 - 108 mmol/L    CO2 23 21 - 32 mmol/L    Anion gap 9 5 - 15 mmol/L    Glucose 111 (H) 65 - 100 mg/dL    BUN 23 (H) 6 - 20 MG/DL    Creatinine 1.11 0.70 - 1.30 MG/DL BUN/Creatinine ratio 21 (H) 12 - 20      GFR est AA >60 >60 ml/min/1.73m2    GFR est non-AA >60 >60 ml/min/1.73m2    Calcium 8.9 8.5 - 10.1 MG/DL    Bilirubin, total 1.2 (H) 0.2 - 1.0 MG/DL    ALT (SGPT) 40 12 - 78 U/L    AST (SGOT) 34 15 - 37 U/L    Alk. phosphatase 210 (H) 45 - 117 U/L    Protein, total 7.4 6.4 - 8.2 g/dL    Albumin 3.6 3.5 - 5.0 g/dL    Globulin 3.8 2.0 - 4.0 g/dL    A-G Ratio 0.9 (L) 1.1 - 2.2     SAMPLES BEING HELD    Collection Time: 06/29/20  8:49 PM   Result Value Ref Range    SAMPLES BEING HELD RED. ADDI     COMMENT        Add-on orders for these samples will be processed based on acceptable specimen integrity and analyte stability, which may vary by analyte. MAGNESIUM    Collection Time: 06/29/20  8:49 PM   Result Value Ref Range    Magnesium 1.7 1.6 - 2.4 mg/dL   TROPONIN I    Collection Time: 06/29/20  8:49 PM   Result Value Ref Range    Troponin-I, Qt. <0.05 <0.05 ng/mL   NT-PRO BNP    Collection Time: 06/29/20  8:49 PM   Result Value Ref Range    NT pro- <125 PG/ML        IMAGING  Xr Chest Port    Result Date: 6/29/2020  IMPRESSION: 1. Right pleural effusion with elevation of the right hemidiaphragm, stable to slightly increased since the prior study. 2. Stable right upper lobe mass. .  . Procedures - none unless documented below  EKG as interpreted by me:  Sinus tachycardia at a rate of 120, normal axis and intervals, no ST or T wave changes suggesting acute ischemia. ED course: Labs, EKG and imaging reviewed. Needs thoracentesis to clarify nature of the effusion, malignant versus parapneumonic. Fever to 104 F, will treat with IV cefepime/Levaquin for HCAP coverage per institutional protocol given recent bronchoscopy, breathing comfortably on room air. Lactate WNL, normal systolic pressures. Will admit for continued management, hospitalist made aware.     Hospitalist Perfect Serve for Admission  9:58 PM    ED Room Number:   ER09/09  Patient Name and age:  Beni Morejon Prosise 38 y.o.  male  Working Diagnosis:     1. Pneumonia of right lower lobe due to infectious organism    2. Pleural effusion on right    3. Sepsis, due to unspecified organism, unspecified whether acute organ dysfunction present ([facility] [address]      COVID suspicion:   Other(negative test two weeks ago, worth repeating given symptoms today)  Code Status:    Full Code  Readmission:    no  Isolation Requirements:  yes  Recommended Level of Care: telemetry  Department:    [facility] - [phone]  Other:     Fever to 104 F, right pleural effusion in setting of lung cancer (oncologist-Radin). Treating as parapneumonic effusion with IV cefepime/Levaquin for HCAP given recent bronchoscopy, breathing comfortably on room air. Lactate WNL, normal systolic pressures.

## 2020-06-30 NOTE — ED TRIAGE NOTES
Patient [facility]     Patient was told he has fluid on his lungs    Patient is diaphoretic and a temp of 104.2 in triage

## 2020-06-30 NOTE — CONSULTS
EARLINEE SideStripe at [facility]  [phone]    Consult received, chart reviewed. I will see the patient formally first thing in the morning. He is well known to me for management of Stage III non-small cell lung cancer, currently on adjuvant immunotherapy with durvalumab. He has been admitted with respiratory failure and fevers. Workup has revealed gram negative maria guadalupe sepsis. COVID19 testing is negative. He is currently on antibiotics. Appreciate hospitalist and pulmonologist management. Plans for thoracentesis are noted, but complicated by his thrombocytopenia. The thrombocytopenia is acute and should not be related to his current cancer-directed therapy. It is most likely secondary to sepsis, and therefore we will hopefully see some improvement with antibiotics. Monitor and consider transfusion tomorrow prior to thoracentesis if PLT remain <50k.

## 2020-06-30 NOTE — CDMP QUERY
Good Afternoon This patient admitted for Pneumonia, ruling out for COVID. If possible, please document in progress notes and d/c summary if you are evaluating and/or treating any of the following: 
 
=> Acute Hypoxemic Respiratory Failure 
=> Other Explanation of clinical findings 
=> Clinically Undetermined (no explanation for clinical findings) The medical record reflects the following: 
   Risk Factors: Known lung cancer, Pneumonia, Pleural effusion Sepsis spread of COVID Clinical Indicators: MD assessment notes pt dyspneic and at times is unable to speak in complete sentences, resp rate up to 35, ,nursong notes, pt o2 sats dropped on RA only in high 80s-CXR + Treatment:o2 as needed (currently on 2 liters, with cont. Pulse o2 monitoring, ruling out for coVID, nebs, IV vanco, IV cefepime and IV levaqiuin Thank you, Chris [place], [place] 
___________________ Please clarify and document your clinical opinion in the progress notes and discharge summary including the definitive and/or presumptive diagnosis, (suspected or probable), related to the above clinical findings. Please include clinical findings supporting your diagnosis. REFERENCE:  
Hypoxemic respiratory failure is characterized by a PaO2 less than 60 mmHg (or 10 mmHg below COPD patients baseline) and a normal or low arterial PaCO2. Hypercapnic respiratory failure is characterized by a PaCO2 higher than 50 mmHg (or 10 mmHG above COPD patients baseline). Acute Respiratory Failure indicators include: - Respirations <12 or >25 - [employer] - Use of accessory muscles of respiration  
- Inability to speak in full sentences - Cyanosis AND 
 
- Pulse ox <90% RA or <95% on O2  
- pH <7.35 or >7.45  
- pO2 < 60 mm Hg (or 10mm below COPD patient's baseline) - pCO2 >50mm Hg (or 10mm above COPD patient's baseline)

## 2020-07-01 ENCOUNTER — APPOINTMENT (OUTPATIENT)
Dept: CT IMAGING | Age: 45
DRG: 871 | End: 2020-07-01
Attending: NURSE PRACTITIONER
Payer: COMMERCIAL

## 2020-07-01 PROBLEM — J90 PLEURAL EFFUSION: Status: ACTIVE | Noted: 2020-07-01

## 2020-07-01 LAB
ALBUMIN SERPL-MCNC: 2.9 G/DL (ref 3.5–5)
ALBUMIN/GLOB SERPL: 0.9 {RATIO} (ref 1.1–2.2)
ALP SERPL-CCNC: 129 U/L (ref 45–117)
ALT SERPL-CCNC: 26 U/L (ref 12–78)
ANION GAP SERPL CALC-SCNC: 6 MMOL/L (ref 5–15)
APPEARANCE UR: CLEAR
AST SERPL-CCNC: 24 U/L (ref 15–37)
BACTERIA URNS QL MICRO: NEGATIVE /HPF
BASOPHILS # BLD: 0 K/UL (ref 0–0.1)
BASOPHILS NFR BLD: 0 % (ref 0–1)
BILIRUB SERPL-MCNC: 0.6 MG/DL (ref 0.2–1)
BILIRUB UR QL: NEGATIVE
BUN SERPL-MCNC: 12 MG/DL (ref 6–20)
BUN/CREAT SERPL: 15 (ref 12–20)
CALCIUM SERPL-MCNC: 8.4 MG/DL (ref 8.5–10.1)
CHLORIDE SERPL-SCNC: 109 MMOL/L (ref 97–108)
CO2 SERPL-SCNC: 25 MMOL/L (ref 21–32)
COLOR UR: NORMAL
CREAT SERPL-MCNC: 0.82 MG/DL (ref 0.7–1.3)
DATE LAST DOSE: ABNORMAL
DIFFERENTIAL METHOD BLD: ABNORMAL
EOSINOPHIL # BLD: 0 K/UL (ref 0–0.4)
EOSINOPHIL NFR BLD: 0 % (ref 0–7)
EPITH CASTS URNS QL MICRO: NORMAL /LPF
ERYTHROCYTE [DISTWIDTH] IN BLOOD BY AUTOMATED COUNT: 15.3 % (ref 11.5–14.5)
GLOBULIN SER CALC-MCNC: 3.2 G/DL (ref 2–4)
GLUCOSE BLD STRIP.AUTO-MCNC: 113 MG/DL (ref 65–100)
GLUCOSE SERPL-MCNC: 100 MG/DL (ref 65–100)
GLUCOSE UR STRIP.AUTO-MCNC: NEGATIVE MG/DL
HCT VFR BLD AUTO: 25.5 % (ref 36.6–50.3)
HGB BLD-MCNC: 8.5 G/DL (ref 12.1–17)
HGB UR QL STRIP: NEGATIVE
HYALINE CASTS URNS QL MICRO: NORMAL /LPF (ref 0–5)
IMM GRANULOCYTES # BLD AUTO: 0 K/UL
IMM GRANULOCYTES NFR BLD AUTO: 0 %
KETONES UR QL STRIP.AUTO: NEGATIVE MG/DL
LEUKOCYTE ESTERASE UR QL STRIP.AUTO: NEGATIVE
LYMPHOCYTES # BLD: 0.2 K/UL (ref 0.8–3.5)
LYMPHOCYTES NFR BLD: 3 % (ref 12–49)
MAGNESIUM SERPL-MCNC: 1.8 MG/DL (ref 1.6–2.4)
MCH RBC QN AUTO: 24.4 PG (ref 26–34)
MCHC RBC AUTO-ENTMCNC: 33.3 G/DL (ref 30–36.5)
MCV RBC AUTO: 73.3 FL (ref 80–99)
METAMYELOCYTES NFR BLD MANUAL: 1 %
MONOCYTES # BLD: 0.4 K/UL (ref 0–1)
MONOCYTES NFR BLD: 6 % (ref 5–13)
NEUTS BAND NFR BLD MANUAL: 6 % (ref 0–6)
NEUTS SEG # BLD: 5.4 K/UL (ref 1.8–8)
NEUTS SEG NFR BLD: 84 % (ref 32–75)
NITRITE UR QL STRIP.AUTO: NEGATIVE
NRBC # BLD: 0 K/UL (ref 0–0.01)
NRBC BLD-RTO: 0 PER 100 WBC
PH UR STRIP: 6 [PH] (ref 5–8)
PLATELET # BLD AUTO: 40 K/UL (ref 150–400)
POTASSIUM SERPL-SCNC: 3.7 MMOL/L (ref 3.5–5.1)
PROT SERPL-MCNC: 6.1 G/DL (ref 6.4–8.2)
PROT UR STRIP-MCNC: NEGATIVE MG/DL
RBC # BLD AUTO: 3.48 M/UL (ref 4.1–5.7)
RBC #/AREA URNS HPF: NORMAL /HPF (ref 0–5)
RBC MORPH BLD: ABNORMAL
REPORTED DOSE,DOSE: ABNORMAL UNITS
REPORTED DOSE/TIME,TMG: 0
SERVICE CMNT-IMP: ABNORMAL
SODIUM SERPL-SCNC: 140 MMOL/L (ref 136–145)
SP GR UR REFRACTOMETRY: 1.02 (ref 1–1.03)
UA: UC IF INDICATED,UAUC: NORMAL
UROBILINOGEN UR QL STRIP.AUTO: 0.2 EU/DL (ref 0.2–1)
VANCOMYCIN TROUGH SERPL-MCNC: 15.9 UG/ML (ref 5–10)
WBC # BLD AUTO: 6 K/UL (ref 4.1–11.1)
WBC MORPH BLD: ABNORMAL
WBC URNS QL MICRO: NORMAL /HPF (ref 0–4)

## 2020-07-01 PROCEDURE — 74011250636 HC RX REV CODE- 250/636: Performed by: INTERNAL MEDICINE

## 2020-07-01 PROCEDURE — 74011250636 HC RX REV CODE- 250/636: Performed by: STUDENT IN AN ORGANIZED HEALTH CARE EDUCATION/TRAINING PROGRAM

## 2020-07-01 PROCEDURE — 87040 BLOOD CULTURE FOR BACTERIA: CPT

## 2020-07-01 PROCEDURE — 74011000258 HC RX REV CODE- 258: Performed by: STUDENT IN AN ORGANIZED HEALTH CARE EDUCATION/TRAINING PROGRAM

## 2020-07-01 PROCEDURE — 94664 DEMO&/EVAL PT USE INHALER: CPT

## 2020-07-01 PROCEDURE — 94760 N-INVAS EAR/PLS OXIMETRY 1: CPT

## 2020-07-01 PROCEDURE — 97116 GAIT TRAINING THERAPY: CPT

## 2020-07-01 PROCEDURE — 94640 AIRWAY INHALATION TREATMENT: CPT

## 2020-07-01 PROCEDURE — 97535 SELF CARE MNGMENT TRAINING: CPT

## 2020-07-01 PROCEDURE — 74011636320 HC RX REV CODE- 636/320: Performed by: RADIOLOGY

## 2020-07-01 PROCEDURE — 36415 COLL VENOUS BLD VENIPUNCTURE: CPT

## 2020-07-01 PROCEDURE — 80202 ASSAY OF VANCOMYCIN: CPT

## 2020-07-01 PROCEDURE — 85025 COMPLETE CBC W/AUTO DIFF WBC: CPT

## 2020-07-01 PROCEDURE — 97161 PT EVAL LOW COMPLEX 20 MIN: CPT

## 2020-07-01 PROCEDURE — 65660000000 HC RM CCU STEPDOWN

## 2020-07-01 PROCEDURE — 82540 ASSAY OF CREATINE: CPT

## 2020-07-01 PROCEDURE — 82962 GLUCOSE BLOOD TEST: CPT

## 2020-07-01 PROCEDURE — 80053 COMPREHEN METABOLIC PANEL: CPT

## 2020-07-01 PROCEDURE — 71260 CT THORAX DX C+: CPT

## 2020-07-01 PROCEDURE — 97165 OT EVAL LOW COMPLEX 30 MIN: CPT

## 2020-07-01 PROCEDURE — 77010033678 HC OXYGEN DAILY

## 2020-07-01 PROCEDURE — 77030027138 HC INCENT SPIROMETER -A

## 2020-07-01 PROCEDURE — 87635 SARS-COV-2 COVID-19 AMP PRB: CPT

## 2020-07-01 PROCEDURE — 81001 URINALYSIS AUTO W/SCOPE: CPT

## 2020-07-01 PROCEDURE — 74011250637 HC RX REV CODE- 250/637: Performed by: INTERNAL MEDICINE

## 2020-07-01 PROCEDURE — 83735 ASSAY OF MAGNESIUM: CPT

## 2020-07-01 RX ORDER — IBUPROFEN 600 MG/1
600 TABLET ORAL ONCE
Status: COMPLETED | OUTPATIENT
Start: 2020-07-01 | End: 2020-07-01

## 2020-07-01 RX ORDER — METRONIDAZOLE 500 MG/100ML
500 INJECTION, SOLUTION INTRAVENOUS EVERY 12 HOURS
Status: DISCONTINUED | OUTPATIENT
Start: 2020-07-01 | End: 2020-07-02

## 2020-07-01 RX ORDER — PROCHLORPERAZINE EDISYLATE 5 MG/ML
10 INJECTION INTRAMUSCULAR; INTRAVENOUS
Status: DISCONTINUED | OUTPATIENT
Start: 2020-07-01 | End: 2020-07-06 | Stop reason: HOSPADM

## 2020-07-01 RX ADMIN — ALBUTEROL SULFATE 2 PUFF: 108 INHALANT RESPIRATORY (INHALATION) at 05:59

## 2020-07-01 RX ADMIN — ONDANSETRON 4 MG: 2 INJECTION INTRAMUSCULAR; INTRAVENOUS at 06:09

## 2020-07-01 RX ADMIN — ALBUTEROL SULFATE 2 PUFF: 108 INHALANT RESPIRATORY (INHALATION) at 07:16

## 2020-07-01 RX ADMIN — IBUPROFEN 600 MG: 600 TABLET, FILM COATED ORAL at 08:10

## 2020-07-01 RX ADMIN — ALBUTEROL SULFATE 2 PUFF: 108 INHALANT RESPIRATORY (INHALATION) at 14:48

## 2020-07-01 RX ADMIN — VANCOMYCIN HYDROCHLORIDE 1250 MG: 1.25 INJECTION, POWDER, LYOPHILIZED, FOR SOLUTION INTRAVENOUS at 01:30

## 2020-07-01 RX ADMIN — IOHEXOL 50 ML: 240 INJECTION, SOLUTION INTRATHECAL; INTRAVASCULAR; INTRAVENOUS; ORAL at 15:40

## 2020-07-01 RX ADMIN — LEVOFLOXACIN 750 MG: 5 INJECTION, SOLUTION INTRAVENOUS at 21:15

## 2020-07-01 RX ADMIN — VANCOMYCIN HYDROCHLORIDE 1000 MG: 1 INJECTION, POWDER, LYOPHILIZED, FOR SOLUTION INTRAVENOUS at 20:14

## 2020-07-01 RX ADMIN — IOPAMIDOL 100 ML: 755 INJECTION, SOLUTION INTRAVENOUS at 16:00

## 2020-07-01 RX ADMIN — METRONIDAZOLE 500 MG: 500 INJECTION, SOLUTION INTRAVENOUS at 21:15

## 2020-07-01 RX ADMIN — Medication 10 ML: at 05:59

## 2020-07-01 RX ADMIN — SODIUM CHLORIDE 500 ML: 900 INJECTION, SOLUTION INTRAVENOUS at 08:11

## 2020-07-01 RX ADMIN — IPRATROPIUM BROMIDE 1 PUFF: 17 AEROSOL, METERED RESPIRATORY (INHALATION) at 14:47

## 2020-07-01 RX ADMIN — CEFEPIME HYDROCHLORIDE 2 G: 2 INJECTION, POWDER, FOR SOLUTION INTRAVENOUS at 15:40

## 2020-07-01 RX ADMIN — CEFEPIME HYDROCHLORIDE 2 G: 2 INJECTION, POWDER, FOR SOLUTION INTRAVENOUS at 05:58

## 2020-07-01 RX ADMIN — ACETAMINOPHEN 650 MG: 325 TABLET ORAL at 06:50

## 2020-07-01 RX ADMIN — PROCHLORPERAZINE EDISYLATE 10 MG: 5 INJECTION INTRAMUSCULAR; INTRAVENOUS at 06:50

## 2020-07-01 RX ADMIN — VANCOMYCIN HYDROCHLORIDE 1000 MG: 1 INJECTION, POWDER, LYOPHILIZED, FOR SOLUTION INTRAVENOUS at 13:33

## 2020-07-01 RX ADMIN — Medication 10 ML: at 01:32

## 2020-07-01 RX ADMIN — Medication 10 ML: at 21:15

## 2020-07-01 RX ADMIN — IPRATROPIUM BROMIDE 1 PUFF: 17 AEROSOL, METERED RESPIRATORY (INHALATION) at 05:59

## 2020-07-01 RX ADMIN — METRONIDAZOLE 500 MG: 500 INJECTION, SOLUTION INTRAVENOUS at 08:28

## 2020-07-01 RX ADMIN — CEFEPIME HYDROCHLORIDE 2 G: 2 INJECTION, POWDER, FOR SOLUTION INTRAVENOUS at 23:28

## 2020-07-01 RX ADMIN — IPRATROPIUM BROMIDE 1 PUFF: 17 AEROSOL, METERED RESPIRATORY (INHALATION) at 07:16

## 2020-07-01 NOTE — PROGRESS NOTES
[facility] Dosing Services: Antimicrobial Stewardship Daily Doc    Consult for antibiotic dosing of Vancomycin by Dr. Rani SCHWARTZ, r/o Covid-19  Day of Therapy 3    Vancomycin therapy:  Current maintenance dose: 1250 (mg) every 12 hours   Dose calculated to approximate a therapeutic trough of 15-20 mcg/mL. WBC WNL, SCr improved (0.82), T-Max 103  Plan for level / Adjustment in Therapy: Trough obtained ~9 hours post-dose was 15.9 mcg/mL which extrapolates to a SUBtherapeutic level of 12 mcg/mL for this indication  Vancomycin dose adjusted to 1000 mg IV Q8H  Trough ordered prior to noon dose on 7/2 to evaluate new regimen  Daily SCr ordered by pharmacy  Dose administration notes:  Vancomycin dose given ~1 hour late at 0130    Other Antimicrobial   (not dosed by pharmacist) Cefepime 2 gm every 8 hours  Levofloxacin 750 mg every 24 hours  Metronidazole 500 mg IV Q12H   Cultures 7/1 Blood - (pending)  6/30 Blood: GPR 1/2 bottles - prelim  6/30 COVID-19:Intermediate  6/29 Blood: 3/4 GNR - prelim  6/30 RVP: neg   Serum Creatinine Lab Results   Component Value Date/Time    Creatinine 0.82 07/01/2020 04:20 AM         Creatinine Clearance Estimated Creatinine Clearance: 123.4 mL/min (based on SCr of 0.82 mg/dL).      Temp Temp: (!) 102.3 °F (39.1 °C)       WBC Lab Results   Component Value Date/Time    WBC 6.0 07/01/2020 08:50 AM        H/H Lab Results   Component Value Date/Time    HGB 8.5 (L) 07/01/2020 08:50 AM        Platelets    Lab Results   Component Value Date/Time    PLATELET 40 (LL) 84/29/6783 08:50 AM            Pharmacist Braden Cohn, PHARMD Contact information:

## 2020-07-01 NOTE — PROGRESS NOTES
Problem: Falls - Risk of  Goal: *Absence of Falls  Description: Document Christy Gannon Fall Risk and appropriate interventions in the flowsheet. Outcome: Progressing Towards Goal  Note: Fall Risk Interventions:            Medication Interventions: Patient to call before getting OOB, Teach patient to arise slowly, Utilize gait belt for transfers/ambulation                   Problem: Patient Education: Go to Patient Education Activity  Goal: Patient/Family Education  Outcome: Progressing Towards Goal     Problem: Risk for Spread of Infection  Goal: Prevent transmission of infectious organism to others  Description: Prevent the transmission of infectious organisms to other patients, staff members, and visitors.   Outcome: Progressing Towards Goal     Problem: Patient Education:  Go to Education Activity  Goal: Patient/Family Education  Outcome: Progressing Towards Goal

## 2020-07-01 NOTE — PROGRESS NOTES
Name: [facility]: [address] [place] [address] Dr GARCESB: 1975 Admit Date: 2020   Phone: [phone]  Room: [facility]/   PCP: Mark Muniz MD  MRN: [id number]   Date: 2020  Code: Full Code          Chart and notes reviewed. Data reviewed. I review the patient's current medications in the medical record at each encounter. I have evaluated and examined the patient. Overnight Events:  Febrile  HR elevated  BP stable  Oxygen saturations stable on 2L NC  WBC WNL  HgB 8.5  Platelet 40  Gram positive rods 1/2 bottles BC  COVID not detected but rapid test indeterminate    Images:  CXR : with right pleural effusion and stable RUL mass    ROS: Feeling ok today. Some SOB on exertion. Some cough and wheezing. No chest pain. Past Medical History:   Diagnosis Date    Anemia, iron deficiency     Aphagia     2/2 radiation    Hypertension     Lung cancer ([address] 2019    Pneumonia involving right lung        History reviewed. No pertinent surgical history.     Family History   Problem Relation Age of Onset    Cancer Maternal Grandmother         ovarian       Social History     Tobacco Use    Smoking status: Former Smoker     Packs/day: 0.25     Years: 9.00     Pack years: 2.25     Last attempt to quit: 2019     Years since quittin.3    Smokeless tobacco: Never Used    Tobacco comment: cigar smoking 5-6 months and quit   Substance Use Topics    Alcohol use: Not Currently       No Known Allergies    Current Facility-Administered Medications   Medication Dose Route Frequency    prochlorperazine (COMPAZINE) injection 10 mg  10 mg IntraVENous Q4H PRN    metroNIDAZOLE (FLAGYL) IVPB premix 500 mg  500 mg IntraVENous Q12H    vancomycin (VANCOCIN) 1,000 mg in 0.9% sodium chloride (MBP/ADV) 250 mL  1,000 mg IntraVENous Q8H    [START ON 2020] Vancomycin trough -  @ 1130am   Other ONCE    albuterol (PROVENTIL HFA, VENTOLIN HFA, PROAIR HFA) inhaler 2 Puff  2 Puff Inhalation Q6H RT    And    ipratropium (ATROVENT HFA) 17 mcg inhaler  1 Puff Inhalation Q6H RT    0.9% sodium chloride infusion  125 mL/hr IntraVENous CONTINUOUS    cefepime (MAXIPIME) 2 g in 0.9% sodium chloride (MBP/ADV) 100 mL  2 g IntraVENous Q8H    sodium chloride (NS) flush 5-40 mL  5-40 mL IntraVENous Q8H    sodium chloride (NS) flush 5-40 mL  5-40 mL IntraVENous PRN    acetaminophen (TYLENOL) tablet 650 mg  650 mg Oral Q4H PRN    ondansetron (ZOFRAN) injection 4 mg  4 mg IntraVENous Q6H PRN    albuterol (PROVENTIL VENTOLIN) nebulizer solution 2.5 mg  2.5 mg Nebulization Q4H PRN    levoFLOXacin (LEVAQUIN) 750 mg in D5W IVPB  750 mg IntraVENous Q24H    benzonatate (TESSALON) capsule 100 mg  100 mg Oral TID PRN    guaiFENesin ER (MUCINEX) tablet 600 mg  600 mg Oral Q12H PRN         REVIEW OF SYSTEMS   12 point ROS negative except as stated in the HPI. Physical Exam:   Visit Vitals  /58 (BP 1 Location: Left arm, BP Patient Position: Sitting)   Pulse (!) 113   Temp 98.3 °F (36.8 °C)   Resp 22   Ht 5' 9\" (1.753 m)   Wt 85.7 kg (189 lb)   SpO2 97%   BMI 27.91 kg/m²       General:  Alert, cooperative, no distress, appears stated age. Head:  Normocephalic, without obvious abnormality, atraumatic. Eyes:  Conjunctivae/corneas clear. Nose: Nares normal. Septum midline. Mucosa normal.    Throat: Lips, mucosa, and tongue normal.    Neck: Supple, symmetrical   Lungs:   Nearly absent R sided breath sounds, clear on the left   Chest wall:  No tenderness or deformity. Heart:  Regular rate and rhythm, S1, S2 normal, no murmur, click, rub or gallop. Abdomen:   Soft, non-tender. Bowel sounds normal.   Extremities: Extremities normal, atraumatic, no cyanosis or edema. Pulses: 2+ and symmetric all extremities.    Skin: Skin color, texture, turgor normal. No rashes or lesions       Neurologic: Grossly nonfocal       Lab Results   Component Value Date/Time    Sodium 140 07/01/2020 04:20 AM Potassium 3.7 07/01/2020 04:20 AM    Chloride 109 (H) 07/01/2020 04:20 AM    CO2 25 07/01/2020 04:20 AM    BUN 12 07/01/2020 04:20 AM    Creatinine 0.82 07/01/2020 04:20 AM    Glucose 100 07/01/2020 04:20 AM    Calcium 8.4 (L) 07/01/2020 04:20 AM    Magnesium 1.8 07/01/2020 04:20 AM    Phosphorus 3.3 07/28/2019 02:29 AM    Lactic acid 1.0 07/27/2019 02:54 PM       Lab Results   Component Value Date/Time    WBC 6.0 07/01/2020 08:50 AM    HGB 8.5 (L) 07/01/2020 08:50 AM    PLATELET 40 (LL) 17/56/0714 08:50 AM    MCV 73.3 (L) 07/01/2020 08:50 AM       Lab Results   Component Value Date/Time    INR 1.0 12/03/2019 11:22 AM    aPTT 27.3 12/03/2019 11:22 AM    Alk.  phosphatase 129 (H) 07/01/2020 04:20 AM    Protein, total 6.1 (L) 07/01/2020 04:20 AM    Albumin 2.9 (L) 07/01/2020 04:20 AM    Globulin 3.2 07/01/2020 04:20 AM       Lab Results   Component Value Date/Time    Iron 103 08/27/2019 11:19 AM    TIBC 236 (L) 08/27/2019 11:19 AM    Iron % saturation 44 08/27/2019 11:19 AM    Ferritin 698 (H) 08/27/2019 11:19 AM       Lab Results   Component Value Date/Time    TSH 3.49 06/18/2020 08:58 AM        No results found for: PH, PHI, PCO2, PCO2I, PO2, PO2I, HCO3, HCO3I, FIO2, FIO2I    Lab Results   Component Value Date/Time    Troponin-I, Qt. <0.05 06/29/2020 08:49 PM        Lab Results   Component Value Date/Time    Culture result: (A) 06/30/2020 03:17 PM     GRAM POSITIVE RODS  GROWING IN 1 OF 2 BOTTLES DRAWN  (SITE = L. AC I.V.)      Culture result: REMAINING BOTTLE(S) HAS/HAVE NO GROWTH SO FAR 06/30/2020 03:17 PM    Culture result: (A) 06/29/2020 08:49 PM     GRAM NEGATIVE RODS GROWING IN 3 OF 4 BOTTLES DRAWN (SITE = LAC AND R. AC)    Culture result: REMAINING BOTTLE(S) HAS/HAVE NO GROWTH SO FAR 06/29/2020 08:49 PM       Lab Results   Component Value Date/Time    Hepatitis B surface Ag 0.10 12/03/2019 11:22 AM       Lab Results   Component Value Date/Time    Vancomycin,trough 15.9 (H) 07/01/2020 10:36 AM       Lab Results Component Value Date/Time    Color YELLOW/STRAW 07/01/2020 04:20 AM    Appearance CLEAR 07/01/2020 04:20 AM    pH (UA) 6.0 07/01/2020 04:20 AM    Protein Negative 07/01/2020 04:20 AM    Glucose Negative 07/01/2020 04:20 AM    Ketone Negative 07/01/2020 04:20 AM    Bilirubin Negative 07/01/2020 04:20 AM    Blood Negative 07/01/2020 04:20 AM    Urobilinogen 0.2 07/01/2020 04:20 AM    Nitrites Negative 07/01/2020 04:20 AM    Leukocyte Esterase Negative 07/01/2020 04:20 AM    WBC 0-4 07/01/2020 04:20 AM    RBC 0-5 07/01/2020 04:20 AM    Bacteria Negative 07/01/2020 04:20 AM       IMPRESSION  · Acute respiratory failure with hypoxia  · Recurrent R pleural effusion  · Squamous Cell Carcinoma  · Sepsis  · Pneumonia  · Thrombocytopenia  · Bacteremia, GNR's    PLAN  · Goal satss 88% or greater, wean O2 as tolerated; currently on 2L NC  · Combivent Q6  · CT chest/abdomen/pelvis today  · Will need 6MWT closer to discharge  · Vanc/cefepime/levo/flagyl  · Follow-up cultures  · Repeat COVID today  · Repeat BCx ordered  · IVF  · Will need repeat thora; platelets very low today; hold off on thora today  · Repeat PLTS in the morning, depending on level will need to determine if he needs a unit transfused prior to the procedure    Keshav Mullins NP

## 2020-07-01 NOTE — PROGRESS NOTES
OCCUPATIONAL THERAPY EVALUATION/DISCHARGE  Patient: Francia Mackay (63 y.o. male)  Date: 7/1/2020  Primary Diagnosis: [facility] ([facility] [A41.9]       Precautions:        ASSESSMENT  Based on the objective data described below, the patient presents with baseline functional mobility, balance, strength and safety. Patient reports living alone, able to perform all ADLs and transfers at baseline and able to demonstrate tasks today. Patient with history of lung cancer and reports no O2 needs at home, however currently using NC in room. Therapist unable to find dynamap on floor to monitor O2, so supplemental O2 left on patient, though he reports no SOB at rest or with activity. Patient without need for continued OT services . Functional Outcome Measure: The patient scored 90/100 on the Barthel Index  outcome measure. Other factors to consider for discharge: none     PLAN :  Recommend with staff:  Call out for assist with tranfers secondary to multiple IV lines and O2 tubing. Recommendation for discharge: (in order for the patient to meet his/her long term goals)  No skilled occupational therapy/ follow up rehabilitation needs identified at this time. This discharge recommendation:  Has not yet been discussed the attending provider and/or case management    IF patient discharges home will need the following DME: none       SUBJECTIVE:   Patient stated I am feeling pretty good .     OBJECTIVE DATA SUMMARY:   HISTORY:   Past Medical History:   Diagnosis Date    Anemia, iron deficiency     Aphagia     2/2 radiation    Hypertension     [facility]    Pneumonia involving right lung    History reviewed. No pertinent surgical history.     Prior Level of Function/Environment/Context: independent  Expanded or extensive additional review of patient history:   Home Situation  Home Environment: Trailer/mobile home(double wide)  # Steps to Enter: 3  Rails to Enter: Yes  One/Two Story Residence: One story  Living Alone: Yes  Patient Expects to be Discharged to[de-identified] Private residence  Current DME Used/Available at Home: None  Tub or Shower Type: Shower    Hand dominance: Right    EXAMINATION OF PERFORMANCE DEFICITS:  Cognitive/Behavioral Status:  Neurologic State: Alert  Orientation Level: Oriented X4  Cognition: Follows commands  Perception: Appears intact  Perseveration: No perseveration noted  Safety/Judgement: Awareness of environment    Skin: intact as seen    Edema: none noted     Hearing: Auditory  Auditory Impairment: None    Vision/Perceptual:                                     Range of Motion:  AROM: Within functional limits                         Strength:  Strength: Within functional limits                Coordination:  Coordination: Within functional limits  Fine Motor Skills-Upper: Left Intact; Right Intact    Gross Motor Skills-Upper: Left Intact; Right Intact    Tone & Sensation:  Normal tone  Sensation intact    Tone: Normal  Sensation: Intact                      Balance:  Sitting: Intact  Standing: Intact    Functional Mobility and Transfers for ADLs:  Bed Mobility:  Supine to Sit: Supervision  Scooting: Supervision    Transfers:  Sit to Stand: Supervision  Stand to Sit: Supervision  Bed to Chair: Contact guard assistance  Bathroom Mobility: Contact guard assistance  Toilet Transfer : Contact guard assistance    ADL Assessment:  Feeding: Independent    Oral Facial Hygiene/Grooming: Supervision    Bathing: Supervision    Upper Body Dressing: Supervision    Lower Body Dressing: Supervision    Toileting: Supervision                ADL Intervention and task modifications:                                     Cognitive Retraining  Safety/Judgement: Awareness of environment    Therapeutic Exercise:     Functional Measure:  Barthel Index:    Bathin  Bladder: 10  Bowels: 10  Groomin  Dressing: 10  Feeding: 10  Mobility: 10  Stairs: 10  Toilet Use: 10  Transfer (Bed to Chair and Back): 10  Total: 90/100        The Barthel ADL Index: Guidelines  1. The index should be used as a record of what a patient does, not as a record of what a patient could do. 2. The main aim is to establish degree of independence from any help, physical or verbal, however minor and for whatever reason. 3. The need for supervision renders the patient not independent. 4. A patient's performance should be established using the best available evidence. Asking the patient, friends/relatives and nurses are the usual sources, but direct observation and common sense are also important. However direct testing is not needed. 5. Usually the patient's performance over the preceding 24-48 hours is important, but occasionally longer periods will be relevant. 6. Middle categories imply that the patient supplies over 50 per cent of the effort. 7. Use of aids to be independent is allowed. Jin Quintero., Barthel, DLaurenW. (7911). Functional evaluation: the Barthel Index. [address] [facility] (14)2. [facility] susan ZACK Oliver, Gaurav Meadows., [employer]ie [employer] [place], [address] [place] [address] (1999). Measuring the change indisability after inpatient rehabilitation; comparison of the responsiveness of the Barthel Index and Functional [place] Measure. Journal of Neurology, Neurosurgery, and Psychiatry, 66(4), 144-345. Veena Herrera, N.J.A, LUISA Bhardwaj, & Adi Oswald M.A. (2004.) Assessment of post-stroke quality of life in cost-effectiveness studies: The usefulness of the Barthel Index and the EuroQoL-5D.  Quality of Life Research, 15, 857-57         Occupational Therapy Evaluation Charge Determination   History Examination Decision-Making   LOW Complexity : Brief history review  LOW Complexity : 1-3 performance deficits relating to physical, cognitive , or psychosocial skils that result in activity limitations and / or participation restrictions  LOW Complexity : No comorbidities that affect functional and no verbal or physical assistance needed to complete eval tasks       Based on the above components, the patient evaluation is determined to be of the following complexity level: LOW   Pain Rating:      Activity Tolerance:   Good  Please refer to the flowsheet for vital signs taken during this treatment. After treatment patient left in no apparent distress:    Sitting in chair, Call bell within reach and Bed / chair alarm activated    COMMUNICATION/EDUCATION:   The patients plan of care was discussed with: Physical therapist and Registered nurse.      Thank you for this referral.  Irais Thompson OTR/L  Time Calculation: 25 mins

## 2020-07-01 NOTE — PROGRESS NOTES
TRANSFER - IN REPORT:    Verbal report received from Bridgette Pawnee, [address] [place] [address] (name) on Alex Dumas  being received from 5th Floor (unit) for routine progression of care      Report consisted of patients Situation, Background, Assessment and   Recommendations(SBAR). Information from the following report(s) SBAR, Kardex, Intake/Output and MAR was reviewed with the receiving nurse. Opportunity for questions and clarification was provided. Assessment completed upon patients arrival to unit and care assumed. Dual skin assessment performed with [place] Guess. No skin issues noted on admission to unit.

## 2020-07-01 NOTE — PROGRESS NOTES
Verbal shift change report given to Rima Wells RN (oncoming nurse) by Stevo Vo RN (offgoing nurse). Report included the following information SBAR, Kardex, Procedure Summary, Intake/Output and MAR.

## 2020-07-01 NOTE — PROGRESS NOTES
5419 Primary nurse called RRT nurse to assess patient due to elevated heart rate, patient lying in bed, he is nauseated and vomiting, nurse had already given Zofran. Patient has fever 102.3 at this time, primary nurse to medicate for fever, patient was admitted with sepsis and has lung cancer, patient just back to bed, from walking to the bathroom. Nurse spoke with Dr Kirstie Serrano concerning patient received orders compazine for nausea and vomiting. Patient is alert and stable at this time, patient being medicated for fever, blankets taken off of patient and temp in room down. No escalation of care needed at this time.   Nicolasa Mcconnell RN, CCRN

## 2020-07-01 NOTE — PROGRESS NOTES
Quang Lagunas [facility]  [address] [facility], [address] [place] [address]  [phone]      Medical Progress Note      NAME: Zoran Kaba   :  1975  MRM:  [id number]    Date/Time: 2020  8:49 PM         Subjective:     Chief Complaint:  \"I'm okay\"     Pt and examined. No complaints. Febrile this AM to 103 and tachycardic concurrently. Denies feeling unwell. No N/V/D. No ab pain. ROS:  (bold if positive, if negative)      Fevers       Objective:       Vitals:          Last 24hrs VS reviewed since prior progress note. Most recent are:    Visit Vitals  /60 (BP 1 Location: Left arm, BP Patient Position: At rest)   Pulse (!) 120   Temp (!) 102.3 °F (39.1 °C)   Resp 28   Ht 5' 9\" (1.753 m)   Wt 85.7 kg (189 lb)   SpO2 96%   BMI 27.91 kg/m²     SpO2 Readings from Last 6 Encounters:   20 96%   20 97%   20 97%   20 95%   20 94%   20 99%    O2 Flow Rate (L/min): 2 l/min       Intake/Output Summary (Last 24 hours) at 2020 1108  Last data filed at 2020 0558  Gross per 24 hour   Intake 1961.25 ml   Output 200 ml   Net 1761.25 ml          Exam:     Physical Exam:    Gen: Not particularly ill appearing   HEENT:  Pink conjunctivae, PERRL, hearing intact to voice, moist mucous membranes  Neck:  Supple, without masses, thyroid non-tender  Resp: Diminished breath sounds in the R lung field  Card: Tachycardic.  No murmurs, normal S1, S2 without thrills, bruits or peripheral edema  Abd:  Soft, non-tender, non-distended, normoactive bowel sounds are present  Musc:  No cyanosis or clubbing  Skin:  No rashes or ulcers, skin turgor is good  Neuro:  Cranial nerves 3-12 are grossly intact,  strength is 5/5 bilaterally and dorsi / plantarflexion is 5/5 bilaterally, follows commands appropriately  Psych:  Good insight, oriented to person, place and time, alert    Medications Reviewed: (see below)    Lab Data Reviewed: (see below)    ______________________________________________________________________    Medications:     Current Facility-Administered Medications   Medication Dose Route Frequency    prochlorperazine (COMPAZINE) injection 10 mg  10 mg IntraVENous Q4H PRN    metroNIDAZOLE (FLAGYL) IVPB premix 500 mg  500 mg IntraVENous Q12H    albuterol (PROVENTIL HFA, VENTOLIN HFA, PROAIR HFA) inhaler 2 Puff  2 Puff Inhalation Q6H RT    And    ipratropium (ATROVENT HFA) 17 mcg inhaler  1 Puff Inhalation Q6H RT    [COMPLETED] Vancomycin Trough 11:30  1 Each Other ONCE    0.9% sodium chloride infusion  125 mL/hr IntraVENous CONTINUOUS    cefepime (MAXIPIME) 2 g in 0.9% sodium chloride (MBP/ADV) 100 mL  2 g IntraVENous Q8H    sodium chloride (NS) flush 5-40 mL  5-40 mL IntraVENous Q8H    sodium chloride (NS) flush 5-40 mL  5-40 mL IntraVENous PRN    acetaminophen (TYLENOL) tablet 650 mg  650 mg Oral Q4H PRN    ondansetron (ZOFRAN) injection 4 mg  4 mg IntraVENous Q6H PRN    albuterol (PROVENTIL VENTOLIN) nebulizer solution 2.5 mg  2.5 mg Nebulization Q4H PRN    levoFLOXacin (LEVAQUIN) 750 mg in D5W IVPB  750 mg IntraVENous Q24H    benzonatate (TESSALON) capsule 100 mg  100 mg Oral TID PRN    guaiFENesin ER (MUCINEX) tablet 600 mg  600 mg Oral Q12H PRN    vancomycin (VANCOCIN) 1,250 mg in 0.9% sodium chloride (MBP/ADV) 250 mL  1,250 mg IntraVENous Q12H            Lab Review:     Recent Labs     07/01/20  0850 06/30/20  0347 06/29/20 2049   WBC 6.0 8.3 5.9   HGB 8.5* 9.8* 11.7*   HCT 25.5* 30.0* 34.9*   PLT 40* 45* 45*     Recent Labs     07/01/20  0420 06/30/20  0347 06/29/20 2049    141 139   K 3.7 3.8 3.4*   * 111* 107   CO2 25 24 23    103* 111*   BUN 12 20 23*   CREA 0.82 1.03 1.11   CA 8.4* 7.8* 8.9   MG 1.8  --  1.7   ALB 2.9*  --  3.6   ALT 26  --  40     No components found for: [place]         Assessment / Plan:   [facility] (6/29/2020) - initially thought to be 2/2 PNA however, x-ray underwhelming for acute infectious process and cultures growing GNR's and GPR's   -continue broad spectrum antibiotics; add flagyl   -repeat blood cultures pending   -await speciation of initial cultures   -UA obtained after IV antibiotics so unhelpful  -IVF's   -depending on the speciation of organisms may need additional imaging       Lung cancer ([facility] (7/27/2019)  -followed by Dr. Castro Ugalde      Bacteremia (6/30/2020) - GNR's and GPR's  -see above       Thrombocytopenia ([facility] (6/30/2020) - ?2/2 chemo   -monitor   -transfuse PRN     HTN - BP's soft  -hold metoprolol     R pleural effusion   -as per pulmonary re: thoracentesis     Acute Hypoxemic Respiratory Failure - POA.  2/2 R pleural effusion, lung CA     Total time spent with patient: 35 minutes               Care Plan discussed with: Patient, RN     Discussed:  Care plan     Prophylaxis:  SCD's     Disposition:  Home   _______________________________________    Attending Physician: Harlan Felix MD

## 2020-07-01 NOTE — PROGRESS NOTES
CT has called to have patient start drinking oral contrast since 1130 but unable to get through to nurse.   Please call CT when avalible

## 2020-07-01 NOTE — PROGRESS NOTES
7/1/2020   CARE MANAGEMENT NOTE:  CM reviewed EMR and handoff received from ER . Pt was admitted with sepsis/PNA, lung cancer, and thrombocytopenia. Reportedly, pt resides alone. Pt's mother, Wilian Alonso ([phone]) is his primary family contact. RUR 10%    Transition Plan of Care:  1. PT/OT evals pending and await recs. 2.  Pt has [facility] for PICC line care. A resumption order will be needed. 3.  Pt will arrange his own transportation home. CM will continue to follow pt until discharged.   Patti

## 2020-07-01 NOTE — PROGRESS NOTES
Bedside shift change report given to Denita (oncoming nurse) by Grant Rosado (offgoing nurse). Report included the following information SBAR, Kardex, Intake/Output and Recent Results. TRANSFER - OUT REPORT:    Verbal report given to Elizabeth(name) on Elias Fritz  being transferred to [facility](unit) for routine progression of care       Report consisted of patients Situation, Background, Assessment and   Recommendations(SBAR). Information from the following report(s) SBAR, Kardex, Intake/Output and Recent Results was reviewed with the receiving nurse.     Lines:   PICC Double Lumen Right;Other(comment) (Active)   Central Line Being Utilized No 7/1/2020  8:35 AM   Criteria for Appropriate Use Irritant/vesicant medication 7/1/2020  8:35 AM   Site Assessment Clean, dry, & intact 7/1/2020  8:35 AM   Phlebitis Assessment 0 7/1/2020  8:35 AM   Infiltration Assessment 0 7/1/2020  8:35 AM   Dressing Status Clean, dry, & intact 7/1/2020  8:35 AM   Action Taken Blood drawn;Open ports on tubing capped 7/1/2020  8:35 AM   Dressing Type Disk with Chlorhexadine gluconate (CHG) 7/1/2020  8:35 AM   Hub Color/Line Status Purple 7/1/2020  8:35 AM   Positive Blood Return (Site #1) Yes 7/1/2020  8:35 AM   Positive Blood Return (Site #2) Yes 7/1/2020  8:35 AM   Alcohol Cap Used Yes 7/1/2020  8:35 AM       Peripheral IV 06/29/20 Left Antecubital (Active)   Site Assessment Clean, dry, & intact 7/1/2020  8:35 AM   Phlebitis Assessment 0 7/1/2020  8:35 AM   Infiltration Assessment 0 7/1/2020  8:35 AM   Dressing Status Clean, dry, & intact 7/1/2020  8:35 AM   Dressing Type Transparent 7/1/2020  8:35 AM   Hub Color/Line Status Green 7/1/2020  8:35 AM   Action Taken Open ports on tubing capped 7/1/2020  8:35 AM   Alcohol Cap Used Yes 7/1/2020  8:35 AM       Peripheral IV 06/29/20 Right Antecubital (Active)   Site Assessment Clean, dry, & intact 7/1/2020  8:35 AM   Phlebitis Assessment 0 7/1/2020  8:35 AM   Infiltration Assessment 0 7/1/2020 8:35 AM   Dressing Status Clean, dry, & intact 7/1/2020  8:35 AM   Dressing Type Transparent 7/1/2020  8:35 AM   Hub Color/Line Status Pink 7/1/2020  8:35 AM   Action Taken Open ports on tubing capped 7/1/2020  8:35 AM   Alcohol Cap Used Yes 7/1/2020  8:35 AM        Opportunity for questions and clarification was provided.       Patient transported with:   Monitor  Tech

## 2020-07-01 NOTE — PROGRESS NOTES
Quang Lagunas [address]  [address]  [phone]      Medical Progress Note      NAME: Tremaine Brownlee   :  1975  MRM:  [id number]    Date/Time: 2020  8:49 PM         Subjective:     Chief Complaint:  \"I'm okay\"     Pt and examined. No complaints. Has required two prior thoracenteses. Last > 1 month ago per pt. Blood cultures flagged (+) for GNR's     ROS:  (bold if positive, if negative)             Objective:       Vitals:          Last 24hrs VS reviewed since prior progress note.  Most recent are:    Visit Vitals  /70 (BP 1 Location: Left arm)   Pulse 99   Temp 98.2 °F (36.8 °C)   Resp 15   Ht 5' 9\" (1.753 m)   Wt 85.7 kg (189 lb)   SpO2 95%   BMI 27.91 kg/m²     SpO2 Readings from Last 6 Encounters:   20 95%   20 97%   20 97%   20 95%   20 94%   20 99%    O2 Flow Rate (L/min): 2 l/min       Intake/Output Summary (Last 24 hours) at 2020  Last data filed at 2020 1859  Gross per 24 hour   Intake 400 ml   Output 200 ml   Net 200 ml          Exam:     Physical Exam:    Gen:  Well-developed, well-nourished, in no acute distress  HEENT:  Pink conjunctivae, PERRL, hearing intact to voice, moist mucous membranes  Neck:  Supple, without masses, thyroid non-tender  Resp:  No accessory muscle use, clear breath sounds without wheezes rales or rhonchi  Card:  No murmurs, normal S1, S2 without thrills, bruits or peripheral edema  Abd:  Soft, non-tender, non-distended, normoactive bowel sounds are present  Musc:  No cyanosis or clubbing  Skin:  No rashes or ulcers, skin turgor is good  Neuro:  Cranial nerves 3-12 are grossly intact,  strength is 5/5 bilaterally and dorsi / plantarflexion is 5/5 bilaterally, follows commands appropriately  Psych:  Good insight, oriented to person, place and time, alert    Medications Reviewed: (see below)    Lab Data Reviewed: (see below)    ______________________________________________________________________    Medications:     Current Facility-Administered Medications   Medication Dose Route Frequency    albuterol (PROVENTIL HFA, VENTOLIN HFA, PROAIR HFA) inhaler 2 Puff  2 Puff Inhalation Q6H RT    And    ipratropium (ATROVENT HFA) 17 mcg inhaler  1 Puff Inhalation Q6H RT    [START ON 7/1/2020] Vancomycin Trough 11:30  1 Each Other ONCE    cefepime (MAXIPIME) 2 g in 0.9% sodium chloride (MBP/ADV) 100 mL  2 g IntraVENous Q8H    sodium chloride (NS) flush 5-40 mL  5-40 mL IntraVENous Q8H    sodium chloride (NS) flush 5-40 mL  5-40 mL IntraVENous PRN    acetaminophen (TYLENOL) tablet 650 mg  650 mg Oral Q4H PRN    ondansetron (ZOFRAN) injection 4 mg  4 mg IntraVENous Q6H PRN    albuterol (PROVENTIL VENTOLIN) nebulizer solution 2.5 mg  2.5 mg Nebulization Q4H PRN    levoFLOXacin (LEVAQUIN) 750 mg in D5W IVPB  750 mg IntraVENous Q24H    benzonatate (TESSALON) capsule 100 mg  100 mg Oral TID PRN    guaiFENesin ER (MUCINEX) tablet 600 mg  600 mg Oral Q12H PRN    vancomycin (VANCOCIN) 1,250 mg in 0.9% sodium chloride (MBP/ADV) 250 mL  1,250 mg IntraVENous Q12H     Current Outpatient Medications   Medication Sig    calcium carbonate (CALTRATE 600 PO) Take 600 mg by mouth daily.  metoprolol succinate (TOPROL-XL) 50 mg XL tablet Take 50 mg by mouth daily. Lab Review:     Recent Labs     06/30/20 0347 06/29/20 2049   WBC 8.3 5.9   HGB 9.8* 11.7*   HCT 30.0* 34.9*   PLT 45* 45*     Recent Labs     06/30/20 0347 06/29/20 2049    139   K 3.8 3.4*   * 107   CO2 24 23   * 111*   BUN 20 23*   CREA 1.03 1.11   CA 7.8* 8.9   MG  --  1.7   ALB  --  3.6   ALT  --  40     No components found for: GLPOC         Assessment / Plan:   Sepsis ([facility] (6/29/2020) - initially thought to be 2/2 PNA however, x-ray underwhelming for acute infectious process and cultures growing GNR's. ?Urinary source or intra-ab source.  No UA obtained on admission.  Denies ab symptoms currently   -continue broad spectrum antibiotics  -repeat blood cultures   -await speciation of initial cultures   -check UA though at this point will likely be negative 2/2 antibiotics  -IVF's       Lung cancer ([facility] (7/27/2019)  -followed by Dr. Shaw Precise      Bacteremia (6/30/2020)  -see above       Thrombocytopenia ([facility] (6/30/2020) - ?2/2 chemo   -monitor   -transfuse PRN     HTN - BP's soft  -hold metoprolol     Total time spent with patient: 35 minutes               Care Plan discussed with: Patient, pulmonary     Discussed:  Care plan     Prophylaxis:  SCD's     Disposition:  Home   _______________________________________    Attending Physician: Laura Pinto MD

## 2020-07-01 NOTE — PROGRESS NOTES
PHYSICAL THERAPY EVALUATION/DISCHARGE  Patient: Alex Dumas (72 y.o. male)  Date: 7/1/2020  Primary Diagnosis: Sepsis ([facility] [A41.9]       Precautions: None         ASSESSMENT  Based on the objective data described below, the patient presents with decreased activity tolerance in the presence of generalized infection but overall is mobilizing at or near his functional baseline. He is currently independent with all aspects of mobility and has no acute PT needs at this time and is safe to dc home when medically appropriate. Functional Outcome Measure: The patient scored 90/100 on the Barthel Index outcome measure. Other factors to consider for discharge: None     Further skilled acute physical therapy is not indicated at this time. PLAN :  Recommendation for discharge: (in order for the patient to meet his/her long term goals)  No skilled physical therapy/ follow up rehabilitation needs identified at this time. This discharge recommendation:  Has been made in collaboration with the attending provider and/or case management    IF patient discharges home will need the following DME: none       SUBJECTIVE:   Patient stated I'm doing ok overall.     OBJECTIVE DATA SUMMARY:   HISTORY:    Past Medical History:   Diagnosis Date    Anemia, iron deficiency     Aphagia     2/2 radiation    Hypertension     Lung cancer ([facility] 7/27/2019    Pneumonia involving right lung    History reviewed. No pertinent surgical history.     Prior level of function: Independent at baseline      [address]: Trailer/mobile home(double wide)  # Steps to Enter: 3  Rails to Enter: Yes  One/Two Story Residence: One story  Living Alone: Yes  Patient Expects to be Discharged to[de-identified] Private residence  Current DME Used/Available at Home: None  Tub or Shower Type: Shower    EXAMINATION/PRESENTATION/DECISION MAKING:   Critical Behavior:  Neurologic State: Alert  Orientation Level: Oriented X4  Cognition: Follows commands  Safety/Judgement: Awareness of environment  Hearing: Auditory  Auditory Impairment: None  Skin:  Defer to RN notes  Edema: Defer to RN notes  Range Of Motion:  AROM: Within functional limits                       Strength:    Strength: Within functional limits                    Tone & Sensation:   Tone: Normal              Sensation: Intact               Coordination:  Coordination: Within functional limits  Vision:      Functional Mobility:  Bed Mobility:     Supine to Sit: Supervision     Scooting: Supervision  Transfers:  Sit to Stand: Supervision  Stand to Sit: Supervision        Bed to Chair: Contact guard assistance              Balance:   Sitting: Intact  Standing: Intact  Ambulation/Gait Training:  Distance (ft): 45 Feet (ft)  Assistive Device: Gait belt        Gait Description (WDL): Exceptions to WDL                 Speed/Nini: Shuffled                  Functional Measure:  Barthel Index:    Bathin  Bladder: 10  Bowels: 10  Groomin  Dressing: 10  Feeding: 10  Mobility: 10  Stairs: 10  Toilet Use: 10  Transfer (Bed to Chair and Back): 10  Total: 90/100       The Barthel ADL Index: Guidelines  1. The index should be used as a record of what a patient does, not as a record of what a patient could do. 2. The main aim is to establish degree of independence from any help, physical or verbal, however minor and for whatever reason. 3. The need for supervision renders the patient not independent. 4. A patient's performance should be established using the best available evidence. Asking the patient, friends/relatives and nurses are the usual sources, but direct observation and common sense are also important. However direct testing is not needed. 5. Usually the patient's performance over the preceding 24-48 hours is important, but occasionally longer periods will be relevant. 6. Middle categories imply that the patient supplies over 50 per cent of the effort.   7. Use of aids to be independent is allowed. Marion Led., Barthel, D.W. (2962). Functional evaluation: the Barthel Index. [address] (14)2. ZACK Yee, Rafael Gannon., Denishatristin Funes., [facility], [address] (1999). Measuring the change indisability after inpatient rehabilitation; comparison of the responsiveness of the Barthel Index and Functional [place] Measure. Journal of Neurology, Neurosurgery, and Psychiatry, 66(4), 310-844. JUANITO Maxwell, LUISA Bhardwaj, & Doris Hong M.A. (2004.) Assessment of post-stroke quality of life in cost-effectiveness studies: The usefulness of the Barthel Index and the EuroQoL-5D. Quality of Life Research, 15, 216-54          Physical Therapy Evaluation Charge Determination   History Examination Presentation Decision-Making   LOW Complexity : Zero comorbidities / personal factors that will impact the outcome / POC LOW Complexity : 1-2 Standardized tests and measures addressing body structure, function, activity limitation and / or participation in recreation  LOW Complexity : Stable, uncomplicated  LOW Complexity : FOTO score of       Based on the above components, the patient evaluation is determined to be of the following complexity level: LOW     Pain Rating:  Denied pain    Activity Tolerance:   Good  Please refer to the flowsheet for vital signs taken during this treatment. After treatment patient left in no apparent distress:   Sitting in chair, Call bell within reach and Bed / chair alarm activated    COMMUNICATION/EDUCATION:   The patients plan of care was discussed with: Occupational therapist.     Fall prevention education was provided and the patient/caregiver indicated understanding., Patient/family have participated as able in goal setting and plan of care. and Patient/family agree to work toward stated goals and plan of care.     Thank you for this referral.  Collette Mile PT, DPT   Time Calculation: 32 mins

## 2020-07-01 NOTE — PROGRESS NOTES
Patient complained of having chills, then immediately started vomotting. VS were Temp 98.1; ; RR 18; O2 Sat 94 on 2L O2; /85. Blood glucose was 113. RRTRN notified fpr further assessment. Currently patient resting quietly.

## 2020-07-02 ENCOUNTER — APPOINTMENT (OUTPATIENT)
Dept: GENERAL RADIOLOGY | Age: 45
DRG: 871 | End: 2020-07-02
Attending: RADIOLOGY
Payer: COMMERCIAL

## 2020-07-02 ENCOUNTER — APPOINTMENT (OUTPATIENT)
Dept: ULTRASOUND IMAGING | Age: 45
DRG: 871 | End: 2020-07-02
Attending: NURSE PRACTITIONER
Payer: COMMERCIAL

## 2020-07-02 LAB
ALBUMIN SERPL-MCNC: 2.8 G/DL (ref 3.5–5)
ALBUMIN/GLOB SERPL: 0.8 {RATIO} (ref 1.1–2.2)
ALP SERPL-CCNC: 116 U/L (ref 45–117)
ALT SERPL-CCNC: 31 U/L (ref 12–78)
ANION GAP SERPL CALC-SCNC: 7 MMOL/L (ref 5–15)
APPEARANCE FLD: CLEAR
AST SERPL-CCNC: 25 U/L (ref 15–37)
BASOPHILS # BLD: 0 K/UL (ref 0–0.1)
BASOPHILS NFR BLD: 0 % (ref 0–1)
BILIRUB SERPL-MCNC: 1 MG/DL (ref 0.2–1)
BODY FLD TYPE: NORMAL
BUN SERPL-MCNC: 9 MG/DL (ref 6–20)
BUN/CREAT SERPL: 10 (ref 12–20)
CALCIUM SERPL-MCNC: 8.2 MG/DL (ref 8.5–10.1)
CHLORIDE SERPL-SCNC: 110 MMOL/L (ref 97–108)
CO2 SERPL-SCNC: 24 MMOL/L (ref 21–32)
COLOR FLD: YELLOW
CREAT SERPL-MCNC: 0.88 MG/DL (ref 0.7–1.3)
CREATINE SERPL-MCNC: 0.4 MG/DL (ref 0–0.7)
DATE LAST DOSE: ABNORMAL
DIFFERENTIAL METHOD BLD: ABNORMAL
EOSINOPHIL # BLD: 0 K/UL (ref 0–0.4)
EOSINOPHIL NFR BLD: 0 % (ref 0–7)
ERYTHROCYTE [DISTWIDTH] IN BLOOD BY AUTOMATED COUNT: 15.1 % (ref 11.5–14.5)
FERRITIN SERPL-MCNC: 413 NG/ML (ref 26–388)
FOLATE SERPL-MCNC: 10.1 NG/ML (ref 5–21)
GLOBULIN SER CALC-MCNC: 3.5 G/DL (ref 2–4)
GLUCOSE BLD STRIP.AUTO-MCNC: 86 MG/DL (ref 65–100)
GLUCOSE BLD STRIP.AUTO-MCNC: 91 MG/DL (ref 65–100)
GLUCOSE BLD STRIP.AUTO-MCNC: 99 MG/DL (ref 65–100)
GLUCOSE FLD-MCNC: 98 MG/DL
GLUCOSE SERPL-MCNC: 99 MG/DL (ref 65–100)
HCT VFR BLD AUTO: 27.1 % (ref 36.6–50.3)
HGB BLD-MCNC: 9.1 G/DL (ref 12.1–17)
IMM GRANULOCYTES # BLD AUTO: 0 K/UL
IMM GRANULOCYTES NFR BLD AUTO: 0 %
IRON SATN MFR SERPL: 33 % (ref 20–50)
IRON SERPL-MCNC: 65 UG/DL (ref 35–150)
LDH FLD L TO P-CCNC: 88 U/L
LYMPHOCYTES # BLD: 0.9 K/UL (ref 0.8–3.5)
LYMPHOCYTES NFR BLD: 9 % (ref 12–49)
LYMPHOCYTES NFR FLD: 37 %
MAGNESIUM SERPL-MCNC: 1.7 MG/DL (ref 1.6–2.4)
MCH RBC QN AUTO: 24.2 PG (ref 26–34)
MCHC RBC AUTO-ENTMCNC: 33.6 G/DL (ref 30–36.5)
MCV RBC AUTO: 72.1 FL (ref 80–99)
MESOTHL CELL NFR FLD: 1 %
METAMYELOCYTES NFR BLD MANUAL: 1 %
MONOCYTES # BLD: 0.9 K/UL (ref 0–1)
MONOCYTES NFR BLD: 9 % (ref 5–13)
MONOS+MACROS NFR FLD: 33 %
NEUTROPHILS NFR FLD: 29 %
NEUTS BAND NFR BLD MANUAL: 7 % (ref 0–6)
NEUTS SEG # BLD: 8.4 K/UL (ref 1.8–8)
NEUTS SEG NFR BLD: 74 % (ref 32–75)
NRBC # BLD: 0 K/UL (ref 0–0.01)
NRBC BLD-RTO: 0 PER 100 WBC
NUC CELL # FLD: 362 /CU MM
PH FLD: 7.2 [PH]
PHOSPHATE SERPL-MCNC: 2.2 MG/DL (ref 2.6–4.7)
PLATELET # BLD AUTO: 54 K/UL (ref 150–400)
POTASSIUM SERPL-SCNC: 3.3 MMOL/L (ref 3.5–5.1)
PROT FLD-MCNC: 4 G/DL
PROT SERPL-MCNC: 6.3 G/DL (ref 6.4–8.2)
RBC # BLD AUTO: 3.76 M/UL (ref 4.1–5.7)
RBC # FLD: >100 /CU MM
RBC MORPH BLD: ABNORMAL
REPORTED DOSE,DOSE: ABNORMAL UNITS
REPORTED DOSE/TIME,TMG: 400
SARS-COV-2, COV2: NOT DETECTED
SERVICE CMNT-IMP: NORMAL
SODIUM SERPL-SCNC: 141 MMOL/L (ref 136–145)
SOURCE, COVRS: NORMAL
SPECIMEN SOURCE FLD: ABNORMAL
SPECIMEN SOURCE FLD: NORMAL
SPECIMEN SOURCE, FCOV2M: NORMAL
TIBC SERPL-MCNC: 199 UG/DL (ref 250–450)
VANCOMYCIN TROUGH SERPL-MCNC: 12.9 UG/ML (ref 5–10)
VIT B12 SERPL-MCNC: 873 PG/ML (ref 193–986)
WBC # BLD AUTO: 10.4 K/UL (ref 4.1–11.1)
WBC MORPH BLD: ABNORMAL

## 2020-07-02 PROCEDURE — 74011250636 HC RX REV CODE- 250/636: Performed by: INTERNAL MEDICINE

## 2020-07-02 PROCEDURE — 94760 N-INVAS EAR/PLS OXIMETRY 1: CPT

## 2020-07-02 PROCEDURE — 83735 ASSAY OF MAGNESIUM: CPT

## 2020-07-02 PROCEDURE — 87205 SMEAR GRAM STAIN: CPT

## 2020-07-02 PROCEDURE — 32555 ASPIRATE PLEURA W/ IMAGING: CPT

## 2020-07-02 PROCEDURE — 77010033678 HC OXYGEN DAILY

## 2020-07-02 PROCEDURE — 82746 ASSAY OF FOLIC ACID SERUM: CPT

## 2020-07-02 PROCEDURE — 74011000258 HC RX REV CODE- 258: Performed by: STUDENT IN AN ORGANIZED HEALTH CARE EDUCATION/TRAINING PROGRAM

## 2020-07-02 PROCEDURE — 89050 BODY FLUID CELL COUNT: CPT

## 2020-07-02 PROCEDURE — 87116 MYCOBACTERIA CULTURE: CPT

## 2020-07-02 PROCEDURE — 74011250637 HC RX REV CODE- 250/637: Performed by: INTERNAL MEDICINE

## 2020-07-02 PROCEDURE — 74011000258 HC RX REV CODE- 258: Performed by: INTERNAL MEDICINE

## 2020-07-02 PROCEDURE — 87040 BLOOD CULTURE FOR BACTERIA: CPT

## 2020-07-02 PROCEDURE — 84157 ASSAY OF PROTEIN OTHER: CPT

## 2020-07-02 PROCEDURE — 85025 COMPLETE CBC W/AUTO DIFF WBC: CPT

## 2020-07-02 PROCEDURE — 82962 GLUCOSE BLOOD TEST: CPT

## 2020-07-02 PROCEDURE — 84100 ASSAY OF PHOSPHORUS: CPT

## 2020-07-02 PROCEDURE — 82945 GLUCOSE OTHER FLUID: CPT

## 2020-07-02 PROCEDURE — 83615 LACTATE (LD) (LDH) ENZYME: CPT

## 2020-07-02 PROCEDURE — 82607 VITAMIN B-12: CPT

## 2020-07-02 PROCEDURE — 74011250636 HC RX REV CODE- 250/636: Performed by: STUDENT IN AN ORGANIZED HEALTH CARE EDUCATION/TRAINING PROGRAM

## 2020-07-02 PROCEDURE — 87186 SC STD MICRODIL/AGAR DIL: CPT

## 2020-07-02 PROCEDURE — 65660000000 HC RM CCU STEPDOWN

## 2020-07-02 PROCEDURE — 83986 ASSAY PH BODY FLUID NOS: CPT

## 2020-07-02 PROCEDURE — 80053 COMPREHEN METABOLIC PANEL: CPT

## 2020-07-02 PROCEDURE — 94640 AIRWAY INHALATION TREATMENT: CPT

## 2020-07-02 PROCEDURE — 83540 ASSAY OF IRON: CPT

## 2020-07-02 PROCEDURE — 88305 TISSUE EXAM BY PATHOLOGIST: CPT

## 2020-07-02 PROCEDURE — 71045 X-RAY EXAM CHEST 1 VIEW: CPT

## 2020-07-02 PROCEDURE — 87077 CULTURE AEROBIC IDENTIFY: CPT

## 2020-07-02 PROCEDURE — 82728 ASSAY OF FERRITIN: CPT

## 2020-07-02 PROCEDURE — 0W993ZZ DRAINAGE OF RIGHT PLEURAL CAVITY, PERCUTANEOUS APPROACH: ICD-10-PCS | Performed by: RADIOLOGY

## 2020-07-02 PROCEDURE — 87015 SPECIMEN INFECT AGNT CONCNTJ: CPT

## 2020-07-02 PROCEDURE — 74011000250 HC RX REV CODE- 250: Performed by: INTERNAL MEDICINE

## 2020-07-02 PROCEDURE — 88112 CYTOPATH CELL ENHANCE TECH: CPT

## 2020-07-02 PROCEDURE — 36415 COLL VENOUS BLD VENIPUNCTURE: CPT

## 2020-07-02 PROCEDURE — 80202 ASSAY OF VANCOMYCIN: CPT

## 2020-07-02 RX ORDER — SODIUM,POTASSIUM PHOSPHATES 280-250MG
2 POWDER IN PACKET (EA) ORAL EVERY 4 HOURS
Status: DISCONTINUED | OUTPATIENT
Start: 2020-07-02 | End: 2020-07-02

## 2020-07-02 RX ORDER — METOPROLOL SUCCINATE 25 MG/1
25 TABLET, EXTENDED RELEASE ORAL DAILY
Status: DISCONTINUED | OUTPATIENT
Start: 2020-07-03 | End: 2020-07-03

## 2020-07-02 RX ADMIN — Medication 10 ML: at 14:52

## 2020-07-02 RX ADMIN — IPRATROPIUM BROMIDE 1 PUFF: 17 AEROSOL, METERED RESPIRATORY (INHALATION) at 04:24

## 2020-07-02 RX ADMIN — POTASSIUM PHOSPHATE, MONOBASIC AND POTASSIUM PHOSPHATE, DIBASIC: 224; 236 INJECTION, SOLUTION, CONCENTRATE INTRAVENOUS at 10:31

## 2020-07-02 RX ADMIN — CEFEPIME HYDROCHLORIDE 2 G: 2 INJECTION, POWDER, FOR SOLUTION INTRAVENOUS at 06:09

## 2020-07-02 RX ADMIN — IPRATROPIUM BROMIDE 1 PUFF: 17 AEROSOL, METERED RESPIRATORY (INHALATION) at 09:48

## 2020-07-02 RX ADMIN — VANCOMYCIN HYDROCHLORIDE 1000 MG: 1 INJECTION, POWDER, LYOPHILIZED, FOR SOLUTION INTRAVENOUS at 04:18

## 2020-07-02 RX ADMIN — Medication 10 ML: at 21:00

## 2020-07-02 RX ADMIN — IPRATROPIUM BROMIDE 1 PUFF: 17 AEROSOL, METERED RESPIRATORY (INHALATION) at 14:53

## 2020-07-02 RX ADMIN — ACETAMINOPHEN 650 MG: 325 TABLET ORAL at 20:51

## 2020-07-02 RX ADMIN — ALBUTEROL SULFATE 2 PUFF: 108 INHALANT RESPIRATORY (INHALATION) at 04:24

## 2020-07-02 RX ADMIN — ALBUTEROL SULFATE 2 PUFF: 108 INHALANT RESPIRATORY (INHALATION) at 20:51

## 2020-07-02 RX ADMIN — ALBUTEROL SULFATE 2 PUFF: 108 INHALANT RESPIRATORY (INHALATION) at 09:48

## 2020-07-02 RX ADMIN — Medication 10 ML: at 06:09

## 2020-07-02 RX ADMIN — IPRATROPIUM BROMIDE 1 PUFF: 17 AEROSOL, METERED RESPIRATORY (INHALATION) at 20:51

## 2020-07-02 RX ADMIN — METRONIDAZOLE 500 MG: 500 INJECTION, SOLUTION INTRAVENOUS at 09:50

## 2020-07-02 RX ADMIN — CEFTRIAXONE 2 G: 2 INJECTION, POWDER, FOR SOLUTION INTRAMUSCULAR; INTRAVENOUS at 14:51

## 2020-07-02 RX ADMIN — ALBUTEROL SULFATE 2 PUFF: 108 INHALANT RESPIRATORY (INHALATION) at 14:54

## 2020-07-02 NOTE — PROGRESS NOTES
Nutrition Assessment:    RECOMMENDATIONS/INTERVENTION(S):   1. Advance to diet as medically able, recommend Regular diet. 2. Monitor PO intakes and will reassess need for ONS on f/u visit. 3. Monitor POC, GI function, labs, and wt trends. ASSESSMENT:   7/2: 37 y/o M admitted with sepsis. Pt seen for LOS. PMH - lung CA, HTN. COVID-19 testing. Pt reports good appetite. Intakes per EMR 50-60%. NPO at this time. Pt with good appetite PTA, consumes x2 meals/d with some snacks.  lb. BMI 27.9 c/w overweight. Pt reports recent wt stability, confirmed by wt Hx in EMR. NKFA. No c/s difficulties. Pt reports nausea improved. LBM 7/2. Lytes repletion. Skin without PI. Labs - K+ 3.3 L, Ca 8.2 L, Phos 2.2 L. Meds - KPhos, vancomycin. Diet Order: NPO  % Eaten:    Patient Vitals for the past 72 hrs:   % Diet Eaten   06/30/20 1859 60 %   06/30/20 1400 50 %   06/30/20 0859 0 %       Pertinent Medications: [x] Reviewed    Labs: [x] Reviewed    Anthropometrics: Height: 5' 9\" (175.3 cm) Weight: 85.7 kg (189 lb)    IBW (%IBW):   ( ) UBW (%UBW):   (  %)      BMI: Body mass index is 27.91 kg/m². This BMI is indicative of:   [] Underweight    [] Normal    [x] Overweight    []  Obesity    []  Extreme Obesity (BMI>40)  Estimated Nutrition Needs (Based on): 2252 Kcals/day(REE 1732 x 1.3) , 80 g(65 - 82 gm (0.8 - 1.0 gm/kg)) Protein  Carbohydrate: At Least 130 g/day  Fluids: 2252 mL/day (1 ml/kcal)    Last BM: 7/2   []Active     []Hyperactive  []Hypoactive       [] Absent   BS  Skin:  No PI  [] Intact   [] Incision  [] Breakdown   [] DTI   [] Tears/Excoriation/Abrasion  []Edema [] Other:    Wt Readings from Last 30 Encounters:   06/29/20 85.7 kg (189 lb)   06/18/20 88.1 kg (194 lb 3.2 oz)   06/18/20 88 kg (194 lb)   06/17/20 87 kg (191 lb 12.8 oz)   06/09/20 88 kg (194 lb)   06/02/20 88.4 kg (194 lb 12.8 oz)   05/19/20 88.8 kg (195 lb 11.2 oz)   05/05/20 87.7 kg (193 lb 4.8 oz)   04/21/20 87.2 kg (192 lb 3.2 oz) 04/21/20 87.1 kg (192 lb)   04/07/20 85.8 kg (189 lb 1.6 oz)   03/24/20 86 kg (189 lb 11.2 oz)   03/24/20 85.7 kg (189 lb)   03/10/20 86.5 kg (190 lb 11.2 oz)   02/25/20 84.5 kg (186 lb 3.2 oz)   02/25/20 84.4 kg (186 lb)   02/14/20 83.5 kg (184 lb)   02/11/20 83.6 kg (184 lb 6.4 oz)   02/11/20 83.5 kg (184 lb)   01/28/20 82.8 kg (182 lb 8 oz)   01/14/20 83.5 kg (184 lb)   01/14/20 82.6 kg (182 lb)   12/30/19 81 kg (178 lb 8 oz)   12/17/19 78.7 kg (173 lb 6.4 oz)   12/17/19 78.5 kg (173 lb)   12/03/19 78.4 kg (172 lb 14.4 oz)   12/03/19 78 kg (172 lb)   11/19/19 76.8 kg (169 lb 4.8 oz)   11/19/19 76.7 kg (169 lb)   11/05/19 75.1 kg (165 lb 8 oz)      NUTRITION DIAGNOSES:   Problem:  Inadequate energy intake     Etiology: related to decreased ability to consume sufficient energy     Signs/Symptoms: as evidenced by NPO day 2      NUTRITION INTERVENTIONS:  Meals/Snacks: General/healthful diet                  GOAL:   Pt will advance to diet with intakes >75% Within 3-5 days     Cultural, Quaker, or Ethnic Dietary Needs: None     EDUCATION & DISCHARGE NEEDS:    [x] None Identified   [] Identified and Education Provided/Documented   [] Identified and Pt declined/was not appropriate      [x] Interdisciplinary Care Plan Reviewed/Documented    [x] Discharge Needs:    Regular diet    [] No Nutrition Related Discharge Needs    NUTRITION RISK:   Pt Is At Nutrition Risk  [x]     No Nutrition Risk Identified  []       PT SEEN FOR:    []  MD Consult: []Calorie Count      []Diabetic Diet Education        []Diet Education     []Electrolyte Management     []General Nutrition Management and Supplements     []Management of Tube Feeding     []TPN Recommendations    []  RN Referral:  []MST score >=2     []Enteral/Parenteral Nutrition PTA     []Pregnant: Gestational DM or Multigestation                 [] Pressure Ulcer    []  Low BMI      [x]  Length of Stay       [] Dysphagia Diet         [] Ventilator  []  Follow-up     Previous Recommendations:   [] Implemented          [] Not Implemented          [x] Not Applicable    Previous Goal:   [] Met              [] Progressing Towards Goal              [] Not Progressing Towards Goal   [x] Not Applicable            Liv Palomares, [address] [facility]  Pager [phone]  Phone [phone]

## 2020-07-02 NOTE — CONSULTS
Infectious Disease Consult    Impression/Plan   · Klebsiella oxytoca bacteremia. Etiology is unclear. Urinalysis is bland. CT scan of the abdomen and pelvis is negative for any acute infectious process. CT scanning of the chest reveals subtle left-sided airspace disease however with no pulmonary symptoms I doubt pneumonia is the source of his bacteremia. Thoracentesis planned to rule out an infected pleural effusion as the source of infection. I am concerned that the patient's right thigh PICC line may be the source of infection. This is been in place for over 1 year. Recommend blood cultures drawn through the line. Will discuss removal of line with the oncology team.  Repeat blood cultures have been ordered. Antibiotics will be narrowed to ceftriaxone. Further recommendations to follow  · Bacillus isolated from the blood. This likely represents a contaminant. Follow repeat cultures  · Non-small cell lung cancer. On.maintenance therapy with Durvalumab which he receives every other week  · Thrombocytopenia. Suspect related to gram-negative maria guadalupe bacteremia. Continue to follow  · Indeterminate COVID testing        Anti-infectives:   1. Ceftriaxone    Subjective:   Date of Consultation:  July 2, 2020  Date of Admission: 6/29/2020   Referring Physician:    HISTORY OF PRESENT ILLNESS: 53-year-old gentleman with a past medical history significant for lung cancer, pneumonia, superior vena cava syndrome, and primary hypertension was brought to the emergency room from [facility] via private vehicle after being evaluated for fever, shortness of breath, and cough. Patient's initial chest x-ray evaluation was consistent with a right pleural effusion with elevation of the right hemidiaphragm. Patient denied any associated headaches, dizziness, visual deficits, chest pains, palpitations, nausea, vomiting, abdominal pain, bladder or bowel irregularities. The patient is short of breath at baseline.   No new pulmonary complaints. Work-up has revealed Klebsiella oxytoca bacteremia. He is currently on vancomycin, cefepime, and metronidazole. The infectious diseases service is been asked to assist with antibiotic management      Patient Active Problem List   Diagnosis Code    SVC syndrome I87.1    Lung cancer ([facility] C34.90    HTN (hypertension) I10    Dysphagia R13.10    Cancer related pain G89.3    Physical debility R53.81    [facility] A41.9    Bacteremia R78.81    Thrombocytopenia ([facility] D69.6    Pleural effusion J90     Past Medical History:   Diagnosis Date    Anemia, iron deficiency     Aphagia     2/2 radiation    Hypertension     Lung cancer ([facility] 2019    Pneumonia involving right lung       Family History   Problem Relation Age of Onset    Cancer Maternal Grandmother         ovarian      Social History     Tobacco Use    Smoking status: Former Smoker     Packs/day: 0.25     Years: 9.00     Pack years: 2.25     Last attempt to quit: 2019     Years since quittin.3    Smokeless tobacco: Never Used    Tobacco comment: cigar smoking 5-6 months and quit   Substance Use Topics    Alcohol use: Not Currently     History reviewed. No pertinent surgical history. Prior to Admission medications    Medication Sig Start Date End Date Taking? Authorizing Provider   calcium carbonate (CALTRATE 600 PO) Take 600 mg by mouth daily. Yes Provider, Historical   metoprolol succinate (TOPROL-XL) 50 mg XL tablet Take 50 mg by mouth daily. Yes Provider, Historical     No Known Allergies     Review of Systems:  A comprehensive review of systems was negative except for that written in the History of Present Illness. Objective:   Blood pressure 123/80, pulse 91, temperature 99.2 °F (37.3 °C), resp. rate 19, height 5' 9\" (1.753 m), weight 189 lb (85.7 kg), SpO2 98 %.   Temp (24hrs), Av °F (37.2 °C), Min:98.7 °F (37.1 °C), Max:99.3 °F (37.4 °C)       Exam:    General:  Alert, cooperative, well noursished, well developed, appears stated age   Eyes:  Sclera anicteric. Mouth/Throat: Mucous membranes moist   Neck: Supple   Lungs:    CTA anteriorly   CV:  Regular rate and rhythm   Abdomen:   Soft, non-tender, nondistended   Extremities:  No edema   Skin:  No rash   Lymph nodes:    Musculoskeletal:    Lines/Devices:   Right thigh PICC line unremarkable   Psych: Alert and oriented, normal mood affect given the setting       Data Review:   Recent Results (from the past 24 hour(s))   SARS-COV-2    Collection Time: 07/01/20  4:20 PM   Result Value Ref Range    Specimen source Nasopharyngeal      SARS-CoV-2 PENDING     Specimen source Nasopharyngeal     MAGNESIUM    Collection Time: 07/02/20  4:35 AM   Result Value Ref Range    Magnesium 1.7 1.6 - 2.4 mg/dL   CBC WITH AUTOMATED DIFF    Collection Time: 07/02/20  4:35 AM   Result Value Ref Range    WBC 10.4 4.1 - 11.1 K/uL    RBC 3.76 (L) 4.10 - 5.70 M/uL    HGB 9.1 (L) 12.1 - 17.0 g/dL    HCT 27.1 (L) 36.6 - 50.3 %    MCV 72.1 (L) 80.0 - 99.0 FL    MCH 24.2 (L) 26.0 - 34.0 PG    MCHC 33.6 30.0 - 36.5 g/dL    RDW 15.1 (H) 11.5 - 14.5 %    PLATELET 54 (L) 434 - 400 K/uL    NRBC 0.0 0  WBC    ABSOLUTE NRBC 0.00 0.00 - 0.01 K/uL    NEUTROPHILS 74 32 - 75 %    BAND NEUTROPHILS 7 (H) 0 - 6 %    LYMPHOCYTES 9 (L) 12 - 49 %    MONOCYTES 9 5 - 13 %    EOSINOPHILS 0 0 - 7 %    BASOPHILS 0 0 - 1 %    METAMYELOCYTES 1 (H) 0 %    IMMATURE GRANULOCYTES 0 %    ABS. NEUTROPHILS 8.4 (H) 1.8 - 8.0 K/UL    ABS. LYMPHOCYTES 0.9 0.8 - 3.5 K/UL    ABS. MONOCYTES 0.9 0.0 - 1.0 K/UL    ABS. EOSINOPHILS 0.0 0.0 - 0.4 K/UL    ABS. BASOPHILS 0.0 0.0 - 0.1 K/UL    ABS. IMM.  GRANS. 0.0 K/UL    DF MANUAL      RBC COMMENTS MICROCYTOSIS      WBC COMMENTS TOXIC GRANULATION     FERRITIN    Collection Time: 07/02/20  4:35 AM   Result Value Ref Range    Ferritin 413 (H) 26 - 388 NG/ML   IRON PROFILE    Collection Time: 07/02/20  4:35 AM   Result Value Ref Range    Iron 65 35 - 150 ug/dL TIBC 199 (L) 250 - 450 ug/dL    Iron % saturation 33 20 - 50 %   FOLATE    Collection Time: 07/02/20  4:35 AM   Result Value Ref Range    Folate 10.1 5.0 - 21.0 ng/mL   VITAMIN B12    Collection Time: 07/02/20  4:35 AM   Result Value Ref Range    Vitamin B12 873 193 - 478 pg/mL   METABOLIC PANEL, COMPREHENSIVE    Collection Time: 07/02/20  4:35 AM   Result Value Ref Range    Sodium 141 136 - 145 mmol/L    Potassium 3.3 (L) 3.5 - 5.1 mmol/L    Chloride 110 (H) 97 - 108 mmol/L    CO2 24 21 - 32 mmol/L    Anion gap 7 5 - 15 mmol/L    Glucose 99 65 - 100 mg/dL    BUN 9 6 - 20 MG/DL    Creatinine 0.88 0.70 - 1.30 MG/DL    BUN/Creatinine ratio 10 (L) 12 - 20      GFR est AA >60 >60 ml/min/1.73m2    GFR est non-AA >60 >60 ml/min/1.73m2    Calcium 8.2 (L) 8.5 - 10.1 MG/DL    Bilirubin, total 1.0 0.2 - 1.0 MG/DL    ALT (SGPT) 31 12 - 78 U/L    AST (SGOT) 25 15 - 37 U/L    Alk.  phosphatase 116 45 - 117 U/L    Protein, total 6.3 (L) 6.4 - 8.2 g/dL    Albumin 2.8 (L) 3.5 - 5.0 g/dL    Globulin 3.5 2.0 - 4.0 g/dL    A-G Ratio 0.8 (L) 1.1 - 2.2     PHOSPHORUS    Collection Time: 07/02/20  4:35 AM   Result Value Ref Range    Phosphorus 2.2 (L) 2.6 - 4.7 MG/DL   VANCOMYCIN, TROUGH    Collection Time: 07/02/20 11:45 AM   Result Value Ref Range    Vancomycin,trough 12.9 (H) 5.0 - 10.0 ug/mL    Reported dose date: [id number]      Reported dose time: 0400      Reported dose: 1000 MG UNITS   GLUCOSE, POC    Collection Time: 07/02/20 12:23 PM   Result Value Ref Range    Glucose (POC) 99 65 - 100 mg/dL    Performed by Harriett Wallace (PCT)         Microbiology:      Studies:      Signed By: Lee Ramos DO     July 2, 2020

## 2020-07-02 NOTE — PROGRESS NOTES
Verbal shift change report given to Cora Chávez RN (oncoming nurse) by Ester Coker RN (offgoing nurse). Report included the following information SBAR, Kardex, Procedure Summary, Intake/Output and MAR.

## 2020-07-02 NOTE — PROGRESS NOTES
[facility] Dosing Services: Antimicrobial Stewardship Daily Doc  Consult for antibiotic dosing of Vancomycin by EDNA Julio pending  Day of Therapy 4    Ht Readings from Last 1 Encounters:   06/29/20 175.3 cm (69\")        Wt Readings from Last 1 Encounters:   06/29/20 85.7 kg (189 lb)      Vancomycin therapy:  Current maintenance dose: 1000(mg) every 8 hours   Dose calculated to approximate a therapeutic trough of 15-20 mcg/mL. Last trough level: 12.9 mcg/ml drawn 7.5 hrs post dose, extrapolates to a trough of ~12.04 mcg/ml. Level is subtherapeutic  Plan for level / Adjustment in Therapy: Will increase Vancomycin to 1250 mg IV q8hr and recheck level  Dose administration notes:   Doses given appropriately as scheduled    Other Antimicrobial   (not dosed by pharmacist) Cefepime 2gm IV q8hr  Levaquin 750 mg IV daily  Flagyl 500 mg IV q12hr   Cultures 7/1: Blood: NG x20hrs pending  6/30: Blood: Bacillus 1/2 bottles pending  6/30: RVP: neg final  6/29: Blood: Klebsiella sens to Levaquin/Cefepime pending   Serum Creatinine Lab Results   Component Value Date/Time    Creatinine 0.88 07/02/2020 04:35 AM         Creatinine Clearance Estimated Creatinine Clearance: 115 mL/min (based on SCr of 0.88 mg/dL).      Temp Temp: 99.2 °F (37.3 °C)       WBC Lab Results   Component Value Date/Time    WBC 10.4 07/02/2020 04:35 AM        H/H Lab Results   Component Value Date/Time    HGB 9.1 (L) 07/02/2020 04:35 AM        Platelets    Lab Results   Component Value Date/Time    PLATELET 54 (L) 16/21/6001 04:35 AM          Pharmacist Shanda Darby Contact information: [phone]

## 2020-07-02 NOTE — PROGRESS NOTES
Called US to see if available today to do thoracentesis. Was told they would call back. Traci Christensen from radiology called and said doing bedside thoracentesis. Requested BP cuff and pulse ox.  Scheduled to come up around 1330.     1315 updated pt on plan for procedure

## 2020-07-02 NOTE — PROGRESS NOTES
Name: [address]: [address]   : 1975 Admit Date: 2020   Phone: [phone]  Room: [facility]/   PCP: Marleny Morgan MD  MRN: [id number]   Date: 2020  Code: Full Code          Chart and notes reviewed. Data reviewed. I review the patient's current medications in the medical record at each encounter. I have evaluated and examined the patient. Overnight Events:  Afebrile  HR elevated  BP stable  Oxygen saturations 96% on 2L NC  WBC 10.4  HgB 9.1  Platelet 54  Gram positive rods /2 bottles BC   Repeat BC  NGTD  COVID not detected but rapid test indeterminate    Images:  Chest CT : New left lung airspace disease. Stable large right pleural effusion. Stable RUL mass and mediastinal adenopathy. Stable unremarkable abdomen/pelvis. CXR : with right pleural effusion and stable RUL mass    ROS: Feeling good. Minimal SOB, cough. No wheezing. Doing well. Resting comfortably. No pain. Past Medical History:   Diagnosis Date    Anemia, iron deficiency     Aphagia     2/2 radiation    Hypertension     Lung cancer ([facility] 2019    Pneumonia involving right lung        History reviewed. No pertinent surgical history.     Family History   Problem Relation Age of Onset    Cancer Maternal Grandmother         ovarian       Social History     Tobacco Use    Smoking status: Former Smoker     Packs/day: 0.25     Years: 9.00     Pack years: 2.25     Last attempt to quit: 2019     Years since quittin.3    Smokeless tobacco: Never Used    Tobacco comment: cigar smoking 5-6 months and quit   Substance Use Topics    Alcohol use: Not Currently       No Known Allergies    Current Facility-Administered Medications   Medication Dose Route Frequency    potassium, sodium phosphates (NEUTRA-PHOS) packet 2 Packet  2 Packet Oral Q4H    prochlorperazine (COMPAZINE) injection 10 mg  10 mg IntraVENous Q4H PRN    metroNIDAZOLE (FLAGYL) IVPB premix 500 mg 500 mg IntraVENous Q12H    vancomycin (VANCOCIN) 1,000 mg in 0.9% sodium chloride (MBP/ADV) 250 mL  1,000 mg IntraVENous Q8H    Vancomycin trough - 7/2 @ 1130am   Other ONCE    albuterol (PROVENTIL HFA, VENTOLIN HFA, PROAIR HFA) inhaler 2 Puff  2 Puff Inhalation Q6H RT    And    ipratropium (ATROVENT HFA) 17 mcg inhaler  1 Puff Inhalation Q6H RT    cefepime (MAXIPIME) 2 g in 0.9% sodium chloride (MBP/ADV) 100 mL  2 g IntraVENous Q8H    sodium chloride (NS) flush 5-40 mL  5-40 mL IntraVENous Q8H    sodium chloride (NS) flush 5-40 mL  5-40 mL IntraVENous PRN    acetaminophen (TYLENOL) tablet 650 mg  650 mg Oral Q4H PRN    ondansetron (ZOFRAN) injection 4 mg  4 mg IntraVENous Q6H PRN    albuterol (PROVENTIL VENTOLIN) nebulizer solution 2.5 mg  2.5 mg Nebulization Q4H PRN    levoFLOXacin (LEVAQUIN) 750 mg in D5W IVPB  750 mg IntraVENous Q24H    benzonatate (TESSALON) capsule 100 mg  100 mg Oral TID PRN    guaiFENesin ER (MUCINEX) tablet 600 mg  600 mg Oral Q12H PRN         REVIEW OF SYSTEMS   12 point ROS negative except as stated in the HPI. Physical Exam:   Visit Vitals  /84 (BP 1 Location: Left arm, BP Patient Position: At rest)   Pulse (!) 105   Temp 99.3 °F (37.4 °C)   Resp 16   Ht 5' 9\" (1.753 m)   Wt 85.7 kg (189 lb)   SpO2 96%   BMI 27.91 kg/m²       General:  Alert, cooperative, no distress, appears stated age. Head:  Normocephalic, without obvious abnormality, atraumatic. Eyes:  Conjunctivae/corneas clear. Nose: Nares normal. Septum midline. Mucosa normal.    Throat: Lips, mucosa, and tongue normal.    Neck: Supple, symmetrical   Lungs:   Nearly absent R sided breath sounds, clear on the left   Chest wall:  No tenderness or deformity. Heart:  Regular rate and rhythm, S1, S2 normal, no murmur, click, rub or gallop. Abdomen:   Soft, non-tender. Bowel sounds normal.   Extremities: Extremities normal, atraumatic, no cyanosis or edema. Pulses: 2+ and symmetric all extremities. Skin: Skin color, texture, turgor normal. No rashes or lesions       Neurologic: Grossly nonfocal       Lab Results   Component Value Date/Time    Sodium 141 07/02/2020 04:35 AM    Potassium 3.3 (L) 07/02/2020 04:35 AM    Chloride 110 (H) 07/02/2020 04:35 AM    CO2 24 07/02/2020 04:35 AM    BUN 9 07/02/2020 04:35 AM    Creatinine 0.88 07/02/2020 04:35 AM    Glucose 99 07/02/2020 04:35 AM    Calcium 8.2 (L) 07/02/2020 04:35 AM    Magnesium 1.7 07/02/2020 04:35 AM    Phosphorus 2.2 (L) 07/02/2020 04:35 AM    Lactic acid 1.0 07/27/2019 02:54 PM       Lab Results   Component Value Date/Time    WBC 10.4 07/02/2020 04:35 AM    HGB 9.1 (L) 07/02/2020 04:35 AM    PLATELET 54 (L) 00/78/6069 04:35 AM    MCV 72.1 (L) 07/02/2020 04:35 AM       Lab Results   Component Value Date/Time    INR 1.0 12/03/2019 11:22 AM    aPTT 27.3 12/03/2019 11:22 AM    Alk.  phosphatase 116 07/02/2020 04:35 AM    Protein, total 6.3 (L) 07/02/2020 04:35 AM    Albumin 2.8 (L) 07/02/2020 04:35 AM    Globulin 3.5 07/02/2020 04:35 AM       Lab Results   Component Value Date/Time    Iron 65 07/02/2020 04:35 AM    TIBC 199 (L) 07/02/2020 04:35 AM    Iron % saturation 33 07/02/2020 04:35 AM    Ferritin 698 (H) 08/27/2019 11:19 AM       Lab Results   Component Value Date/Time    TSH 3.49 06/18/2020 08:58 AM        No results found for: PH, PHI, PCO2, PCO2I, PO2, PO2I, HCO3, HCO3I, FIO2, FIO2I    Lab Results   Component Value Date/Time    Troponin-I, Qt. <0.05 06/29/2020 08:49 PM        Lab Results   Component Value Date/Time    Culture result: NO GROWTH AFTER 20 HOURS 07/01/2020 10:36 AM    Culture result: (A) 06/30/2020 03:17 PM     GRAM POSITIVE RODS  GROWING IN 1 OF 2 BOTTLES DRAWN  (SITE = L. AC I.V.)      Culture result: REMAINING BOTTLE(S) HAS/HAVE NO GROWTH SO FAR 06/30/2020 03:17 PM       Lab Results   Component Value Date/Time    Hepatitis B surface Ag 0.10 12/03/2019 11:22 AM       Lab Results   Component Value Date/Time    Vancomycin,trough 15.9 (H) 07/01/2020 10:36 AM       Lab Results   Component Value Date/Time    Color YELLOW/STRAW 07/01/2020 04:20 AM    Appearance CLEAR 07/01/2020 04:20 AM    pH (UA) 6.0 07/01/2020 04:20 AM    Protein Negative 07/01/2020 04:20 AM    Glucose Negative 07/01/2020 04:20 AM    Ketone Negative 07/01/2020 04:20 AM    Bilirubin Negative 07/01/2020 04:20 AM    Blood Negative 07/01/2020 04:20 AM    Urobilinogen 0.2 07/01/2020 04:20 AM    Nitrites Negative 07/01/2020 04:20 AM    Leukocyte Esterase Negative 07/01/2020 04:20 AM    WBC 0-4 07/01/2020 04:20 AM    RBC 0-5 07/01/2020 04:20 AM    Bacteria Negative 07/01/2020 04:20 AM       IMPRESSION  · Acute respiratory failure with hypoxia  · Recurrent R pleural effusion  · Squamous Cell Carcinoma  · Sepsis  · Pneumonia  · Thrombocytopenia  · Bacteremia, GNR's    PLAN  · Goal satss 88% or greater, wean O2 as tolerated; currently on 2L NC  · Thoracentesis today  · Combivent Q6  · Will need 6MWT closer to discharge  · Vanc/cefepime/levo/flagyl  · Follow-up cultures  · Oncology and ID following  · Encourage IS  · Repeat COVID pending  · DVT prophylaxis: SCD's    Will see PRN over the weekend and check back on Monday. Chart Review.     Macy Mccormick NP

## 2020-07-02 NOTE — PROGRESS NOTES
Encountered pt in room 428 for Ultrasound guided Thoracentesis for R pleural effusion. Dr Km Del Cid. Shilpa Laureano at bedside and consent obtained. 1430 Procedure complete. Total of 1350 clear yellow fluid obtained. Bandaid applied to R subscapular area. Pt denies complaint and states he feels he can take a big breath now. Pt resting in bed, bed in low position, call bell in reach. PCXR to be done at bedside post Thoracentesis. Verbal report to Mirna Figueroa RN.

## 2020-07-02 NOTE — PROGRESS NOTES
[address] at [place]  [phone]    Chart reviewed. CT scans reviewed, no evidence of progression of his cancer. Possible airspace disease which could be a source of infection. Thoracentesis performed today and results pending. ID consulted today and concerned for line infection. His femoral line was originally placed by his prior oncology team last year. 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 I would be ok with removal of his femoral line if this is suspected to be the source of his infection. It would be helpful to have an upper extremity PICC line placed prior to discharge so that we can continue his immunotherapy in the [facility], though if needed we could attempt treatment through a PIV. Continue management of sepsis per hospitalist, ID, and pulmonary. We will follow along. Over the holiday weekend my colleague is covering and available if needed.

## 2020-07-02 NOTE — PROGRESS NOTES
[facility]  [address] [facility] [place], [address] [place] [address]  [phone]      Medical Progress Note      NAME: Rickford Lefort   :  1975  MRM:  [id number]    Date/Time: 2020        Assessment / Plan:     Sepsis: POA. Found to have Klebsiella oxytoca bacteremia. Bacillus thought to be contaminant. Has PICC line in his right thigh, apparently in for > a year. Possible line-infection and this may need to be removed. Repeating blood culture through PICC line. Antibiotics now narrowed to IV CTX, appreciate ID assistance. Follow up on thoracentesis pleural fluid cultures as well    R pleural effusion: s/p ultrasound-guided thoracentesis yielding approximately 1350 ml of fluid . Follow up on fluid studies     Lung cancer: Non-small cell lung cancer, at least stage IIIB disease. With pleural effusion is worrisome for malignant effusion however pleural effusion cytology negative x3 thus far. Repeating thoracentesis today with cytology. Mgmt per hem/onc.      Thrombocytopenia: likely from sepsis/bacteremia. Follow PLT     HTN: BP trending up. Resume metoprolol at lower dose with holding parameters          Total time spent: 35 minutes  Time spent in the care of this patient including reviewing records, discussing with nursing and/or other providers on the treatment team, obtaining history and examining the patient, and discussing treatment plans. Care Plan discussed with: Patient, Nursing Staff and >50% of time spent in counseling and coordination of care    Discussed:  Care Plan and D/C Planning    Prophylaxis:  SCD's    Disposition:   PT, OT, RN         Subjective:     Chief Complaint:  Follow up pleural effusion    Chart/notes/labs/studies reviewed, patient examined. Feels better. No CP. Less SOB          Objective:       Vitals:        Last 24hrs VS reviewed since prior progress note.  Most recent are:    Visit Vitals  /85 (BP 1 Location: Right arm, BP Patient Position: At rest)   Pulse 96   Temp 99.4 °F (37.4 °C)   Resp 18   Ht 5' 9\" (1.753 m)   Wt 85.7 kg (189 lb)   SpO2 100%   BMI 27.91 kg/m²     SpO2 Readings from Last 6 Encounters:   07/02/20 100%   06/18/20 97%   06/18/20 97%   06/17/20 95%   06/09/20 94%   05/05/20 99%    O2 Flow Rate (L/min): 2 l/min       Intake/Output Summary (Last 24 hours) at 7/2/2020 1733  Last data filed at 7/2/2020 1420  Gross per 24 hour   Intake --   Output 1350 ml   Net -1350 ml          Exam:     Physical Exam:    Gen:  Chronically ill-appearing. NAD. Pleasant   HEENT:  Sclerae nonicteric, hearing intact to voice, mucous membranes moist  Neck:  Supple, without masses. Resp:  No accessory muscle use, diminished R-sided breath sounds  Card: RRR, without m/r/g. No LE edema. Abd:  +bowel sounds, soft, NTTP, nondistended. No HSM. Neuro: Face symmetric, tongue midline, speech fluent, follows commands appropriately  Psych:  Alert, oriented x 3.  Limited insight     Medications Reviewed: (see below)    Lab Data Reviewed: (see below)    ______________________________________________________________________    Medications:     Current Facility-Administered Medications   Medication Dose Route Frequency    cefTRIAXone (ROCEPHIN) 2 g in 0.9% sodium chloride (MBP/ADV) 50 mL  2 g IntraVENous Q24H    prochlorperazine (COMPAZINE) injection 10 mg  10 mg IntraVENous Q4H PRN    albuterol (PROVENTIL HFA, VENTOLIN HFA, PROAIR HFA) inhaler 2 Puff  2 Puff Inhalation Q6H RT    And    ipratropium (ATROVENT HFA) 17 mcg inhaler  1 Puff Inhalation Q6H RT    sodium chloride (NS) flush 5-40 mL  5-40 mL IntraVENous Q8H    sodium chloride (NS) flush 5-40 mL  5-40 mL IntraVENous PRN    acetaminophen (TYLENOL) tablet 650 mg  650 mg Oral Q4H PRN    ondansetron (ZOFRAN) injection 4 mg  4 mg IntraVENous Q6H PRN    albuterol (PROVENTIL VENTOLIN) nebulizer solution 2.5 mg  2.5 mg Nebulization Q4H PRN    benzonatate (TESSALON) capsule 100 mg  100 mg Oral TID PRN    guaiFENesin ER (MUCINEX) tablet 600 mg  600 mg Oral Q12H PRN            Lab Review:     Recent Labs     07/02/20  0435 07/01/20  0850 06/30/20  0347   WBC 10.4 6.0 8.3   HGB 9.1* 8.5* 9.8*   HCT 27.1* 25.5* 30.0*   PLT 54* 40* 45*     Recent Labs     07/02/20  0435 07/01/20  0420 06/30/20  0347 06/29/20  2049    140 141 139   K 3.3* 3.7 3.8 3.4*   * 109* 111* 107   CO2 24 25 24 23   GLU 99 100 103* 111*   BUN 9 12 20 23*   CREA 0.88 0.82 1.03 1.11   CA 8.2* 8.4* 7.8* 8.9   MG 1.7 1.8  --  1.7   PHOS 2.2*  --   --   --    ALB 2.8* 2.9*  --  3.6   ALT 31 26  --  40     No components found for: GLPOC  No results for input(s): PH, PCO2, PO2, HCO3, FIO2 in the last 72 hours. No results for input(s): INR, INREXT in the last 72 hours.   No results found for: SDES  Lab Results   Component Value Date/Time    Culture result: PENDING 07/02/2020 02:36 PM    Culture result: NO GROWTH AFTER 20 HOURS 07/01/2020 10:36 AM    Culture result: (A) 06/30/2020 03:17 PM     BACILLUS SPECIES, NOT ANTHRACIS GROWING IN 1 OF 2 BOTTLES DRAWN (SITE = L. AC I.V.)    Culture result: REMAINING BOTTLE(S) HAS/HAVE NO GROWTH SO FAR 06/30/2020 03:17 PM              ___________________________________________________    Attending Physician: Ellen Long MD

## 2020-07-02 NOTE — ADT AUTH CERT NOTES
Sepsis and Other Febrile Illness, without Focal Infection - Care Day 3 (7/1/2020) by Esther Pettit RN         Review Status Review Entered   Completed 7/1/2020 15:47       Criteria Review      Care Day: 3 Care Date: 7/1/2020 Level of Care: Telemetry    Guideline Day 2    Level Of Care    (X) ICU or floor    Clinical Status    ( ) * Hemodynamic stability    ( ) * Hypoxemia absent    ( ) * Tachypnea absent    Activity    (X) Activity as tolerated    7/1/2020 15:47:26 EDT by Manuela Aguilera      ambulate w/assist    Routes    (X) Possible IV fluids    7/1/2020 15:47:26 EDT by Manuela Aguilera      0.9% sodium chloride infusion   Rate: 125 mL/hr    (X) Parenteral or oral medications    7/1/2020 15:47:26 EDT by Lukasz Hardy) injection 10 mg q4, ZOFRAN) injection 4 mg q6    ( ) Diet as tolerated    7/1/2020 15:47:26 EDT by Manuela Aguilera      npo    Interventions    (X) WBC    7/1/2020 15:47:26 EDT by Manuela Aguilera      6.0    Medications    (X) Possible antimicrobial treatment    7/1/2020 15:47:26 EDT by Manuela Aguilera      cefepime (MAXIPIME) 2 g in 0.9% sodium chloride (MBP/ADV) 100 mL Dose: 2 g Freq: EVERY 8 HOURS Route: IV, metroNIDAZOLE (FLAGYL) IVPB premix 500 mg  EVERY 12 HOURS Route: IV, VANCOCIN) 1,000 mg in 0.9% sodium chloride (MBP/ADV) 250 mL q8    (X) Possible DVT prophylaxis    7/1/2020 15:47:26 EDT by Manuela Aguilera      scds    * Milestone   Additional Notes   7/1   IP   /60    Pulse (!) 120   Temp (!) 102.3 °F (39.1 °C)   Resp 28   SpO2 96% 2LNC          Chief Complaint:  \"I'm okay\"        Pt and examined. No complaints. Febrile this AM to 103 and tachycardic concurrently. Denies feeling unwell. No N/V/D. No ab pain.        Physical Exam:       Gen: Not particularly ill appearing    HEENT:  Pink conjunctivae, PERRL, hearing intact to voice, moist mucous membranes   Neck:  Supple, without masses, thyroid non-tender   Resp: Diminished breath sounds in the R lung field   Card: Tachycardic.  No murmurs, normal S1, S2 without thrills, bruits or peripheral edema   Abd:  Soft, non-tender, non-distended, normoactive bowel sounds are present   Musc:  No cyanosis or clubbing   Skin:  No rashes or ulcers, skin turgor is good   Neuro:  Cranial nerves 3-12 are grossly intact,  strength is 5/5 bilaterally and dorsi / plantarflexion is 5/5 bilaterally, follows commands appropriately   Psych:  Good insight, oriented to person, place and time, alert       Medications Reviewed: (see below)       Lab Data Reviewed: (see below)       ______________________________________________________________________       Medications:       Current Facility-Administered Medications   Medication Dose Route Frequency   · prochlorperazine (COMPAZINE) injection 10 mg 10 mg IntraVENous Q4H PRN   · metroNIDAZOLE (FLAGYL) IVPB premix 500 mg 500 mg IntraVENous Q12H   · albuterol (PROVENTIL HFA, VENTOLIN HFA, PROAIR HFA) inhaler 2 Puff 2 Puff Inhalation Q6H RT     And   · ipratropium (ATROVENT HFA) 17 mcg inhaler 1 Puff Inhalation Q6H RT   · [COMPLETED] Vancomycin Trough 11:30 1 Each Other ONCE   · 0.9% sodium chloride infusion 125 mL/hr IntraVENous CONTINUOUS   · cefepime (MAXIPIME) 2 g in 0.9% sodium chloride (MBP/ADV) 100 mL 2 g IntraVENous Q8H   · sodium chloride (NS) flush 5-40 mL 5-40 mL IntraVENous Q8H   · sodium chloride (NS) flush 5-40 mL 5-40 mL IntraVENous PRN   · acetaminophen (TYLENOL) tablet 650 mg 650 mg Oral Q4H PRN   · ondansetron (ZOFRAN) injection 4 mg 4 mg IntraVENous Q6H PRN   · albuterol (PROVENTIL VENTOLIN) nebulizer solution 2.5 mg 2.5 mg Nebulization Q4H PRN   · levoFLOXacin (LEVAQUIN) 750 mg in D5W IVPB 750 mg IntraVENous Q24H   · benzonatate (TESSALON) capsule 100 mg 100 mg Oral TID PRN   · guaiFENesin ER (MUCINEX) tablet 600 mg 600 mg Oral Q12H PRN   · vancomycin (VANCOCIN) 1,250 mg in 0.9% sodium chloride (MBP/ADV) 250 mL 1,250 mg IntraVENous Q12H      Assessment / Plan:   [facility] (6/29/2020) - initially thought to be 2/2 PNA however, x-ray underwhelming for acute infectious process and cultures growing GNR's and GPR's    -continue broad spectrum antibiotics; add flagyl    -repeat blood cultures pending    -await speciation of initial cultures    -UA obtained after IV antibiotics so unhelpful   -IVF's    -depending on the speciation of organisms may need additional imaging          Lung cancer ([facility] [address] (7/27/2019)   -followed by Dr. Calvin Modi         Bacteremia (6/30/2020) - GNR's and GPR's   -see above          Thrombocytopenia ([facility] [address] (6/30/2020) - ?2/2 chemo    -monitor    -transfuse PRN        HTN - BP's soft   -hold metoprolol        R pleural effusion    -as per pulmonary re: thoracentesis        Total time spent with patient: 35 minutes                                                                                            Care Plan discussed with: Patient, RN        Discussed:  Care plan        Prophylaxis:  SCD's        Disposition:  Home       Labs:   Color: YELLOW/STRAW   Appearance: CLEAR   Specific gravity: 1.017   pH (UA): 6.0   Protein: Negative   Glucose: Negative   Ketone: Negative   Blood: Negative   Bilirubin: Negative   Urobilinogen: 0.2   Nitrites: Negative   Leukocyte Esterase: Negative   Epithelial cells: FEW   WBC: 0-4   RBC: 0-5   Bacteria: Negative   Hyaline cast: 2-5   Sodium: 140   Potassium: 3.7   Chloride: 109 (H)   CO2: 25   Anion gap: 6   Glucose: 100   BUN: 12   Creatinine: 0.82   BUN/Creatinine ratio: 15   Calcium: 8.4 (L)   Magnesium: 1.8   GFR est non-AA: >60   GFR est AA: >60   Bilirubin, total: 0.6   Protein, total: 6.1 (L)   Albumin: 2.9 (L)   Globulin: 3.2   A-G Ratio: 0.9 (L)   ALT: 26   AST: 24   Alk.  phosphatase: 129 (H)      7/1/2020 06:08      7/1/2020 08:50   WBC: 6.0   NRBC: 0.0   RBC: 3.48 (L)   HGB: 8.5 (L)   HCT: 25.5 (L)   MCV: 73.3 (L)   MCH: 24.4 (L)   MCHC: 33.3   RDW: 15.3 (H)   PLATELET: 40 (LL)   NEUTROPHILS: 84 (H)   BAND NEUTROPHILS: 6 LYMPHOCYTES: 3 (L)   MONOCYTES: 6   EOSINOPHILS: 0   BASOPHILS: 0   IMMATURE GRANULOCYTES: 0   METAMYELOCYTES: 1 (H)   DF: MANUAL   ABSOLUTE NRBC: 0.00   ABS. NEUTROPHILS: 5.4   ABS. IMM. GRANS.: 0.0   ABS. LYMPHOCYTES: 0.2 (L)   ABS. MONOCYTES: 0.4   ABS. EOSINOPHILS: 0.0   ABS. BASOPHILS: 0.0   RBC COMMENTS: MICROCYTOSIS. .. WBC COMMENTS: TOXIC GRANULATION          Covid 19 Indeterminate by Esther Pettit RN         Review Status Review Entered   In Primary 7/1/2020 10:41       Criteria Review   Is the illness suspected to be related to the Coronavirus (COVID-19)? Yes, No or Other  Has the member been tested for the COVID-19? Yes   If Yes, what are the results of the COVID-19? COVID-19 rapid test Indeterminate       What is the severity of the members condition (i.e. Isolation, Ventilator use)?    None

## 2020-07-02 NOTE — PROGRESS NOTES
7/2/2020 12:36 PM EMR reviewed, noted pt was open to [facility] prior to admission. Updates sent to [facility] via [employer]. Following for final discharge needs, noted ID has been consulted.  MADELYN VelaW

## 2020-07-03 LAB
ALBUMIN SERPL-MCNC: 2.8 G/DL (ref 3.5–5)
ALBUMIN/GLOB SERPL: 0.8 {RATIO} (ref 1.1–2.2)
ALP SERPL-CCNC: 106 U/L (ref 45–117)
ALT SERPL-CCNC: 22 U/L (ref 12–78)
ANION GAP SERPL CALC-SCNC: 8 MMOL/L (ref 5–15)
AST SERPL-CCNC: 17 U/L (ref 15–37)
BASOPHILS # BLD: 0 K/UL (ref 0–0.1)
BASOPHILS NFR BLD: 0 % (ref 0–1)
BILIRUB SERPL-MCNC: 0.9 MG/DL (ref 0.2–1)
BUN SERPL-MCNC: 9 MG/DL (ref 6–20)
BUN/CREAT SERPL: 12 (ref 12–20)
CALCIUM SERPL-MCNC: 8.4 MG/DL (ref 8.5–10.1)
CHLORIDE SERPL-SCNC: 109 MMOL/L (ref 97–108)
CO2 SERPL-SCNC: 23 MMOL/L (ref 21–32)
CREAT SERPL-MCNC: 0.73 MG/DL (ref 0.7–1.3)
DIFFERENTIAL METHOD BLD: ABNORMAL
EOSINOPHIL # BLD: 0.1 K/UL (ref 0–0.4)
EOSINOPHIL NFR BLD: 1 % (ref 0–7)
ERYTHROCYTE [DISTWIDTH] IN BLOOD BY AUTOMATED COUNT: 15.5 % (ref 11.5–14.5)
GLOBULIN SER CALC-MCNC: 3.3 G/DL (ref 2–4)
GLUCOSE BLD STRIP.AUTO-MCNC: 105 MG/DL (ref 65–100)
GLUCOSE BLD STRIP.AUTO-MCNC: 123 MG/DL (ref 65–100)
GLUCOSE BLD STRIP.AUTO-MCNC: 129 MG/DL (ref 65–100)
GLUCOSE BLD STRIP.AUTO-MCNC: 88 MG/DL (ref 65–100)
GLUCOSE SERPL-MCNC: 84 MG/DL (ref 65–100)
HCT VFR BLD AUTO: 27.4 % (ref 36.6–50.3)
HGB BLD-MCNC: 9.2 G/DL (ref 12.1–17)
IMM GRANULOCYTES # BLD AUTO: 0.1 K/UL (ref 0–0.04)
IMM GRANULOCYTES NFR BLD AUTO: 2 % (ref 0–0.5)
LYMPHOCYTES # BLD: 0.8 K/UL (ref 0.8–3.5)
LYMPHOCYTES NFR BLD: 9 % (ref 12–49)
MAGNESIUM SERPL-MCNC: 2 MG/DL (ref 1.6–2.4)
MCH RBC QN AUTO: 24.1 PG (ref 26–34)
MCHC RBC AUTO-ENTMCNC: 33.6 G/DL (ref 30–36.5)
MCV RBC AUTO: 71.9 FL (ref 80–99)
MONOCYTES # BLD: 0.9 K/UL (ref 0–1)
MONOCYTES NFR BLD: 11 % (ref 5–13)
NEUTS SEG # BLD: 6.6 K/UL (ref 1.8–8)
NEUTS SEG NFR BLD: 77 % (ref 32–75)
NRBC # BLD: 0 K/UL (ref 0–0.01)
NRBC BLD-RTO: 0 PER 100 WBC
PHOSPHATE SERPL-MCNC: 3.1 MG/DL (ref 2.6–4.7)
PLATELET # BLD AUTO: 60 K/UL (ref 150–400)
PMV BLD AUTO: ABNORMAL FL (ref 8.9–12.9)
POTASSIUM SERPL-SCNC: 3.3 MMOL/L (ref 3.5–5.1)
PROT SERPL-MCNC: 6.1 G/DL (ref 6.4–8.2)
RBC # BLD AUTO: 3.81 M/UL (ref 4.1–5.7)
SERVICE CMNT-IMP: ABNORMAL
SERVICE CMNT-IMP: NORMAL
SODIUM SERPL-SCNC: 140 MMOL/L (ref 136–145)
WBC # BLD AUTO: 8.5 K/UL (ref 4.1–11.1)

## 2020-07-03 PROCEDURE — 74011000258 HC RX REV CODE- 258: Performed by: INTERNAL MEDICINE

## 2020-07-03 PROCEDURE — 74011250636 HC RX REV CODE- 250/636: Performed by: INTERNAL MEDICINE

## 2020-07-03 PROCEDURE — 94640 AIRWAY INHALATION TREATMENT: CPT

## 2020-07-03 PROCEDURE — 83735 ASSAY OF MAGNESIUM: CPT

## 2020-07-03 PROCEDURE — 82962 GLUCOSE BLOOD TEST: CPT

## 2020-07-03 PROCEDURE — 74011250637 HC RX REV CODE- 250/637: Performed by: INTERNAL MEDICINE

## 2020-07-03 PROCEDURE — 85025 COMPLETE CBC W/AUTO DIFF WBC: CPT

## 2020-07-03 PROCEDURE — 80053 COMPREHEN METABOLIC PANEL: CPT

## 2020-07-03 PROCEDURE — 36415 COLL VENOUS BLD VENIPUNCTURE: CPT

## 2020-07-03 PROCEDURE — 65660000000 HC RM CCU STEPDOWN

## 2020-07-03 PROCEDURE — 94760 N-INVAS EAR/PLS OXIMETRY 1: CPT

## 2020-07-03 PROCEDURE — 77010033678 HC OXYGEN DAILY

## 2020-07-03 PROCEDURE — 84100 ASSAY OF PHOSPHORUS: CPT

## 2020-07-03 RX ORDER — METOPROLOL SUCCINATE 50 MG/1
50 TABLET, EXTENDED RELEASE ORAL DAILY
Status: DISCONTINUED | OUTPATIENT
Start: 2020-07-04 | End: 2020-07-06 | Stop reason: HOSPADM

## 2020-07-03 RX ORDER — HEPARIN SODIUM 5000 [USP'U]/ML
5000 INJECTION, SOLUTION INTRAVENOUS; SUBCUTANEOUS EVERY 8 HOURS
Status: DISCONTINUED | OUTPATIENT
Start: 2020-07-03 | End: 2020-07-06 | Stop reason: HOSPADM

## 2020-07-03 RX ORDER — POTASSIUM CHLORIDE 750 MG/1
40 TABLET, FILM COATED, EXTENDED RELEASE ORAL
Status: COMPLETED | OUTPATIENT
Start: 2020-07-03 | End: 2020-07-03

## 2020-07-03 RX ADMIN — IPRATROPIUM BROMIDE 1 PUFF: 17 AEROSOL, METERED RESPIRATORY (INHALATION) at 22:25

## 2020-07-03 RX ADMIN — POTASSIUM CHLORIDE 40 MEQ: 750 TABLET, FILM COATED, EXTENDED RELEASE ORAL at 08:53

## 2020-07-03 RX ADMIN — IPRATROPIUM BROMIDE 1 PUFF: 17 AEROSOL, METERED RESPIRATORY (INHALATION) at 02:48

## 2020-07-03 RX ADMIN — ALBUTEROL SULFATE 2 PUFF: 108 INHALANT RESPIRATORY (INHALATION) at 02:48

## 2020-07-03 RX ADMIN — IPRATROPIUM BROMIDE 1 PUFF: 17 AEROSOL, METERED RESPIRATORY (INHALATION) at 15:29

## 2020-07-03 RX ADMIN — HEPARIN SODIUM 5000 UNITS: 5000 INJECTION INTRAVENOUS; SUBCUTANEOUS at 08:53

## 2020-07-03 RX ADMIN — ALBUTEROL SULFATE 2 PUFF: 108 INHALANT RESPIRATORY (INHALATION) at 15:29

## 2020-07-03 RX ADMIN — ALBUTEROL SULFATE 2 PUFF: 108 INHALANT RESPIRATORY (INHALATION) at 22:24

## 2020-07-03 RX ADMIN — Medication 10 ML: at 15:19

## 2020-07-03 RX ADMIN — HEPARIN SODIUM 5000 UNITS: 5000 INJECTION INTRAVENOUS; SUBCUTANEOUS at 23:45

## 2020-07-03 RX ADMIN — HEPARIN SODIUM 5000 UNITS: 5000 INJECTION INTRAVENOUS; SUBCUTANEOUS at 16:57

## 2020-07-03 RX ADMIN — METOPROLOL SUCCINATE 25 MG: 25 TABLET, EXTENDED RELEASE ORAL at 08:53

## 2020-07-03 RX ADMIN — CEFTRIAXONE 2 G: 2 INJECTION, POWDER, FOR SOLUTION INTRAMUSCULAR; INTRAVENOUS at 15:19

## 2020-07-03 RX ADMIN — Medication 10 ML: at 06:00

## 2020-07-03 RX ADMIN — Medication 10 ML: at 22:25

## 2020-07-03 NOTE — PROGRESS NOTES
1721: Informed Dr Lina Hannon of pt's Covid test negative; isolation order removed as per MD order. Also, informed MD that pt stated he's had Rt upper thigh PICC line for 1year: MD agreed for PICC removal if peripheral IVs patent. 2015: Shift change report given to MARK Greer (oncoming nurse) by Deangelo Caballero (offgoing nurse). Report included the following information SBAR, Kardex, Procedure Summary, Intake/Output, MAR and Recent Results.

## 2020-07-03 NOTE — PROGRESS NOTES
Quang Lagunas [facility] 79  [address] [facility], [address] [place] [address]  [phone]      Medical Progress Note      NAME: Socorro Mars   :  1975  MRM:  [id number]    Date/Time: 7/3/2020        Assessment / Plan:     Sepsis: POA. Klebsiella oxytoca bacteremia. Bacillus thought to be contaminant. 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 Antibiotics now narrowed to IV CTX, appreciate ID. Follow up on thoracentesis pleural fluid cultures. Monitor blood culture obtained through PICC    R pleural effusion: underwent thoracentesis on  with 1350 ml output. Follow up on fluid studies     Lung cancer: Non-small cell lung cancer, at least stage IIIB disease. With pleural effusion is worrisome for malignant effusion however pleural effusion cytology reportedly negative x3 thus far. Mgmt per hem/onc      Thrombocytopenia: likely from sepsis/bacteremia. Follow PLT, improving slowly. Started heparin SQ for DVT ppx     HTN: BP controlled. Continue metoprolol, titrate up to home dose       Total time spent: 35 minutes  Time spent in the care of this patient including reviewing records, discussing with nursing and/or other providers on the treatment team, obtaining history and examining the patient, and discussing treatment plans. Care Plan discussed with: Patient, Nursing Staff and >50% of time spent in counseling and coordination of care    Discussed:  Care Plan and D/C Planning    Prophylaxis:  SCD's    Disposition:   PT, OT, RN         Subjective:     Chief Complaint:  Follow up pleural effusion    Chart/notes/labs/studies reviewed, patient examined. Feels fine. Denies CP. Febrile overnight        Objective:       Vitals:        Last 24hrs VS reviewed since prior progress note.  Most recent are:    Visit Vitals  /86 (BP 1 Location: Left arm, BP Patient Position: At rest) Pulse 99   Temp 98.9 °F (37.2 °C)   Resp 20   Ht 5' 9\" (1.753 m)   Wt 85.7 kg (189 lb)   SpO2 99%   BMI 27.91 kg/m²     SpO2 Readings from Last 6 Encounters:   07/03/20 99%   06/18/20 97%   06/18/20 97%   06/17/20 95%   06/09/20 94%   05/05/20 99%    O2 Flow Rate (L/min): 2 l/min     No intake or output data in the 24 hours ending 07/03/20 1740       Exam:     Physical Exam:    Gen:  Chronically ill-appearing. NAD. Pleasant   HEENT:  Sclerae nonicteric, hearing intact to voice, mucous membranes moist  Neck:  Supple, without masses. Resp:  No accessory muscle use, diminished R-sided breath sounds  Card: RRR, without m/r/g. No LE edema. Abd:  +bowel sounds, soft, NTTP, nondistended. No HSM. Neuro: Face symmetric, tongue midline, speech fluent, follows commands appropriately  Psych:  Alert, oriented x 3.  Limited insight  Skin: R thigh PICC line     Medications Reviewed: (see below)    Lab Data Reviewed: (see below)    ______________________________________________________________________    Medications:     Current Facility-Administered Medications   Medication Dose Route Frequency    heparin (porcine) injection 5,000 Units  5,000 Units SubCUTAneous Q8H    cefTRIAXone (ROCEPHIN) 2 g in 0.9% sodium chloride (MBP/ADV) 50 mL  2 g IntraVENous Q24H    metoprolol succinate (TOPROL-XL) XL tablet 25 mg  25 mg Oral DAILY    prochlorperazine (COMPAZINE) injection 10 mg  10 mg IntraVENous Q4H PRN    albuterol (PROVENTIL HFA, VENTOLIN HFA, PROAIR HFA) inhaler 2 Puff  2 Puff Inhalation Q6H RT    And    ipratropium (ATROVENT HFA) 17 mcg inhaler  1 Puff Inhalation Q6H RT    sodium chloride (NS) flush 5-40 mL  5-40 mL IntraVENous Q8H    sodium chloride (NS) flush 5-40 mL  5-40 mL IntraVENous PRN    acetaminophen (TYLENOL) tablet 650 mg  650 mg Oral Q4H PRN    ondansetron (ZOFRAN) injection 4 mg  4 mg IntraVENous Q6H PRN    albuterol (PROVENTIL VENTOLIN) nebulizer solution 2.5 mg  2.5 mg Nebulization Q4H PRN    benzonatate (TESSALON) capsule 100 mg  100 mg Oral TID PRN    guaiFENesin ER (MUCINEX) tablet 600 mg  600 mg Oral Q12H PRN            Lab Review:     Recent Labs     07/03/20  0300 07/02/20  0435 07/01/20  0850   WBC 8.5 10.4 6.0   HGB 9.2* 9.1* 8.5*   HCT 27.4* 27.1* 25.5*   PLT 60* 54* 40*     Recent Labs     07/03/20  0300 07/02/20  0435 07/01/20  0420    141 140   K 3.3* 3.3* 3.7   * 110* 109*   CO2 23 24 25   GLU 84 99 100   BUN 9 9 12   CREA 0.73 0.88 0.82   CA 8.4* 8.2* 8.4*   MG 2.0 1.7 1.8   PHOS 3.1 2.2*  --    ALB 2.8* 2.8* 2.9*   ALT 22 31 26     No components found for: GLPOC  No results for input(s): PH, PCO2, PO2, HCO3, FIO2 in the last 72 hours. No results for input(s): INR, INREXT, INREXT in the last 72 hours.   No results found for: SDES  Lab Results   Component Value Date/Time    Culture result: NO GROWTH AFTER 19 HOURS 07/02/2020 02:36 PM    Culture result: NO GROWTH AFTER 19 HOURS 07/02/2020 01:30 PM    Culture result: NO GROWTH 2 DAYS 07/01/2020 10:36 AM              ___________________________________________________    Attending Physician: Sena Romero MD

## 2020-07-04 LAB
ALBUMIN SERPL-MCNC: 2.9 G/DL (ref 3.5–5)
ALBUMIN/GLOB SERPL: 0.8 {RATIO} (ref 1.1–2.2)
ALP SERPL-CCNC: 109 U/L (ref 45–117)
ALT SERPL-CCNC: 20 U/L (ref 12–78)
ANION GAP SERPL CALC-SCNC: 6 MMOL/L (ref 5–15)
AST SERPL-CCNC: 17 U/L (ref 15–37)
BASOPHILS # BLD: 0 K/UL (ref 0–0.1)
BASOPHILS NFR BLD: 0 % (ref 0–1)
BILIRUB SERPL-MCNC: 0.8 MG/DL (ref 0.2–1)
BUN SERPL-MCNC: 10 MG/DL (ref 6–20)
BUN/CREAT SERPL: 14 (ref 12–20)
CALCIUM SERPL-MCNC: 8.5 MG/DL (ref 8.5–10.1)
CHLORIDE SERPL-SCNC: 108 MMOL/L (ref 97–108)
CO2 SERPL-SCNC: 26 MMOL/L (ref 21–32)
CREAT SERPL-MCNC: 0.7 MG/DL (ref 0.7–1.3)
DIFFERENTIAL METHOD BLD: ABNORMAL
EOSINOPHIL # BLD: 0.1 K/UL (ref 0–0.4)
EOSINOPHIL NFR BLD: 2 % (ref 0–7)
ERYTHROCYTE [DISTWIDTH] IN BLOOD BY AUTOMATED COUNT: 15.2 % (ref 11.5–14.5)
GLOBULIN SER CALC-MCNC: 3.5 G/DL (ref 2–4)
GLUCOSE BLD STRIP.AUTO-MCNC: 107 MG/DL (ref 65–100)
GLUCOSE BLD STRIP.AUTO-MCNC: 109 MG/DL (ref 65–100)
GLUCOSE BLD STRIP.AUTO-MCNC: 114 MG/DL (ref 65–100)
GLUCOSE SERPL-MCNC: 100 MG/DL (ref 65–100)
HCT VFR BLD AUTO: 27.9 % (ref 36.6–50.3)
HGB BLD-MCNC: 9.3 G/DL (ref 12.1–17)
IMM GRANULOCYTES # BLD AUTO: 0.1 K/UL (ref 0–0.04)
IMM GRANULOCYTES NFR BLD AUTO: 2 % (ref 0–0.5)
LYMPHOCYTES # BLD: 0.7 K/UL (ref 0.8–3.5)
LYMPHOCYTES NFR BLD: 11 % (ref 12–49)
MAGNESIUM SERPL-MCNC: 1.8 MG/DL (ref 1.6–2.4)
MCH RBC QN AUTO: 24 PG (ref 26–34)
MCHC RBC AUTO-ENTMCNC: 33.3 G/DL (ref 30–36.5)
MCV RBC AUTO: 71.9 FL (ref 80–99)
MONOCYTES # BLD: 0.7 K/UL (ref 0–1)
MONOCYTES NFR BLD: 11 % (ref 5–13)
NEUTS SEG # BLD: 5 K/UL (ref 1.8–8)
NEUTS SEG NFR BLD: 75 % (ref 32–75)
NRBC # BLD: 0 K/UL (ref 0–0.01)
NRBC BLD-RTO: 0 PER 100 WBC
PHOSPHATE SERPL-MCNC: 2.7 MG/DL (ref 2.6–4.7)
PLATELET # BLD AUTO: 75 K/UL (ref 150–400)
POTASSIUM SERPL-SCNC: 3.3 MMOL/L (ref 3.5–5.1)
PROT SERPL-MCNC: 6.4 G/DL (ref 6.4–8.2)
RBC # BLD AUTO: 3.88 M/UL (ref 4.1–5.7)
SERVICE CMNT-IMP: ABNORMAL
SODIUM SERPL-SCNC: 140 MMOL/L (ref 136–145)
WBC # BLD AUTO: 6.7 K/UL (ref 4.1–11.1)

## 2020-07-04 PROCEDURE — 74011250637 HC RX REV CODE- 250/637: Performed by: INTERNAL MEDICINE

## 2020-07-04 PROCEDURE — 85025 COMPLETE CBC W/AUTO DIFF WBC: CPT

## 2020-07-04 PROCEDURE — 83735 ASSAY OF MAGNESIUM: CPT

## 2020-07-04 PROCEDURE — 94760 N-INVAS EAR/PLS OXIMETRY 1: CPT

## 2020-07-04 PROCEDURE — 94664 DEMO&/EVAL PT USE INHALER: CPT

## 2020-07-04 PROCEDURE — 94640 AIRWAY INHALATION TREATMENT: CPT

## 2020-07-04 PROCEDURE — 74011000258 HC RX REV CODE- 258: Performed by: INTERNAL MEDICINE

## 2020-07-04 PROCEDURE — 87040 BLOOD CULTURE FOR BACTERIA: CPT

## 2020-07-04 PROCEDURE — 36415 COLL VENOUS BLD VENIPUNCTURE: CPT

## 2020-07-04 PROCEDURE — 74011250636 HC RX REV CODE- 250/636: Performed by: INTERNAL MEDICINE

## 2020-07-04 PROCEDURE — 80053 COMPREHEN METABOLIC PANEL: CPT

## 2020-07-04 PROCEDURE — 84100 ASSAY OF PHOSPHORUS: CPT

## 2020-07-04 PROCEDURE — 65660000000 HC RM CCU STEPDOWN

## 2020-07-04 PROCEDURE — 82962 GLUCOSE BLOOD TEST: CPT

## 2020-07-04 RX ORDER — POTASSIUM CHLORIDE 750 MG/1
40 TABLET, FILM COATED, EXTENDED RELEASE ORAL
Status: COMPLETED | OUTPATIENT
Start: 2020-07-04 | End: 2020-07-04

## 2020-07-04 RX ADMIN — IPRATROPIUM BROMIDE 1 PUFF: 17 AEROSOL, METERED RESPIRATORY (INHALATION) at 08:14

## 2020-07-04 RX ADMIN — IPRATROPIUM BROMIDE 1 PUFF: 17 AEROSOL, METERED RESPIRATORY (INHALATION) at 19:34

## 2020-07-04 RX ADMIN — HEPARIN SODIUM 5000 UNITS: 5000 INJECTION INTRAVENOUS; SUBCUTANEOUS at 08:14

## 2020-07-04 RX ADMIN — ALBUTEROL SULFATE 2 PUFF: 108 INHALANT RESPIRATORY (INHALATION) at 03:09

## 2020-07-04 RX ADMIN — Medication 10 ML: at 14:35

## 2020-07-04 RX ADMIN — IPRATROPIUM BROMIDE 1 PUFF: 17 AEROSOL, METERED RESPIRATORY (INHALATION) at 03:09

## 2020-07-04 RX ADMIN — IPRATROPIUM BROMIDE 1 PUFF: 17 AEROSOL, METERED RESPIRATORY (INHALATION) at 14:38

## 2020-07-04 RX ADMIN — Medication 10 ML: at 19:35

## 2020-07-04 RX ADMIN — METOPROLOL SUCCINATE 50 MG: 50 TABLET, EXTENDED RELEASE ORAL at 08:13

## 2020-07-04 RX ADMIN — CEFTRIAXONE 2 G: 2 INJECTION, POWDER, FOR SOLUTION INTRAMUSCULAR; INTRAVENOUS at 14:35

## 2020-07-04 RX ADMIN — POTASSIUM CHLORIDE 40 MEQ: 750 TABLET, FILM COATED, EXTENDED RELEASE ORAL at 09:41

## 2020-07-04 RX ADMIN — HEPARIN SODIUM 5000 UNITS: 5000 INJECTION INTRAVENOUS; SUBCUTANEOUS at 16:42

## 2020-07-04 RX ADMIN — ALBUTEROL SULFATE 2 PUFF: 108 INHALANT RESPIRATORY (INHALATION) at 14:39

## 2020-07-04 RX ADMIN — Medication 10 ML: at 06:00

## 2020-07-04 RX ADMIN — ALBUTEROL SULFATE 2 PUFF: 108 INHALANT RESPIRATORY (INHALATION) at 19:34

## 2020-07-04 RX ADMIN — ALBUTEROL SULFATE 2 PUFF: 108 INHALANT RESPIRATORY (INHALATION) at 08:14

## 2020-07-04 NOTE — PROGRESS NOTES
1344: received critical result call from the lab. There are  grand negative rods in one of two bottles from right PICC on drawn on 7/2/2020. Notified Dr. Miguel Nickerson via perfect serve. Problem: Falls - Risk of  Goal: *Absence of Falls  Description: Document Diaz Mena Fall Risk and appropriate interventions in the flowsheet.   Outcome: Progressing Towards Goal  Note: Fall Risk Interventions:    Medication Interventions: Teach patient to arise slowly, Patient to call before getting OOB     Problem: Patient Education: Go to Patient Education Activity  Goal: Patient/Family Education  Outcome: Progressing Towards Goal

## 2020-07-04 NOTE — PROGRESS NOTES
Quang Lagunas [facility] 79  [address] [place] [address], [address] [place] [address]  [phone]      Medical Progress Note      NAME: Bartolome Boyce   :  1975  MRM:  [id number]    Date/Time: 2020        Assessment / Plan:     Sepsis: POA. Klebsiella oxytoca bacteremia. Bacillus thought to be contaminant. PICC line culture also positive with GNR bacteremia. 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 Antibiotics now narrowed to IV CTX, appreciate ID. R pleural effusion: underwent thoracentesis on  with 1350 ml output. Follow up on fluid studies including cytology. Culture NGTD.      Lung cancer: Non-small cell lung cancer, at least stage IIIB disease. With pleural effusion is worrisome for malignant effusion however pleural effusion cytology reportedly negative x3 thus far. Mgmt per hem/onc      Thrombocytopenia: likely from sepsis/bacteremia. Improving slowly, follow closely on heparin SQ for DVT ppx     HTN: BP controlled. Continue metoprolol, titrate up to home dose     Total time spent: 25 minutes  Time spent in the care of this patient including reviewing records, discussing with nursing and/or other providers on the treatment team, obtaining history and examining the patient, and discussing treatment plans. Care Plan discussed with: Patient, Nursing Staff and >50% of time spent in counseling and coordination of care    Discussed:  Care Plan and D/C Planning    Prophylaxis:  SCD's    Disposition:   PT, OT, RN         Subjective:     Chief Complaint:  Follow up pleural effusion    Chart/notes/labs/studies reviewed, patient examined. Feels well. Denies CP, SOB. No fevers overnight. Tmax 100.1         Objective:       Vitals:        Last 24hrs VS reviewed since prior progress note.  Most recent are:    Visit Vitals  /76 (BP 1 Location: Left arm, BP Patient Position: At rest)   Pulse 93   Temp 99.5 °F (37.5 °C)   Resp 18   Ht 5' 9\" (1.753 m)   Wt 85.7 kg (189 lb)   SpO2 95%   BMI 27.91 kg/m²     SpO2 Readings from Last 6 Encounters:   07/04/20 95%   06/18/20 97%   06/18/20 97%   06/17/20 95%   06/09/20 94%   05/05/20 99%    O2 Flow Rate (L/min): 2 l/min     No intake or output data in the 24 hours ending 07/04/20 1639       Exam:     Physical Exam:    Gen:  Chronically ill-appearing. NAD. Very pleasant  HEENT:  Sclerae nonicteric, hearing intact to voice, mucous membranes moist  Neck:  Supple, without masses. Resp:  No accessory muscle use, mildly diminished R-sided breath sounds  Card: RRR, without m/r/g. No LE edema. Abd:  +bowel sounds, soft, NTTP, nondistended. No HSM. Neuro: Face symmetric, tongue midline, speech fluent, follows commands appropriately  Psych:  Alert, oriented x 3.  Limited insight  Skin: R thigh PICC line removed, dressing C/D/I    Medications Reviewed: (see below)    Lab Data Reviewed: (see below)    ______________________________________________________________________    Medications:     Current Facility-Administered Medications   Medication Dose Route Frequency    heparin (porcine) injection 5,000 Units  5,000 Units SubCUTAneous Q8H    metoprolol succinate (TOPROL-XL) XL tablet 50 mg  50 mg Oral DAILY    cefTRIAXone (ROCEPHIN) 2 g in 0.9% sodium chloride (MBP/ADV) 50 mL  2 g IntraVENous Q24H    prochlorperazine (COMPAZINE) injection 10 mg  10 mg IntraVENous Q4H PRN    albuterol (PROVENTIL HFA, VENTOLIN HFA, PROAIR HFA) inhaler 2 Puff  2 Puff Inhalation Q6H RT    And    ipratropium (ATROVENT HFA) 17 mcg inhaler  1 Puff Inhalation Q6H RT    sodium chloride (NS) flush 5-40 mL  5-40 mL IntraVENous Q8H    sodium chloride (NS) flush 5-40 mL  5-40 mL IntraVENous PRN    acetaminophen (TYLENOL) tablet 650 mg  650 mg Oral Q4H PRN    ondansetron (ZOFRAN) injection 4 mg  4 mg IntraVENous Q6H PRN    albuterol (PROVENTIL VENTOLIN) nebulizer solution 2.5 mg  2.5 mg Nebulization Q4H PRN    benzonatate (TESSALON) capsule 100 mg  100 mg Oral TID PRN    guaiFENesin ER (MUCINEX) tablet 600 mg  600 mg Oral Q12H PRN            Lab Review:     Recent Labs     07/04/20 0247 07/03/20 0300 07/02/20  0435   WBC 6.7 8.5 10.4   HGB 9.3* 9.2* 9.1*   HCT 27.9* 27.4* 27.1*   PLT 75* 60* 54*     Recent Labs     07/04/20 0247 07/03/20  0300 07/02/20  0435    140 141   K 3.3* 3.3* 3.3*    109* 110*   CO2 26 23 24    84 99   BUN 10 9 9   CREA 0.70 0.73 0.88   CA 8.5 8.4* 8.2*   MG 1.8 2.0 1.7   PHOS 2.7 3.1 2.2*   ALB 2.9* 2.8* 2.8*   ALT 20 22 31     No components found for: GLPOC  No results for input(s): PH, PCO2, PO2, HCO3, FIO2 in the last 72 hours. No results for input(s): INR, INREXT, INREXT in the last 72 hours.   No results found for: SDES  Lab Results   Component Value Date/Time    Culture result: NO GROWTH 2 DAYS 07/02/2020 02:36 PM    Culture result: (A) 07/02/2020 01:30 PM     GRAM NEGATIVE RODS GROWING IN 1 OF 2 BOTTLES DRAWN (SITE = R PICC)    Culture result: REMAINING BOTTLE(S) HAS/HAVE NO GROWTH SO FAR 07/02/2020 01:30 PM              ___________________________________________________    Attending Physician: Ellen Long MD

## 2020-07-04 NOTE — PROGRESS NOTES
Discussed removing PICC with patient. Patient says has 4 more treatments to go. Holding off on removing PICC until plan in place. Will notify Hospitalist.    Addressed with Hospitalist and concern for infection high. Will pull PICC.     0630 PICC removed.

## 2020-07-05 LAB
ALBUMIN SERPL-MCNC: 2.9 G/DL (ref 3.5–5)
ALBUMIN/GLOB SERPL: 0.8 {RATIO} (ref 1.1–2.2)
ALP SERPL-CCNC: 109 U/L (ref 45–117)
ALT SERPL-CCNC: 23 U/L (ref 12–78)
ANION GAP SERPL CALC-SCNC: 5 MMOL/L (ref 5–15)
AST SERPL-CCNC: 20 U/L (ref 15–37)
BASOPHILS # BLD: 0 K/UL (ref 0–0.1)
BASOPHILS NFR BLD: 1 % (ref 0–1)
BILIRUB SERPL-MCNC: 0.5 MG/DL (ref 0.2–1)
BUN SERPL-MCNC: 9 MG/DL (ref 6–20)
BUN/CREAT SERPL: 12 (ref 12–20)
CALCIUM SERPL-MCNC: 8.4 MG/DL (ref 8.5–10.1)
CHLORIDE SERPL-SCNC: 106 MMOL/L (ref 97–108)
CO2 SERPL-SCNC: 27 MMOL/L (ref 21–32)
CREAT SERPL-MCNC: 0.78 MG/DL (ref 0.7–1.3)
DIFFERENTIAL METHOD BLD: ABNORMAL
EOSINOPHIL # BLD: 0.1 K/UL (ref 0–0.4)
EOSINOPHIL NFR BLD: 3 % (ref 0–7)
ERYTHROCYTE [DISTWIDTH] IN BLOOD BY AUTOMATED COUNT: 14.5 % (ref 11.5–14.5)
GLOBULIN SER CALC-MCNC: 3.8 G/DL (ref 2–4)
GLUCOSE BLD STRIP.AUTO-MCNC: 123 MG/DL (ref 65–100)
GLUCOSE SERPL-MCNC: 106 MG/DL (ref 65–100)
HCT VFR BLD AUTO: 29.2 % (ref 36.6–50.3)
HGB BLD-MCNC: 9.6 G/DL (ref 12.1–17)
IMM GRANULOCYTES # BLD AUTO: 0 K/UL (ref 0–0.04)
IMM GRANULOCYTES NFR BLD AUTO: 0 % (ref 0–0.5)
LYMPHOCYTES # BLD: 0.6 K/UL (ref 0.8–3.5)
LYMPHOCYTES NFR BLD: 16 % (ref 12–49)
MAGNESIUM SERPL-MCNC: 1.8 MG/DL (ref 1.6–2.4)
MCH RBC QN AUTO: 23.8 PG (ref 26–34)
MCHC RBC AUTO-ENTMCNC: 32.9 G/DL (ref 30–36.5)
MCV RBC AUTO: 72.5 FL (ref 80–99)
MONOCYTES # BLD: 0.5 K/UL (ref 0–1)
MONOCYTES NFR BLD: 14 % (ref 5–13)
NEUTS SEG # BLD: 2.7 K/UL (ref 1.8–8)
NEUTS SEG NFR BLD: 66 % (ref 32–75)
NRBC # BLD: 0 K/UL (ref 0–0.01)
NRBC BLD-RTO: 0 PER 100 WBC
PHOSPHATE SERPL-MCNC: 2.9 MG/DL (ref 2.6–4.7)
PLATELET # BLD AUTO: 94 K/UL (ref 150–400)
POTASSIUM SERPL-SCNC: 3.4 MMOL/L (ref 3.5–5.1)
PROT SERPL-MCNC: 6.7 G/DL (ref 6.4–8.2)
RBC # BLD AUTO: 4.03 M/UL (ref 4.1–5.7)
RBC MORPH BLD: ABNORMAL
SERVICE CMNT-IMP: ABNORMAL
SODIUM SERPL-SCNC: 138 MMOL/L (ref 136–145)
WBC # BLD AUTO: 3.9 K/UL (ref 4.1–11.1)
WBC MORPH BLD: ABNORMAL

## 2020-07-05 PROCEDURE — 80053 COMPREHEN METABOLIC PANEL: CPT

## 2020-07-05 PROCEDURE — 94664 DEMO&/EVAL PT USE INHALER: CPT

## 2020-07-05 PROCEDURE — 94640 AIRWAY INHALATION TREATMENT: CPT

## 2020-07-05 PROCEDURE — 36415 COLL VENOUS BLD VENIPUNCTURE: CPT

## 2020-07-05 PROCEDURE — 74011000258 HC RX REV CODE- 258: Performed by: INTERNAL MEDICINE

## 2020-07-05 PROCEDURE — 74011250636 HC RX REV CODE- 250/636: Performed by: INTERNAL MEDICINE

## 2020-07-05 PROCEDURE — 83735 ASSAY OF MAGNESIUM: CPT

## 2020-07-05 PROCEDURE — 74011250637 HC RX REV CODE- 250/637: Performed by: INTERNAL MEDICINE

## 2020-07-05 PROCEDURE — 84100 ASSAY OF PHOSPHORUS: CPT

## 2020-07-05 PROCEDURE — 65660000000 HC RM CCU STEPDOWN

## 2020-07-05 PROCEDURE — 82962 GLUCOSE BLOOD TEST: CPT

## 2020-07-05 PROCEDURE — 85025 COMPLETE CBC W/AUTO DIFF WBC: CPT

## 2020-07-05 PROCEDURE — 94760 N-INVAS EAR/PLS OXIMETRY 1: CPT

## 2020-07-05 RX ORDER — POTASSIUM CHLORIDE 750 MG/1
40 TABLET, FILM COATED, EXTENDED RELEASE ORAL
Status: COMPLETED | OUTPATIENT
Start: 2020-07-05 | End: 2020-07-05

## 2020-07-05 RX ADMIN — IPRATROPIUM BROMIDE 1 PUFF: 17 AEROSOL, METERED RESPIRATORY (INHALATION) at 22:00

## 2020-07-05 RX ADMIN — Medication 10 ML: at 21:59

## 2020-07-05 RX ADMIN — METOPROLOL SUCCINATE 50 MG: 50 TABLET, EXTENDED RELEASE ORAL at 08:04

## 2020-07-05 RX ADMIN — ALBUTEROL SULFATE 2 PUFF: 108 INHALANT RESPIRATORY (INHALATION) at 14:06

## 2020-07-05 RX ADMIN — ALBUTEROL SULFATE 2 PUFF: 108 INHALANT RESPIRATORY (INHALATION) at 08:04

## 2020-07-05 RX ADMIN — ALBUTEROL SULFATE 2 PUFF: 108 INHALANT RESPIRATORY (INHALATION) at 02:50

## 2020-07-05 RX ADMIN — IPRATROPIUM BROMIDE 1 PUFF: 17 AEROSOL, METERED RESPIRATORY (INHALATION) at 08:04

## 2020-07-05 RX ADMIN — POTASSIUM CHLORIDE 40 MEQ: 750 TABLET, FILM COATED, EXTENDED RELEASE ORAL at 11:30

## 2020-07-05 RX ADMIN — HEPARIN SODIUM 5000 UNITS: 5000 INJECTION INTRAVENOUS; SUBCUTANEOUS at 08:04

## 2020-07-05 RX ADMIN — IPRATROPIUM BROMIDE 1 PUFF: 17 AEROSOL, METERED RESPIRATORY (INHALATION) at 14:06

## 2020-07-05 RX ADMIN — HEPARIN SODIUM 5000 UNITS: 5000 INJECTION INTRAVENOUS; SUBCUTANEOUS at 15:28

## 2020-07-05 RX ADMIN — HEPARIN SODIUM 5000 UNITS: 5000 INJECTION INTRAVENOUS; SUBCUTANEOUS at 00:31

## 2020-07-05 RX ADMIN — IPRATROPIUM BROMIDE 1 PUFF: 17 AEROSOL, METERED RESPIRATORY (INHALATION) at 02:50

## 2020-07-05 RX ADMIN — CEFTRIAXONE 2 G: 2 INJECTION, POWDER, FOR SOLUTION INTRAMUSCULAR; INTRAVENOUS at 14:07

## 2020-07-05 RX ADMIN — ALBUTEROL SULFATE 2 PUFF: 108 INHALANT RESPIRATORY (INHALATION) at 22:00

## 2020-07-05 RX ADMIN — Medication 10 ML: at 02:51

## 2020-07-05 NOTE — PROGRESS NOTES
Quang Lagunas [facility] 79  [address]  [phone]      Medical Progress Note      NAME: Francia Mackay   :  1975  MRM:  [id number]    Date/Time: 2020        Assessment / Plan:     Sepsis: POA. Klebsiella oxytoca bacteremia. Bacillus thought to be contaminant. PICC line culture also positive with GNR bacteremia. 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 Antibiotics now narrowed to IV CTX, appreciate ID. Defer to ID on need for PICC line and home IV CTX vs PO Levaquin at discharge    R pleural effusion: underwent thoracentesis on  with 1350 ml output. Follow up on fluid studies including cytology. Culture NGTD.      Lung cancer: Non-small cell lung cancer, at least stage IIIB disease. With pleural effusion is worrisome for malignant effusion however pleural effusion cytology reportedly negative x3 thus far. Follow up on repeat cytology. Mgmt per hem/onc      Thrombocytopenia: likely from sepsis/bacteremia. Improving slowly daily, follow closely on heparin SQ for DVT ppx     HTN: BP controlled. Continue metoprolol       Total time spent: 25 minutes  Time spent in the care of this patient including reviewing records, discussing with nursing and/or other providers on the treatment team, obtaining history and examining the patient, and discussing treatment plans. Care Plan discussed with: Patient, Nursing Staff and >50% of time spent in counseling and coordination of care    Discussed:  Care Plan and D/C Planning    Prophylaxis:  SCD's / heparin    Disposition:   PT, OT, RN         Subjective:     Chief Complaint:  Follow up pleural effusion    Chart/notes/labs/studies reviewed, patient examined. Feels well. Denies CP, SOB. No F/C         Objective:       Vitals:        Last 24hrs VS reviewed since prior progress note.  Most recent are:    Visit Vitals  BP 121/86 (BP 1 Location: Right arm, BP Patient Position: At rest;Head of bed elevated (Comment degrees))   Pulse 90   Temp 98.4 °F (36.9 °C)   Resp 18   Ht 5' 9\" (1.753 m)   Wt 85.7 kg (189 lb)   SpO2 95%   BMI 27.91 kg/m²     SpO2 Readings from Last 6 Encounters:   07/05/20 95%   06/18/20 97%   06/18/20 97%   06/17/20 95%   06/09/20 94%   05/05/20 99%    O2 Flow Rate (L/min): 2 l/min       Intake/Output Summary (Last 24 hours) at 7/5/2020 1740  Last data filed at 7/5/2020 0808  Gross per 24 hour   Intake 240 ml   Output --   Net 240 ml          Exam:     Physical Exam:    Gen:  Chronically ill-appearing. NAD. Very pleasant  HEENT:  Sclerae nonicteric, hearing intact to voice, mucous membranes moist  Neck:  Supple, without masses. Resp:  No accessory muscle use, mildly diminished R-sided breath sounds  Card: RRR, without m/r/g. No LE edema  Abd:  +bowel sounds, soft, NTTP, nondistended  Neuro: Face symmetric, tongue midline, speech fluent, follows commands appropriately  Psych:  Alert, oriented x 3.  Limited insight  Skin: R thigh PICC line removed, dressing C/D/I    Medications Reviewed: (see below)    Lab Data Reviewed: (see below)    ______________________________________________________________________    Medications:     Current Facility-Administered Medications   Medication Dose Route Frequency    heparin (porcine) injection 5,000 Units  5,000 Units SubCUTAneous Q8H    metoprolol succinate (TOPROL-XL) XL tablet 50 mg  50 mg Oral DAILY    cefTRIAXone (ROCEPHIN) 2 g in 0.9% sodium chloride (MBP/ADV) 50 mL  2 g IntraVENous Q24H    prochlorperazine (COMPAZINE) injection 10 mg  10 mg IntraVENous Q4H PRN    albuterol (PROVENTIL HFA, VENTOLIN HFA, PROAIR HFA) inhaler 2 Puff  2 Puff Inhalation Q6H RT    And    ipratropium (ATROVENT HFA) 17 mcg inhaler  1 Puff Inhalation Q6H RT    sodium chloride (NS) flush 5-40 mL  5-40 mL IntraVENous Q8H    sodium chloride (NS) flush 5-40 mL  5-40 mL IntraVENous PRN    acetaminophen (TYLENOL) tablet 650 mg  650 mg Oral Q4H PRN    ondansetron (ZOFRAN) injection 4 mg  4 mg IntraVENous Q6H PRN    albuterol (PROVENTIL VENTOLIN) nebulizer solution 2.5 mg  2.5 mg Nebulization Q4H PRN    benzonatate (TESSALON) capsule 100 mg  100 mg Oral TID PRN    guaiFENesin ER (MUCINEX) tablet 600 mg  600 mg Oral Q12H PRN            Lab Review:     Recent Labs     07/05/20 0254 07/04/20 0247 07/03/20  0300   WBC 3.9* 6.7 8.5   HGB 9.6* 9.3* 9.2*   HCT 29.2* 27.9* 27.4*   PLT 94* 75* 60*     Recent Labs     07/05/20 0254 07/04/20 0247 07/03/20  0300    140 140   K 3.4* 3.3* 3.3*    108 109*   CO2 27 26 23   * 100 84   BUN 9 10 9   CREA 0.78 0.70 0.73   CA 8.4* 8.5 8.4*   MG 1.8 1.8 2.0   PHOS 2.9 2.7 3.1   ALB 2.9* 2.9* 2.8*   ALT 23 20 22     No components found for: GLPOC  No results for input(s): PH, PCO2, PO2, HCO3, FIO2 in the last 72 hours. No results for input(s): INR, INREXT, INREXT in the last 72 hours.   No results found for: SDES  Lab Results   Component Value Date/Time    Culture result: NO GROWTH AFTER 13 HOURS 07/04/2020 03:23 PM    Culture result: NO GROWTH 3 DAYS 07/02/2020 02:36 PM    Culture result: (A) 07/02/2020 01:30 PM     GRAM NEGATIVE RODS GROWING IN 1 OF 2 BOTTLES DRAWN (SITE = R PICC)    Culture result: REMAINING BOTTLE(S) HAS/HAVE NO GROWTH SO FAR 07/02/2020 01:30 PM              ___________________________________________________    Attending Physician: Alan Garcia MD

## 2020-07-05 NOTE — PROGRESS NOTES
Transition of care note:    RUR 17%-low risk for a future readmission. Plan:  1I discussed pt with attending. 2.Pt will need a new PICC placed. 2.Pt will need home iv antibioticvs.  3.ID will be consulted per attendiong.     Gale Gallegos

## 2020-07-05 NOTE — PROGRESS NOTES
Bedside shift change report given to Savanah Thompson RN (oncoming nurse) by Deandra Blank RN (offgoing nurse). Report included the following information SBAR, Kardex and MAR.

## 2020-07-05 NOTE — PROGRESS NOTES
Bedside shift change report given to Lauri MondayMARK (oncoming nurse) by Fernanda Reed RN (offgoing nurse). Report included the following information SBAR, Kardex and MAR.

## 2020-07-06 ENCOUNTER — TELEPHONE (OUTPATIENT)
Dept: ONCOLOGY | Age: 45
End: 2020-07-06

## 2020-07-06 VITALS
WEIGHT: 189 LBS | HEIGHT: 69 IN | OXYGEN SATURATION: 98 % | DIASTOLIC BLOOD PRESSURE: 75 MMHG | HEART RATE: 101 BPM | BODY MASS INDEX: 27.99 KG/M2 | TEMPERATURE: 98.4 F | RESPIRATION RATE: 18 BRPM | SYSTOLIC BLOOD PRESSURE: 108 MMHG

## 2020-07-06 PROBLEM — A41.9 SEPSIS (HCC): Status: RESOLVED | Noted: 2020-06-29 | Resolved: 2020-07-06

## 2020-07-06 LAB
ALBUMIN SERPL-MCNC: 3 G/DL (ref 3.5–5)
ALBUMIN/GLOB SERPL: 0.8 {RATIO} (ref 1.1–2.2)
ALP SERPL-CCNC: 119 U/L (ref 45–117)
ALT SERPL-CCNC: 38 U/L (ref 12–78)
ANION GAP SERPL CALC-SCNC: 5 MMOL/L (ref 5–15)
AST SERPL-CCNC: 40 U/L (ref 15–37)
BACTERIA SPEC CULT: ABNORMAL
BACTERIA SPEC CULT: NORMAL
BASOPHILS # BLD: 0 K/UL (ref 0–0.1)
BASOPHILS NFR BLD: 1 % (ref 0–1)
BILIRUB SERPL-MCNC: 0.4 MG/DL (ref 0.2–1)
BUN SERPL-MCNC: 10 MG/DL (ref 6–20)
BUN/CREAT SERPL: 12 (ref 12–20)
CALCIUM SERPL-MCNC: 8.8 MG/DL (ref 8.5–10.1)
CHLORIDE SERPL-SCNC: 106 MMOL/L (ref 97–108)
CO2 SERPL-SCNC: 27 MMOL/L (ref 21–32)
CREAT SERPL-MCNC: 0.82 MG/DL (ref 0.7–1.3)
DIFFERENTIAL METHOD BLD: ABNORMAL
EOSINOPHIL # BLD: 0.1 K/UL (ref 0–0.4)
EOSINOPHIL NFR BLD: 4 % (ref 0–7)
ERYTHROCYTE [DISTWIDTH] IN BLOOD BY AUTOMATED COUNT: 15 % (ref 11.5–14.5)
GLOBULIN SER CALC-MCNC: 3.8 G/DL (ref 2–4)
GLUCOSE SERPL-MCNC: 103 MG/DL (ref 65–100)
GRAM STN SPEC: NORMAL
GRAM STN SPEC: NORMAL
HCT VFR BLD AUTO: 29.9 % (ref 36.6–50.3)
HGB BLD-MCNC: 9.9 G/DL (ref 12.1–17)
IMM GRANULOCYTES # BLD AUTO: 0 K/UL
IMM GRANULOCYTES NFR BLD AUTO: 0 %
LYMPHOCYTES # BLD: 1 K/UL (ref 0.8–3.5)
LYMPHOCYTES NFR BLD: 29 % (ref 12–49)
MAGNESIUM SERPL-MCNC: 1.8 MG/DL (ref 1.6–2.4)
MCH RBC QN AUTO: 23.9 PG (ref 26–34)
MCHC RBC AUTO-ENTMCNC: 33.1 G/DL (ref 30–36.5)
MCV RBC AUTO: 72.2 FL (ref 80–99)
MONOCYTES # BLD: 0.4 K/UL (ref 0–1)
MONOCYTES NFR BLD: 12 % (ref 5–13)
NEUTS BAND NFR BLD MANUAL: 1 % (ref 0–6)
NEUTS SEG # BLD: 1.9 K/UL (ref 1.8–8)
NEUTS SEG NFR BLD: 53 % (ref 32–75)
NRBC # BLD: 0 K/UL (ref 0–0.01)
NRBC BLD-RTO: 0 PER 100 WBC
PHOSPHATE SERPL-MCNC: 3.3 MG/DL (ref 2.6–4.7)
PLATELET # BLD AUTO: 121 K/UL (ref 150–400)
POTASSIUM SERPL-SCNC: 3.7 MMOL/L (ref 3.5–5.1)
PROT SERPL-MCNC: 6.8 G/DL (ref 6.4–8.2)
RBC # BLD AUTO: 4.14 M/UL (ref 4.1–5.7)
RBC MORPH BLD: ABNORMAL
RBC MORPH BLD: ABNORMAL
SERVICE CMNT-IMP: ABNORMAL
SERVICE CMNT-IMP: ABNORMAL
SERVICE CMNT-IMP: NORMAL
SODIUM SERPL-SCNC: 138 MMOL/L (ref 136–145)
WBC # BLD AUTO: 3.4 K/UL (ref 4.1–11.1)

## 2020-07-06 PROCEDURE — 94760 N-INVAS EAR/PLS OXIMETRY 1: CPT

## 2020-07-06 PROCEDURE — 84100 ASSAY OF PHOSPHORUS: CPT

## 2020-07-06 PROCEDURE — 94640 AIRWAY INHALATION TREATMENT: CPT

## 2020-07-06 PROCEDURE — 80053 COMPREHEN METABOLIC PANEL: CPT

## 2020-07-06 PROCEDURE — 74011250637 HC RX REV CODE- 250/637: Performed by: INTERNAL MEDICINE

## 2020-07-06 PROCEDURE — 83735 ASSAY OF MAGNESIUM: CPT

## 2020-07-06 PROCEDURE — 85025 COMPLETE CBC W/AUTO DIFF WBC: CPT

## 2020-07-06 PROCEDURE — 74011250636 HC RX REV CODE- 250/636: Performed by: INTERNAL MEDICINE

## 2020-07-06 PROCEDURE — 36415 COLL VENOUS BLD VENIPUNCTURE: CPT

## 2020-07-06 RX ORDER — ONDANSETRON 8 MG/1
8 TABLET, ORALLY DISINTEGRATING ORAL
Qty: 30 TAB | Refills: 5 | Status: SHIPPED | OUTPATIENT
Start: 2020-07-06

## 2020-07-06 RX ORDER — LEVOFLOXACIN 750 MG/1
750 TABLET ORAL DAILY
Qty: 11 TAB | Refills: 0 | Status: SHIPPED | OUTPATIENT
Start: 2020-07-06 | End: 2020-07-17

## 2020-07-06 RX ADMIN — HEPARIN SODIUM 5000 UNITS: 5000 INJECTION INTRAVENOUS; SUBCUTANEOUS at 09:23

## 2020-07-06 RX ADMIN — ALBUTEROL SULFATE 2 PUFF: 108 INHALANT RESPIRATORY (INHALATION) at 09:23

## 2020-07-06 RX ADMIN — IPRATROPIUM BROMIDE 1 PUFF: 17 AEROSOL, METERED RESPIRATORY (INHALATION) at 09:23

## 2020-07-06 RX ADMIN — HEPARIN SODIUM 5000 UNITS: 5000 INJECTION INTRAVENOUS; SUBCUTANEOUS at 01:42

## 2020-07-06 RX ADMIN — ALBUTEROL SULFATE 2 PUFF: 108 INHALANT RESPIRATORY (INHALATION) at 01:44

## 2020-07-06 RX ADMIN — IPRATROPIUM BROMIDE 1 PUFF: 17 AEROSOL, METERED RESPIRATORY (INHALATION) at 01:44

## 2020-07-06 RX ADMIN — Medication 10 ML: at 05:50

## 2020-07-06 NOTE — PROGRESS NOTES
Problem: Falls - Risk of  Goal: *Absence of Falls  Description: Document Juanita Salinas Fall Risk and appropriate interventions in the flowsheet. Outcome: Progressing Towards Goal  Note: Fall Risk Interventions:            Medication Interventions: Bed/chair exit alarm, Patient to call before getting OOB, Teach patient to arise slowly                   Problem: Patient Education: Go to Patient Education Activity  Goal: Patient/Family Education  Outcome: Progressing Towards Goal     Problem: Risk for Spread of Infection  Goal: Prevent transmission of infectious organism to others  Description: Prevent the transmission of infectious organisms to other patients, staff members, and visitors.   Outcome: Progressing Towards Goal     Problem: Patient Education:  Go to Education Activity  Goal: Patient/Family Education  Outcome: Progressing Towards Goal     Problem: Sepsis: Discharge Outcomes  Goal: *Vital signs within defined limits  Outcome: Progressing Towards Goal  Goal: *Tolerating diet  Outcome: Progressing Towards Goal  Goal: *Verbalizes understanding and describes prescribed diet  Outcome: Progressing Towards Goal  Goal: *Demonstrates progressive activity  Outcome: Progressing Towards Goal  Goal: *Describes follow-up/return visits to physicians  Outcome: Progressing Towards Goal  Goal: *Verbalizes name, dosage, time, side effects, and number of days to continue medications  Outcome: Progressing Towards Goal  Goal: *Pneumococcal immunization  Outcome: Progressing Towards Goal  Goal: *Lungs clear or at baseline  Outcome: Progressing Towards Goal  Goal: *Oxygen saturation returns to baseline or 90% or better on room air  Outcome: Progressing Towards Goal  Goal: *Absence of deep venous thrombosis signs and symptoms(Stroke Metric)  Outcome: Progressing Towards Goal  Goal: *Describes available resources and support systems  Outcome: Progressing Towards Goal  Goal: *Optimal pain control at patient's stated goal  Outcome: Progressing Towards Goal

## 2020-07-06 NOTE — PROGRESS NOTES
750: pt has orders for enteric, contact, and airborne precautions; unable to identify source of precautions; contacted Dr. Yaritza Garcia and received orders to discontinue all precautions.

## 2020-07-06 NOTE — PROGRESS NOTES
[facility] Progress Note           Beverly Sage DO    [phone] Office  [phone]  Fax    2020      Assessment & Plan:   1. Klebsiella oxytoca bacteremia. Suspect due to infected right thigh PICC line. Cultures drawn from the PICC line are growing the same organism. Line has been removed. Repeat blood cultures are sterile to date. Plan discharge on levofloxacin 750 mg p.o. daily through 2020. Okay for discharge from infectious diseases standpoint. Discussed potential side effects of antibiotics with the patient. He will contact me with any problems  2. Bacillus isolated from the blood. This likely represents a contaminant. Follow repeat culture  3. Non-small cell lung cancer. On.maintenance therapy with Durvalumab which he receives every other week. Oncology to determine whether or not he will need a new PICC line at discharge          Subjective:   No complaints. Anxious for discharge    Objective:     Vitals:   Visit Vitals  /75 (BP 1 Location: Right arm, BP Patient Position: Sitting)   Pulse (!) 101   Temp 98.4 °F (36.9 °C)   Resp 18   Ht 5' 9\" (1.753 m)   Wt 189 lb (85.7 kg)   SpO2 98%   BMI 27.91 kg/m²        Tmax:  Temp (24hrs), Av.4 °F (36.9 °C), Min:97.9 °F (36.6 °C), Max:98.7 °F (37.1 °C)      Exam:   Patient is intubated:  no     Physical Examination:   General:  Alert, cooperative, no distress   Head:  Normocephalic, atraumatic. Eyes:  Conjunctivae clear   Neck: Supple       Lungs:   No distress. Chest wall:     Heart:  Regul   Abdomen:   Non-distended   Extremities: Moves all. Skin:  No rash   Neurologic: CNII-XII intact. Labs:        No lab exists for component: ITNL   No results for input(s): CPK, CKMB, TROIQ in the last 72 hours.   Recent Labs     20  0146 20  0254 20  0247    138 140   K 3.7 3.4* 3.3*    106 108   CO2 27 27 26   BUN 10 9 10   CREA 0.82 0.78 0.70   * 106* 100   PHOS 3.3 2.9 2.7   MG 1.8 1.8 1.8   ALB 3.0* 2.9* 2.9*   WBC 3.4* 3.9* 6.7   HGB 9.9* 9.6* 9.3*   HCT 29.9* 29.2* 27.9*   * 94* 75*     No results for input(s): INR, PTP, APTT, INREXT in the last 72 hours. Needs: urine analysis, urine sodium, protein and creatinine  No results found for: SOFIA, CREAU      Cultures:     No results found for: SDES  Lab Results   Component Value Date/Time    Culture result: NO GROWTH 2 DAYS 07/04/2020 03:23 PM    Culture result: NO GROWTH 4 DAYS 07/02/2020 02:36 PM    Culture result: (A) 07/02/2020 01:30 PM     KLEBSIELLA OXYTOCA GROWING IN 1 OF 2 BOTTLES DRAWN . ..(SITE= R PICC)    Culture result: REMAINING BOTTLE(S) HAS/HAVE NO GROWTH SO FAR 07/02/2020 01:30 PM       Radiology:     Medications       Current Facility-Administered Medications   Medication Dose Route Frequency Last Dose    heparin (porcine) injection 5,000 Units  5,000 Units SubCUTAneous Q8H 5,000 Units at 07/06/20 0923    metoprolol succinate (TOPROL-XL) XL tablet 50 mg  50 mg Oral DAILY Stopped at 07/06/20 0900    cefTRIAXone (ROCEPHIN) 2 g in 0.9% sodium chloride (MBP/ADV) 50 mL  2 g IntraVENous Q24H 2 g at 07/05/20 1407    prochlorperazine (COMPAZINE) injection 10 mg  10 mg IntraVENous Q4H PRN 10 mg at 07/01/20 0650    albuterol (PROVENTIL HFA, VENTOLIN HFA, PROAIR HFA) inhaler 2 Puff  2 Puff Inhalation Q6H RT 2 Puff at 07/06/20 0923    And    ipratropium (ATROVENT HFA) 17 mcg inhaler  1 Puff Inhalation Q6H RT 1 Puff at 07/06/20 0923    sodium chloride (NS) flush 5-40 mL  5-40 mL IntraVENous Q8H 10 mL at 07/06/20 0550    sodium chloride (NS) flush 5-40 mL  5-40 mL IntraVENous PRN      acetaminophen (TYLENOL) tablet 650 mg  650 mg Oral Q4H  mg at 07/02/20 2051    ondansetron (ZOFRAN) injection 4 mg  4 mg IntraVENous Q6H PRN 4 mg at 07/01/20 0609    albuterol (PROVENTIL VENTOLIN) nebulizer solution 2.5 mg  2.5 mg Nebulization Q4H PRN      benzonatate (TESSALON) capsule 100 mg  100 mg Oral TID PRN  guaiFENesin ER (MUCINEX) tablet 600 mg  600 mg Oral Q12H PRN             Case discussed with:      Xochitl Julian DO

## 2020-07-06 NOTE — PROGRESS NOTES
Cancer [place] at [place] [address] [address] [facility]  Dianna Sortoter: [phone]  F: [phone]      Reason for Visit:   Sarita Vallejo is a 38 y.o. male who is seen in consultation at the request of Dr. Michell Alejandra for evaluation of h/o lung cancer. Hematology / Oncology Treatment History:   · Diagnosed at [facility]  · Biopsy of right level 4 node: non small cell carcinoma, favored squamous cell carcinoma, insufficient sample for further testing  · Stage III Non-small cell lung cancer (Squamous cell)  · Definitive radiation with Dr. Johanny Holbrook completed mid-July 2019  · Concurrent chemotherapy with carboplatin and paclitaxel by Dr. Maranda Marino, stopped during second infusion due to reaction to paclitaxel  · Concurrent chemotherapy with cisplatin and etoposide 7/16/2019 by Dr. Maranda Marino  · CT Chest 7/27/2019: There has been no significant change in size of the large right paratracheal mediastinal mass. There are many new areas of peripheral consolidation in the right upper lobe, which may represent some combination of progressive disease, posttreatment change, or infection. Attention on follow-up is needed. A previously seen right upper lobe nodule in the posterior segment appears to have decreased in size. The large right pleural effusion on this exam is significantly increased in size from prior. · CT A/P 7/29/2019: no metastatic disease in abdomen or pelvis  · MRI Brain 7/29/2019: no intracranial metastatic disease  · EBUS by Dr. Byron Morejon 7/30/2019: Squamous cell, PDL1 QNS, insufficient tissue for molecular studies  · Thoracentesis 8/5/2019: cytology negative  · Initiated maintenance therapy with Durvalumab on 8/27/2019    History of Present Illness:     Yusra Montgomery was admitted on 6/29/2020 from the ED when he presented with c/o fever, SOB and cough. ED CXR shows recurrence of rt pleural effusion. ED labs Hgb 11.7 plts 45 Admitted for further eval and management.      Today states breathing has improved; not as SOB as he has been. Continues with dry cough. Denies pain or N/V. Had normal BM this am.  Febrile again this morning, with rigors      Interval History:     Feeling better; up in chair; breathing improved; denies pain. Hoping to go home today. Current Facility-Administered Medications   Medication Dose Route Frequency    heparin (porcine) injection 5,000 Units  5,000 Units SubCUTAneous Q8H    metoprolol succinate (TOPROL-XL) XL tablet 50 mg  50 mg Oral DAILY    cefTRIAXone (ROCEPHIN) 2 g in 0.9% sodium chloride (MBP/ADV) 50 mL  2 g IntraVENous Q24H    prochlorperazine (COMPAZINE) injection 10 mg  10 mg IntraVENous Q4H PRN    albuterol (PROVENTIL HFA, VENTOLIN HFA, PROAIR HFA) inhaler 2 Puff  2 Puff Inhalation Q6H RT    And    ipratropium (ATROVENT HFA) 17 mcg inhaler  1 Puff Inhalation Q6H RT    sodium chloride (NS) flush 5-40 mL  5-40 mL IntraVENous Q8H    sodium chloride (NS) flush 5-40 mL  5-40 mL IntraVENous PRN    acetaminophen (TYLENOL) tablet 650 mg  650 mg Oral Q4H PRN    ondansetron (ZOFRAN) injection 4 mg  4 mg IntraVENous Q6H PRN    albuterol (PROVENTIL VENTOLIN) nebulizer solution 2.5 mg  2.5 mg Nebulization Q4H PRN    benzonatate (TESSALON) capsule 100 mg  100 mg Oral TID PRN    guaiFENesin ER (MUCINEX) tablet 600 mg  600 mg Oral Q12H PRN      No Known Allergies     Review of Systems: A complete review of systems was obtained, negative except as described above.       Physical Exam:     Visit Vitals  /75 (BP 1 Location: Right arm, BP Patient Position: Sitting)   Pulse (!) 101   Temp 98.4 °F (36.9 °C)   Resp 18   Ht 5' 9\" (1.753 m)   Wt 85.7 kg (189 lb)   SpO2 98%   BMI 27.91 kg/m²     ECOG PS: 1-2  General: No distress  Eyes:  anicteric sclerae  HENT: Atraumatic with normal appearance of ears and nose; OP clear  Neck: Supple; no thyromegaly   Respiratory: decreased breath sounds on rt ; no rhonchi or wheezing noted;, normal respiratory effort  CV:  Normal rate, regular rhythm, no murmurs, no peripheral edema  GI: Soft, nontender, nondistended, no masses, no hepatomegaly, no splenomegaly  MS: Digits without clubbing or cyanosis. Skin: No rashes, ecchymoses, or petechiae. Normal temperature, turgor, and texture. Neuro/Psych: Alert, oriented, appropriate affect, normal judgment/insight      Results:     Lab Results   Component Value Date/Time    WBC 3.4 (L) 07/06/2020 01:46 AM    HGB 9.9 (L) 07/06/2020 01:46 AM    HCT 29.9 (L) 07/06/2020 01:46 AM    PLATELET 085 (L) 37/15/9934 01:46 AM    MCV 72.2 (L) 07/06/2020 01:46 AM    ABS. NEUTROPHILS 1.9 07/06/2020 01:46 AM     Lab Results   Component Value Date/Time    Sodium 138 07/06/2020 01:46 AM    Potassium 3.7 07/06/2020 01:46 AM    Chloride 106 07/06/2020 01:46 AM    CO2 27 07/06/2020 01:46 AM    Glucose 103 (H) 07/06/2020 01:46 AM    BUN 10 07/06/2020 01:46 AM    Creatinine 0.82 07/06/2020 01:46 AM    GFR est AA >60 07/06/2020 01:46 AM    GFR est non-AA >60 07/06/2020 01:46 AM    Calcium 8.8 07/06/2020 01:46 AM    Glucose (POC) 123 (H) 07/05/2020 08:42 PM     Lab Results   Component Value Date/Time    Bilirubin, total 0.4 07/06/2020 01:46 AM    ALT (SGPT) 38 07/06/2020 01:46 AM    Alk. phosphatase 119 (H) 07/06/2020 01:46 AM    Protein, total 6.8 07/06/2020 01:46 AM    Albumin 3.0 (L) 07/06/2020 01:46 AM    Globulin 3.8 07/06/2020 01:46 AM     Lab Results   Component Value Date/Time    Reticulocyte count 1.7 08/27/2019 11:19 AM    Iron % saturation 33 07/02/2020 04:35 AM    TIBC 199 (L) 07/02/2020 04:35 AM    Ferritin 413 (H) 07/02/2020 04:35 AM    Vitamin B12 873 07/02/2020 04:35 AM    Folate 10.1 07/02/2020 04:35 AM    TSH 3.49 06/18/2020 08:58 AM    Lipase 136 07/27/2019 11:31 AM    Hep C  virus Ab Interp.  NONREACTIVE 12/03/2019 11:22 AM     Lab Results   Component Value Date/Time    INR 1.0 12/03/2019 11:22 AM    aPTT 27.3 12/03/2019 11:22 AM    D-dimer 2.45 (H) 06/09/2020 10:56 AM    Fibrinogen 243 2019 11:22 AM     No results found for: CEA, 829572, C199LT, C125, CBFS528, 2729LT, C153LT, PSA, PSALT, LDH, [id number], HCGTLT, HCGN, AFP, CHRGLT    2020 XR CHEST  IMPRESSION:   1. Right pleural effusion with elevation of the right hemidiaphragm, stable to  slightly increased since the prior study. 2. Stable right upper lobe mass. .           Assessment and Recommendations:     1. Sepsis/ bacteremia   Blood cultures GNRs ;    repeat blood cultures: gram pos rods    repeat cultures NGTD  abx coverage  ID consulted: Source of bacteremia rt LE PICC line; now removed and plan for home abx with oral levaquin x 10 days. 2. Resp failure  improved  2/2 to pleural effusion ( tapped x 3)  COVID negative   resp panel : negative  Pulmonary followin/2 Thoracentesis( 1350 ml) ; cytology pending    3. Non-small cell lung cancer  Stage IIIB  He has at least Stage IIIB disease, and has been treated with concurrent radiation and chemotherapy (cisplatin and etoposide). Repeat PET demonstrates a partial response to therapy. No definite evidence of distant metastatic disease at this time. Pleural effusion cytology negative x3. He is currently on maintenance immunotherapy with Durvalumab. --follow up for next tx on ; reviewed with pt and placed in discharge summary. 4. Thrombocytopenia  Continues to improve  2/2  Sepsis  Continue to monitor; hold anticoagulation for plts< 50K    5. Anemia, microcytic  stable  Likely 2/2 to treatment   Continue to monitor and transfuse for Hgb < 7      6. SVC syndrome  Monitor.   Stable on imaging       Plan reviewed with Dr Jimenez      Signed By: Carine España NP

## 2020-07-06 NOTE — PROGRESS NOTES
Name: [address]: [place]   : 1975 Admit Date: 2020   Phone: [phone]  Room: [facility]   PCP: Gino Reed MD  MRN: [id number]   Date: 2020  Code: Full Code          Chart and notes reviewed. Data reviewed. I review the patient's current medications in the medical record at each encounter. I have evaluated and examined the patient. Overnight Events:  Afebrile  HR 90s-100s  BP stable  Sats 98% on RA  WBC 3.4  HgB 9.9  Platelet 394 - better  KLEBSIELLA OXYTOCA / bottles BC   Repeat BC  NGTD   COVID negative  S/p thoracentesis by IR with 1350 ml clear yellow fluid removed    Images: Personally visualized and report reviewed  CXR : Status post right thoracentesis with interval decrease in pleural effusion and  no pneumothorax. Chest CT : New left lung airspace disease. Stable large right pleural effusion. Stable RUL mass and mediastinal adenopathy. Stable unremarkable abdomen/pelvis. CXR : with right pleural effusion and stable RUL mass    ROS: Reports improvement in SOB following thoracentesis. Denies CP. Denies fever, chills, or cough. Denies abd pain or LE pain/swelling. Past Medical History:   Diagnosis Date    Anemia, iron deficiency     Aphagia     2/2 radiation    Hypertension     Lung cancer ([address] 2019    Pneumonia involving right lung        History reviewed. No pertinent surgical history.     Family History   Problem Relation Age of Onset    Cancer Maternal Grandmother         ovarian       Social History     Tobacco Use    Smoking status: Former Smoker     Packs/day: 0.25     Years: 9.00     Pack years: 2.25     Last attempt to quit: 2019     Years since quittin.3    Smokeless tobacco: Never Used    Tobacco comment: cigar smoking 5-6 months and quit   Substance Use Topics    Alcohol use: Not Currently       No Known Allergies    Current Facility-Administered Medications   Medication Dose Route Frequency    heparin (porcine) injection 5,000 Units  5,000 Units SubCUTAneous Q8H    metoprolol succinate (TOPROL-XL) XL tablet 50 mg  50 mg Oral DAILY    cefTRIAXone (ROCEPHIN) 2 g in 0.9% sodium chloride (MBP/ADV) 50 mL  2 g IntraVENous Q24H    prochlorperazine (COMPAZINE) injection 10 mg  10 mg IntraVENous Q4H PRN    albuterol (PROVENTIL HFA, VENTOLIN HFA, PROAIR HFA) inhaler 2 Puff  2 Puff Inhalation Q6H RT    And    ipratropium (ATROVENT HFA) 17 mcg inhaler  1 Puff Inhalation Q6H RT    sodium chloride (NS) flush 5-40 mL  5-40 mL IntraVENous Q8H    sodium chloride (NS) flush 5-40 mL  5-40 mL IntraVENous PRN    acetaminophen (TYLENOL) tablet 650 mg  650 mg Oral Q4H PRN    ondansetron (ZOFRAN) injection 4 mg  4 mg IntraVENous Q6H PRN    albuterol (PROVENTIL VENTOLIN) nebulizer solution 2.5 mg  2.5 mg Nebulization Q4H PRN    benzonatate (TESSALON) capsule 100 mg  100 mg Oral TID PRN    guaiFENesin ER (MUCINEX) tablet 600 mg  600 mg Oral Q12H PRN         REVIEW OF SYSTEMS   12 point ROS negative except as stated in the HPI. Physical Exam:   Visit Vitals  /75 (BP 1 Location: Right arm, BP Patient Position: Sitting)   Pulse (!) 101   Temp 98.4 °F (36.9 °C)   Resp 18   Ht 5' 9\" (1.753 m)   Wt 85.7 kg (189 lb)   SpO2 98%   BMI 27.91 kg/m²       General:  Alert, cooperative, no distress, appears stated age. Head:  Normocephalic, without obvious abnormality, atraumatic. Eyes:  Conjunctivae/corneas clear. Nose: Nares normal. Septum midline. Mucosa normal.    Throat: Lips, mucosa, and tongue normal.    Neck: Supple, symmetrical   Lungs:   Diminished over the right base, clear on the left   Chest wall:  No tenderness or deformity. Heart:  Regular rate and rhythm, S1, S2 normal, no murmur, click, rub or gallop. Abdomen:   Soft, non-tender. Bowel sounds normal.   Extremities: Extremities normal, atraumatic, no cyanosis or edema. Pulses: 2+ and symmetric all extremities.    Skin: Skin color, texture, turgor normal. No rashes or lesions   Neurologic: Grossly nonfocal       Lab Results   Component Value Date/Time    Sodium 138 07/06/2020 01:46 AM    Potassium 3.7 07/06/2020 01:46 AM    Chloride 106 07/06/2020 01:46 AM    CO2 27 07/06/2020 01:46 AM    BUN 10 07/06/2020 01:46 AM    Creatinine 0.82 07/06/2020 01:46 AM    Glucose 103 (H) 07/06/2020 01:46 AM    Calcium 8.8 07/06/2020 01:46 AM    Magnesium 1.8 07/06/2020 01:46 AM    Phosphorus 3.3 07/06/2020 01:46 AM    Lactic acid 1.0 07/27/2019 02:54 PM       Lab Results   Component Value Date/Time    WBC 3.4 (L) 07/06/2020 01:46 AM    HGB 9.9 (L) 07/06/2020 01:46 AM    PLATELET 739 (L) 12/13/7991 01:46 AM    MCV 72.2 (L) 07/06/2020 01:46 AM       Lab Results   Component Value Date/Time    INR 1.0 12/03/2019 11:22 AM    aPTT 27.3 12/03/2019 11:22 AM    Alk. phosphatase 119 (H) 07/06/2020 01:46 AM    Protein, total 6.8 07/06/2020 01:46 AM    Albumin 3.0 (L) 07/06/2020 01:46 AM    Globulin 3.8 07/06/2020 01:46 AM       Lab Results   Component Value Date/Time    Iron 65 07/02/2020 04:35 AM    TIBC 199 (L) 07/02/2020 04:35 AM    Iron % saturation 33 07/02/2020 04:35 AM    Ferritin 413 (H) 07/02/2020 04:35 AM       Lab Results   Component Value Date/Time    TSH 3.49 06/18/2020 08:58 AM        No results found for: PH, PHI, PCO2, PCO2I, PO2, PO2I, HCO3, HCO3I, FIO2, FIO2I    Lab Results   Component Value Date/Time    Troponin-I, Qt. <0.05 06/29/2020 08:49 PM        Lab Results   Component Value Date/Time    Culture result: NO GROWTH 2 DAYS 07/04/2020 03:23 PM    Culture result: NO GROWTH 4 DAYS 07/02/2020 02:36 PM    Culture result: (A) 07/02/2020 01:30 PM     KLEBSIELLA OXYTOCA GROWING IN 1 OF 2 BOTTLES DRAWN . ..(SITE= R PICC)    Culture result: REMAINING BOTTLE(S) HAS/HAVE NO GROWTH SO FAR 07/02/2020 01:30 PM       Lab Results   Component Value Date/Time    Hepatitis B surface Ag 0.10 12/03/2019 11:22 AM       Lab Results   Component Value Date/Time Vancomycin,trough 12.9 (H) 07/02/2020 11:45 AM    Vancomycin,trough 15.9 (H) 07/01/2020 10:36 AM       Lab Results   Component Value Date/Time    Color YELLOW/STRAW 07/01/2020 04:20 AM    Appearance CLEAR 07/01/2020 04:20 AM    pH (UA) 6.0 07/01/2020 04:20 AM    Protein Negative 07/01/2020 04:20 AM    Glucose Negative 07/01/2020 04:20 AM    Ketone Negative 07/01/2020 04:20 AM    Bilirubin Negative 07/01/2020 04:20 AM    Blood Negative 07/01/2020 04:20 AM    Urobilinogen 0.2 07/01/2020 04:20 AM    Nitrites Negative 07/01/2020 04:20 AM    Leukocyte Esterase Negative 07/01/2020 04:20 AM    WBC 0-4 07/01/2020 04:20 AM    RBC 0-5 07/01/2020 04:20 AM    Bacteria Negative 07/01/2020 04:20 AM       IMPRESSION  · Acute respiratory failure with hypoxia  · Recurrent R pleural effusion  · Squamous Cell Carcinoma  · Sepsis  · Pneumonia  · Thrombocytopenia  · Bacteremia, GNR's    PLAN  · Goal satss 88% or greater, wean O2 as tolerated; currently on 2L NC  · F/u pleural fluid cytology  · Combivent Q6  · CTX per ID  · Follow-up repeat cultures  · Oncologyfollowing  · Encourage IS  · DVT prophylaxis: SCD's    Patient is stable from a pulmonary standpoint. We will sign off and arrange for outpatient pulmonary follow up later this week. Please call with questions.       Linda Johnson

## 2020-07-06 NOTE — PROGRESS NOTES
1313: Reviewed discharge instructions with patient. Opportunity for questions provided. Patient verbalized understanding. IV and telemetry removed.

## 2020-07-06 NOTE — PROGRESS NOTES
Nutrition Assessment:    RECOMMENDATIONS/INTERVENTION(S):   1. Continue Regular diet. 2. Monitor PO intakes, GI function, labs, and wt trends. ASSESSMENT:   7/6: F/u. Good appetite, pt reports 100% breakfast intake. Meal intake per EMR shows 100% intake x1 meal recorded. No n/v. LBM 7/5. Skin without PI. Labs reviewed. Meds - heparin. 7/2: 39 y/o M admitted with sepsis. Pt seen for LOS. PMH - lung CA, HTN. COVID-19 testing. Pt reports good appetite. Intakes per EMR 50-60%. NPO at this time. Pt with good appetite PTA, consumes x2 meals/d with some snacks.  lb. BMI 27.9 c/w overweight. Pt reports recent wt stability, confirmed by wt Hx in EMR. NKFA. No c/s difficulties. Pt reports nausea improved. LBM 7/2. Lytes repletion. Skin without PI. Labs - K+ 3.3 L, Ca 8.2 L, Phos 2.2 L. Meds - KPhos, vancomycin. Diet Order: Regular  % Eaten:    Patient Vitals for the past 72 hrs:   % Diet Eaten   07/05/20 0808 100 %       Pertinent Medications: [x] Reviewed    Labs: [x] Reviewed    Anthropometrics: Height: 5' 9\" (175.3 cm) Weight: 85.7 kg (189 lb)    IBW (%IBW):   ( ) UBW (%UBW):   (  %)      BMI: Body mass index is 27.91 kg/m². This BMI is indicative of:   [] Underweight    [] Normal    [x] Overweight    []  Obesity    []  Extreme Obesity (BMI>40)  Estimated Nutrition Needs (Based on): 2252 Kcals/day(REE 1732 x 1.3) , 80 g(65 - 82 gm (0.8 - 1.0 gm/kg)) Protein  Carbohydrate: At Least 130 g/day  Fluids: 2252 mL/day (1 ml/kcal)    Last BM: 7/5   []Active     []Hyperactive  []Hypoactive       [] Absent   BS  Skin:  No PI  [] Intact   [] Incision  [] Breakdown   [] DTI   [] Tears/Excoriation/Abrasion  []Edema [] Other:    Wt Readings from Last 30 Encounters:   06/29/20 85.7 kg (189 lb)   06/18/20 88.1 kg (194 lb 3.2 oz)   06/18/20 88 kg (194 lb)   06/17/20 87 kg (191 lb 12.8 oz)   06/09/20 88 kg (194 lb)   06/02/20 88.4 kg (194 lb 12.8 oz)   05/19/20 88.8 kg (195 lb 11.2 oz)   05/05/20 87.7 kg (193 lb 4.8 oz)   04/21/20 87.2 kg (192 lb 3.2 oz)   04/21/20 87.1 kg (192 lb)   04/07/20 85.8 kg (189 lb 1.6 oz)   03/24/20 86 kg (189 lb 11.2 oz)   03/24/20 85.7 kg (189 lb)   03/10/20 86.5 kg (190 lb 11.2 oz)   02/25/20 84.5 kg (186 lb 3.2 oz)   02/25/20 84.4 kg (186 lb)   02/14/20 83.5 kg (184 lb)   02/11/20 83.6 kg (184 lb 6.4 oz)   02/11/20 83.5 kg (184 lb)   01/28/20 82.8 kg (182 lb 8 oz)   01/14/20 83.5 kg (184 lb)   01/14/20 82.6 kg (182 lb)   12/30/19 81 kg (178 lb 8 oz)   12/17/19 78.7 kg (173 lb 6.4 oz)   12/17/19 78.5 kg (173 lb)   12/03/19 78.4 kg (172 lb 14.4 oz)   12/03/19 78 kg (172 lb)   11/19/19 76.8 kg (169 lb 4.8 oz)   11/19/19 76.7 kg (169 lb)   11/05/19 75.1 kg (165 lb 8 oz)      NUTRITION DIAGNOSES:   Problem:  Inadequate energy intake     Etiology: related to decreased ability to consume sufficient energy     Signs/Symptoms: as evidenced by NPO day 2       7/6: Nutrition Dx resolved - 100% intakes.      NUTRITION INTERVENTIONS:  Meals/Snacks: General/healthful diet                  GOAL:   PO intakes >75% within 5-7 days     Cultural, Latter day, or Ethnic Dietary Needs: None     EDUCATION & DISCHARGE NEEDS:    [x] None Identified   [] Identified and Education Provided/Documented   [] Identified and Pt declined/was not appropriate      [x] Interdisciplinary Care Plan Reviewed/Documented    [x] Discharge Needs:    Regular diet    [] No Nutrition Related Discharge Needs    NUTRITION RISK:   Pt Is At Nutrition Risk  [x]     No Nutrition Risk Identified  []       PT SEEN FOR:    []  MD Consult: []Calorie Count      []Diabetic Diet Education        []Diet Education     []Electrolyte Management     []General Nutrition Management and Supplements     []Management of Tube Feeding     []TPN Recommendations    []  RN Referral:  []MST score >=2     []Enteral/Parenteral Nutrition PTA     []Pregnant: Gestational DM or Multigestation                 [] Pressure Ulcer    []  Low BMI      []  Length of Stay       [] Dysphagia Diet         [] Ventilator  [x]  Follow-up     Previous Recommendations:   [x] Implemented          [] Not Implemented          [] Not Applicable    Previous Goal:   [x] Met              [] Progressing Towards Goal              [] Not Progressing Towards Goal   [] Not Applicable            Armin Reardon, [address] [facility]  Pager [phone]  Phone [phone]

## 2020-07-06 NOTE — PROGRESS NOTES
7/6/2020 12:22 PM Spoke with ID MD, pt will not need iv abx at discharge. Updates sent to [facility] with resumption of home health order. 7/6/2020  9:14 AM EMR reviewed, following for ID recommendations for outpatient iv abx. Pt is already arranged with home health nursing through [facility]. CM will set up iv abx if ordered, noted possible for home iv abx vs po Levaquin. Will continue to follow.  ALE Scott

## 2020-07-06 NOTE — PROGRESS NOTES
Quang Lagunas [facility]  [address] [facility], [address] [place] [address]  [phone]      Medical Progress Note      NAME: Zoran Kaba   :  1975  MRM:  [id number]    Date of service: 2020  12:32 PM       Assessment and Plan:   1. Sepsis: POA. Klebsiella oxytoca bacteremia. PICC line culture also positive with GNR bacteremia. 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 Treated with broad spectrum ABx. Will be discharged on PO ABx. Appreciate ID.       2. R pleural effusion: underwent thoracentesis on  with 1350 ml output. Follow up on fluid studies including cytology. Culture NGTD.      3.  Lung cancer: Non-small cell lung cancer, at least stage IIIB disease. With pleural effusion is worrisome for malignant effusion however pleural effusion cytology reportedly negative x3 thus far. Follow up on repeat cytology. Mgmt per hem/onc      4. Thrombocytopenia: likely from sepsis/bacteremia. Improving       5. HTN: BP controlled. Continue metoprolol            Subjective:     Chief Complaint[de-identified] Patient was seen and examined as a follow up for bacteremia. Chart was reviewed. feels better     ROS:  (bold if positive, if negative)    Tolerating PT  Tolerating Diet        Objective:     Last 24hrs VS reviewed since prior progress note.  Most recent are:    Visit Vitals  /75 (BP 1 Location: Right arm, BP Patient Position: Sitting)   Pulse (!) 101   Temp 98.4 °F (36.9 °C)   Resp 18   Ht 5' 9\" (1.753 m)   Wt 85.7 kg (189 lb)   SpO2 98%   BMI 27.91 kg/m²     SpO2 Readings from Last 6 Encounters:   20 98%   20 97%   20 97%   20 95%   20 94%   20 99%    O2 Flow Rate (L/min): 2 l/min       Intake/Output Summary (Last 24 hours) at 2020 1232  Last data filed at 2020 0920  Gross per 24 hour   Intake 240 ml   Output --   Net 240 ml        Physical Exam:    Gen:  Well-developed, well-nourished, in no acute distress  HEENT:  Pink conjunctivae, PERRL, hearing intact to voice, moist mucous membranes  Neck:  Supple, without masses, thyroid non-tender  Resp:  No accessory muscle use, clear breath sounds without wheezes rales or rhonchi  Card:  No murmurs, normal S1, S2 without thrills, bruits or peripheral edema  Abd:  Soft, non-tender, non-distended, normoactive bowel sounds are present, no palpable organomegaly and no detectable hernias  Lymph:  No cervical or inguinal adenopathy  Musc:  No cyanosis or clubbing  Skin:  No rashes or ulcers, skin turgor is good  Neuro:  Cranial nerves are grossly intact, no focal motor weakness, follows commands appropriately  Psych:  Good insight, oriented to person, place and time, alert  __________________________________________________________________  Medications Reviewed: (see below)  Medications:     Current Facility-Administered Medications   Medication Dose Route Frequency    heparin (porcine) injection 5,000 Units  5,000 Units SubCUTAneous Q8H    metoprolol succinate (TOPROL-XL) XL tablet 50 mg  50 mg Oral DAILY    cefTRIAXone (ROCEPHIN) 2 g in 0.9% sodium chloride (MBP/ADV) 50 mL  2 g IntraVENous Q24H    prochlorperazine (COMPAZINE) injection 10 mg  10 mg IntraVENous Q4H PRN    albuterol (PROVENTIL HFA, VENTOLIN HFA, PROAIR HFA) inhaler 2 Puff  2 Puff Inhalation Q6H RT    And    ipratropium (ATROVENT HFA) 17 mcg inhaler  1 Puff Inhalation Q6H RT    sodium chloride (NS) flush 5-40 mL  5-40 mL IntraVENous Q8H    sodium chloride (NS) flush 5-40 mL  5-40 mL IntraVENous PRN    acetaminophen (TYLENOL) tablet 650 mg  650 mg Oral Q4H PRN    ondansetron (ZOFRAN) injection 4 mg  4 mg IntraVENous Q6H PRN    albuterol (PROVENTIL VENTOLIN) nebulizer solution 2.5 mg  2.5 mg Nebulization Q4H PRN    benzonatate (TESSALON) capsule 100 mg  100 mg Oral TID PRN    guaiFENesin ER (MUCINEX) tablet 600 mg  600 mg Oral Q12H PRN        Lab Data Reviewed: (see below)  Lab Review:     Recent Labs     07/06/20  0146 07/05/20  0254 07/04/20  0247   WBC 3.4* 3.9* 6.7   HGB 9.9* 9.6* 9.3*   HCT 29.9* 29.2* 27.9*   * 94* 75*     Recent Labs     07/06/20  0146 07/05/20  0254 07/04/20  0247    138 140   K 3.7 3.4* 3.3*    106 108   CO2 27 27 26   * 106* 100   BUN 10 9 10   CREA 0.82 0.78 0.70   CA 8.8 8.4* 8.5   MG 1.8 1.8 1.8   PHOS 3.3 2.9 2.7   ALB 3.0* 2.9* 2.9*   TBILI 0.4 0.5 0.8   ALT 38 23 20     Lab Results   Component Value Date/Time    Glucose (POC) 123 (H) 07/05/2020 08:42 PM    Glucose (POC) 107 (H) 07/04/2020 04:37 PM    Glucose (POC) 114 (H) 07/04/2020 11:17 AM    Glucose (POC) 109 (H) 07/04/2020 06:17 AM    Glucose (POC) 105 (H) 07/03/2020 09:18 PM     No results for input(s): PH, PCO2, PO2, HCO3, FIO2 in the last 72 hours. No results for input(s): INR, INREXT in the last 72 hours. All Micro Results     Procedure Component Value Units Date/Time    CULTURE, BODY FLUID RENETTA Hewitt [[id number]] Collected:  07/02/20 1436    Order Status:  Completed Specimen: Body Fluid from Pleural Fluid Updated:  07/06/20 1108     Special Requests: NO SPECIAL REQUESTS        GRAM STAIN 2+ WBCS SEEN         NO ORGANISMS SEEN        Culture result: NO GROWTH 4 DAYS       CULTURE, BLOOD, LINE [[id number]]  (Abnormal)  (Susceptibility) Collected:  07/02/20 1330    Order Status:  Completed Specimen:  Blood Updated:  07/06/20 0930     Special Requests: NO SPECIAL REQUESTS        Culture result:       KLEBSIELLA OXYTOCA GROWING IN 1 OF 2 BOTTLES DRAWN . ..(SITE= R PICC)                  REMAINING BOTTLE(S) HAS/HAVE NO GROWTH SO FAR          CULTURE, BLOOD [[id number]]  (Abnormal) Collected:  06/30/20 1517    Order Status:  Completed Specimen:  Blood Updated:  07/06/20 0902     Special Requests: NO SPECIAL REQUESTS        Culture result:       BACILLUS SPECIES, NOT ANTHRACIS GROWING IN 1 OF 2 BOTTLES DRAWN (SITE = L. AC I.V.)                  REMAINING BOTTLE(S) HAS/HAVE NO GROWTH IN 5 DAYS          CULTURE, BLOOD, PAIRED [[id number]]  (Abnormal)  (Susceptibility) Collected:  06/29/20 2049    Order Status:  Completed Specimen:  Blood Updated:  07/06/20 0853     Special Requests: NO SPECIAL REQUESTS        Culture result:       KLEBSIELLA OXYTOCA GROWING IN 3 OF 4 BOTTLES DRAWN (R AC AND L AC SITE)                  REMAINING BOTTLE(S) HAS/HAVE NO GROWTH IN 5 DAYS          CULTURE, BLOOD, PAIRED [[id number]] Collected:  07/04/20 1523    Order Status:  Completed Specimen:  Blood Updated:  07/06/20 0510     Special Requests: NO SPECIAL REQUESTS        Culture result: NO GROWTH 2 DAYS       CULTURE, BLOOD [[id number]] Collected:  07/01/20 1036    Order Status:  Completed Specimen:  Blood Updated:  07/06/20 0510     Special Requests: NO SPECIAL REQUESTS        Culture result: NO GROWTH 5 DAYS       MICRO TRACKING [[id number]] Collected:  07/02/20 1330    Order Status:  Completed Updated:  07/04/20 1018    AFB CULTURE + SMEAR W/RFLX ID FROM CULTURE [[id number]] Collected:  07/02/20 1436    Order Status:  Completed Specimen:  Miscellaneous sample Updated:  07/03/20 1439     Source PLEURAL FLUID        AFB Specimen processing Concentration     Acid Fast Smear Negative        Comment: (NOTE)  Performed At: [address] [id number]  Sea Anton MD [id number]          Acid Fast Culture PENDING    Savanah Nguyen [[id number]] Collected:  06/30/20 1517    Order Status:  Completed Updated:  07/01/20 Rosan Holstein [[id number]] Collected:  06/29/20 2049    Order Status:  Completed Updated:  06/30/20 2142    SARS-COV-2, PCR [[id number]] Collected:  06/30/20 0057    Order Status:  Completed Specimen:  Nasopharyngeal Updated:  06/30/20 1635     Specimen source Nasopharyngeal        SARS-CoV-2 Not detected        Comment:      The specimen is NEGATIVE for SARS-CoV2, the novel coronavirus associated with COVID-19.   A negative result does not rule out COVID-19. This test has been authorized by the FDA under an Emergency Use Authorization (EUA) for use by authorized laboratories. Fact sheet for Healthcare Providers: ConventionUpdate.co.nz  Fact sheet for Patients: https://fda.gov/media/229787/download       Methodology: RT-PCR         MICRO TRACKING [[id number]] Collected:  06/29/20 2049    Order Status:  Completed Updated:  06/30/20 1900 Werner Jakob [[id number]] Collected:  06/29/20 2049    Order Status:  Completed Updated:  06/30/20 Trisha [[id number]] Collected:  06/30/20 0057    Order Status:  Completed Specimen:  Nasopharyngeal Updated:  06/30/20 1115     Adenovirus Not detected        Coronavirus 229E Not detected        Coronavirus HKU1 Not detected        Coronavirus CVNL63 Not detected        Coronavirus OC43 Not detected        Metapneumovirus Not detected        Rhinovirus and Enterovirus Not detected        Influenza A Not detected        Influenza A, subtype H1 Not detected        Influenza A, subtype H3 Not detected        INFLUENZA A H1N1 PCR Not detected        Influenza B Not detected        Parainfluenza 1 Not detected        Parainfluenza 2 Not detected        Parainfluenza 3 Not detected        Parainfluenza virus 4 Not detected        RSV by PCR Not detected        B. parapertussis, PCR Not detected        Bordetella pertussis - PCR Not detected        Chlamydophila pneumoniae DNA, QL, PCR Not detected        Mycoplasma pneumoniae DNA, QL, PCR Not detected             I have reviewed notes of prior 24hr. Other pertinent lab:      Total time spent with patient: [address] discussed with: Patient, Nursing Staff and >50% of time spent in counseling and coordination of care    Discussed:  Care Plan    Prophylaxis:  Lovenox    Disposition:  Home w/Family           ___________________________________________________    Attending Physician: Gigi Guerrero MD

## 2020-07-06 NOTE — DISCHARGE INSTRUCTIONS
ACUTE DIAGNOSES:  Sepsis ([place] [address] [A41.9]    CHRONIC MEDICAL DIAGNOSES:  Problem List as of 7/6/2020 Date Reviewed: 6/18/2020          Codes Class Noted - Resolved    Pleural effusion ICD-10-CM: J90  ICD-9-CM: 511.9  7/1/2020 - Present        Bacteremia ICD-10-CM: R78.81  ICD-9-CM: 790.7  6/30/2020 - Present        Thrombocytopenia ([facility].) ICD-10-CM: D69.6  ICD-9-CM: 287.5  6/30/2020 - Present        Cancer related pain ICD-10-CM: G89.3  ICD-9-CM: 338.3  Unknown - Present        Physical debility ICD-10-CM: R53.81  ICD-9-CM: 799.3  Unknown - Present        Dysphagia ICD-10-CM: R13.10  ICD-9-CM: 787.20  7/28/2019 - Present        SVC syndrome ICD-10-CM: I87.1  ICD-9-CM: 459.2  7/27/2019 - Present        Lung cancer ([place] [address].) (Chronic) ICD-10-CM: C34.90  ICD-9-CM: 162.9  7/27/2019 - Present        HTN (hypertension) (Chronic) ICD-10-CM: I10  ICD-9-CM: 401.9  7/27/2019 - Present        * (Principal) RESOLVED: Sepsis ([place] [address].) ICD-10-CM: A41.9  ICD-9-CM: 038.9, 995.91  6/29/2020 - 7/6/2020        RESOLVED: Goals of care, counseling/discussion ICD-10-CM: Z71.89  ICD-9-CM: V65.49  Unknown - 6/30/2020        RESOLVED: ACP (advance care planning) ICD-10-CM: Z71.89  ICD-9-CM: V65.49  Unknown - 6/30/2020        RESOLVED: Pneumonia ICD-10-CM: J18.9  ICD-9-CM: 486  7/27/2019 - 6/30/2020        RESOLVED: Pancytopenia ([facility].) ICD-10-CM: Q57.670  ICD-9-CM: 284.19  7/27/2019 - 6/10/2020              DISCHARGE MEDICATIONS:          · It is important that you take the medication exactly as they are prescribed. · Keep your medication in the bottles provided by the pharmacist and keep a list of the medication names, dosages, and times to be taken in your wallet. · Do not take other medications without consulting your doctor.        DIET:  Regular Diet    ACTIVITY: Activity as tolerated    ADDITIONAL INFORMATION: If you experience any of the following symptoms then please call your primary care physician or return to the emergency room if you cannot get hold of your doctor: Fever, chills, nausea, vomiting, diarrhea, change in mentation, falling, bleeding, shortness of breath. FOLLOW UP CARE:  Dr. Moustapha Fisher MD  you are to call and set up an appointment to see them in 5 days. Follow-up with hematology       Information obtained by :  I understand that if any problems occur once I am at home I am to contact my physician. I understand and acknowledge receipt of the instructions indicated above.                                                                                                                                            Physician's or R.N.'s Signature                                                                  Date/Time                                                                                                                                              Patient or Representative Signature                                                          Date/Time

## 2020-07-06 NOTE — DISCHARGE SUMMARY
Hospitalist Discharge Summary     Patient ID:    Marquise Ngo  [id number]  38 y.o.  1975    Admit date of service: 6/29/2020    Discharge date of service: 7/6/2020    Admission Diagnoses: Sepsis ([place] [address].) [A41.9]    Chronic Diagnoses:    Problem List as of 7/6/2020 Date Reviewed: 6/18/2020          Codes Class Noted - Resolved    Pleural effusion ICD-10-CM: J90  ICD-9-CM: 511.9  7/1/2020 - Present        Bacteremia ICD-10-CM: R78.81  ICD-9-CM: 790.7  6/30/2020 - Present        Thrombocytopenia ([facility].) ICD-10-CM: D69.6  ICD-9-CM: 287.5  6/30/2020 - Present        Cancer related pain ICD-10-CM: G89.3  ICD-9-CM: 338.3  Unknown - Present        Physical debility ICD-10-CM: R53.81  ICD-9-CM: 799.3  Unknown - Present        Dysphagia ICD-10-CM: R13.10  ICD-9-CM: 787.20  7/28/2019 - Present        SVC syndrome ICD-10-CM: I87.1  ICD-9-CM: 459.2  7/27/2019 - Present        Lung cancer ([facility].) (Chronic) ICD-10-CM: C34.90  ICD-9-CM: 162.9  7/27/2019 - Present        HTN (hypertension) (Chronic) ICD-10-CM: I10  ICD-9-CM: 401.9  7/27/2019 - Present        * (Principal) RESOLVED: Sepsis ([facility].) ICD-10-CM: A41.9  ICD-9-CM: 038.9, 995.91  6/29/2020 - 7/6/2020        RESOLVED: Goals of care, counseling/discussion ICD-10-CM: Z71.89  ICD-9-CM: V65.49  Unknown - 6/30/2020        RESOLVED: ACP (advance care planning) ICD-10-CM: Z71.89  ICD-9-CM: V65.49  Unknown - 6/30/2020        RESOLVED: Pneumonia ICD-10-CM: J18.9  ICD-9-CM: 486  7/27/2019 - 6/30/2020        RESOLVED: Pancytopenia ([facility] ICD-10-CM: Y49.249  ICD-9-CM: 284.19  7/27/2019 - 6/10/2020              Discharge Medications:   Current Discharge Medication List      START taking these medications    Details   levoFLOXacin (Levaquin) 750 mg tablet Take 1 Tab by mouth daily for 11 days. Qty: 11 Tab, Refills: 0         CONTINUE these medications which have NOT CHANGED    Details   calcium carbonate (CALTRATE 600 PO) Take 600 mg by mouth daily.       metoprolol succinate (TOPROL-XL) 50 mg XL tablet Take 50 mg by mouth daily. Follow up Care:    1. Radha Villalobos MD in 1-2 weeks  2. Diet:  Regular Diet    Disposition:  Home. Advanced Directive:    Discharge Exam:  See today's note. CONSULTATIONS: Pulmonary/Intensive care, ID and Hematology/Oncology    Significant Diagnostic Studies:   Recent Labs     07/06/20 0146 07/05/20  0254   WBC 3.4* 3.9*   HGB 9.9* 9.6*   HCT 29.9* 29.2*   * 94*     Recent Labs     07/06/20 0146 07/05/20  0254 07/04/20  0247    138 140   K 3.7 3.4* 3.3*    106 108   CO2 27 27 26   BUN 10 9 10   CREA 0.82 0.78 0.70   * 106* 100   CA 8.8 8.4* 8.5   MG 1.8 1.8 1.8   PHOS 3.3 2.9 2.7     Recent Labs     07/06/20 0146 07/05/20 0254 07/04/20  0247   ALT 38 23 20   * 109 109   TBILI 0.4 0.5 0.8   TP 6.8 6.7 6.4   ALB 3.0* 2.9* 2.9*   GLOB 3.8 3.8 3.5     No results for input(s): INR, PTP, APTT, INREXT in the last 72 hours. No results for input(s): FE, TIBC, PSAT, FERR in the last 72 hours. No results for input(s): PH, PCO2, PO2 in the last 72 hours. No results for input(s): CPK, CKMB in the last 72 hours. No lab exists for component: TROPONINI  Lab Results   Component Value Date/Time    Glucose (POC) 123 (H) 07/05/2020 08:42 PM    Glucose (POC) 107 (H) 07/04/2020 04:37 PM    Glucose (POC) 114 (H) 07/04/2020 11:17 AM    Glucose (POC) 109 (H) 07/04/2020 06:17 AM    Glucose (POC) 105 (H) 07/03/2020 09:18 PM             HOSPITAL COURSE & TREATMENT RENDERED:   1. Sepsis: POA. Klebsiella oxytoca bacteremia. PICC line culture also shows the same organism. 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 Treated with broad spectrum ABx. Will be discharged on levaquin 750 mg po for 11 days. Appreciate ID.       2. R pleural effusion: underwent thoracentesis on 7/2 with 1350 ml output. Follow up on fluid studies including cytology. Culture NGTD.      3. Lung cancer: Non-small cell lung cancer, at least stage IIIB disease. With pleural effusion is worrisome for malignant effusion however pleural effusion cytology reportedly negative x3 thus far. Follow up on repeat cytology. Mgmt per hem/onc      4. Thrombocytopenia: likely from sepsis/bacteremia. Improving       5. HTN: BP controlled. Continue metoprolol             Discharged in improved condition.     Spent 35 minutes    Signed:  Raymond Moe MD  7/6/2020  12:43 PM

## 2020-07-07 ENCOUNTER — PATIENT OUTREACH (OUTPATIENT)
Dept: INTERNAL MEDICINE CLINIC | Age: 45
End: 2020-07-07

## 2020-07-07 ENCOUNTER — TELEPHONE (OUTPATIENT)
Dept: ONCOLOGY | Age: 45
End: 2020-07-07

## 2020-07-07 LAB
BACTERIA SPEC CULT: NORMAL
SERVICE CMNT-IMP: NORMAL

## 2020-07-07 NOTE — TELEPHONE ENCOUNTER
[employer] at [facility]  [phone]    07/07/20- TCM call placed from hospital discharge 7/6. Patient stated he's feeling really well, denies fevers. Confirms eating and drinking well. Reviewed follow up appointment scheduled 7/14. Encouraged a return call with any needs prior to then. Patient was appreciative, no further questions or concerns.

## 2020-07-07 NOTE — PROGRESS NOTES
Patient contacted regarding recent discharge and COVID-19 risk. Discussed COVID-19 related testing which was available at this time. Test results were negative. Patient informed of results, if available? yes    Care Transition Nurse/ Ambulatory Care Manager contacted the patient by telephone to perform post discharge assessment. Verified name and  with patient as identifiers. Patient has following risk factors of: sepsis. CTN/ACM reviewed discharge instructions, medical action plan and red flags related to discharge diagnosis. Reviewed and educated them on any new and changed medications related to discharge diagnosis. Advised obtaining a 90-day supply of all daily and as-needed medications. Education provided regarding infection prevention, and signs and symptoms of COVID-19 and when to seek medical attention with patient who verbalized understanding. Discussed exposure protocols and quarantine from [address] you at higher risk for severe illness  and given an opportunity for questions and concerns. The patient agrees to contact the COVID-19 hotline [phone] or PCP office for questions related to their healthcare. CTN/ACM provided contact information for future reference. From CDC: Are you at higher risk for severe illness?  Wash your hands often.  Avoid close contact (6 feet, which is about two arm lengths) with people who are sick.  Put distance between yourself and other people if COVID-19 is spreading in your community.  Clean and disinfect frequently touched surfaces.  Avoid all cruise travel and non-essential air travel.  Call your healthcare professional if you have concerns about COVID-19 and your underlying condition or if you are sick.     For more information on steps you can take to protect yourself, see CDC's How to Protect Yourself      Patient/family/caregiver given information for Sanjeev Gross and agrees to enroll no patient does not have an email Patient's preferred e-mail:  n/a  Patient's preferred phone number: n/a  Based on Loop alert triggers, patient will be contacted by nurse care manager for worsening symptoms. Plan for follow-up call in 7-14 days based on severity of symptoms and risk factors. Confirmed patient is taking below medications and will finish course. Patient is calling his PCP today for f/u. START taking these medications     Details   levoFLOXacin (Levaquin) 750 mg tablet Take 1 Tab by mouth daily for 11 days.   Qty: 11 Tab, Refills: 0

## 2020-07-08 LAB
BACTERIA SPEC CULT: ABNORMAL
BACTERIA SPEC CULT: ABNORMAL
SERVICE CMNT-IMP: ABNORMAL

## 2020-07-08 NOTE — ADT AUTH CERT NOTES
Sepsis and Other Febrile Illness, without Focal Infection - Care Day 7 (7/5/2020) by Aleida Riddle RN         Review Status Review Entered   Completed 7/7/2020 14:57       Criteria Review      Care Day: 7 Care Date: 7/5/2020 Level of Care: Telemetry    Guideline Day 2    Level Of Care    (X) ICU or floor    Clinical Status    (X) * Hemodynamic stability    7/7/2020 14:57:12 EDT by Mccoy Osgood      99/76, HR: 93    (X) * Hypoxemia absent    7/7/2020 14:57:12 EDT by Mccoy Osgood      on room air    (X) * Tachypnea absent    7/7/2020 14:57:12 EDT by Choy Osgood      16-18    Medications    (X) Possible antimicrobial treatment    7/7/2020 14:57:12 EDT by Mccoy Osgood      cefTRIAXone (ROCEPHIN) 2 g in 0.9% sodium chloride (MBP/ADV) 50 mL Dose: 2 g Freq: EVERY 24 HOURS Route: IV    (X) Possible DVT prophylaxis    7/7/2020 14:57:12 EDT by Mccoy Osgood      heparin (porcine) injection 5,000 Units Dose: 5,000 Units Freq: EVERY 8 HOURS Route: SC    * Milestone   Additional Notes   7/5   IP   /86    Pulse 90   Temp 98.4 °F (36.9 °C)   Resp 18   SpO2 95%  RA      Assessment / Plan:       Sepsis: POA. Klebsiella oxytoca bacteremia. Bacillus thought to be contaminant. PICC line culture also positive with GNR bacteremia. 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 Antibiotics now narrowed to IV CTX, appreciate ID. Defer to ID on need for PICC line and home IV CTX vs PO Levaquin at discharge       R pleural effusion: underwent thoracentesis on 7/2 with 1350 ml output. Follow up on fluid studies including cytology. Culture NGTD.        Lung cancer: Non-small cell lung cancer, at least stage IIIB disease. With pleural effusion is worrisome for malignant effusion however pleural effusion cytology reportedly negative x3 thus far. Follow up on repeat cytology. Mgmt per hem/onc        Thrombocytopenia: likely from sepsis/bacteremia.  Improving slowly daily, follow closely on heparin SQ for DVT ppx       HTN: BP controlled. Continue metoprolol           Total time spent: 25 minutes   Time spent in the care of this patient including reviewing records, discussing with nursing and/or other providers on the treatment team, obtaining history and examining the patient, and discussing treatment plans.                                                                                                                               Care Plan discussed with: Patient, Nursing Staff and >50% of time spent in counseling and coordination of care       Discussed: 2018 [facility] and D/C Planning       Prophylaxis:  SCD's / heparin       Disposition: [facility] [address] PT, OT, RN               Subjective:       Chief Complaint:  Follow up pleural effusion       Chart/notes/labs/studies reviewed, patient examined.       Feels well. Denies CP, SOB. No F/C       Physical Exam:       Gen:  Chronically ill-appearing. NAD. Very pleasant   HEENT:  Sclerae nonicteric, hearing intact to voice, mucous membranes moist   Neck:  Supple, without masses. Resp:  No accessory muscle use, mildly diminished R-sided breath sounds   Card: RRR, without m/r/g. No LE edema   Abd:  +bowel sounds, soft, NTTP, nondistended   Neuro: Face symmetric, tongue midline, speech fluent, follows commands appropriately   Psych:  Alert, oriented x 3.  Limited insight   Skin: R thigh PICC line removed, dressing C/D/I       Medications Reviewed: (see below)       Lab Data Reviewed: (see below)       ______________________________________________________________________       Medications:       Current Facility-Administered Medications   Medication Dose Route Frequency   · heparin (porcine) injection 5,000 Units 5,000 Units SubCUTAneous Q8H   · metoprolol succinate (TOPROL-XL) XL tablet 50 mg 50 mg Oral DAILY   · cefTRIAXone (ROCEPHIN) 2 g in 0.9% sodium chloride (MBP/ADV) 50 mL 2 g IntraVENous Q24H   · prochlorperazine (COMPAZINE) injection 10 mg 10 mg IntraVENous Q4H PRN   · albuterol (PROVENTIL HFA, VENTOLIN HFA, PROAIR HFA) inhaler 2 Puff 2 Puff Inhalation Q6H RT     And   · ipratropium (ATROVENT HFA) 17 mcg inhaler 1 Puff Inhalation Q6H RT   · sodium chloride (NS) flush 5-40 mL 5-40 mL IntraVENous Q8H   · sodium chloride (NS) flush 5-40 mL 5-40 mL IntraVENous PRN   · acetaminophen (TYLENOL) tablet 650 mg 650 mg Oral Q4H PRN   · ondansetron (ZOFRAN) injection 4 mg 4 mg IntraVENous Q6H PRN   · albuterol (PROVENTIL VENTOLIN) nebulizer solution 2.5 mg 2.5 mg Nebulization Q4H PRN   · benzonatate (TESSALON) capsule 100 mg 100 mg Oral TID PRN   · guaiFENesin ER (MUCINEX) tablet 600 mg 600 mg Oral Q12H PRN      Labs:   WBC: 3.9 (L)   NRBC: 0.0   RBC: 4.03 (L)   HGB: 9.6 (L)   HCT: 29.2 (L)   MCV: 72.5 (L)   MCH: 23.8 (L)   MCHC: 32.9   RDW: 14.5   PLATELET: 94 (L)   NEUTROPHILS: 66   LYMPHOCYTES: 16   MONOCYTES: 14 (H)   EOSINOPHILS: 3   BASOPHILS: 1   IMMATURE GRANULOCYTES: 0   DF: MANUAL   ABSOLUTE NRBC: 0.00   ABS. NEUTROPHILS: 2.7   ABS. IMM. GRANS.: 0.0   ABS. LYMPHOCYTES: 0.6 (L)   ABS. MONOCYTES: 0.5   ABS. EOSINOPHILS: 0.1   ABS. BASOPHILS: 0.0   RBC COMMENTS: ANISOCYTOSIS. .. WBC COMMENTS: TOXIC GRANULATION   Sodium: 138   Potassium: 3.4 (L)   Chloride: 106   CO2: 27   Anion gap: 5   Glucose: 106 (H)   BUN: 9   Creatinine: 0.78   BUN/Creatinine ratio: 12   Calcium: 8.4 (L)   Phosphorus: 2.9   Magnesium: 1.8   GFR est non-AA: >60   GFR est AA: >60   Bilirubin, total: 0.5   Protein, total: 6.7   Albumin: 2.9 (L)   Globulin: 3.8   A-G Ratio: 0.8 (L)   ALT: 23   AST: 20   Alk.  phosphatase: 109          Sepsis and Other Febrile Illness, without Focal Infection - Care Day 6 (7/4/2020) by Gordon Kirkland RN         Review Status Review Entered   Completed 7/7/2020 14:19       Criteria Review      Care Day: 6 Care Date: 7/4/2020 Level of Care: Telemetry    Guideline Day 2    Level Of Care    (X) ICU or floor    Clinical Status    ( ) * Hemodynamic stability    (X) * Hypoxemia absent    7/7/2020 14:19:05 EDT by Misty Victoria      on room air    (X) * Tachypnea absent    7/7/2020 14:19:05 EDT by Misty Victoria      RR: 18    Medications    (X) Possible antimicrobial treatment    7/7/2020 14:19:05 EDT by Misty Victoria      cefTRIAXone (ROCEPHIN) 2 g in 0.9% sodium chloride (MBP/ADV) 50 mL   Dose: 2 g  Freq: EVERY 24 HOURS Route: IV    (X) Possible DVT prophylaxis    7/7/2020 14:19:05 EDT by Misty Victoria      heparin (porcine) injection 5,000 Units   Dose: 5,000 Units  Freq: EVERY 8 HOURS Route: SC    * Milestone   Additional Notes   7/4   IP   /76    Pulse 93   Temp 99.5 °F (37.5 °C)   Resp 18   SpO2 95%  RA      Assessment / Plan:       Sepsis: POA. Klebsiella oxytoca bacteremia. Bacillus thought to be contaminant. PICC line culture also positive with GNR bacteremia. 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 Antibiotics now narrowed to IV CTX, appreciate ID.       R pleural effusion: underwent thoracentesis on 7/2 with 1350 ml output. Follow up on fluid studies including cytology. Culture NGTD.        Lung cancer: Non-small cell lung cancer, at least stage IIIB disease. With pleural effusion is worrisome for malignant effusion however pleural effusion cytology reportedly negative x3 thus far. Mgmt per hem/onc        Thrombocytopenia: likely from sepsis/bacteremia. Improving slowly, follow closely on heparin SQ for DVT ppx       HTN: BP controlled.  Continue metoprolol, titrate up to home dose       Total time spent: 25 minutes   Time spent in the care of this patient including reviewing records, discussing with nursing and/or other providers on the treatment team, obtaining history and examining the patient, and discussing treatment plans.                                                                                                                               Care Plan discussed with: Patient, Nursing Staff and >50% of time spent in counseling and coordination of care       Discussed: 2018 [facility] and D/C Planning       Prophylaxis:  SCD's       Disposition: Abbey Willett [address] PT, OT, RN               Subjective:       Chief Complaint:  Follow up pleural effusion       Chart/notes/labs/studies reviewed, patient examined.       Feels well. Denies CP, SOB. No fevers overnight. Tmax 100.1      Physical Exam:       Gen:  Chronically ill-appearing. NAD. Very pleasant   HEENT:  Sclerae nonicteric, hearing intact to voice, mucous membranes moist   Neck:  Supple, without masses. Resp:  No accessory muscle use, mildly diminished R-sided breath sounds   Card: RRR, without m/r/g. No LE edema. Abd:  +bowel sounds, soft, NTTP, nondistended. No HSM. Neuro: Face symmetric, tongue midline, speech fluent, follows commands appropriately   Psych:  Alert, oriented x 3.  Limited insight   Skin: R thigh PICC line removed, dressing C/D/I       Medications Reviewed: (see below)       Lab Data Reviewed: (see below)       ______________________________________________________________________       Medications:       Current Facility-Administered Medications   Medication Dose Route Frequency   · heparin (porcine) injection 5,000 Units 5,000 Units SubCUTAneous Q8H   · metoprolol succinate (TOPROL-XL) XL tablet 50 mg 50 mg Oral DAILY   · cefTRIAXone (ROCEPHIN) 2 g in 0.9% sodium chloride (MBP/ADV) 50 mL 2 g IntraVENous Q24H   · prochlorperazine (COMPAZINE) injection 10 mg 10 mg IntraVENous Q4H PRN   · albuterol (PROVENTIL HFA, VENTOLIN HFA, PROAIR HFA) inhaler 2 Puff 2 Puff Inhalation Q6H RT     And   · ipratropium (ATROVENT HFA) 17 mcg inhaler 1 Puff Inhalation Q6H RT   · sodium chloride (NS) flush 5-40 mL 5-40 mL IntraVENous Q8H   · sodium chloride (NS) flush 5-40 mL 5-40 mL IntraVENous PRN   · acetaminophen (TYLENOL) tablet 650 mg 650 mg Oral Q4H PRN   · ondansetron (ZOFRAN) injection 4 mg 4 mg IntraVENous Q6H PRN   · albuterol (PROVENTIL VENTOLIN) nebulizer solution 2.5 mg 2.5 mg Nebulization Q4H PRN   · benzonatate (TESSALON) capsule 100 mg 100 mg Oral TID PRN   · guaiFENesin ER (MUCINEX) tablet 600 mg 600 mg Oral Q12H PRN      Labs:   Lab Results   Component Value Date/Time     Culture result: NO GROWTH 2 DAYS 07/02/2020 02:36 PM     Culture result: (A) 07/02/2020 01:30 PM       GRAM NEGATIVE RODS GROWING IN 1 OF 2 BOTTLES DRAWN (SITE = R PICC)     Culture result: REMAINING BOTTLE(S) HAS/HAVE NO GROWTH SO FAR 07/02/2020 01:30 PM      WBC: 6.7   NRBC: 0.0   RBC: 3.88 (L)   HGB: 9.3 (L)   HCT: 27.9 (L)   MCV: 71.9 (L)   MCH: 24.0 (L)   MCHC: 33.3   RDW: 15.2 (H)   PLATELET: 75 (L)   NEUTROPHILS: 75   LYMPHOCYTES: 11 (L)   MONOCYTES: 11   EOSINOPHILS: 2   BASOPHILS: 0   IMMATURE GRANULOCYTES: 2 (H)   DF: AUTOMATED   ABSOLUTE NRBC: 0.00   ABS. NEUTROPHILS: 5.0   ABS. IMM. GRANS.: 0.1 (H)   ABS. LYMPHOCYTES: 0.7 (L)   ABS. MONOCYTES: 0.7   ABS. EOSINOPHILS: 0.1   ABS. BASOPHILS: 0.0   Sodium: 140   Potassium: 3.3 (L)   Chloride: 108   CO2: 26   Anion gap: 6   Glucose: 100   BUN: 10   Creatinine: 0.70   BUN/Creatinine ratio: 14   Calcium: 8.5   Phosphorus: 2.7   Magnesium: 1.8   GFR est non-AA: >60   GFR est AA: >60   Bilirubin, total: 0.8   Protein, total: 6.4   Albumin: 2.9 (L)   Globulin: 3.5   A-G Ratio: 0.8 (L)   ALT: 20   AST: 17   Alk.  phosphatase: 109

## 2020-07-09 LAB
BACTERIA SPEC CULT: NORMAL
SERVICE CMNT-IMP: NORMAL

## 2020-07-10 NOTE — PROGRESS NOTES
[facility] [place] at [place] [address] [facility]  Milana Coates: [phone]  F: [phone]      Reason for Visit:   Maria Teresa Ruelas is a 38 y.o. male who is seen for follow up of non-small cell lung cancer. Treatment History:   · Diagnosed at [facility]  · Biopsy of right level 4 node: non small cell carcinoma, favored squamous cell carcinoma, insufficient sample for further testing  · Stage III Non-small cell lung cancer (Squamous cell)  · Definitive radiation with Dr. Jack Apodaca completed mid-July 2019  · Concurrent chemotherapy with carboplatin and paclitaxel by Dr. Sergei Albarado, stopped during second infusion due to reaction to paclitaxel  · Concurrent chemotherapy with cisplatin and etoposide 7/16/2019 by Dr. Sergei Albarado  · CT Chest 7/27/2019: There has been no significant change in size of the large right paratracheal mediastinal mass. There are many new areas of peripheral consolidation in the right upper lobe, which may represent some combination of progressive disease, posttreatment change, or infection. Attention on follow-up is needed. A previously seen right upper lobe nodule in the posterior segment appears to have decreased in size. The large right pleural effusion on this exam is significantly increased in size from prior. · CT A/P 7/29/2019: no metastatic disease in abdomen or pelvis  · MRI Brain 7/29/2019: no intracranial metastatic disease  · EBUS by Dr. Tram Fields 7/30/2019: Squamous cell, PDL1 QNS, insufficient tissue for molecular studies  · Thoracentesis 8/5/2019: cytology negative  · Initiated maintenance therapy with Durvalumab on 8/27/2019    History of Present Illness:   Hospitalized since last visit 6/29/2020-7/6/2020 due to sepsis due to femoral line. Discharged home on Levaquin, to complete 7/17/2020. Doing well since discharge. Appetite has been great. No nausea or vomiting. Energy is doing good, staying active in the home.  Dry cough now, this is improving. SOB is about the same, this seems to be improving as well. No fever or chills in the home. He is unaccompanied today. He quit tobacco at diagnosis, though he was a light smoker (1-2 packs per week). PAST HISTORY: The following sections were reviewed and updated in the EMR as appropriate: PMH, SH, FH, Medications, Allergies. No Known Allergies     Review of Systems: A complete review of systems was obtained, reviewed, and scanned into the EMR. Pertinent findings reviewed above. Physical Exam:     Visit Vitals  /72 (BP 1 Location: Right arm, BP Patient Position: Sitting)   Pulse 86   Temp 97.6 °F (36.4 °C) (Temporal)   Resp 18   Ht 5' 9\" (1.753 m)   Wt 182 lb (82.6 kg)   SpO2 98%   BMI 26.88 kg/m²     ECOG PS: 1  General: No distress  Eyes: PERRLA, anicteric sclerae  HENT: Atraumatic, OP clear  Neck: Supple  Lymphatic: No cervical, supraclavicular, or inguinal adenopathy  Respiratory: CTAB, normal respiratory effort  CV: Normal rate, regular rhythm, no murmurs, no peripheral edema  GI: Soft, nontender, nondistended, no masses, no hepatomegaly, no splenomegaly  MS: Normal gait and station. Digits without clubbing or cyanosis. Skin: No rashes, ecchymoses, or petechiae. Normal temperature, turgor, and texture. Psych: Alert, oriented, appropriate affect, normal judgment/insight    Results:     Lab Results   Component Value Date/Time    WBC 2.7 (L) 07/14/2020 09:04 AM    HGB 11.5 (L) 07/14/2020 09:04 AM    HCT 33.6 (L) 07/14/2020 09:04 AM    PLATELET 463 74/09/4479 09:04 AM    MCV 72.1 (L) 07/14/2020 09:04 AM    ABS.  NEUTROPHILS 1.9 07/14/2020 09:04 AM     Lab Results   Component Value Date/Time    Sodium 138 07/14/2020 09:04 AM    Potassium 3.8 07/14/2020 09:04 AM    Chloride 108 07/14/2020 09:04 AM    CO2 27 07/14/2020 09:04 AM    Glucose 98 07/14/2020 09:04 AM    BUN 19 07/14/2020 09:04 AM    Creatinine 1.10 07/14/2020 09:04 AM    GFR est AA >60 07/14/2020 09:04 AM    GFR est non-AA >60 07/14/2020 09:04 AM    Calcium 9.0 07/14/2020 09:04 AM    Glucose (POC) 123 (H) 07/05/2020 08:42 PM     Lab Results   Component Value Date/Time    Bilirubin, total 0.4 07/14/2020 09:04 AM    ALT (SGPT) 26 07/14/2020 09:04 AM    Alk. phosphatase 110 07/14/2020 09:04 AM    Protein, total 7.6 07/14/2020 09:04 AM    Albumin 3.4 (L) 07/14/2020 09:04 AM    Globulin 4.2 (H) 07/14/2020 09:04 AM     Lab Results   Component Value Date/Time    Reticulocyte count 1.7 08/27/2019 11:19 AM    Iron % saturation 33 07/02/2020 04:35 AM    TIBC 199 (L) 07/02/2020 04:35 AM    Ferritin 413 (H) 07/02/2020 04:35 AM    Vitamin B12 873 07/02/2020 04:35 AM    Folate 10.1 07/02/2020 04:35 AM    TSH 3.49 06/18/2020 08:58 AM    Lipase 136 07/27/2019 11:31 AM    Hep C  virus Ab Interp. NONREACTIVE 12/03/2019 11:22 AM     Lab Results   Component Value Date/Time    INR 1.0 12/03/2019 11:22 AM    aPTT 27.3 12/03/2019 11:22 AM    D-dimer 2.45 (H) 06/09/2020 10:56 AM    Fibrinogen 243 12/03/2019 11:22 AM     PET 8/21/2019:  1. Decreased size and activity of the mediastinal mass. 2. RUL nodular densities remain ametabolic. 3. No new finding. CT Chest 11/12/2019:  Stable exam by RECIST criteria. Increase in moderate right pleural effusion with medial right lower lobe airspace disease. Minimal T2, T4, and T6 superior endplate vertebral compression deformities are likely subacute/chronic. CT Chest 2/3/2020:  Discordant change. Decrease in size of right paratracheal node the increase in size of right upper lobe mass  Decrease in volume of right lung with increased right pleural effusion density  Occluded SVC with extensive collateralization as described  New T6 compression fracture    Bone scan 2/20/2020: There is minimal low-grade activity at T6 vertebral body. Remainder of the study is unremarkable. CT Chest 4/29/2020:  1. No significant change in the right upper lobe mass and right paratracheal  adenopathy.   2.  No significant change in the right pleural effusion right lower lobe volume  loss. 3.  Mild compression deformity of T6 is again noted. There is suggestion of  increased sclerosis which may be related to healing    CTA Chest 6/9/2020:  No evidence for pulmonary embolism  Interval slight decrease in size of right upper lobe mass and right paratracheal node  Stable large right pleural effusion  Stable mild compression fractures of T2 and T6    Bronchoscopy 6/17/2020:  Hyperremic trachea. Evidence of recent oozing from anterior and posterior distal wall of the trachea. Almost completely collapsed RUL subsegments. No evidence of bleeding from RUL. RML take-off narrowed with extrinsic compression and questionable mass vs. compressed mucosa in the opening. No evidence of bleeding from that site. External compression of RLL subsegments, however no endobronchial lesion or bleeding. CT C/A/P 7/1/2020:   1. New left lung airspace disease. 2. Stable large right pleural effusion. 3. Stable RUL mass and mediastinal adenopathy. 4. Stable unremarkable abdomen/pelvis. Assessment:   1) Non-small cell lung cancer  Stage IIIB  He has at least Stage IIIB disease, and has been treated with concurrent radiation and chemotherapy (cisplatin and etoposide). Repeat PET demonstrates a partial response to therapy. No definite evidence of distant metastatic disease at this time. Pleural effusion cytology negative x3. He is currently on maintenance immunotherapy with Durvalumab. He is tolerating therapy well so far with minimal toxicity noted. Continue Durvalumab. He will be seen every other cycle of therapy. Reimage every 12 weeks. Unfortunately, his two biopsies have yielded scant material, insufficient for molecular studies or PDL1 testing. At progression, a repeat biopsy may be needed for NGS. 2) SVC syndrome  Monitor. Stable on imaging. 3) Pleural effusion  Cytology negative.  Worsening on recent imaging, underwent repeat thoracentesis on 11/27/2019, 2/14/2020 and 7/2/2020 with negative cytology. 4) Poor IV access  Femoral PICC removed due to sepsis. Proceed with treatment via peripheral IV for now. 5) Thrombocytopenia  Improved, but not resolved. Presumably secondary to his prior chemotherapy, but surprising that it is persisting. Additional lab work up 12/3/2019 unremarkable. Monitor for now. Could consider bone marrow biopsy if worsening significantly. 6) Anemia  Resolved. 7) Neuropathy, chemotherapy induced  Continue Cymbalta 30mg daily. 8) Cough  On Levaquin now, due to complete 7/17/2020. Symptoms are improving. 9) Compression fracture  Bone scan negative. Consider kyphoplasty vs radiation therapy if symptoms develop. DEXA showing osteopenia 3/17/2020. Continue Calcium and Vit D replacement and weight bearing exercise as tolerated. 10) Hemoptysis  Resolved, thought to be due to prior radiation. 11) Emotional Well Being  No psychosocial concerns identified today. Patient has adequate support. Plan:     · Proceed with C23 Durvalumab (10mg/kg) given every 2 weeks for one year  · Labs: CBC, CMP prior to each cycle, TSH every 6 weeks  · Continue cymbalta 30mg PO daily  · Return to see us in 4 weeks    Patient was seen in conjunction with Oscar Darling NP. Patient was discharged on 7/6 and a TCM call was placed by nurse on 7/7.     Signed By: Citlalli Wheeler MD

## 2020-07-14 ENCOUNTER — HOSPITAL ENCOUNTER (OUTPATIENT)
Dept: INFUSION THERAPY | Age: 45
Discharge: HOME | End: 2020-07-14
Payer: COMMERCIAL

## 2020-07-14 ENCOUNTER — OFFICE VISIT (OUTPATIENT)
Dept: ONCOLOGY | Age: 45
End: 2020-07-14

## 2020-07-14 VITALS
TEMPERATURE: 97.8 F | RESPIRATION RATE: 18 BRPM | SYSTOLIC BLOOD PRESSURE: 93 MMHG | WEIGHT: 182 LBS | HEIGHT: 69 IN | OXYGEN SATURATION: 99 % | BODY MASS INDEX: 26.96 KG/M2 | HEART RATE: 81 BPM | DIASTOLIC BLOOD PRESSURE: 66 MMHG

## 2020-07-14 VITALS
DIASTOLIC BLOOD PRESSURE: 72 MMHG | WEIGHT: 182 LBS | SYSTOLIC BLOOD PRESSURE: 106 MMHG | HEIGHT: 69 IN | BODY MASS INDEX: 26.96 KG/M2 | RESPIRATION RATE: 18 BRPM | HEART RATE: 86 BPM | TEMPERATURE: 97.6 F | OXYGEN SATURATION: 98 %

## 2020-07-14 DIAGNOSIS — C34.11 MALIGNANT NEOPLASM OF UPPER LOBE OF RIGHT LUNG (HCC): Primary | ICD-10-CM

## 2020-07-14 DIAGNOSIS — C34.11 MALIGNANT NEOPLASM OF UPPER LOBE OF RIGHT LUNG (HCC): Primary | Chronic | ICD-10-CM

## 2020-07-14 LAB
ALBUMIN SERPL-MCNC: 3.4 G/DL (ref 3.5–5)
ALBUMIN/GLOB SERPL: 0.8 {RATIO} (ref 1.1–2.2)
ALP SERPL-CCNC: 110 U/L (ref 45–117)
ALT SERPL-CCNC: 26 U/L (ref 12–78)
ANION GAP SERPL CALC-SCNC: 3 MMOL/L (ref 5–15)
AST SERPL-CCNC: 21 U/L (ref 15–37)
BASO+EOS+MONOS # BLD AUTO: 0.4 K/UL (ref 0.2–1.2)
BASO+EOS+MONOS NFR BLD AUTO: 14 % (ref 3.2–16.9)
BILIRUB SERPL-MCNC: 0.4 MG/DL (ref 0.2–1)
BUN SERPL-MCNC: 19 MG/DL (ref 6–20)
BUN/CREAT SERPL: 17 (ref 12–20)
CALCIUM SERPL-MCNC: 9 MG/DL (ref 8.5–10.1)
CHLORIDE SERPL-SCNC: 108 MMOL/L (ref 97–108)
CO2 SERPL-SCNC: 27 MMOL/L (ref 21–32)
CREAT SERPL-MCNC: 1.1 MG/DL (ref 0.7–1.3)
DIFFERENTIAL METHOD BLD: ABNORMAL
ERYTHROCYTE [DISTWIDTH] IN BLOOD BY AUTOMATED COUNT: 16.1 % (ref 11.8–15.8)
GLOBULIN SER CALC-MCNC: 4.2 G/DL (ref 2–4)
GLUCOSE SERPL-MCNC: 98 MG/DL (ref 65–100)
HCT VFR BLD AUTO: 33.6 % (ref 36.6–50.3)
HGB BLD-MCNC: 11.5 G/DL (ref 12.1–17)
LYMPHOCYTES # BLD: 0.4 K/UL (ref 0.8–3.5)
LYMPHOCYTES NFR BLD: 15 % (ref 12–49)
MCH RBC QN AUTO: 24.7 PG (ref 26–34)
MCHC RBC AUTO-ENTMCNC: 34.2 G/DL (ref 30–36.5)
MCV RBC AUTO: 72.1 FL (ref 80–99)
NEUTS SEG # BLD: 1.9 K/UL (ref 1.8–8)
NEUTS SEG NFR BLD: 71 % (ref 32–75)
PLATELET # BLD AUTO: 223 K/UL (ref 150–400)
POTASSIUM SERPL-SCNC: 3.8 MMOL/L (ref 3.5–5.1)
PROT SERPL-MCNC: 7.6 G/DL (ref 6.4–8.2)
RBC # BLD AUTO: 4.66 M/UL (ref 4.1–5.7)
SODIUM SERPL-SCNC: 138 MMOL/L (ref 136–145)
WBC # BLD AUTO: 2.7 K/UL (ref 4.1–11.1)

## 2020-07-14 PROCEDURE — 74011000258 HC RX REV CODE- 258: Performed by: NURSE PRACTITIONER

## 2020-07-14 PROCEDURE — 80053 COMPREHEN METABOLIC PANEL: CPT

## 2020-07-14 PROCEDURE — 36415 COLL VENOUS BLD VENIPUNCTURE: CPT

## 2020-07-14 PROCEDURE — 85025 COMPLETE CBC W/AUTO DIFF WBC: CPT

## 2020-07-14 PROCEDURE — 96413 CHEMO IV INFUSION 1 HR: CPT

## 2020-07-14 PROCEDURE — 74011250636 HC RX REV CODE- 250/636: Performed by: NURSE PRACTITIONER

## 2020-07-14 RX ORDER — DIPHENHYDRAMINE HYDROCHLORIDE 50 MG/ML
50 INJECTION, SOLUTION INTRAMUSCULAR; INTRAVENOUS AS NEEDED
Status: CANCELLED
Start: 2020-08-25

## 2020-07-14 RX ORDER — EPINEPHRINE 1 MG/ML
0.3 INJECTION, SOLUTION, CONCENTRATE INTRAVENOUS AS NEEDED
Status: CANCELLED | OUTPATIENT
Start: 2020-08-11

## 2020-07-14 RX ORDER — ACETAMINOPHEN 325 MG/1
650 TABLET ORAL AS NEEDED
Status: CANCELLED
Start: 2020-08-11

## 2020-07-14 RX ORDER — HEPARIN 100 UNIT/ML
300-500 SYRINGE INTRAVENOUS AS NEEDED
Status: CANCELLED
Start: 2020-08-11

## 2020-07-14 RX ORDER — HEPARIN 100 UNIT/ML
300-500 SYRINGE INTRAVENOUS AS NEEDED
Status: CANCELLED
Start: 2020-07-28

## 2020-07-14 RX ORDER — EPINEPHRINE 1 MG/ML
0.3 INJECTION, SOLUTION, CONCENTRATE INTRAVENOUS AS NEEDED
Status: CANCELLED | OUTPATIENT
Start: 2020-07-28

## 2020-07-14 RX ORDER — SODIUM CHLORIDE 0.9 % (FLUSH) 0.9 %
10 SYRINGE (ML) INJECTION AS NEEDED
Status: CANCELLED
Start: 2020-08-25

## 2020-07-14 RX ORDER — SODIUM CHLORIDE 0.9 % (FLUSH) 0.9 %
10 SYRINGE (ML) INJECTION AS NEEDED
Status: CANCELLED
Start: 2020-07-28

## 2020-07-14 RX ORDER — SODIUM CHLORIDE 9 MG/ML
10 INJECTION INTRAMUSCULAR; INTRAVENOUS; SUBCUTANEOUS AS NEEDED
Status: CANCELLED | OUTPATIENT
Start: 2020-08-11

## 2020-07-14 RX ORDER — ACETAMINOPHEN 325 MG/1
650 TABLET ORAL AS NEEDED
Status: CANCELLED
Start: 2020-08-25

## 2020-07-14 RX ORDER — HYDROCORTISONE SODIUM SUCCINATE 100 MG/2ML
100 INJECTION, POWDER, FOR SOLUTION INTRAMUSCULAR; INTRAVENOUS AS NEEDED
Status: CANCELLED | OUTPATIENT
Start: 2020-07-28

## 2020-07-14 RX ORDER — DIPHENHYDRAMINE HYDROCHLORIDE 50 MG/ML
50 INJECTION, SOLUTION INTRAMUSCULAR; INTRAVENOUS AS NEEDED
Status: CANCELLED
Start: 2020-08-11

## 2020-07-14 RX ORDER — DIPHENHYDRAMINE HYDROCHLORIDE 50 MG/ML
50 INJECTION, SOLUTION INTRAMUSCULAR; INTRAVENOUS
Status: CANCELLED | OUTPATIENT
Start: 2020-08-25

## 2020-07-14 RX ORDER — ALBUTEROL SULFATE 0.83 MG/ML
2.5 SOLUTION RESPIRATORY (INHALATION) AS NEEDED
Status: CANCELLED
Start: 2020-07-28

## 2020-07-14 RX ORDER — SODIUM CHLORIDE 9 MG/ML
10 INJECTION INTRAMUSCULAR; INTRAVENOUS; SUBCUTANEOUS AS NEEDED
Status: CANCELLED | OUTPATIENT
Start: 2020-07-28

## 2020-07-14 RX ORDER — ALBUTEROL SULFATE 0.83 MG/ML
2.5 SOLUTION RESPIRATORY (INHALATION) AS NEEDED
Status: CANCELLED
Start: 2020-08-25

## 2020-07-14 RX ORDER — ALBUTEROL SULFATE 0.83 MG/ML
2.5 SOLUTION RESPIRATORY (INHALATION) AS NEEDED
Status: CANCELLED
Start: 2020-08-11

## 2020-07-14 RX ORDER — SODIUM CHLORIDE 9 MG/ML
25 INJECTION, SOLUTION INTRAVENOUS CONTINUOUS
Status: CANCELLED | OUTPATIENT
Start: 2020-07-28

## 2020-07-14 RX ORDER — DIPHENHYDRAMINE HYDROCHLORIDE 50 MG/ML
50 INJECTION, SOLUTION INTRAMUSCULAR; INTRAVENOUS AS NEEDED
Status: CANCELLED
Start: 2020-07-28

## 2020-07-14 RX ORDER — SODIUM CHLORIDE 9 MG/ML
10 INJECTION INTRAMUSCULAR; INTRAVENOUS; SUBCUTANEOUS AS NEEDED
Status: ACTIVE | OUTPATIENT
Start: 2020-07-14 | End: 2020-07-14

## 2020-07-14 RX ORDER — HEPARIN 100 UNIT/ML
300-500 SYRINGE INTRAVENOUS AS NEEDED
Status: ACTIVE | OUTPATIENT
Start: 2020-07-14 | End: 2020-07-14

## 2020-07-14 RX ORDER — SODIUM CHLORIDE 9 MG/ML
10 INJECTION INTRAMUSCULAR; INTRAVENOUS; SUBCUTANEOUS AS NEEDED
Status: CANCELLED | OUTPATIENT
Start: 2020-08-25

## 2020-07-14 RX ORDER — ACETAMINOPHEN 325 MG/1
650 TABLET ORAL
Status: CANCELLED | OUTPATIENT
Start: 2020-07-28

## 2020-07-14 RX ORDER — SODIUM CHLORIDE 9 MG/ML
25 INJECTION, SOLUTION INTRAVENOUS CONTINUOUS
Status: CANCELLED | OUTPATIENT
Start: 2020-08-11

## 2020-07-14 RX ORDER — SODIUM CHLORIDE 0.9 % (FLUSH) 0.9 %
10 SYRINGE (ML) INJECTION AS NEEDED
Status: CANCELLED
Start: 2020-08-11

## 2020-07-14 RX ORDER — ACETAMINOPHEN 325 MG/1
650 TABLET ORAL AS NEEDED
Status: CANCELLED
Start: 2020-07-28

## 2020-07-14 RX ORDER — HEPARIN 100 UNIT/ML
300-500 SYRINGE INTRAVENOUS AS NEEDED
Status: CANCELLED
Start: 2020-08-25

## 2020-07-14 RX ORDER — ONDANSETRON 2 MG/ML
8 INJECTION INTRAMUSCULAR; INTRAVENOUS AS NEEDED
Status: CANCELLED | OUTPATIENT
Start: 2020-07-28

## 2020-07-14 RX ORDER — ACETAMINOPHEN 325 MG/1
650 TABLET ORAL
Status: CANCELLED | OUTPATIENT
Start: 2020-08-25

## 2020-07-14 RX ORDER — DIPHENHYDRAMINE HYDROCHLORIDE 50 MG/ML
50 INJECTION, SOLUTION INTRAMUSCULAR; INTRAVENOUS
Status: CANCELLED | OUTPATIENT
Start: 2020-07-28

## 2020-07-14 RX ORDER — SODIUM CHLORIDE 9 MG/ML
25 INJECTION, SOLUTION INTRAVENOUS CONTINUOUS
Status: DISPENSED | OUTPATIENT
Start: 2020-07-14 | End: 2020-07-14

## 2020-07-14 RX ORDER — ONDANSETRON 2 MG/ML
8 INJECTION INTRAMUSCULAR; INTRAVENOUS AS NEEDED
Status: CANCELLED | OUTPATIENT
Start: 2020-08-25

## 2020-07-14 RX ORDER — SODIUM CHLORIDE 0.9 % (FLUSH) 0.9 %
10 SYRINGE (ML) INJECTION AS NEEDED
Status: DISPENSED | OUTPATIENT
Start: 2020-07-14 | End: 2020-07-14

## 2020-07-14 RX ORDER — ONDANSETRON 2 MG/ML
8 INJECTION INTRAMUSCULAR; INTRAVENOUS AS NEEDED
Status: CANCELLED | OUTPATIENT
Start: 2020-08-11

## 2020-07-14 RX ORDER — SODIUM CHLORIDE 9 MG/ML
25 INJECTION, SOLUTION INTRAVENOUS CONTINUOUS
Status: CANCELLED | OUTPATIENT
Start: 2020-08-25

## 2020-07-14 RX ORDER — EPINEPHRINE 1 MG/ML
0.3 INJECTION, SOLUTION, CONCENTRATE INTRAVENOUS AS NEEDED
Status: CANCELLED | OUTPATIENT
Start: 2020-08-25

## 2020-07-14 RX ORDER — HYDROCORTISONE SODIUM SUCCINATE 100 MG/2ML
100 INJECTION, POWDER, FOR SOLUTION INTRAMUSCULAR; INTRAVENOUS AS NEEDED
Status: CANCELLED | OUTPATIENT
Start: 2020-08-11

## 2020-07-14 RX ORDER — HYDROCORTISONE SODIUM SUCCINATE 100 MG/2ML
100 INJECTION, POWDER, FOR SOLUTION INTRAMUSCULAR; INTRAVENOUS AS NEEDED
Status: CANCELLED | OUTPATIENT
Start: 2020-08-25

## 2020-07-14 RX ADMIN — SODIUM CHLORIDE 810 MG: 9 INJECTION, SOLUTION INTRAVENOUS at 10:57

## 2020-07-14 NOTE — PROGRESS NOTES
[facility] Progress Note    Date: 2020    Name: Sonia Banda    MRN: [id number]         : 1975    Mr. Aiyana Rothman Arrived ambulatory and in no distress for C23D1 of Imfinzi Regimen. Assessment was completed, patient had a recent hospitalization in which his PICC line was removed. Patient reports still being on antibiotic, MD's office aware. PIV placed in right wrist and labs drawn and sent for processing. Chemotherapy Flowsheet 2020   Cycle C23D1   Date 2020   Drug / Regimen Imfinzi   Pre Meds given   Notes given       Patient proceed to appointment with Dr. Sowmya Maier. Mr. Fritz's vitals were reviewed. Visit Vitals  BP 91/59   Pulse 81   Temp 97.8 °F (36.6 °C)   Resp 18   Ht 5' 9\" (1.753 m)   Wt 82.6 kg (182 lb)   SpO2 99%   BMI 26.88 kg/m²       Lab results were obtained and reviewed. Recent Results (from the past 12 hour(s))   CBC WITH 3 PART DIFF    Collection Time: 20  9:04 AM   Result Value Ref Range    WBC 2.7 (L) 4.1 - 11.1 K/uL    RBC 4.66 4.10 - 5.70 M/uL    HGB 11.5 (L) 12.1 - 17.0 g/dL    HCT 33.6 (L) 36.6 - 50.3 %    MCV 72.1 (L) 80.0 - 99.0 FL    MCH 24.7 (L) 26.0 - 34.0 PG    MCHC 34.2 30.0 - 36.5 g/dL    RDW 16.1 (H) 11.8 - 15.8 %    PLATELET 789 752 - 422 K/uL    NEUTROPHILS 71 32 - 75 %    MIXED CELLS 14 3.2 - 16.9 %    LYMPHOCYTES 15 12 - 49 %    ABS. NEUTROPHILS 1.9 1.8 - 8.0 K/UL    ABS. MIXED CELLS 0.4 0.2 - 1.2 K/uL    ABS. LYMPHOCYTES 0.4 (L) 0.8 - 3.5 K/UL    DF AUTOMATED         Medications:  Medications Administered     durvalumab (IMFINZI) 810 mg in 0.9% sodium chloride 100 mL, overfill volume 10 mL IVPB     Admin Date  2020 Action  New Bag Dose  810 mg Rate  126.2 mL/hr Route  IntraVENous Administered By  Edilberto Chávez RN                Mr. Aiyana Rothman tolerated treatment well and was discharged from [place] [address] in stable condition. Port de-accessed, flushed & heparinized per protocol.  He is to return on 2020 for his next appointment.     John Jimenez RN  July 14, 2020

## 2020-07-14 NOTE — PROGRESS NOTES
Viktoria Beebe is a 38 y.o. male follow up for lung cancer. 1. Have you been to the ER, urgent care clinic since your last visit? Hospitalized since your last visit?no     2. Have you seen or consulted any other health care providers outside of the [address] [place] [address] since your last visit? Include any pap smears or colon screening.  no

## 2020-07-21 ENCOUNTER — PATIENT OUTREACH (OUTPATIENT)
Dept: CASE MANAGEMENT | Age: 45
End: 2020-07-21

## 2020-07-28 ENCOUNTER — HOSPITAL ENCOUNTER (OUTPATIENT)
Dept: INFUSION THERAPY | Age: 45
Discharge: HOME | End: 2020-07-28
Payer: COMMERCIAL

## 2020-07-28 VITALS
BODY MASS INDEX: 27.37 KG/M2 | SYSTOLIC BLOOD PRESSURE: 106 MMHG | HEART RATE: 54 BPM | HEIGHT: 69 IN | OXYGEN SATURATION: 99 % | WEIGHT: 184.8 LBS | RESPIRATION RATE: 16 BRPM | DIASTOLIC BLOOD PRESSURE: 64 MMHG | TEMPERATURE: 96.5 F

## 2020-07-28 DIAGNOSIS — C34.11 MALIGNANT NEOPLASM OF UPPER LOBE OF RIGHT LUNG (HCC): Primary | ICD-10-CM

## 2020-07-28 LAB
ALBUMIN SERPL-MCNC: 3.5 G/DL (ref 3.5–5)
ALBUMIN/GLOB SERPL: 0.9 {RATIO} (ref 1.1–2.2)
ALP SERPL-CCNC: 84 U/L (ref 45–117)
ALT SERPL-CCNC: 25 U/L (ref 12–78)
ANION GAP SERPL CALC-SCNC: 2 MMOL/L (ref 5–15)
AST SERPL-CCNC: 22 U/L (ref 15–37)
BASO+EOS+MONOS # BLD AUTO: 0.3 K/UL (ref 0.2–1.2)
BASO+EOS+MONOS NFR BLD AUTO: 14 % (ref 3.2–16.9)
BILIRUB SERPL-MCNC: 0.5 MG/DL (ref 0.2–1)
BUN SERPL-MCNC: 18 MG/DL (ref 6–20)
BUN/CREAT SERPL: 16 (ref 12–20)
CALCIUM SERPL-MCNC: 8.6 MG/DL (ref 8.5–10.1)
CHLORIDE SERPL-SCNC: 108 MMOL/L (ref 97–108)
CO2 SERPL-SCNC: 29 MMOL/L (ref 21–32)
CREAT SERPL-MCNC: 1.16 MG/DL (ref 0.7–1.3)
DIFFERENTIAL METHOD BLD: ABNORMAL
ERYTHROCYTE [DISTWIDTH] IN BLOOD BY AUTOMATED COUNT: 15.2 % (ref 11.8–15.8)
GLOBULIN SER CALC-MCNC: 3.7 G/DL (ref 2–4)
GLUCOSE SERPL-MCNC: 94 MG/DL (ref 65–100)
HCT VFR BLD AUTO: 32.1 % (ref 36.6–50.3)
HGB BLD-MCNC: 11 G/DL (ref 12.1–17)
LYMPHOCYTES # BLD: 0.4 K/UL (ref 0.8–3.5)
LYMPHOCYTES NFR BLD: 18 % (ref 12–49)
MCH RBC QN AUTO: 24.4 PG (ref 26–34)
MCHC RBC AUTO-ENTMCNC: 34.3 G/DL (ref 30–36.5)
MCV RBC AUTO: 71.3 FL (ref 80–99)
NEUTS SEG # BLD: 1.3 K/UL (ref 1.8–8)
NEUTS SEG NFR BLD: 68 % (ref 32–75)
PLATELET # BLD AUTO: 113 K/UL (ref 150–400)
POTASSIUM SERPL-SCNC: 3.9 MMOL/L (ref 3.5–5.1)
PROT SERPL-MCNC: 7.2 G/DL (ref 6.4–8.2)
RBC # BLD AUTO: 4.5 M/UL (ref 4.1–5.7)
SODIUM SERPL-SCNC: 139 MMOL/L (ref 136–145)
WBC # BLD AUTO: 2 K/UL (ref 4.1–11.1)

## 2020-07-28 PROCEDURE — 36415 COLL VENOUS BLD VENIPUNCTURE: CPT

## 2020-07-28 PROCEDURE — 74011000258 HC RX REV CODE- 258: Performed by: NURSE PRACTITIONER

## 2020-07-28 PROCEDURE — 80053 COMPREHEN METABOLIC PANEL: CPT

## 2020-07-28 PROCEDURE — 74011250636 HC RX REV CODE- 250/636: Performed by: NURSE PRACTITIONER

## 2020-07-28 PROCEDURE — 96413 CHEMO IV INFUSION 1 HR: CPT

## 2020-07-28 PROCEDURE — 85025 COMPLETE CBC W/AUTO DIFF WBC: CPT

## 2020-07-28 RX ORDER — SODIUM CHLORIDE 9 MG/ML
10 INJECTION INTRAMUSCULAR; INTRAVENOUS; SUBCUTANEOUS AS NEEDED
Status: ACTIVE | OUTPATIENT
Start: 2020-07-28 | End: 2020-07-28

## 2020-07-28 RX ORDER — HEPARIN 100 UNIT/ML
300-500 SYRINGE INTRAVENOUS AS NEEDED
Status: ACTIVE | OUTPATIENT
Start: 2020-07-28 | End: 2020-07-28

## 2020-07-28 RX ORDER — SODIUM CHLORIDE 9 MG/ML
25 INJECTION, SOLUTION INTRAVENOUS CONTINUOUS
Status: DISPENSED | OUTPATIENT
Start: 2020-07-28 | End: 2020-07-28

## 2020-07-28 RX ORDER — SODIUM CHLORIDE 0.9 % (FLUSH) 0.9 %
10 SYRINGE (ML) INJECTION AS NEEDED
Status: DISPENSED | OUTPATIENT
Start: 2020-07-28 | End: 2020-07-28

## 2020-07-28 RX ADMIN — SODIUM CHLORIDE 25 ML/HR: 900 INJECTION, SOLUTION INTRAVENOUS at 11:05

## 2020-07-28 RX ADMIN — DURVALUMAB 810 MG: 120 INJECTION, SOLUTION INTRAVENOUS at 11:12

## 2020-07-28 NOTE — PROGRESS NOTES
[facility] VISIT NOTE  Date: 2020    Name: Isidro Casiano    MRN: [id number]         : 1975    0905  Mr. Zane Frank Arrived ambulatory and in no distress for C24D1 of Imfinzi Regimen. Assessment was completed, no acute issues at this time, no new complaints voiced. 24 G PIV placed in right arm without difficulty, labs drawn & sent for processing. Chemotherapy Flowsheet 2020   Cycle C24D1   Date 2020   Drug / Regimen Imfinzi   Pre Meds -   Notes given       Vitals:  Patient Vitals for the past 12 hrs:   Temp Pulse Resp BP SpO2   20 1216 -- (!) 54 -- 106/64 --   20 0908 (!) 96.5 °F (35.8 °C) 63 16 98/62 99 %        Lab results were obtained and reviewed. Recent Results (from the past 12 hour(s))   CBC WITH 3 PART DIFF    Collection Time: 20  9:20 AM   Result Value Ref Range    WBC 2.0 (L) 4.1 - 11.1 K/uL    RBC 4.50 4. 10 - 5.70 M/uL    HGB 11.0 (L) 12.1 - 17.0 g/dL    HCT 32.1 (L) 36.6 - 50.3 %    MCV 71.3 (L) 80.0 - 99.0 FL    MCH 24.4 (L) 26.0 - 34.0 PG    MCHC 34.3 30.0 - 36.5 g/dL    RDW 15.2 11.8 - 15.8 %    PLATELET 255 (L) 621 - 400 K/uL    NEUTROPHILS 68 32 - 75 %    MIXED CELLS 14 3.2 - 16.9 %    LYMPHOCYTES 18 12 - 49 %    ABS. NEUTROPHILS 1.3 (L) 1.8 - 8.0 K/UL    ABS. MIXED CELLS 0.3 0.2 - 1.2 K/uL    ABS. LYMPHOCYTES 0.4 (L) 0.8 - 3.5 K/UL    DF AUTOMATED     METABOLIC PANEL, COMPREHENSIVE    Collection Time: 20  9:20 AM   Result Value Ref Range    Sodium 139 136 - 145 mmol/L    Potassium 3.9 3.5 - 5.1 mmol/L    Chloride 108 97 - 108 mmol/L    CO2 29 21 - 32 mmol/L    Anion gap 2 (L) 5 - 15 mmol/L    Glucose 94 65 - 100 mg/dL    BUN 18 6 - 20 MG/DL    Creatinine 1.16 0.70 - 1.30 MG/DL    BUN/Creatinine ratio 16 12 - 20      GFR est AA >60 >60 ml/min/1.73m2    GFR est non-AA >60 >60 ml/min/1.73m2    Calcium 8.6 8.5 - 10.1 MG/DL    Bilirubin, total 0.5 0.2 - 1.0 MG/DL    ALT (SGPT) 25 12 - 78 U/L    AST (SGOT) 22 15 - 37 U/L    Alk.  phosphatase 84 45 - 117 U/L    Protein, total 7.2 6.4 - 8.2 g/dL    Albumin 3.5 3.5 - 5.0 g/dL    Globulin 3.7 2.0 - 4.0 g/dL    A-G Ratio 0.9 (L) 1.1 - 2.2         Medications received:  Medications Administered     0.9% sodium chloride infusion     Admin Date  07/28/2020 Action  New Bag Dose  25 mL/hr Rate  25 mL/hr Route  IntraVENous Administered By  Lethea Nyhan, RN          durvalumab (IMFINZI) 810 mg in 0.9% sodium chloride 100 mL, overfill volume 10 mL IVPB     Admin Date  07/28/2020 Action  New Bag Dose  810 mg Rate  126.2 mL/hr Route  IntraVENous Administered By  Lethea Nyhan, RN                Mr. Kev Brooks tolerated treatment well and was discharged from [place] [address] in stable condition at 1220. PIV removed per protocol. He is to return on  August 11, 2020 at 0800 for his next appointment.     Tierney Hutchinson RN  July 28, 2020    Future Appointments:  Future Appointments   Date Time Provider Aquilino Steel   8/11/2020  8:00 AM SS INF1 CH1 >4H [facility]   8/11/2020  8:15 AM Rfafy Man NP [facility] AMB   8/25/2020  9:00 AM SS INF6 CH1 >4H [facility] [address] [place] [address]

## 2020-08-07 NOTE — PROGRESS NOTES
[facility] [place] [address] [address] [facility]  Eleno Rankin: [phone]  F: [phone]      Reason for Visit:   Socorro Mars is a 38 y.o. male who is seen for follow up of non-small cell lung cancer. Treatment History:   · Diagnosed at [facility]  · Biopsy of right level 4 node: non small cell carcinoma, favored squamous cell carcinoma, insufficient sample for further testing  · Stage III Non-small cell lung cancer (Squamous cell)  · Definitive radiation with Dr. Shavon Adler completed mid-July 2019  · Concurrent chemotherapy with carboplatin and paclitaxel by Dr. Ryann Bowman, stopped during second infusion due to reaction to paclitaxel  · Concurrent chemotherapy with cisplatin and etoposide 7/16/2019 by Dr. Ryann Bowman  · CT Chest 7/27/2019: There has been no significant change in size of the large right paratracheal mediastinal mass. There are many new areas of peripheral consolidation in the right upper lobe, which may represent some combination of progressive disease, posttreatment change, or infection. Attention on follow-up is needed. A previously seen right upper lobe nodule in the posterior segment appears to have decreased in size. The large right pleural effusion on this exam is significantly increased in size from prior. · CT A/P 7/29/2019: no metastatic disease in abdomen or pelvis  · MRI Brain 7/29/2019: no intracranial metastatic disease  · EBUS by Dr. Didi Park 7/30/2019: Squamous cell, PDL1 QNS, insufficient tissue for molecular studies  · Thoracentesis 8/5/2019: cytology negative  · Initiated maintenance therapy with Durvalumab on 8/27/2019    History of Present Illness:   Presents for follow up on Durvalumab. Feeling well overall. No new aches or pains. No dizziness or headache. Some wheezing at times with breathing. Cough is not worse, more intermittent now. No pain with deep breathing. Denies pain. He is unaccompanied today.      He quit tobacco at diagnosis, though he was a light smoker (1-2 packs per week). PAST HISTORY: The following sections were reviewed and updated in the EMR as appropriate: PMH, SH, FH, Medications, Allergies. No Known Allergies     Review of Systems: A complete review of systems was obtained, reviewed, and scanned into the EMR. Pertinent findings reviewed above. Physical Exam:     Visit Vitals  /81   Pulse 89   Temp 98.2 °F (36.8 °C) (Temporal)   Resp 18   Ht 5' 9\" (1.753 m)   Wt 181 lb (82.1 kg)   SpO2 97%   BMI 26.73 kg/m²     ECOG PS: 1  General: No distress  Eyes: PERRLA, anicteric sclerae  HENT: Atraumatic, OP clear  Neck: Supple  Lymphatic: No cervical, supraclavicular, or inguinal adenopathy  Respiratory: CTAB, normal respiratory effort, diminished on right lung base  CV: Normal rate, regular rhythm, no murmurs, no peripheral edema  GI: Soft, nontender, nondistended, no masses, no hepatomegaly, no splenomegaly  MS: Normal gait and station. Digits without clubbing or cyanosis. Skin: No rashes, ecchymoses, or petechiae. Normal temperature, turgor, and texture. Psych: Alert, oriented, appropriate affect, normal judgment/insight    Results:     Lab Results   Component Value Date/Time    WBC 2.4 (L) 08/11/2020 08:14 AM    HGB 11.8 (L) 08/11/2020 08:14 AM    HCT 37.0 08/11/2020 08:14 AM    PLATELET 086 (L) 23/97/4555 08:14 AM    MCV 72.4 (L) 08/11/2020 08:14 AM    ABS.  NEUTROPHILS 1.6 (L) 08/11/2020 08:14 AM     Lab Results   Component Value Date/Time    Sodium 138 08/11/2020 08:14 AM    Potassium 3.7 08/11/2020 08:14 AM    Chloride 107 08/11/2020 08:14 AM    CO2 26 08/11/2020 08:14 AM    Glucose 107 (H) 08/11/2020 08:14 AM    BUN 18 08/11/2020 08:14 AM    Creatinine 1.09 08/11/2020 08:14 AM    GFR est AA >60 08/11/2020 08:14 AM    GFR est non-AA >60 08/11/2020 08:14 AM    Calcium 8.9 08/11/2020 08:14 AM    Glucose (POC) 123 (H) 07/05/2020 08:42 PM     Lab Results   Component Value Date/Time    Bilirubin, total 0.5 08/11/2020 08:14 AM    ALT (SGPT) 30 08/11/2020 08:14 AM    Alk. phosphatase 102 08/11/2020 08:14 AM    Protein, total 7.3 08/11/2020 08:14 AM    Albumin 3.6 08/11/2020 08:14 AM    Globulin 3.7 08/11/2020 08:14 AM     Lab Results   Component Value Date/Time    Reticulocyte count 1.7 08/27/2019 11:19 AM    Iron % saturation 33 07/02/2020 04:35 AM    TIBC 199 (L) 07/02/2020 04:35 AM    Ferritin 413 (H) 07/02/2020 04:35 AM    Vitamin B12 873 07/02/2020 04:35 AM    Folate 10.1 07/02/2020 04:35 AM    TSH 3.49 08/11/2020 08:14 AM    Lipase 136 07/27/2019 11:31 AM    Hep C  virus Ab Interp. NONREACTIVE 12/03/2019 11:22 AM     Lab Results   Component Value Date/Time    INR 1.0 12/03/2019 11:22 AM    aPTT 27.3 12/03/2019 11:22 AM    D-dimer 2.45 (H) 06/09/2020 10:56 AM    Fibrinogen 243 12/03/2019 11:22 AM     PET 8/21/2019:  1. Decreased size and activity of the mediastinal mass. 2. RUL nodular densities remain ametabolic. 3. No new finding. CT Chest 11/12/2019:  Stable exam by RECIST criteria. Increase in moderate right pleural effusion with medial right lower lobe airspace disease. Minimal T2, T4, and T6 superior endplate vertebral compression deformities are likely subacute/chronic. CT Chest 2/3/2020:  Discordant change. Decrease in size of right paratracheal node the increase in size of right upper lobe mass  Decrease in volume of right lung with increased right pleural effusion density  Occluded SVC with extensive collateralization as described  New T6 compression fracture    Bone scan 2/20/2020: There is minimal low-grade activity at T6 vertebral body. Remainder of the study is unremarkable. CT Chest 4/29/2020:  1. No significant change in the right upper lobe mass and right paratracheal  adenopathy. 2.  No significant change in the right pleural effusion right lower lobe volume  loss. 3.  Mild compression deformity of T6 is again noted.  There is suggestion of  increased sclerosis which may be related to healing    CTA Chest 6/9/2020:  No evidence for pulmonary embolism  Interval slight decrease in size of right upper lobe mass and right paratracheal node  Stable large right pleural effusion  Stable mild compression fractures of T2 and T6    Bronchoscopy 6/17/2020:  Hyperremic trachea. Evidence of recent oozing from anterior and posterior distal wall of the trachea. Almost completely collapsed RUL subsegments. No evidence of bleeding from RUL. RML take-off narrowed with extrinsic compression and questionable mass vs. compressed mucosa in the opening. No evidence of bleeding from that site. External compression of RLL subsegments, however no endobronchial lesion or bleeding. CT C/A/P 7/1/2020:   1. New left lung airspace disease. 2. Stable large right pleural effusion. 3. Stable RUL mass and mediastinal adenopathy. 4. Stable unremarkable abdomen/pelvis. Assessment:   1) Non-small cell lung cancer  Stage IIIB  He has at least Stage IIIB disease, and has been treated with concurrent radiation and chemotherapy (cisplatin and etoposide). Repeat PET demonstrates a partial response to therapy. No definite evidence of distant metastatic disease at this time. Pleural effusion cytology negative x3. He is currently on maintenance immunotherapy with Durvalumab. He is tolerating therapy well so far with minimal toxicity noted. Continue Durvalumab. He will complete 1 year of therapy next cycle. Move to surveillance at that time with repeat imaging due about 9/23/2020. Reimage every 12 weeks. Unfortunately, his two biopsies have yielded scant material, insufficient for molecular studies or PDL1 testing. At progression, a repeat biopsy may be needed for NGS. 2) SVC syndrome  Monitor. Stable on imaging. 3) Pleural effusion  Cytology negative. Worsening on recent imaging, underwent repeat thoracentesis on 11/27/2019, 2/14/2020 and 7/2/2020 with negative cytology.      4) Poor IV access  Femoral PICC removed due to sepsis. Proceed with treatment via peripheral IV. No longer utilizing home health. 5) Thrombocytopenia  Improved, but not resolved. Presumably secondary to his prior chemotherapy, but surprising that it is persisting. Additional lab work up 12/3/2019 unremarkable. Monitor for now. Could consider bone marrow biopsy if worsening significantly. 6) Neuropathy, chemotherapy induced  Continue Cymbalta 30mg daily. 7) Cough  Completed course of Levaquin on 7/17/2020. Symptoms are improving. 8) Compression fracture  Bone scan negative. Consider kyphoplasty vs radiation therapy if symptoms develop. DEXA showing osteopenia 3/17/2020. Continue Calcium and Vit D replacement and weight bearing exercise as tolerated. 9) Hemoptysis  Resolved, thought to be due to prior radiation. 10) Emotional Well Being  No psychosocial concerns identified today. Patient has adequate support. Plan:     · Proceed with C25 Durvalumab (10mg/kg) given every 2 weeks for one year  · Labs: CBC, CMP prior to each cycle, TSH every 6 weeks  · Continue cymbalta 30mg PO daily  · Return to see us in 8 weeks with imaging     Patient was seen in conjunction with Raheem Falcon NP.     Signed By: Rochelle Mchugh MD

## 2020-08-11 ENCOUNTER — HOSPITAL ENCOUNTER (OUTPATIENT)
Dept: INFUSION THERAPY | Age: 45
Discharge: HOME | End: 2020-08-11
Payer: COMMERCIAL

## 2020-08-11 ENCOUNTER — OFFICE VISIT (OUTPATIENT)
Dept: ONCOLOGY | Age: 45
End: 2020-08-11
Payer: COMMERCIAL

## 2020-08-11 VITALS
DIASTOLIC BLOOD PRESSURE: 77 MMHG | HEART RATE: 61 BPM | SYSTOLIC BLOOD PRESSURE: 110 MMHG | TEMPERATURE: 98.2 F | WEIGHT: 181 LBS | RESPIRATION RATE: 15 BRPM | BODY MASS INDEX: 26.81 KG/M2 | HEIGHT: 69 IN | OXYGEN SATURATION: 97 %

## 2020-08-11 VITALS
RESPIRATION RATE: 18 BRPM | TEMPERATURE: 98.2 F | DIASTOLIC BLOOD PRESSURE: 81 MMHG | HEIGHT: 69 IN | HEART RATE: 89 BPM | SYSTOLIC BLOOD PRESSURE: 113 MMHG | OXYGEN SATURATION: 97 % | WEIGHT: 181 LBS | BODY MASS INDEX: 26.81 KG/M2

## 2020-08-11 DIAGNOSIS — C34.11 MALIGNANT NEOPLASM OF UPPER LOBE OF RIGHT LUNG (HCC): Primary | Chronic | ICD-10-CM

## 2020-08-11 DIAGNOSIS — C34.11 MALIGNANT NEOPLASM OF UPPER LOBE OF RIGHT LUNG (HCC): Primary | ICD-10-CM

## 2020-08-11 LAB
ALBUMIN SERPL-MCNC: 3.6 G/DL (ref 3.5–5)
ALBUMIN/GLOB SERPL: 1 {RATIO} (ref 1.1–2.2)
ALP SERPL-CCNC: 102 U/L (ref 45–117)
ALT SERPL-CCNC: 30 U/L (ref 12–78)
ANION GAP SERPL CALC-SCNC: 5 MMOL/L (ref 5–15)
AST SERPL-CCNC: 28 U/L (ref 15–37)
BASOPHILS # BLD: 0 K/UL (ref 0–0.1)
BASOPHILS NFR BLD: 0 % (ref 0–1)
BILIRUB SERPL-MCNC: 0.5 MG/DL (ref 0.2–1)
BUN SERPL-MCNC: 18 MG/DL (ref 6–20)
BUN/CREAT SERPL: 17 (ref 12–20)
CALCIUM SERPL-MCNC: 8.9 MG/DL (ref 8.5–10.1)
CHLORIDE SERPL-SCNC: 107 MMOL/L (ref 97–108)
CO2 SERPL-SCNC: 26 MMOL/L (ref 21–32)
CREAT SERPL-MCNC: 1.09 MG/DL (ref 0.7–1.3)
DIFFERENTIAL METHOD BLD: ABNORMAL
EOSINOPHIL # BLD: 0.1 K/UL (ref 0–0.4)
EOSINOPHIL NFR BLD: 5 % (ref 0–7)
ERYTHROCYTE [DISTWIDTH] IN BLOOD BY AUTOMATED COUNT: 14.1 % (ref 11.5–14.5)
GLOBULIN SER CALC-MCNC: 3.7 G/DL (ref 2–4)
GLUCOSE SERPL-MCNC: 107 MG/DL (ref 65–100)
HCT VFR BLD AUTO: 37 % (ref 36.6–50.3)
HGB BLD-MCNC: 11.8 G/DL (ref 12.1–17)
IMM GRANULOCYTES # BLD AUTO: 0 K/UL (ref 0–0.04)
IMM GRANULOCYTES NFR BLD AUTO: 0 % (ref 0–0.5)
LYMPHOCYTES # BLD: 0.4 K/UL (ref 0.8–3.5)
LYMPHOCYTES NFR BLD: 18 % (ref 12–49)
MCH RBC QN AUTO: 23.1 PG (ref 26–34)
MCHC RBC AUTO-ENTMCNC: 31.9 G/DL (ref 30–36.5)
MCV RBC AUTO: 72.4 FL (ref 80–99)
MONOCYTES # BLD: 0.3 K/UL (ref 0–1)
MONOCYTES NFR BLD: 12 % (ref 5–13)
NEUTS SEG # BLD: 1.6 K/UL (ref 1.8–8)
NEUTS SEG NFR BLD: 65 % (ref 32–75)
NRBC # BLD: 0 K/UL (ref 0–0.01)
NRBC BLD-RTO: 0 PER 100 WBC
PLATELET # BLD AUTO: 127 K/UL (ref 150–400)
PMV BLD AUTO: 8.7 FL (ref 8.9–12.9)
POTASSIUM SERPL-SCNC: 3.7 MMOL/L (ref 3.5–5.1)
PROT SERPL-MCNC: 7.3 G/DL (ref 6.4–8.2)
RBC # BLD AUTO: 5.11 M/UL (ref 4.1–5.7)
RBC MORPH BLD: ABNORMAL
SODIUM SERPL-SCNC: 138 MMOL/L (ref 136–145)
TSH SERPL DL<=0.05 MIU/L-ACNC: 3.49 UIU/ML (ref 0.36–3.74)
WBC # BLD AUTO: 2.4 K/UL (ref 4.1–11.1)

## 2020-08-11 PROCEDURE — 96413 CHEMO IV INFUSION 1 HR: CPT

## 2020-08-11 PROCEDURE — 80053 COMPREHEN METABOLIC PANEL: CPT

## 2020-08-11 PROCEDURE — 36415 COLL VENOUS BLD VENIPUNCTURE: CPT

## 2020-08-11 PROCEDURE — 74011250636 HC RX REV CODE- 250/636: Performed by: NURSE PRACTITIONER

## 2020-08-11 PROCEDURE — 74011000258 HC RX REV CODE- 258: Performed by: NURSE PRACTITIONER

## 2020-08-11 PROCEDURE — 85025 COMPLETE CBC W/AUTO DIFF WBC: CPT

## 2020-08-11 PROCEDURE — 99214 OFFICE O/P EST MOD 30 MIN: CPT | Performed by: INTERNAL MEDICINE

## 2020-08-11 PROCEDURE — 84443 ASSAY THYROID STIM HORMONE: CPT

## 2020-08-11 RX ORDER — SODIUM CHLORIDE 0.9 % (FLUSH) 0.9 %
10 SYRINGE (ML) INJECTION AS NEEDED
Status: DISPENSED | OUTPATIENT
Start: 2020-08-11 | End: 2020-08-11

## 2020-08-11 RX ORDER — SODIUM CHLORIDE 9 MG/ML
25 INJECTION, SOLUTION INTRAVENOUS CONTINUOUS
Status: DISPENSED | OUTPATIENT
Start: 2020-08-11 | End: 2020-08-11

## 2020-08-11 RX ADMIN — SODIUM CHLORIDE 25 ML/HR: 900 INJECTION, SOLUTION INTRAVENOUS at 10:23

## 2020-08-11 RX ADMIN — DURVALUMAB 810 MG: 500 INJECTION, SOLUTION INTRAVENOUS at 10:26

## 2020-08-11 NOTE — PROGRESS NOTES
[facility] Progress Note    Date: 2020    Name: Cindy Terry    MRN: [id number]         : 1975    Mr. Brianne Clement Arrived ambulatory and in no distress for C25D1 of Imfinizi Regimen. Assessment was completed, no acute issues at this time, no new complaints voiced. PIV placed without difficulty, labs drawn & sent for processing. Patient proceed to appointment with Dr. Pierre Ramos. Mr. Fritz's vitals were reviewed. Patient Vitals for the past 12 hrs:   Temp Pulse Resp BP SpO2   20 0805 98.2 °F (36.8 °C) 89 18 113/81 97 %       Lab results were obtained and reviewed. Recent Results (from the past 12 hour(s))   METABOLIC PANEL, COMPREHENSIVE    Collection Time: 20  8:14 AM   Result Value Ref Range    Sodium 138 136 - 145 mmol/L    Potassium 3.7 3.5 - 5.1 mmol/L    Chloride 107 97 - 108 mmol/L    CO2 26 21 - 32 mmol/L    Anion gap 5 5 - 15 mmol/L    Glucose 107 (H) 65 - 100 mg/dL    BUN 18 6 - 20 MG/DL    Creatinine 1.09 0.70 - 1.30 MG/DL    BUN/Creatinine ratio 17 12 - 20      GFR est AA >60 >60 ml/min/1.73m2    GFR est non-AA >60 >60 ml/min/1.73m2    Calcium 8.9 8.5 - 10.1 MG/DL    Bilirubin, total 0.5 0.2 - 1.0 MG/DL    ALT (SGPT) 30 12 - 78 U/L    AST (SGOT) 28 15 - 37 U/L    Alk.  phosphatase 102 45 - 117 U/L    Protein, total 7.3 6.4 - 8.2 g/dL    Albumin 3.6 3.5 - 5.0 g/dL    Globulin 3.7 2.0 - 4.0 g/dL    A-G Ratio 1.0 (L) 1.1 - 2.2     TSH 3RD GENERATION    Collection Time: 20  8:14 AM   Result Value Ref Range    TSH 3.49 0.36 - 3.74 uIU/mL   CBC WITH AUTOMATED DIFF    Collection Time: 20  8:14 AM   Result Value Ref Range    WBC 2.4 (L) 4.1 - 11.1 K/uL    RBC 5.11 4.10 - 5.70 M/uL    HGB 11.8 (L) 12.1 - 17.0 g/dL    HCT 37.0 36.6 - 50.3 %    MCV 72.4 (L) 80.0 - 99.0 FL    MCH 23.1 (L) 26.0 - 34.0 PG    MCHC 31.9 30.0 - 36.5 g/dL    RDW 14.1 11.5 - 14.5 %    PLATELET 856 (L) 015 - 400 K/uL    MPV 8.7 (L) 8.9 - 12.9 FL    NRBC 0.0 0  WBC    ABSOLUTE NRBC 0.00 0.00 - 0.01 K/uL    NEUTROPHILS 65 32 - 75 %    LYMPHOCYTES 18 12 - 49 %    MONOCYTES 12 5 - 13 %    EOSINOPHILS 5 0 - 7 %    BASOPHILS 0 0 - 1 %    IMMATURE GRANULOCYTES 0 0.0 - 0.5 %    ABS. NEUTROPHILS 1.6 (L) 1.8 - 8.0 K/UL    ABS. LYMPHOCYTES 0.4 (L) 0.8 - 3.5 K/UL    ABS. MONOCYTES 0.3 0.0 - 1.0 K/UL    ABS. EOSINOPHILS 0.1 0.0 - 0.4 K/UL    ABS. BASOPHILS 0.0 0.0 - 0.1 K/UL    ABS. IMM. GRANS. 0.0 0.00 - 0.04 K/UL    DF SMEAR SCANNED      RBC COMMENTS ANISOCYTOSIS  PRESENT           Medications:  Imfinizi IV    Mr. David Hills tolerated treatment well and was discharged from [place] [address] in stable condition. PIV flushed and removed. He is to return on 8/25/20 for his next appointment.     Alex Reynolds RN  August 11, 2020

## 2020-08-11 NOTE — PATIENT INSTRUCTIONS
Will finish treatment in 2 weeks. We will plan to have CT scans in about 7 weeks (end of September) and see us back in office in 8 weeks. You will then move to surveillance where we will watch you closely with CT scans every 3 months.

## 2020-08-11 NOTE — PROGRESS NOTES
Dilan Keith is a 38 y.o. male follow up for lung cancer. 1. Have you been to the ER, urgent care clinic since your last visit? Hospitalized since your last visit?no     2. Have you seen or consulted any other health care providers outside of the [address] [place] [address] since your last visit? Include any pap smears or colon screening.  No    Vitals 8/11/2020   Blood Pressure 113/81   Pulse 89   Temp 98.2   Resp 18   Height 5' 9\"   Weight 181 lb   SpO2 97   BSA 2 m2   BMI 26.73 kg/m2

## 2020-08-15 LAB
ACID FAST STN SPEC: NEGATIVE
MYCOBACTERIUM SPEC QL CULT: NEGATIVE
SPECIMEN PREPARATION: NORMAL
SPECIMEN SOURCE: NORMAL

## 2020-08-18 ENCOUNTER — HOSPITAL ENCOUNTER (OUTPATIENT)
Dept: CT IMAGING | Age: 45
Discharge: HOME | End: 2020-08-18
Attending: NURSE PRACTITIONER
Payer: COMMERCIAL

## 2020-08-18 DIAGNOSIS — C34.11 MALIGNANT NEOPLASM OF UPPER LOBE OF RIGHT LUNG (HCC): Chronic | ICD-10-CM

## 2020-08-18 PROCEDURE — 74177 CT ABD & PELVIS W/CONTRAST: CPT

## 2020-08-18 PROCEDURE — 74011636320 HC RX REV CODE- 636/320: Performed by: RADIOLOGY

## 2020-08-18 RX ADMIN — IOPAMIDOL 100 ML: 755 INJECTION, SOLUTION INTRAVENOUS at 15:15

## 2020-08-25 ENCOUNTER — HOSPITAL ENCOUNTER (OUTPATIENT)
Dept: INFUSION THERAPY | Age: 45
Discharge: HOME | End: 2020-08-25
Payer: COMMERCIAL

## 2020-08-25 VITALS
BODY MASS INDEX: 26.9 KG/M2 | OXYGEN SATURATION: 99 % | RESPIRATION RATE: 16 BRPM | HEART RATE: 65 BPM | WEIGHT: 181.6 LBS | SYSTOLIC BLOOD PRESSURE: 102 MMHG | HEIGHT: 69 IN | TEMPERATURE: 98.1 F | DIASTOLIC BLOOD PRESSURE: 62 MMHG

## 2020-08-25 DIAGNOSIS — C34.11 MALIGNANT NEOPLASM OF UPPER LOBE OF RIGHT LUNG (HCC): Primary | ICD-10-CM

## 2020-08-25 LAB
ALBUMIN SERPL-MCNC: 3.6 G/DL (ref 3.5–5)
ALBUMIN/GLOB SERPL: 1 {RATIO} (ref 1.1–2.2)
ALP SERPL-CCNC: 109 U/L (ref 45–117)
ALT SERPL-CCNC: 30 U/L (ref 12–78)
ANION GAP SERPL CALC-SCNC: 5 MMOL/L (ref 5–15)
AST SERPL-CCNC: 27 U/L (ref 15–37)
BASOPHILS # BLD: 0 K/UL (ref 0–0.1)
BASOPHILS NFR BLD: 0 % (ref 0–1)
BILIRUB SERPL-MCNC: 0.5 MG/DL (ref 0.2–1)
BUN SERPL-MCNC: 19 MG/DL (ref 6–20)
BUN/CREAT SERPL: 16 (ref 12–20)
CALCIUM SERPL-MCNC: 9 MG/DL (ref 8.5–10.1)
CHLORIDE SERPL-SCNC: 108 MMOL/L (ref 97–108)
CO2 SERPL-SCNC: 26 MMOL/L (ref 21–32)
CREAT SERPL-MCNC: 1.19 MG/DL (ref 0.7–1.3)
DIFFERENTIAL METHOD BLD: ABNORMAL
EOSINOPHIL # BLD: 0 K/UL (ref 0–0.4)
EOSINOPHIL NFR BLD: 2 % (ref 0–7)
ERYTHROCYTE [DISTWIDTH] IN BLOOD BY AUTOMATED COUNT: 14.4 % (ref 11.5–14.5)
GLOBULIN SER CALC-MCNC: 3.7 G/DL (ref 2–4)
GLUCOSE SERPL-MCNC: 109 MG/DL (ref 65–100)
HCT VFR BLD AUTO: 36.9 % (ref 36.6–50.3)
HGB BLD-MCNC: 11.9 G/DL (ref 12.1–17)
IMM GRANULOCYTES # BLD AUTO: 0 K/UL
IMM GRANULOCYTES NFR BLD AUTO: 0 %
LYMPHOCYTES # BLD: 0.3 K/UL (ref 0.8–3.5)
LYMPHOCYTES NFR BLD: 14 % (ref 12–49)
MCH RBC QN AUTO: 23.2 PG (ref 26–34)
MCHC RBC AUTO-ENTMCNC: 32.2 G/DL (ref 30–36.5)
MCV RBC AUTO: 72.1 FL (ref 80–99)
METAMYELOCYTES NFR BLD MANUAL: 1 %
MONOCYTES # BLD: 0.3 K/UL (ref 0–1)
MONOCYTES NFR BLD: 14 % (ref 5–13)
NEUTS BAND NFR BLD MANUAL: 2 % (ref 0–6)
NEUTS SEG # BLD: 1.7 K/UL (ref 1.8–8)
NEUTS SEG NFR BLD: 67 % (ref 32–75)
NRBC # BLD: 0 K/UL (ref 0–0.01)
NRBC BLD-RTO: 0 PER 100 WBC
PLATELET # BLD AUTO: 118 K/UL (ref 150–400)
PMV BLD AUTO: 9 FL (ref 8.9–12.9)
POTASSIUM SERPL-SCNC: 4 MMOL/L (ref 3.5–5.1)
PROT SERPL-MCNC: 7.3 G/DL (ref 6.4–8.2)
RBC # BLD AUTO: 5.12 M/UL (ref 4.1–5.7)
RBC MORPH BLD: ABNORMAL
RBC MORPH BLD: ABNORMAL
SODIUM SERPL-SCNC: 139 MMOL/L (ref 136–145)
WBC # BLD AUTO: 2.4 K/UL (ref 4.1–11.1)

## 2020-08-25 PROCEDURE — 85025 COMPLETE CBC W/AUTO DIFF WBC: CPT

## 2020-08-25 PROCEDURE — 80053 COMPREHEN METABOLIC PANEL: CPT

## 2020-08-25 PROCEDURE — 74011000258 HC RX REV CODE- 258: Performed by: NURSE PRACTITIONER

## 2020-08-25 PROCEDURE — 96413 CHEMO IV INFUSION 1 HR: CPT

## 2020-08-25 PROCEDURE — 74011250636 HC RX REV CODE- 250/636: Performed by: NURSE PRACTITIONER

## 2020-08-25 PROCEDURE — 36415 COLL VENOUS BLD VENIPUNCTURE: CPT

## 2020-08-25 RX ORDER — SODIUM CHLORIDE 9 MG/ML
25 INJECTION, SOLUTION INTRAVENOUS CONTINUOUS
Status: DISPENSED | OUTPATIENT
Start: 2020-08-25 | End: 2020-08-25

## 2020-08-25 RX ADMIN — SODIUM CHLORIDE 25 ML/HR: 900 INJECTION, SOLUTION INTRAVENOUS at 11:16

## 2020-08-25 RX ADMIN — SODIUM CHLORIDE 810 MG: 9 INJECTION, SOLUTION INTRAVENOUS at 11:23

## 2020-08-25 NOTE — PROGRESS NOTES
[facility] VISIT NOTE  Date: 2020    Name: Anushka Timmons    MRN: [id number]         : 1975    0905  Mr. Larayne Klinefelter Arrived ambulatory and in no distress for C26D1 of Imfiniz Regimen. Assessment was completed, no acute issues at this time, no new complaints voiced. 24 G PIV placed in left arm without difficulty, labs drawn & sent for processing. Chemotherapy Flowsheet 2020   Cycle C26D1   Date 2020   Drug / Regimen imfinizi   Pre Meds -   Notes given       Vitals:  /62   Pulse 65   Temp 98.1 °F (36.7 °C)   Resp 16   Ht 5' 9\" (1.753 m)   Wt 82.4 kg (181 lb 9.6 oz)   SpO2 99%   BMI 26.82 kg/m²      Lab results were obtained and reviewed. Recent Results (from the past 12 hour(s))   METABOLIC PANEL, COMPREHENSIVE    Collection Time: 20  9:14 AM   Result Value Ref Range    Sodium 139 136 - 145 mmol/L    Potassium 4.0 3.5 - 5.1 mmol/L    Chloride 108 97 - 108 mmol/L    CO2 26 21 - 32 mmol/L    Anion gap 5 5 - 15 mmol/L    Glucose 109 (H) 65 - 100 mg/dL    BUN 19 6 - 20 MG/DL    Creatinine 1.19 0.70 - 1.30 MG/DL    BUN/Creatinine ratio 16 12 - 20      GFR est AA >60 >60 ml/min/1.73m2    GFR est non-AA >60 >60 ml/min/1.73m2    Calcium 9.0 8.5 - 10.1 MG/DL    Bilirubin, total 0.5 0.2 - 1.0 MG/DL    ALT (SGPT) 30 12 - 78 U/L    AST (SGOT) 27 15 - 37 U/L    Alk.  phosphatase 109 45 - 117 U/L    Protein, total 7.3 6.4 - 8.2 g/dL    Albumin 3.6 3.5 - 5.0 g/dL    Globulin 3.7 2.0 - 4.0 g/dL    A-G Ratio 1.0 (L) 1.1 - 2.2     CBC WITH AUTOMATED DIFF    Collection Time: 20  9:23 AM   Result Value Ref Range    WBC 2.4 (L) 4.1 - 11.1 K/uL    RBC 5.12 4.10 - 5.70 M/uL    HGB 11.9 (L) 12.1 - 17.0 g/dL    HCT 36.9 36.6 - 50.3 %    MCV 72.1 (L) 80.0 - 99.0 FL    MCH 23.2 (L) 26.0 - 34.0 PG    MCHC 32.2 30.0 - 36.5 g/dL    RDW 14.4 11.5 - 14.5 %    PLATELET 653 (L) 128 - 400 K/uL    MPV 9.0 8.9 - 12.9 FL    NRBC 0.0 0  WBC    ABSOLUTE NRBC 0.00 0.00 - 0.01 K/uL NEUTROPHILS 67 32 - 75 %    BAND NEUTROPHILS 2 0 - 6 %    LYMPHOCYTES 14 12 - 49 %    MONOCYTES 14 (H) 5 - 13 %    EOSINOPHILS 2 0 - 7 %    BASOPHILS 0 0 - 1 %    METAMYELOCYTES 1 (H) 0 %    IMMATURE GRANULOCYTES 0 %    ABS. NEUTROPHILS 1.7 (L) 1.8 - 8.0 K/UL    ABS. LYMPHOCYTES 0.3 (L) 0.8 - 3.5 K/UL    ABS. MONOCYTES 0.3 0.0 - 1.0 K/UL    ABS. EOSINOPHILS 0.0 0.0 - 0.4 K/UL    ABS. BASOPHILS 0.0 0.0 - 0.1 K/UL    ABS. IMM. GRANS. 0.0 K/UL    DF MANUAL      RBC COMMENTS HYPOCHROMIA  1+        RBC COMMENTS MICROCYTOSIS  1+           Medications received:  Medications Administered     0.9% sodium chloride infusion     Admin Date  08/25/2020 Action  New Bag Dose  25 mL/hr Rate  25 mL/hr Route  IntraVENous Administered By  Marisa Rubio RN          durvalumab (IMFINZI) 810 mg in 0.9% sodium chloride 100 mL, overfill volume 10 mL IVPB     Admin Date  08/25/2020 Action  New Bag Dose  810 mg Rate  126.2 mL/hr Route  IntraVENous Administered By  Marisa Rubio RN                Mr. Rigoberto Oleary tolerated treatment well and was discharged from [place] [address] in stable condition at 1250. PIV removed per protocol. He is to contact provider for future [facility] appointments.     Josefina Meredith RN  August 25, 2020    Future Appointments:  Future Appointments   Date Time Provider Aquilino Steel   10/6/2020  2:45 PM Brett Burgess NP [facility] BS AMB

## 2020-10-06 ENCOUNTER — OFFICE VISIT (OUTPATIENT)
Dept: ONCOLOGY | Age: 45
End: 2020-10-06
Payer: COMMERCIAL

## 2020-10-06 VITALS
RESPIRATION RATE: 16 BRPM | TEMPERATURE: 97 F | HEART RATE: 72 BPM | DIASTOLIC BLOOD PRESSURE: 78 MMHG | OXYGEN SATURATION: 98 % | SYSTOLIC BLOOD PRESSURE: 109 MMHG | WEIGHT: 182 LBS | HEIGHT: 69 IN | BODY MASS INDEX: 26.96 KG/M2

## 2020-10-06 DIAGNOSIS — D69.6 THROMBOCYTOPENIA (HCC): ICD-10-CM

## 2020-10-06 DIAGNOSIS — C34.11 MALIGNANT NEOPLASM OF UPPER LOBE OF RIGHT LUNG (HCC): Primary | Chronic | ICD-10-CM

## 2020-10-06 PROCEDURE — 99214 OFFICE O/P EST MOD 30 MIN: CPT | Performed by: INTERNAL MEDICINE

## 2020-10-06 NOTE — PROGRESS NOTES
[facility] [place] [facility] [place] [address] [facility]  Malinda Rodarte: [phone]  F: [phone]      Reason for Visit:   Alcides Millard is a 38 y.o. male who is seen for follow up of non-small cell lung cancer. Treatment History:   · [facility]  · Biopsy of right level 4 node: non small cell carcinoma, favored squamous cell carcinoma, insufficient sample for further testing  · Stage III Non-small cell lung cancer (Squamous cell)  · Definitive radiation with Dr. Maddy Young completed mid-July 2019  · Concurrent chemotherapy with carboplatin and paclitaxel by Dr. Shailesh Tinoco, stopped during second infusion due to reaction to paclitaxel  · Concurrent chemotherapy with cisplatin and etoposide 7/16/2019 by Dr. Shailesh Tinoco  · CT Chest 7/27/2019: There has been no significant change in size of the large right paratracheal mediastinal mass. There are many new areas of peripheral consolidation in the right upper lobe, which may represent some combination of progressive disease, posttreatment change, or infection. Attention on follow-up is needed. A previously seen right upper lobe nodule in the posterior segment appears to have decreased in size. The large right pleural effusion on this exam is significantly increased in size from prior. · CT A/P 7/29/2019: no metastatic disease in abdomen or pelvis  · MRI Brain 7/29/2019: no intracranial metastatic disease  · EBUS by Dr. Barnes Pall 7/30/2019: Squamous cell, PDL1 QNS, insufficient tissue for molecular studies  · Thoracentesis 8/5/2019: cytology negative  · Initiated maintenance therapy with Durvalumab on 8/27/2019-8/25/2020    History of Present Illness:   Presents for follow up. Feeling well off therapy. No new changes. Mild fatigue. He is staying active in home, finding things to stay busy. Appetite has been stable. No nausea or vomiting. No change in bowels. No dizziness, headache. No recent falls.  No recent fever or chills. SOB is about the same, worse with laying flat and with activity. Notices worse with walking up steps. This is unchanged since last visit. He is unaccompanied today. He quit tobacco at diagnosis, though he was a light smoker (1-2 packs per week). PAST HISTORY: The following sections were reviewed and updated in the EMR as appropriate: PMH, SH, FH, Medications, Allergies. No Known Allergies     Review of Systems: A complete review of systems was obtained, reviewed, and scanned into the EMR. Pertinent findings reviewed above. Physical Exam:     Visit Vitals  /78 (BP 1 Location: Right arm, BP Patient Position: Sitting)   Pulse 72   Temp 97 °F (36.1 °C) (Temporal)   Resp 16   Ht 5' 9\" (1.753 m)   Wt 182 lb (82.6 kg)   SpO2 98%   BMI 26.88 kg/m²     ECOG PS: 1  General: No distress  Eyes: PERRLA, anicteric sclerae  HENT: Atraumatic, OP clear  Neck: Supple  Lymphatic: No cervical, supraclavicular, or inguinal adenopathy  Respiratory: CTAB, normal respiratory effort, diminished on right lung base  CV: Normal rate, regular rhythm, no murmurs, no peripheral edema  GI: Soft, nontender, nondistended, no masses, no hepatomegaly, no splenomegaly  MS: Normal gait and station. Digits without clubbing or cyanosis. Skin: No rashes, ecchymoses, or petechiae. Normal temperature, turgor, and texture. Psych: Alert, oriented, appropriate affect, normal judgment/insight    Results:     Lab Results   Component Value Date/Time    WBC 2.4 (L) 08/25/2020 09:23 AM    HGB 11.9 (L) 08/25/2020 09:23 AM    HCT 36.9 08/25/2020 09:23 AM    PLATELET 376 (L) 22/03/0453 09:23 AM    MCV 72.1 (L) 08/25/2020 09:23 AM    ABS.  NEUTROPHILS 1.7 (L) 08/25/2020 09:23 AM     Lab Results   Component Value Date/Time    Sodium 139 08/25/2020 09:14 AM    Potassium 4.0 08/25/2020 09:14 AM    Chloride 108 08/25/2020 09:14 AM    CO2 26 08/25/2020 09:14 AM    Glucose 109 (H) 08/25/2020 09:14 AM    BUN 19 08/25/2020 09:14 AM Creatinine 1.19 08/25/2020 09:14 AM    GFR est AA >60 08/25/2020 09:14 AM    GFR est non-AA >60 08/25/2020 09:14 AM    Calcium 9.0 08/25/2020 09:14 AM    Glucose (POC) 123 (H) 07/05/2020 08:42 PM     Lab Results   Component Value Date/Time    Bilirubin, total 0.5 08/25/2020 09:14 AM    ALT (SGPT) 30 08/25/2020 09:14 AM    Alk. phosphatase 109 08/25/2020 09:14 AM    Protein, total 7.3 08/25/2020 09:14 AM    Albumin 3.6 08/25/2020 09:14 AM    Globulin 3.7 08/25/2020 09:14 AM     Lab Results   Component Value Date/Time    Reticulocyte count 1.7 08/27/2019 11:19 AM    Iron % saturation 33 07/02/2020 04:35 AM    TIBC 199 (L) 07/02/2020 04:35 AM    Ferritin 413 (H) 07/02/2020 04:35 AM    Vitamin B12 873 07/02/2020 04:35 AM    Folate 10.1 07/02/2020 04:35 AM    TSH 3.49 08/11/2020 08:14 AM    Lipase 136 07/27/2019 11:31 AM    Hep C virus Ab Interp. NONREACTIVE 12/03/2019 11:22 AM     Lab Results   Component Value Date/Time    INR 1.0 12/03/2019 11:22 AM    aPTT 27.3 12/03/2019 11:22 AM    D-dimer 2.45 (H) 06/09/2020 10:56 AM    Fibrinogen 243 12/03/2019 11:22 AM     PET 8/21/2019:  1. Decreased size and activity of the mediastinal mass. 2. RUL nodular densities remain ametabolic. 3. No new finding. CT Chest 11/12/2019:  Stable exam by RECIST criteria. Increase in moderate right pleural effusion with medial right lower lobe airspace disease. Minimal T2, T4, and T6 superior endplate vertebral compression deformities are likely subacute/chronic. CT Chest 2/3/2020:  Discordant change. Decrease in size of right paratracheal node the increase in size of right upper lobe mass  Decrease in volume of right lung with increased right pleural effusion density  Occluded SVC with extensive collateralization as described  New T6 compression fracture    Bone scan 2/20/2020: There is minimal low-grade activity at T6 vertebral body. Remainder of the study is unremarkable. CT Chest 4/29/2020:  1.   No significant change in the right upper lobe mass and right paratracheal  adenopathy. 2.  No significant change in the right pleural effusion right lower lobe volume  loss. 3.  Mild compression deformity of T6 is again noted. There is suggestion of  increased sclerosis which may be related to healing    CTA Chest 6/9/2020:  No evidence for pulmonary embolism  Interval slight decrease in size of right upper lobe mass and right paratracheal node  Stable large right pleural effusion  Stable mild compression fractures of T2 and T6    Bronchoscopy 6/17/2020:  Hyperremic trachea. Evidence of recent oozing from anterior and posterior distal wall of the trachea. Almost completely collapsed RUL subsegments. No evidence of bleeding from RUL. RML take-off narrowed with extrinsic compression and questionable mass vs. compressed mucosa in the opening. No evidence of bleeding from that site. External compression of RLL subsegments, however no endobronchial lesion or bleeding. CT C/A/P 7/1/2020:   1. New left lung airspace disease. 2. Stable large right pleural effusion. 3. Stable RUL mass and mediastinal adenopathy. 4. Stable unremarkable abdomen/pelvis. CT C/A/P 8/18/2020:  1. Large right pleural effusion. 2. 2 cm x 1.4 cm right upper lobe hypodense mass, unchanged. 3. 2.2 cm x 1.7 cm enlarged right paratracheal lymph node, unchanged. Assessment:   1) Non-small cell lung cancer  Stage IIIB  He has at least Stage IIIB disease, and has been treated with concurrent radiation and chemotherapy (cisplatin and etoposide). Repeat PET demonstrates a partial response to therapy. No definite evidence of distant metastatic disease at this time. Pleural effusion cytology negative x3. He completed maintenance immunotherapy with Durvalumab. Move to surveillance now with imaging every 3 months for another full year off Durvalumab, then move to every 6 months.      Unfortunately, his two biopsies have yielded scant material, insufficient for molecular studies or PDL1 testing. At progression, a repeat biopsy may be needed for NGS. 2) SVC syndrome  Monitor. Stable on imaging. 3) Pleural effusion  Cytology negative. Worsening on recent imaging, underwent repeat thoracentesis on 11/27/2019, 2/14/2020 and 7/2/2020 with negative cytology. Symptoms are stable. 4) Thrombocytopenia  Improved, but not resolved. Presumably secondary to his prior chemotherapy, but surprising that it is persisting. Additional lab work up 12/3/2019 unremarkable. Monitor for now. Could consider bone marrow biopsy if worsening significantly. 5) Neuropathy, chemotherapy induced  Resolved, now off Cymbalta    6) Compression fracture  Bone scan negative. Consider kyphoplasty vs radiation therapy if symptoms develop. DEXA showing osteopenia 3/17/2020. Continue Calcium and Vit D replacement and weight bearing exercise as tolerated. 7) Emotional Well Being  No psychosocial concerns identified today. Patient has adequate support. Plan:     · CT in 2 months  · Labs per PCP in November  · Follow up with imaging    Patient was seen in conjunction with Ravindra Wyman NP.     Signed By: Tino Simmonds, MD

## 2020-10-06 NOTE — PROGRESS NOTES
Med Rao is a 38 y.o. male follow up for lung cancer. 1. Have you been to the ER, urgent care clinic since your last visit? Hospitalized since your last visit?no     2. Have you seen or consulted any other health care providers outside of the [address] [place] [address] since your last visit? Include any pap smears or colon screening.  no

## 2020-11-24 ENCOUNTER — HOSPITAL ENCOUNTER (OUTPATIENT)
Dept: CT IMAGING | Age: 45
Discharge: HOME | End: 2020-11-24
Attending: NURSE PRACTITIONER
Payer: COMMERCIAL

## 2020-11-24 DIAGNOSIS — C34.11 MALIGNANT NEOPLASM OF UPPER LOBE OF RIGHT LUNG (HCC): Chronic | ICD-10-CM

## 2020-11-24 DIAGNOSIS — D69.6 THROMBOCYTOPENIA (HCC): ICD-10-CM

## 2020-11-24 PROCEDURE — 71260 CT THORAX DX C+: CPT

## 2020-11-24 PROCEDURE — 74011000636 HC RX REV CODE- 636: Performed by: RADIOLOGY

## 2020-11-24 RX ORDER — HEPARIN 100 UNIT/ML
500 SYRINGE INTRAVENOUS
Status: DISCONTINUED | OUTPATIENT
Start: 2020-11-24 | End: 2020-11-25 | Stop reason: HOSPADM

## 2020-11-24 RX ADMIN — IOPAMIDOL 80 ML: 612 INJECTION, SOLUTION INTRAVENOUS at 13:07

## 2020-12-07 NOTE — PROGRESS NOTES
[facility] [place] at [place] [address] [facility] Feliz Rape: [phone]  F: [phone]      Reason for Visit:   Omar Woody is a 38 y.o. male who is seen for follow up of non-small cell lung cancer. Treatment History:   · Diagnosed at [facility]  · Biopsy of right level 4 node: non small cell carcinoma, favored squamous cell carcinoma, insufficient sample for further testing  · Stage III Non-small cell lung cancer (Squamous cell)  · Definitive radiation with Dr. Umer Acuna completed mid-July 2019  · Concurrent chemotherapy with carboplatin and paclitaxel by Dr. Patricia Hurst, stopped during second infusion due to reaction to paclitaxel  · Concurrent chemotherapy with cisplatin and etoposide 7/16/2019 by Dr. Patricia Hurst  · CT Chest 7/27/2019: There has been no significant change in size of the large right paratracheal mediastinal mass. There are many new areas of peripheral consolidation in the right upper lobe, which may represent some combination of progressive disease, posttreatment change, or infection. Attention on follow-up is needed. A previously seen right upper lobe nodule in the posterior segment appears to have decreased in size. The large right pleural effusion on this exam is significantly increased in size from prior. · CT A/P 7/29/2019: no metastatic disease in abdomen or pelvis  · MRI Brain 7/29/2019: no intracranial metastatic disease  · EBUS by Dr. Alayna Dumont 7/30/2019: Squamous cell, PDL1 QNS, insufficient tissue for molecular studies  · Thoracentesis 8/5/2019: cytology negative  · Initiated maintenance therapy with Durvalumab on 8/27/2019-8/25/2020    History of Present Illness:   Presents for follow up. SOB/PEDROZA is about the same as normal. Moderate cough symptoms. This isn't too bothersome for him. Cough worse in the morning. He is sleeping well without cough interrupting sleep. No fever or chills.      Some soreness/stiffness to right chest wall/shoulder. Notices it first thing in the morning, started in the last 2-3 weeks. He is unaccompanied today. He quit tobacco at diagnosis, though he was a light smoker (1-2 packs per week). PAST HISTORY: The following sections were reviewed and updated in the EMR as appropriate: PMH, SH, FH, Medications, Allergies. No Known Allergies     Review of Systems: A complete review of systems was obtained, reviewed, and scanned into the EMR. Pertinent findings reviewed above. Physical Exam:     Visit Vitals  /87 (BP 1 Location: Left arm, BP Patient Position: Sitting)   Pulse 66   Temp 96.9 °F (36.1 °C) (Oral)   Resp 14   Ht 5' 9\" (1.753 m)   Wt 186 lb (84.4 kg)   SpO2 100%   BMI 27.47 kg/m²     ECOG PS: 1  General: No distress  Eyes: PERRLA, anicteric sclerae  HENT: Atraumatic, OP clear  Neck: Supple  Lymphatic: No cervical, supraclavicular, or inguinal adenopathy  Respiratory: CTAB, normal respiratory effort, diminished on right lung base  CV: Normal rate, regular rhythm, no murmurs, no peripheral edema  GI: Soft, nontender, nondistended, no masses, no hepatomegaly, no splenomegaly  MS: Normal gait and station. Digits without clubbing or cyanosis. Soreness to palpation over right 4th rib  Skin: No rashes, ecchymoses, or petechiae. Normal temperature, turgor, and texture. Psych: Alert, oriented, appropriate affect, normal judgment/insight    Results:     Lab Results   Component Value Date/Time    WBC 2.4 (L) 08/25/2020 09:23 AM    HGB 11.9 (L) 08/25/2020 09:23 AM    HCT 36.9 08/25/2020 09:23 AM    PLATELET 418 (L) 64/08/8112 09:23 AM    MCV 72.1 (L) 08/25/2020 09:23 AM    ABS.  NEUTROPHILS 1.7 (L) 08/25/2020 09:23 AM     Lab Results   Component Value Date/Time    Sodium 139 08/25/2020 09:14 AM    Potassium 4.0 08/25/2020 09:14 AM    Chloride 108 08/25/2020 09:14 AM    CO2 26 08/25/2020 09:14 AM    Glucose 109 (H) 08/25/2020 09:14 AM    BUN 19 08/25/2020 09:14 AM    Creatinine 1.19 08/25/2020 09:14 AM    GFR est AA >60 08/25/2020 09:14 AM    GFR est non-AA >60 08/25/2020 09:14 AM    Calcium 9.0 08/25/2020 09:14 AM    Glucose (POC) 123 (H) 07/05/2020 08:42 PM     Lab Results   Component Value Date/Time    Bilirubin, total 0.5 08/25/2020 09:14 AM    ALT (SGPT) 30 08/25/2020 09:14 AM    Alk. phosphatase 109 08/25/2020 09:14 AM    Protein, total 7.3 08/25/2020 09:14 AM    Albumin 3.6 08/25/2020 09:14 AM    Globulin 3.7 08/25/2020 09:14 AM     Lab Results   Component Value Date/Time    Reticulocyte count 1.7 08/27/2019 11:19 AM    Iron % saturation 33 07/02/2020 04:35 AM    TIBC 199 (L) 07/02/2020 04:35 AM    Ferritin 413 (H) 07/02/2020 04:35 AM    Vitamin B12 873 07/02/2020 04:35 AM    Folate 10.1 07/02/2020 04:35 AM    TSH 3.49 08/11/2020 08:14 AM    Lipase 136 07/27/2019 11:31 AM    Hep C virus Ab Interp. NONREACTIVE 12/03/2019 11:22 AM     Lab Results   Component Value Date/Time    INR 1.0 12/03/2019 11:22 AM    aPTT 27.3 12/03/2019 11:22 AM    D-dimer 2.45 (H) 06/09/2020 10:56 AM    Fibrinogen 243 12/03/2019 11:22 AM     PET 8/21/2019:  1. Decreased size and activity of the mediastinal mass. 2. RUL nodular densities remain ametabolic. 3. No new finding. CT Chest 11/12/2019:  Stable exam by RECIST criteria. Increase in moderate right pleural effusion with medial right lower lobe airspace disease. Minimal T2, T4, and T6 superior endplate vertebral compression deformities are likely subacute/chronic. CT Chest 2/3/2020:  Discordant change. Decrease in size of right paratracheal node the increase in size of right upper lobe mass  Decrease in volume of right lung with increased right pleural effusion density  Occluded SVC with extensive collateralization as described  New T6 compression fracture    Bone scan 2/20/2020: There is minimal low-grade activity at T6 vertebral body. Remainder of the study is unremarkable. CT Chest 4/29/2020:  1.   No significant change in the right upper lobe mass and right paratracheal  adenopathy. 2.  No significant change in the right pleural effusion right lower lobe volume  loss. 3.  Mild compression deformity of T6 is again noted. There is suggestion of  increased sclerosis which may be related to healing    CTA Chest 6/9/2020:  No evidence for pulmonary embolism  Interval slight decrease in size of right upper lobe mass and right paratracheal node  Stable large right pleural effusion  Stable mild compression fractures of T2 and T6    Bronchoscopy 6/17/2020:  Hyperremic trachea. Evidence of recent oozing from anterior and posterior distal wall of the trachea. Almost completely collapsed RUL subsegments. No evidence of bleeding from RUL. RML take-off narrowed with extrinsic compression and questionable mass vs. compressed mucosa in the opening. No evidence of bleeding from that site. External compression of RLL subsegments, however no endobronchial lesion or bleeding. CT C/A/P 7/1/2020:   1. New left lung airspace disease. 2. Stable large right pleural effusion. 3. Stable RUL mass and mediastinal adenopathy. 4. Stable unremarkable abdomen/pelvis. CT C/A/P 8/18/2020:  1. Large right pleural effusion. 2. 2 cm x 1.4 cm right upper lobe hypodense mass, unchanged. 3. 2.2 cm x 1.7 cm enlarged right paratracheal lymph node, unchanged. CT Chest 11/24/2020:  1. 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 2. Soft tissue density right paratracheal region surrounding the superior vena  cava is unchanged. 3. Right upper lobe volume loss and probable post radiation changes are  unchanged. 4. No change large right pleural effusion. 5. New linear lucency in the lateral aspect of the right fourth rib is  consistent with fracture. In this same area there are some mottled areas of  lucency of the rib that could be due to metastatic disease to the right fourth  rib.  This is not a definitive finding. Bone scan or PET CT scan may yield more  information. Assessment:   1) Non-small cell lung cancer  Stage IIIB  He has at least Stage IIIB disease, and has been treated with concurrent radiation and chemotherapy (cisplatin and etoposide). Repeat PET demonstrates a partial response to therapy. No definite evidence of distant metastatic disease at this time. Pleural effusion cytology negative x3. He completed maintenance immunotherapy with Durvalumab and is now being followed with surveillance. Recent CT discussed in detail with no overt evidence of progression. There is questionable lesion in right forth rib not seen on prior bone scan. Will assess with PET scan. Unfortunately, his two biopsies have yielded scant material, insufficient for molecular studies or PDL1 testing. At progression, a repeat biopsy may be needed for NGS. 2) SVC syndrome  Monitor. Stable on imaging. 3) Pleural effusion  Cytology negative. Worsening on recent imaging, underwent repeat thoracentesis on 11/27/2019, 2/14/2020 and 7/2/2020 with negative cytology. Symptoms are stable. Effusion the same level on current imaging. 4) Thrombocytopenia  Monitor for now. Could consider bone marrow biopsy if worsening significantly. He is due to have labs with PCP on 12/21/2020.     5) Neuropathy, chemotherapy induced  Resolved, now off Cymbalta    6) Compression fracture  Bone scan negative. Consider kyphoplasty vs radiation therapy if symptoms develop. DEXA showing osteopenia 3/17/2020. Continue Calcium and Vit D replacement and weight bearing exercise as tolerated. 7) Emotional Well Being  No psychosocial concerns identified today. Patient has adequate support. Plan:     · PET in 3 months  · Labs per PCP  · Follow up with imaging    I have personally seen and evaluated the patient in conjunction with Kayden Lovell NP.  I find the patient's history and physical exam are consistent with the NP's documentation. I agree with the above assessment and plan, which I have edited if needed.       Signed By: Luly Jung MD

## 2020-12-08 ENCOUNTER — OFFICE VISIT (OUTPATIENT)
Dept: ONCOLOGY | Age: 45
End: 2020-12-08
Payer: COMMERCIAL

## 2020-12-08 VITALS
TEMPERATURE: 96.9 F | RESPIRATION RATE: 14 BRPM | SYSTOLIC BLOOD PRESSURE: 116 MMHG | DIASTOLIC BLOOD PRESSURE: 87 MMHG | HEIGHT: 69 IN | OXYGEN SATURATION: 100 % | BODY MASS INDEX: 27.55 KG/M2 | WEIGHT: 186 LBS | HEART RATE: 66 BPM

## 2020-12-08 DIAGNOSIS — D69.6 THROMBOCYTOPENIA (HCC): ICD-10-CM

## 2020-12-08 DIAGNOSIS — M89.9 RIB LESION: ICD-10-CM

## 2020-12-08 DIAGNOSIS — C34.11 MALIGNANT NEOPLASM OF UPPER LOBE OF RIGHT LUNG (HCC): Primary | Chronic | ICD-10-CM

## 2020-12-08 PROCEDURE — 99214 OFFICE O/P EST MOD 30 MIN: CPT | Performed by: INTERNAL MEDICINE

## 2020-12-08 NOTE — PROGRESS NOTES
Chief Complaint   Patient presents with    Lung Cancer       Visit Vitals  /87 (BP 1 Location: Left arm, BP Patient Position: Sitting)   Pulse 66   Temp 96.9 °F (36.1 °C) (Oral)   Resp 14   Ht 5' 9\" (1.753 m)   Wt 186 lb (84.4 kg)   SpO2 100%   BMI 27.47 kg/m²

## 2021-01-04 ENCOUNTER — HOSPITAL ENCOUNTER (OUTPATIENT)
Dept: PET IMAGING | Age: 46
Discharge: HOME | End: 2021-01-04
Attending: NURSE PRACTITIONER
Payer: COMMERCIAL

## 2021-01-04 VITALS — WEIGHT: 192 LBS | HEIGHT: 69 IN | BODY MASS INDEX: 28.44 KG/M2

## 2021-01-04 DIAGNOSIS — M89.9 RIB LESION: ICD-10-CM

## 2021-01-04 DIAGNOSIS — C34.11 MALIGNANT NEOPLASM OF UPPER LOBE OF RIGHT LUNG (HCC): Chronic | ICD-10-CM

## 2021-01-04 LAB
GLUCOSE BLD STRIP.AUTO-MCNC: 96 MG/DL (ref 65–100)
SERVICE CMNT-IMP: NORMAL

## 2021-01-04 PROCEDURE — A9552 F18 FDG: HCPCS

## 2021-01-04 RX ORDER — SODIUM CHLORIDE 0.9 % (FLUSH) 0.9 %
10 SYRINGE (ML) INJECTION
Status: COMPLETED | OUTPATIENT
Start: 2021-01-04 | End: 2021-01-04

## 2021-01-04 RX ADMIN — Medication 10 ML: at 15:12

## 2021-01-07 ENCOUNTER — TELEPHONE (OUTPATIENT)
Dept: ONCOLOGY | Age: 46
End: 2021-01-07

## 2021-01-07 DIAGNOSIS — C34.11 MALIGNANT NEOPLASM OF UPPER LOBE OF RIGHT LUNG (HCC): Primary | Chronic | ICD-10-CM

## 2021-01-07 NOTE — TELEPHONE ENCOUNTER
----- Message from Michele Rivera MD sent at 1/6/2021  5:39 PM EST -----  Please call patient and inform that results look good, no evidence of disease recurrence. 01/07/21- Informed patient of results he verbalized understanding. He reports ongoing SOB on activies and mild chest discomfort along with mild swelling in chest. He is unsure if his fluid accumulation has worsened- per Vdea Moss. NP advised him the fluid amount noted on recent scans is the same amount since November 2020. Advised him to monitor symptoms. If they worsen we can order CXR/ see him in the office sooner than scheduled. He agreed with plan and denied any further questions or concerns.

## 2021-01-18 RX ORDER — TRAMADOL HYDROCHLORIDE 50 MG/1
50 TABLET ORAL
Qty: 20 TAB | Refills: 0 | Status: SHIPPED | OUTPATIENT
Start: 2021-01-18 | End: 2021-01-23

## 2021-01-18 RX ORDER — METHYLPREDNISOLONE 4 MG/1
TABLET ORAL
Qty: 1 DOSE PACK | Refills: 0 | Status: SHIPPED | OUTPATIENT
Start: 2021-01-18 | End: 2021-03-10 | Stop reason: ALTCHOICE

## 2021-01-18 NOTE — TELEPHONE ENCOUNTER
Patient is calling about pain in the rib area getting worst.  He wants to know if you can prescribe something for the pain       # [phone]

## 2021-01-18 NOTE — TELEPHONE ENCOUNTER
[facility]  [phone]    01/18/21- Patient reports pain in his right ribcage to mid chest worsening over the last couple days. He also reports stiffness in his shoulder. SOB continues, but has not worsened. Pain is better when sitting up straight or reclining back. Worsens with movement. Patient does not currently take any medication for pain. Will discuss with Veda Moss and call patient back. 11:47 AM- Per NP, PET showed some inflammation on chest wall due to effusion, even though effusion is unchanged that may be causing some pain/discomfort. Will treat with Tramadol and short course of steroids. Encouraged patient to monitor symptoms and call back if no improvement. He verbalized understanding, no further questions or concerns.

## 2021-03-05 NOTE — PROGRESS NOTES
Cancer [place] at [facility]  [address] [facility], [address] [facility] [facility]  Jolanta Harris: [phone]  F: [phone]      Reason for Visit:   Alex Alan is a 54 y.o. male who is seen for follow up of non-small cell lung cancer. Treatment History:   · Diagnosed at [facility]  · Biopsy of right level 4 node: non small cell carcinoma, favored squamous cell carcinoma, insufficient sample for further testing  · Stage III Non-small cell lung cancer (Squamous cell)  · Definitive radiation with Dr. Tc Castano completed mid-July 2019  · Concurrent chemotherapy with carboplatin and paclitaxel by Dr. Maik De Anda, stopped during second infusion due to reaction to paclitaxel  · Concurrent chemotherapy with cisplatin and etoposide 7/16/2019 by Dr. Maik De Anda  · CT Chest 7/27/2019: There has been no significant change in size of the large right paratracheal mediastinal mass. There are many new areas of peripheral consolidation in the right upper lobe, which may represent some combination of progressive disease, posttreatment change, or infection. Attention on follow-up is needed. A previously seen right upper lobe nodule in the posterior segment appears to have decreased in size. The large right pleural effusion on this exam is significantly increased in size from prior. · CT A/P 7/29/2019: no metastatic disease in abdomen or pelvis  · MRI Brain 7/29/2019: no intracranial metastatic disease  · EBUS by Dr. Ryne Tellez 7/30/2019: Squamous cell, PDL1 QNS, insufficient tissue for molecular studies  · Thoracentesis 8/5/2019: cytology negative  · Initiated maintenance therapy with Durvalumab on 8/27/2019-8/25/2020    History of Present Illness:   He continues with some PEDROZA, perhaps a bit worse than when I saw him last.  Cough is stable, slightly productive, yellow sputum. No hemoptysis. No worsening swelling. No infections. No pain. He is unaccompanied today.      He quit tobacco at diagnosis, though he was a light smoker (1-2 packs per week). Review of systems was obtained and pertinent findings reviewed above. Past medical history, social history, family history, medications, and allergies are located in the electronic medical record. Physical Exam:     Visit Vitals  /77 (BP 1 Location: Left upper arm, BP Patient Position: Sitting, BP Cuff Size: Large adult)   Pulse 64   Temp 98 °F (36.7 °C) (Temporal)   Resp 20   SpO2 95%     ECOG PS: 1  General: no distress  Eyes: anicteric sclerae  HENT: oropharynx clear  Neck: supple  Lymphatic: no cervical, supraclavicular, or inguinal adenopathy  Respiratory: normal respiratory effort  CV: no peripheral edema  GI: soft, nontender, nondistended, no masses  Skin: no rashes; no ecchymoses; no petechiae    Results:     Lab Results   Component Value Date/Time    WBC 2.4 (L) 08/25/2020 09:23 AM    HGB 11.9 (L) 08/25/2020 09:23 AM    HCT 36.9 08/25/2020 09:23 AM    PLATELET 578 (L) 01/24/0705 09:23 AM    MCV 72.1 (L) 08/25/2020 09:23 AM    ABS. NEUTROPHILS 1.7 (L) 08/25/2020 09:23 AM     Lab Results   Component Value Date/Time    Sodium 139 08/25/2020 09:14 AM    Potassium 4.0 08/25/2020 09:14 AM    Chloride 108 08/25/2020 09:14 AM    CO2 26 08/25/2020 09:14 AM    Glucose 109 (H) 08/25/2020 09:14 AM    BUN 19 08/25/2020 09:14 AM    Creatinine 1.19 08/25/2020 09:14 AM    GFR est AA >60 08/25/2020 09:14 AM    GFR est non-AA >60 08/25/2020 09:14 AM    Calcium 9.0 08/25/2020 09:14 AM    Glucose (POC) 96 01/04/2021 03:09 PM     Lab Results   Component Value Date/Time    Bilirubin, total 0.5 08/25/2020 09:14 AM    ALT (SGPT) 30 08/25/2020 09:14 AM    Alk.  phosphatase 109 08/25/2020 09:14 AM    Protein, total 7.3 08/25/2020 09:14 AM    Albumin 3.6 08/25/2020 09:14 AM    Globulin 3.7 08/25/2020 09:14 AM     Lab Results   Component Value Date/Time    Reticulocyte count 1.7 08/27/2019 11:19 AM    Iron % saturation 33 07/02/2020 04:35 AM    TIBC 199 (L) 07/02/2020 04:35 AM Ferritin 413 (H) 07/02/2020 04:35 AM    Vitamin B12 873 07/02/2020 04:35 AM    Folate 10.1 07/02/2020 04:35 AM    TSH 3.49 08/11/2020 08:14 AM    Lipase 136 07/27/2019 11:31 AM    Hep C virus Ab Interp. NONREACTIVE 12/03/2019 11:22 AM     Lab Results   Component Value Date/Time    INR 1.0 12/03/2019 11:22 AM    aPTT 27.3 12/03/2019 11:22 AM    D-dimer 2.45 (H) 06/09/2020 10:56 AM    Fibrinogen 243 12/03/2019 11:22 AM         Bronchoscopy 6/17/2020:  Hyperremic trachea. Evidence of recent oozing from anterior and posterior distal wall of the trachea. Almost completely collapsed RUL subsegments. No evidence of bleeding from RUL. RML take-off narrowed with extrinsic compression and questionable mass vs. compressed mucosa in the opening. No evidence of bleeding from that site. External compression of RLL subsegments, however no endobronchial lesion or bleeding. CT C/A/P 7/1/2020:   1. New left lung airspace disease. 2. Stable large right pleural effusion. 3. Stable RUL mass and mediastinal adenopathy. 4. Stable unremarkable abdomen/pelvis. CT C/A/P 8/18/2020:  1. Large right pleural effusion. 2. 2 cm x 1.4 cm right upper lobe hypodense mass, unchanged. 3. 2.2 cm x 1.7 cm enlarged right paratracheal lymph node, unchanged. CT Chest 11/24/2020:  1. 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 2. Soft tissue density right paratracheal region surrounding the superior vena  cava is unchanged. 3. Right upper lobe volume loss and probable post radiation changes are  unchanged. 4. No change large right pleural effusion. 5. New linear lucency in the lateral aspect of the right fourth rib is  consistent with fracture. In this same area there are some mottled areas of  lucency of the rib that could be due to metastatic disease to the right fourth  rib. This is not a definitive finding.  Bone scan or PET CT scan may yield more  Information. PET/CT 1/4/2021:  No Neoplastic Foci of Abnormal Hypermetabolism. Labs from PCP from 2/22/2021 reviewed and scanned into EMR:  Creatinine: 1.24  LFTs: wnl  TSH: 2.8  WBC: 2.5  HGB: 12.0  PLT: 115    Assessment:   1) Non-small cell lung cancer  Stage IIIB  He is s/p definitive radiation with concurrent chemotherapy (cisplatin and etoposide) followed by one year of maintenance durvalumab. He is now followed with surveillance. His recent PET from January looked good with no hypermetabolic abnormalities. No evidence of recurrence, though his risk of recurrence in the future is very high. We will continue with surveillance. His PET was done earlier than I had planned. I recommend CT 3 months from the PET, which will be due next month. Unfortunately, his two biopsies have yielded scant material, insufficient for molecular studies or PDL1 testing. At progression, a repeat biopsy may be needed for NGS. 2) SVC syndrome  Symptoms well controlled now. Stable on imaging. Monitor. 3) Pleural effusion  Cytology negative. Underwent repeat thoracentesis on 11/27/2019, 2/14/2020 and 7/2/2020 with negative cytology. Stable on recent PET. Monitor. 4) Thrombocytopenia  Mild. Possibly from prior chemotherapy. Stable on recent labs from his PCP. Monitor. Consider bone marrow biopsy if worsening significantly. 5) Compression fracture  Bone scan negative. Consider kyphoplasty vs radiation therapy if symptoms develop. No pain currently. 6) Osteopenia  Noted on DEXA 3/17/2020. Continue Calcium and Vit D replacement and weight bearing exercise as tolerated. 7) PEDROZA  Worsening somewhat. Attention on upcoming CT.     Plan:     · CT Chest in one month  · Return to see me after his CT        Signed By: Kateryna Holman MD

## 2021-03-09 ENCOUNTER — TELEPHONE (OUTPATIENT)
Dept: ONCOLOGY | Age: 46
End: 2021-03-09

## 2021-03-09 NOTE — TELEPHONE ENCOUNTER
Called patient to go over covid screening questions. No answer. Left a voicemail and call back number.
Name band;

## 2021-03-10 ENCOUNTER — OFFICE VISIT (OUTPATIENT)
Dept: ONCOLOGY | Age: 46
End: 2021-03-10
Payer: COMMERCIAL

## 2021-03-10 VITALS
SYSTOLIC BLOOD PRESSURE: 116 MMHG | OXYGEN SATURATION: 95 % | TEMPERATURE: 98 F | HEART RATE: 64 BPM | DIASTOLIC BLOOD PRESSURE: 77 MMHG | RESPIRATION RATE: 20 BRPM

## 2021-03-10 DIAGNOSIS — D69.6 THROMBOCYTOPENIA (HCC): ICD-10-CM

## 2021-03-10 DIAGNOSIS — C34.11 MALIGNANT NEOPLASM OF UPPER LOBE OF RIGHT LUNG (HCC): Primary | ICD-10-CM

## 2021-03-10 PROCEDURE — 99214 OFFICE O/P EST MOD 30 MIN: CPT | Performed by: INTERNAL MEDICINE

## 2021-03-10 NOTE — PROGRESS NOTES
[facility] is a 54 y.o. male follow up for lung cancer. 1. Have you been to the ER, urgent care clinic since your last visit? Hospitalized since your last visit?no    2. Have you seen or consulted any other health care providers outside of the [address] [place] [address] since your last visit? Include any pap smears or colon screening.  no

## 2021-04-07 ENCOUNTER — HOSPITAL ENCOUNTER (OUTPATIENT)
Dept: CT IMAGING | Age: 46
Discharge: HOME | End: 2021-04-07
Attending: INTERNAL MEDICINE
Payer: COMMERCIAL

## 2021-04-07 DIAGNOSIS — C34.11 MALIGNANT NEOPLASM OF UPPER LOBE OF RIGHT LUNG (HCC): ICD-10-CM

## 2021-04-07 PROCEDURE — 74011000636 HC RX REV CODE- 636: Performed by: RADIOLOGY

## 2021-04-07 PROCEDURE — 71260 CT THORAX DX C+: CPT

## 2021-04-07 RX ADMIN — IOPAMIDOL 100 ML: 612 INJECTION, SOLUTION INTRAVENOUS at 14:44

## 2021-04-14 ENCOUNTER — OFFICE VISIT (OUTPATIENT)
Dept: ONCOLOGY | Age: 46
End: 2021-04-14
Payer: COMMERCIAL

## 2021-04-14 VITALS
RESPIRATION RATE: 20 BRPM | HEART RATE: 61 BPM | DIASTOLIC BLOOD PRESSURE: 77 MMHG | SYSTOLIC BLOOD PRESSURE: 114 MMHG | HEIGHT: 69 IN | BODY MASS INDEX: 27.85 KG/M2 | WEIGHT: 188 LBS | OXYGEN SATURATION: 98 % | TEMPERATURE: 97 F

## 2021-04-14 DIAGNOSIS — C34.11 MALIGNANT NEOPLASM OF UPPER LOBE OF RIGHT LUNG (HCC): Primary | Chronic | ICD-10-CM

## 2021-04-14 PROCEDURE — 99214 OFFICE O/P EST MOD 30 MIN: CPT | Performed by: INTERNAL MEDICINE

## 2021-04-14 NOTE — PROGRESS NOTES
Cancer [place] at [place] [address] [facility]  Felipe Pal: [phone]  F: [phone]      Reason for Visit:   Chavez Morel is a 54 y.o. male who is seen for follow up of non-small cell lung cancer. Treatment History:   · Diagnosed at [address] [facility]  · Biopsy of right level 4 node: non small cell carcinoma, favored squamous cell carcinoma, insufficient sample for further testing  · Stage III Non-small cell lung cancer (Squamous cell)  · Definitive radiation with Dr. Jada Joy completed mid-July 2019  · Concurrent chemotherapy with carboplatin and paclitaxel by Dr. Janet Wellington, stopped during second infusion due to reaction to paclitaxel  · Concurrent chemotherapy with cisplatin and etoposide 7/16/2019 by Dr. Janet Wellington  · CT Chest 7/27/2019: There has been no significant change in size of the large right paratracheal mediastinal mass. There are many new areas of peripheral consolidation in the right upper lobe, which may represent some combination of progressive disease, posttreatment change, or infection. Attention on follow-up is needed. A previously seen right upper lobe nodule in the posterior segment appears to have decreased in size. The large right pleural effusion on this exam is significantly increased in size from prior. · CT A/P 7/29/2019: no metastatic disease in abdomen or pelvis  · MRI Brain 7/29/2019: no intracranial metastatic disease  · EBUS by Dr. Caryle Aus 7/30/2019: Squamous cell, PDL1 QNS, insufficient tissue for molecular studies  · Thoracentesis 8/5/2019: cytology negative  · Initiated maintenance therapy with Durvalumab on 8/27/2019-8/25/2020    History of Present Illness:   Mild PEDROZA persists. This is not bothersome for him, not worsening. Some limited activity, he takes frequent breaks as needed. He remains active in the home. States he will have to take a break with sweeping the floors in the home.  Some wheezing with increased activity, this has been stable in the last year or more since treatment. No nausea or vomiting. No change in bowels. No change in appetite. He is unaccompanied today. He quit tobacco at diagnosis, though he was a light smoker (1-2 packs per week). Review of systems was obtained and pertinent findings reviewed above. Past medical history, social history, family history, medications, and allergies are located in the electronic medical record. Physical Exam:     Visit Vitals  /77 (BP 1 Location: Left upper arm, BP Patient Position: Sitting, BP Cuff Size: Large adult)   Pulse 61   Temp 97 °F (36.1 °C) (Oral)   Resp 20   Ht 5' 9\" (1.753 m)   Wt 188 lb (85.3 kg)   SpO2 98%   BMI 27.76 kg/m²     ECOG PS: 1  General: no distress  Eyes: anicteric sclerae  HENT: oropharynx clear  Neck: supple  Lymphatic: no cervical, supraclavicular, or inguinal adenopathy  Respiratory: normal respiratory effort, left lung clear, right lung severely diminished. CV: no peripheral edema, HRR  GI: soft, nontender, nondistended, no masses  Skin: no rashes; no ecchymoses; no petechiae    Results:     Lab Results   Component Value Date/Time    WBC 2.4 (L) 08/25/2020 09:23 AM    HGB 11.9 (L) 08/25/2020 09:23 AM    HCT 36.9 08/25/2020 09:23 AM    PLATELET 675 (L) 56/65/0204 09:23 AM    MCV 72.1 (L) 08/25/2020 09:23 AM    ABS. NEUTROPHILS 1.7 (L) 08/25/2020 09:23 AM     Lab Results   Component Value Date/Time    Sodium 139 08/25/2020 09:14 AM    Potassium 4.0 08/25/2020 09:14 AM    Chloride 108 08/25/2020 09:14 AM    CO2 26 08/25/2020 09:14 AM    Glucose 109 (H) 08/25/2020 09:14 AM    BUN 19 08/25/2020 09:14 AM    Creatinine 1.19 08/25/2020 09:14 AM    GFR est AA >60 08/25/2020 09:14 AM    GFR est non-AA >60 08/25/2020 09:14 AM    Calcium 9.0 08/25/2020 09:14 AM    Glucose (POC) 96 01/04/2021 03:09 PM     Lab Results   Component Value Date/Time    Bilirubin, total 0.5 08/25/2020 09:14 AM    ALT (SGPT) 30 08/25/2020 09:14 AM    Alk. phosphatase 109 08/25/2020 09:14 AM    Protein, total 7.3 08/25/2020 09:14 AM    Albumin 3.6 08/25/2020 09:14 AM    Globulin 3.7 08/25/2020 09:14 AM     Lab Results   Component Value Date/Time    Reticulocyte count 1.7 08/27/2019 11:19 AM    Iron % saturation 33 07/02/2020 04:35 AM    TIBC 199 (L) 07/02/2020 04:35 AM    Ferritin 413 (H) 07/02/2020 04:35 AM    Vitamin B12 873 07/02/2020 04:35 AM    Folate 10.1 07/02/2020 04:35 AM    TSH 3.49 08/11/2020 08:14 AM    Lipase 136 07/27/2019 11:31 AM    Hep C virus Ab Interp. NONREACTIVE 12/03/2019 11:22 AM     Lab Results   Component Value Date/Time    INR 1.0 12/03/2019 11:22 AM    aPTT 27.3 12/03/2019 11:22 AM    D-dimer 2.45 (H) 06/09/2020 10:56 AM    Fibrinogen 243 12/03/2019 11:22 AM       PET/CT 1/4/2021:  No Neoplastic Foci of Abnormal Hypermetabolism. CT chest 4/7/2021:  1. Moderate to large right pleural effusion, seen on multiple prior  examinations. 2.  SVC and bilateral brachiocephalic vein occlusion with compensatory right  chest wall collaterals. 3.  Similar paratracheal soft tissue to prior exams. 4.  Post radiation changes in the right perihilar lung. Assessment:   1) Non-small cell lung cancer  Stage IIIB  He is s/p definitive radiation with concurrent chemotherapy (cisplatin and etoposide) followed by one year of maintenance durvalumab completed 8/2020. He is now followed with surveillance. I personally reviewed his recent CT and he has no evidence of recurrence/progression, though his risk of recurrence remains high. We will continue with surveillance, repeating a CT in 3 months. Unfortunately, his two biopsies yielded scant material, insufficient for molecular studies or PDL1 testing. At progression, a repeat biopsy may be needed for NGS. 2) SVC syndrome  Symptoms well controlled now. Stable on imaging. Monitor. 3) Pleural effusion  Cytology negative.  Underwent repeat thoracentesis on 11/27/2019, 2/14/2020 and 7/2/2020 with negative cytology. Stable on recent PET. Monitor. 4) Thrombocytopenia  Mild. Possibly from prior chemotherapy. Stable on recent labs from his PCP. Monitor. Consider bone marrow biopsy if worsening significantly. 5) Compression fracture  Bone scan negative. Consider kyphoplasty vs radiation therapy if symptoms develop. No pain currently. 6) Osteopenia  Noted on DEXA 3/17/2020. Continue Calcium and Vit D replacement and weight bearing exercise as tolerated. 7) PEDROZA  Stable per patient report. No worsening of symptoms. Effusion stable on recent imaging. Plan:     · CT Chest in 3 months  · Labs per PCP  · Return to see me after his CT    I have personally seen and evaluated the patient in conjunction with Fatoumata Medrano NP. I find the patient's history and physical exam are consistent with the NP's documentation. I agree with the above assessment and plan, which I have modified as needed. He has no evidence of recurrence at this time, so we will continue with surveillance.     Signed By: Patrick Meeks MD

## 2021-04-14 NOTE — PROGRESS NOTES
Christal Skinner is a 54 y.o. male follow up for lung cancer. 1. Have you been to the ER, urgent care clinic since your last visit? Hospitalized since your last visit?no     2. Have you seen or consulted any other health care providers outside of the [address] [place] [address] since your last visit? Include any pap smears or colon screening.  no

## 2021-08-20 ENCOUNTER — HOSPITAL ENCOUNTER (OUTPATIENT)
Dept: CT IMAGING | Age: 46
Discharge: HOME | End: 2021-08-20
Attending: NURSE PRACTITIONER
Payer: MEDICAID

## 2021-08-20 DIAGNOSIS — C34.11 MALIGNANT NEOPLASM OF UPPER LOBE OF RIGHT LUNG (HCC): Chronic | ICD-10-CM

## 2021-08-20 PROCEDURE — 71260 CT THORAX DX C+: CPT

## 2021-08-20 PROCEDURE — 74011000636 HC RX REV CODE- 636: Performed by: STUDENT IN AN ORGANIZED HEALTH CARE EDUCATION/TRAINING PROGRAM

## 2021-08-20 RX ADMIN — IOPAMIDOL 100 ML: 612 INJECTION, SOLUTION INTRAVENOUS at 14:49

## 2021-08-21 NOTE — PROGRESS NOTES
Cancer [place] at [facility]  [address] [facility]  Kathia Osullivan: [phone]  F: [phone]      Reason for Visit:   Stoney Catherine is a 54 y.o. male who is seen for follow up of non-small cell lung cancer. Treatment History:   · Diagnosed at [facility]  · Biopsy of right level 4 node: non small cell carcinoma, favored squamous cell carcinoma, insufficient sample for further testing  · Stage III Non-small cell lung cancer (Squamous cell)  · Definitive radiation with Dr. Swapnil Kim completed mid-July 2019  · Concurrent chemotherapy with carboplatin and paclitaxel by Dr. Ivett Aviles, stopped during second infusion due to reaction to paclitaxel  · Concurrent chemotherapy with cisplatin and etoposide 7/16/2019 by Dr. Ivett Aviles  · CT Chest 7/27/2019: There has been no significant change in size of the large right paratracheal mediastinal mass. There are many new areas of peripheral consolidation in the right upper lobe, which may represent some combination of progressive disease, posttreatment change, or infection. Attention on follow-up is needed. A previously seen right upper lobe nodule in the posterior segment appears to have decreased in size. The large right pleural effusion on this exam is significantly increased in size from prior. · CT A/P 7/29/2019: no metastatic disease in abdomen or pelvis  · MRI Brain 7/29/2019: no intracranial metastatic disease  · EBUS by Dr. Dominique Frye 7/30/2019: Squamous cell, PDL1 QNS, insufficient tissue for molecular studies  · Thoracentesis 8/5/2019: cytology negative  · Initiated maintenance therapy with Durvalumab on 8/27/2019-8/25/2020    History of Present Illness:   He reports doing about the same. Continues with some PEDROZA, stable to improved. Needs to stop and rest when he climbs a flight of stairs. Cough intermittent, nonproductive. No hemoptysis. No pain. No UE swelling. He is unaccompanied today.      He quit tobacco at diagnosis, though he was a light smoker (1-2 packs per week). Review of systems was obtained and pertinent findings reviewed above. Past medical history, social history, family history, medications, and allergies are located in the electronic medical record. Physical Exam:     Visit Vitals  /70 (BP 1 Location: Right arm, BP Patient Position: Sitting, BP Cuff Size: Large adult)   Pulse 61   Temp 98.2 °F (36.8 °C) (Temporal)   Resp 20   Ht 5' 9\" (1.753 m)   Wt 185 lb (83.9 kg)   SpO2 98%   BMI 27.32 kg/m²     ECOG PS: 1  General: no distress  Eyes: anicteric sclerae  HENT: oropharynx clear  Neck: supple  Lymphatic: no cervical, supraclavicular, or inguinal adenopathy  Respiratory: normal respiratory effort, left lung clear, right lung severely diminished. CV: no peripheral edema, HRR  GI: soft, nontender, nondistended, no masses  Skin: no rashes; no ecchymoses; no petechiae    Results:     Lab Results   Component Value Date/Time    WBC 2.4 (L) 08/25/2020 09:23 AM    HGB 11.9 (L) 08/25/2020 09:23 AM    HCT 36.9 08/25/2020 09:23 AM    PLATELET 947 (L) 36/25/2734 09:23 AM    MCV 72.1 (L) 08/25/2020 09:23 AM    ABS. NEUTROPHILS 1.7 (L) 08/25/2020 09:23 AM     Lab Results   Component Value Date/Time    Sodium 139 08/25/2020 09:14 AM    Potassium 4.0 08/25/2020 09:14 AM    Chloride 108 08/25/2020 09:14 AM    CO2 26 08/25/2020 09:14 AM    Glucose 109 (H) 08/25/2020 09:14 AM    BUN 19 08/25/2020 09:14 AM    Creatinine 1.19 08/25/2020 09:14 AM    GFR est AA >60 08/25/2020 09:14 AM    GFR est non-AA >60 08/25/2020 09:14 AM    Calcium 9.0 08/25/2020 09:14 AM    Glucose (POC) 96 01/04/2021 03:09 PM     Lab Results   Component Value Date/Time    Bilirubin, total 0.5 08/25/2020 09:14 AM    ALT (SGPT) 30 08/25/2020 09:14 AM    Alk.  phosphatase 109 08/25/2020 09:14 AM    Protein, total 7.3 08/25/2020 09:14 AM    Albumin 3.6 08/25/2020 09:14 AM    Globulin 3.7 08/25/2020 09:14 AM     Lab Results   Component Value Date/Time    Reticulocyte count 1.7 08/27/2019 11:19 AM    Iron % saturation 33 07/02/2020 04:35 AM    TIBC 199 (L) 07/02/2020 04:35 AM    Ferritin 413 (H) 07/02/2020 04:35 AM    Vitamin B12 873 07/02/2020 04:35 AM    Folate 10.1 07/02/2020 04:35 AM    TSH 3.49 08/11/2020 08:14 AM    Lipase 136 07/27/2019 11:31 AM    Hep C virus Ab Interp. NONREACTIVE 12/03/2019 11:22 AM     Lab Results   Component Value Date/Time    INR 1.0 12/03/2019 11:22 AM    aPTT 27.3 12/03/2019 11:22 AM    D-dimer 2.45 (H) 06/09/2020 10:56 AM    Fibrinogen 243 12/03/2019 11:22 AM       PET/CT 1/4/2021:  No Neoplastic Foci of Abnormal Hypermetabolism. CT chest 4/7/2021:  1. Moderate to large right pleural effusion, seen on multiple prior  examinations. 2.  SVC and bilateral brachiocephalic vein occlusion with compensatory right  chest wall collaterals. 3.  Similar paratracheal soft tissue to prior exams. 4.  Post radiation changes in the right perihilar lung. CT Chest 8/20/2021:  1.  Stable examination with regards to the moderate right pleural effusion and  posttreatment changes in the right hilum. No evidence of local recurrence. 2.  Unchanged SVC and bilateral brachiocephalic vein occlusion. Assessment:   1) Non-small cell lung cancer  Stage IIIB  He is s/p definitive radiation with concurrent chemotherapy (cisplatin and etoposide) followed by one year of maintenance durvalumab completed 8/2020. He is now followed with surveillance. He has no evidence of recurrence at this time. I personally reviewed his recent CT which is overall stable, with persistent abnormalities but no definite progression or recurrence of his malignancy. He is now one year out from treatment. He remains at high risk of recurrence. We will continue with surveillance, continuing with 3 month interval imaging given his persistent radiographic abnormalities.     Unfortunately, his two biopsies yielded scant material, insufficient for molecular studies or PDL1 testing. At progression, a repeat biopsy may be needed for PDL1 and NGS. 2) SVC syndrome  Symptoms well controlled now. Stable on imaging. Monitor. 3) Pleural effusion  Cytology negative. Underwent repeat thoracentesis on 11/27/2019, 2/14/2020 and 7/2/2020 with negative cytology. Stable on recent CT. Monitor. 4) Thrombocytopenia  Mild. Chronic since at least 7/2019. Possibly from prior chemotherapy. Monitor, repeat CBC today. Consider bone marrow biopsy if worsening significantly. 5) Compression fracture  Bone scan negative. Consider kyphoplasty vs radiation therapy if symptoms develop. No pain currently. 6) Osteopenia  Noted on DEXA 3/17/2020. Continue Calcium and Vit D replacement and weight bearing exercise as tolerated. 7) PEDROZA  Stable per patient report. No worsening of symptoms. Effusion stable on recent imaging.      Plan:     · Labs today: CBC, CMP  · CT Chest in 3 months  · Return to see me in 3 months      Signed By: Rebekah Wiggins MD

## 2021-08-27 ENCOUNTER — OFFICE VISIT (OUTPATIENT)
Dept: ONCOLOGY | Age: 46
End: 2021-08-27
Payer: MEDICAID

## 2021-08-27 VITALS
HEIGHT: 69 IN | HEART RATE: 61 BPM | WEIGHT: 185 LBS | DIASTOLIC BLOOD PRESSURE: 70 MMHG | RESPIRATION RATE: 20 BRPM | OXYGEN SATURATION: 98 % | SYSTOLIC BLOOD PRESSURE: 118 MMHG | TEMPERATURE: 98.2 F | BODY MASS INDEX: 27.4 KG/M2

## 2021-08-27 DIAGNOSIS — C34.11 MALIGNANT NEOPLASM OF UPPER LOBE OF RIGHT LUNG (HCC): Primary | ICD-10-CM

## 2021-08-27 DIAGNOSIS — D69.6 THROMBOCYTOPENIA (HCC): ICD-10-CM

## 2021-08-27 PROCEDURE — 99214 OFFICE O/P EST MOD 30 MIN: CPT | Performed by: INTERNAL MEDICINE

## 2021-08-27 NOTE — PROGRESS NOTES
Deven Humphrey is a 54 y.o. male follow up for lung cancer. 1. Have you been to the ER, urgent care clinic since your last visit? Hospitalized since your last visit?no     2. Have you seen or consulted any other health care providers outside of the [address] [place] [address] since your last visit? Include any pap smears or colon screening.  no

## 2021-08-28 LAB
ALBUMIN SERPL-MCNC: 4.5 G/DL (ref 4–5)
ALBUMIN/GLOB SERPL: 1.7 {RATIO} (ref 1.2–2.2)
ALP SERPL-CCNC: 101 IU/L (ref 48–121)
ALT SERPL-CCNC: 24 IU/L (ref 0–44)
AST SERPL-CCNC: 25 IU/L (ref 0–40)
BASOPHILS # BLD AUTO: 0 X10E3/UL (ref 0–0.2)
BASOPHILS NFR BLD AUTO: 0 %
BILIRUB SERPL-MCNC: 0.2 MG/DL (ref 0–1.2)
BUN SERPL-MCNC: 20 MG/DL (ref 6–24)
BUN/CREAT SERPL: 14 (ref 9–20)
CALCIUM SERPL-MCNC: 9.2 MG/DL (ref 8.7–10.2)
CHLORIDE SERPL-SCNC: 104 MMOL/L (ref 96–106)
CO2 SERPL-SCNC: 24 MMOL/L (ref 20–29)
CREAT SERPL-MCNC: 1.38 MG/DL (ref 0.76–1.27)
EOSINOPHIL # BLD AUTO: 0.1 X10E3/UL (ref 0–0.4)
EOSINOPHIL NFR BLD AUTO: 4 %
ERYTHROCYTE [DISTWIDTH] IN BLOOD BY AUTOMATED COUNT: 16.1 % (ref 11.6–15.4)
GLOBULIN SER CALC-MCNC: 2.6 G/DL (ref 1.5–4.5)
GLUCOSE SERPL-MCNC: 92 MG/DL (ref 65–99)
HCT VFR BLD AUTO: 40.9 % (ref 37.5–51)
HGB BLD-MCNC: 13.3 G/DL (ref 13–17.7)
IMM GRANULOCYTES # BLD AUTO: 0 X10E3/UL (ref 0–0.1)
IMM GRANULOCYTES NFR BLD AUTO: 0 %
LYMPHOCYTES # BLD AUTO: 0.6 X10E3/UL (ref 0.7–3.1)
LYMPHOCYTES NFR BLD AUTO: 21 %
MCH RBC QN AUTO: 23.8 PG (ref 26.6–33)
MCHC RBC AUTO-ENTMCNC: 32.5 G/DL (ref 31.5–35.7)
MCV RBC AUTO: 73 FL (ref 79–97)
MONOCYTES # BLD AUTO: 0.5 X10E3/UL (ref 0.1–0.9)
MONOCYTES NFR BLD AUTO: 16 %
NEUTROPHILS # BLD AUTO: 1.7 X10E3/UL (ref 1.4–7)
NEUTROPHILS NFR BLD AUTO: 59 %
PLATELET # BLD AUTO: 130 X10E3/UL (ref 150–450)
POTASSIUM SERPL-SCNC: 4.6 MMOL/L (ref 3.5–5.2)
PROT SERPL-MCNC: 7.1 G/DL (ref 6–8.5)
RBC # BLD AUTO: 5.59 X10E6/UL (ref 4.14–5.8)
SODIUM SERPL-SCNC: 139 MMOL/L (ref 134–144)
WBC # BLD AUTO: 2.9 X10E3/UL (ref 3.4–10.8)

## 2021-12-02 ENCOUNTER — HOSPITAL ENCOUNTER (OUTPATIENT)
Dept: CT IMAGING | Age: 46
Discharge: HOME | End: 2021-12-02
Attending: INTERNAL MEDICINE
Payer: MEDICARE

## 2021-12-02 DIAGNOSIS — C34.11 MALIGNANT NEOPLASM OF UPPER LOBE OF RIGHT LUNG (HCC): ICD-10-CM

## 2021-12-02 PROCEDURE — 74011000636 HC RX REV CODE- 636: Performed by: EMERGENCY MEDICINE

## 2021-12-02 PROCEDURE — 71260 CT THORAX DX C+: CPT

## 2021-12-02 RX ADMIN — IOPAMIDOL 100 ML: 612 INJECTION, SOLUTION INTRAVENOUS at 13:46

## 2021-12-03 NOTE — PROGRESS NOTES
[facility] [place] at [place] [address] [address] [facility]  Michael [phone]  F: [phone]      Reason for Visit:   Jonah Pinedo is a 54 y.o. male who is seen for follow up of non-small cell lung cancer. Treatment History:   · Diagnosed at [facility]  · Biopsy of right level 4 node: non small cell carcinoma, favored squamous cell carcinoma, insufficient sample for further testing  · Stage III Non-small cell lung cancer (Squamous cell)  · Definitive radiation with Dr. Sunitha Taylor completed mid-2019  · Concurrent chemotherapy with carboplatin and paclitaxel by Dr. Kp Post, stopped during second infusion due to reaction to paclitaxel  · Concurrent chemotherapy with cisplatin and etoposide 2019 by Dr. Kp Post  · CT Chest 2019: There has been no significant change in size of the large right paratracheal mediastinal mass. There are many new areas of peripheral consolidation in the right upper lobe, which may represent some combination of progressive disease, posttreatment change, or infection. Attention on follow-up is needed. A previously seen right upper lobe nodule in the posterior segment appears to have decreased in size. The large right pleural effusion on this exam is significantly increased in size from prior. · CT A/P 2019: no metastatic disease in abdomen or pelvis  · MRI Brain 2019: no intracranial metastatic disease  · EBUS by Dr. Claudell [facility] 2019: Squamous cell, PDL1 QNS, insufficient tissue for molecular studies  · Thoracentesis 2019: cytology negative  · Initiated maintenance therapy with Durvalumab on 2019-2020    History of Present Illness:   He reports persistent dyspnea, occurs with exertion but also when lying supine. Occasional cough, nonproductive, no hemoptysis. No pain. No swelling in arms, but does feel some intermittent swelling in his upper chest. He notes some constipation.       He is unaccompanied today. He quit tobacco at diagnosis, though he was a light smoker (1-2 packs per week). Review of systems was obtained and pertinent findings reviewed above. Past medical history, social history, family history, medications, and allergies are located in the electronic medical record. Physical Exam:     Visit Vitals  /86 (BP 1 Location: Right arm, BP Patient Position: Sitting, BP Cuff Size: Large adult) Comment: . Pulse 65   Temp 97.6 °F (36.4 °C) (Temporal)   Resp 18   Ht 5' 9\" (1.753 m)   Wt 193 lb (87.5 kg)   SpO2 96%   BMI 28.50 kg/m²     ECOG PS: 1  General: no distress  Eyes: anicteric sclerae  HENT: oropharynx clear  Neck: supple  Lymphatic: no cervical, supraclavicular, or inguinal adenopathy  Respiratory: normal respiratory effort, left lung clear, right lung severely diminished. CV: no peripheral edema, HRR  GI: soft, nontender, nondistended, no masses  Skin: no rashes; no ecchymoses; no petechiae    Results:     Lab Results   Component Value Date/Time    WBC 2.9 (L) 08/27/2021 08:56 AM    HGB 13.3 08/27/2021 08:56 AM    HCT 40.9 08/27/2021 08:56 AM    PLATELET 265 (L) 43/12/2227 08:56 AM    MCV 73 (L) 08/27/2021 08:56 AM    ABS. NEUTROPHILS 1.7 08/27/2021 08:56 AM     Lab Results   Component Value Date/Time    Sodium 139 08/27/2021 08:56 AM    Potassium 4.6 08/27/2021 08:56 AM    Chloride 104 08/27/2021 08:56 AM    CO2 24 08/27/2021 08:56 AM    Glucose 92 08/27/2021 08:56 AM    BUN 20 08/27/2021 08:56 AM    Creatinine 1.38 (H) 08/27/2021 08:56 AM    GFR est AA 70 08/27/2021 08:56 AM    GFR est non-AA 61 08/27/2021 08:56 AM    Calcium 9.2 08/27/2021 08:56 AM    Glucose (POC) 96 01/04/2021 03:09 PM     Lab Results   Component Value Date/Time    Bilirubin, total 0.2 08/27/2021 08:56 AM    ALT (SGPT) 24 08/27/2021 08:56 AM    Alk.  phosphatase 101 08/27/2021 08:56 AM    Protein, total 7.1 08/27/2021 08:56 AM    Albumin 4.5 08/27/2021 08:56 AM    Globulin 3.7 08/25/2020 09:14 AM     Lab Results   Component Value Date/Time    Reticulocyte count 1.7 08/27/2019 11:19 AM    Iron % saturation 33 07/02/2020 04:35 AM    TIBC 199 (L) 07/02/2020 04:35 AM    Ferritin 413 (H) 07/02/2020 04:35 AM    Vitamin B12 873 07/02/2020 04:35 AM    Folate 10.1 07/02/2020 04:35 AM    TSH 3.49 08/11/2020 08:14 AM    Lipase 136 07/27/2019 11:31 AM    Hep C virus Ab Interp. NONREACTIVE 12/03/2019 11:22 AM     Lab Results   Component Value Date/Time    INR 1.0 12/03/2019 11:22 AM    aPTT 27.3 12/03/2019 11:22 AM    D-dimer 2.45 (H) 06/09/2020 10:56 AM    Fibrinogen 243 12/03/2019 11:22 AM       CT Chest 8/20/2021:  1.  Stable examination with regards to the moderate right pleural effusion and posttreatment changes in the right hilum. No evidence of local recurrence. 2.  Unchanged SVC and bilateral brachiocephalic vein occlusion. CT Chest 12/3/2021:  No evidence of recurrence or disease progression. Stable examination with regards to the moderate right pleural effusion and posttreatment changes in the right hilum. Assessment:   1) Non-small cell lung cancer  Stage IIIB  He is s/p definitive radiation with concurrent chemotherapy (cisplatin and etoposide) followed by one year of maintenance durvalumab completed 8/2020. He is now followed with surveillance. He has no evidence of recurrence at this time. We will continue with surveillance. Continue every 3 month imaging through 8/2022, then consider reducing frequency to 6 months if stable. Unfortunately, his two biopsies yielded scant material, insufficient for molecular studies or PDL1 testing. At progression, a repeat biopsy may be needed for PDL1 and NGS. 2) SVC syndrome  Symptoms well controlled now. Stable on imaging. Monitor. 3) Pleural effusion  Cytology negative. Underwent repeat thoracentesis on 11/27/2019, 2/14/2020 and 7/2/2020 with negative cytology. Stable on recent CT. Monitor. 4) Thrombocytopenia  Mild.   Chronic since at least 7/2019. Possibly from prior chemotherapy. Monitor, repeat CBC today. Consider bone marrow biopsy if worsening significantly. 5) Compression fracture  Bone scan negative. Consider kyphoplasty vs radiation therapy if symptoms develop. No pain currently. 6) Osteopenia  Noted on DEXA 3/17/2020. Continue Calcium and Vit D replacement and weight bearing exercise as tolerated. 7) Dyspnea  Worsening. Some PEDROZA, but also some position dyspnea when supine. Effusion stable on recent imaging. I encouraged him to work on exercise, he has purchased a treadmill and is expected to have this delivered soon. He has not seen pulmonary in awhile, we will help him get a follow up with Dr. Bipin Duran. May benefit from repeat thoracentesis, but will await pulmonary input. 8) Constipation  Discussed OTC measures to try.     Plan:     · Labs today: CBC, CMP  · Referral to pulmonary  · CT Chest in 3 months  · Return to see me in 3 months      Signed By: Dorothy Mills MD

## 2021-12-10 ENCOUNTER — TELEPHONE (OUTPATIENT)
Dept: ONCOLOGY | Age: 46
End: 2021-12-10

## 2021-12-10 ENCOUNTER — OFFICE VISIT (OUTPATIENT)
Dept: ONCOLOGY | Age: 46
End: 2021-12-10
Payer: MEDICARE

## 2021-12-10 VITALS
OXYGEN SATURATION: 96 % | SYSTOLIC BLOOD PRESSURE: 134 MMHG | DIASTOLIC BLOOD PRESSURE: 86 MMHG | BODY MASS INDEX: 28.58 KG/M2 | HEART RATE: 65 BPM | RESPIRATION RATE: 18 BRPM | WEIGHT: 193 LBS | HEIGHT: 69 IN | TEMPERATURE: 97.6 F

## 2021-12-10 DIAGNOSIS — R06.02 SHORTNESS OF BREATH: ICD-10-CM

## 2021-12-10 DIAGNOSIS — C34.11 MALIGNANT NEOPLASM OF UPPER LOBE OF RIGHT LUNG (HCC): Primary | ICD-10-CM

## 2021-12-10 PROCEDURE — G8752 SYS BP LESS 140: HCPCS | Performed by: INTERNAL MEDICINE

## 2021-12-10 PROCEDURE — G8754 DIAS BP LESS 90: HCPCS | Performed by: INTERNAL MEDICINE

## 2021-12-10 PROCEDURE — G8427 DOCREV CUR MEDS BY ELIG CLIN: HCPCS | Performed by: INTERNAL MEDICINE

## 2021-12-10 PROCEDURE — G8432 DEP SCR NOT DOC, RNG: HCPCS | Performed by: INTERNAL MEDICINE

## 2021-12-10 PROCEDURE — 99214 OFFICE O/P EST MOD 30 MIN: CPT | Performed by: INTERNAL MEDICINE

## 2021-12-10 PROCEDURE — G8419 CALC BMI OUT NRM PARAM NOF/U: HCPCS | Performed by: INTERNAL MEDICINE

## 2021-12-10 NOTE — TELEPHONE ENCOUNTER
[employer] at [facility]  [phone]    12/10/21- Appointment scheduled with  ( pulmonary ) for 12/15 at 10:30am at [facility] location. Pt informed of upcoming appointment and records faxed to [phone]- fax confirmation delivery successful.

## 2021-12-10 NOTE — PROGRESS NOTES
Jonn Michael is a 54 y.o. male follow up for lung cancer. 1. Have you been to the ER, urgent care clinic since your last visit? Hospitalized since your last visit?no    2. Have you seen or consulted any other health care providers outside of the [address] [place] [address] since your last visit? Include any pap smears or colon screening.  no

## 2021-12-11 LAB
ALBUMIN SERPL-MCNC: 4.8 G/DL (ref 4–5)
ALBUMIN/GLOB SERPL: 1.8 {RATIO} (ref 1.2–2.2)
ALP SERPL-CCNC: 90 IU/L (ref 44–121)
ALT SERPL-CCNC: 24 IU/L (ref 0–44)
AST SERPL-CCNC: 27 IU/L (ref 0–40)
BASOPHILS # BLD AUTO: 0 X10E3/UL (ref 0–0.2)
BASOPHILS NFR BLD AUTO: 0 %
BILIRUB SERPL-MCNC: 0.4 MG/DL (ref 0–1.2)
BUN SERPL-MCNC: 12 MG/DL (ref 6–24)
BUN/CREAT SERPL: 10 (ref 9–20)
CALCIUM SERPL-MCNC: 9.8 MG/DL (ref 8.7–10.2)
CHLORIDE SERPL-SCNC: 99 MMOL/L (ref 96–106)
CO2 SERPL-SCNC: 22 MMOL/L (ref 20–29)
CREAT SERPL-MCNC: 1.21 MG/DL (ref 0.76–1.27)
EOSINOPHIL # BLD AUTO: 0.1 X10E3/UL (ref 0–0.4)
EOSINOPHIL NFR BLD AUTO: 4 %
ERYTHROCYTE [DISTWIDTH] IN BLOOD BY AUTOMATED COUNT: 17.1 % (ref 11.6–15.4)
GLOBULIN SER CALC-MCNC: 2.7 G/DL (ref 1.5–4.5)
GLUCOSE SERPL-MCNC: 96 MG/DL (ref 65–99)
HCT VFR BLD AUTO: 42.4 % (ref 37.5–51)
HGB BLD-MCNC: 14.3 G/DL (ref 13–17.7)
IMM GRANULOCYTES # BLD AUTO: 0 X10E3/UL (ref 0–0.1)
IMM GRANULOCYTES NFR BLD AUTO: 0 %
LYMPHOCYTES # BLD AUTO: 0.5 X10E3/UL (ref 0.7–3.1)
LYMPHOCYTES NFR BLD AUTO: 20 %
MCH RBC QN AUTO: 23.6 PG (ref 26.6–33)
MCHC RBC AUTO-ENTMCNC: 33.7 G/DL (ref 31.5–35.7)
MCV RBC AUTO: 70 FL (ref 79–97)
MONOCYTES # BLD AUTO: 0.4 X10E3/UL (ref 0.1–0.9)
MONOCYTES NFR BLD AUTO: 15 %
NEUTROPHILS # BLD AUTO: 1.5 X10E3/UL (ref 1.4–7)
NEUTROPHILS NFR BLD AUTO: 61 %
PLATELET # BLD AUTO: 119 X10E3/UL (ref 150–450)
POTASSIUM SERPL-SCNC: 4.5 MMOL/L (ref 3.5–5.2)
PROT SERPL-MCNC: 7.5 G/DL (ref 6–8.5)
RBC # BLD AUTO: 6.07 X10E6/UL (ref 4.14–5.8)
SODIUM SERPL-SCNC: 137 MMOL/L (ref 134–144)
WBC # BLD AUTO: 2.5 X10E3/UL (ref 3.4–10.8)

## 2021-12-17 ENCOUNTER — TRANSCRIBE ORDER (OUTPATIENT)
Dept: SCHEDULING | Age: 46
End: 2021-12-17

## 2021-12-17 DIAGNOSIS — J90 PLEURAL EFFUSION: Primary | ICD-10-CM

## 2022-01-26 ENCOUNTER — HOSPITAL ENCOUNTER (OUTPATIENT)
Dept: ULTRASOUND IMAGING | Age: 47
Discharge: HOME | End: 2022-01-26
Attending: INTERNAL MEDICINE
Payer: MEDICARE

## 2022-01-26 ENCOUNTER — HOSPITAL ENCOUNTER (OUTPATIENT)
Dept: GENERAL RADIOLOGY | Age: 47
Discharge: HOME | End: 2022-01-26
Attending: STUDENT IN AN ORGANIZED HEALTH CARE EDUCATION/TRAINING PROGRAM
Payer: MEDICARE

## 2022-01-26 VITALS
DIASTOLIC BLOOD PRESSURE: 81 MMHG | OXYGEN SATURATION: 100 % | RESPIRATION RATE: 12 BRPM | SYSTOLIC BLOOD PRESSURE: 136 MMHG | HEART RATE: 80 BPM

## 2022-01-26 DIAGNOSIS — J90 PLEURAL EFFUSION: ICD-10-CM

## 2022-01-26 LAB
APPEARANCE FLD: ABNORMAL
COLOR FLD: YELLOW
GLUCOSE FLD-MCNC: 107 MG/DL
LDH FLD L TO P-CCNC: 92 U/L
LYMPHOCYTES NFR FLD: 43 %
MONOS+MACROS NFR FLD: 55 %
NEUTROPHILS NFR FLD: 2 %
NUC CELL # FLD: 501 /CU MM
PROT FLD-MCNC: 4.8 G/DL
RBC # FLD: >100 /CU MM
SPECIMEN SOURCE FLD: ABNORMAL
SPECIMEN SOURCE FLD: NORMAL

## 2022-01-26 PROCEDURE — 84157 ASSAY OF PROTEIN OTHER: CPT

## 2022-01-26 PROCEDURE — 89050 BODY FLUID CELL COUNT: CPT

## 2022-01-26 PROCEDURE — 71045 X-RAY EXAM CHEST 1 VIEW: CPT

## 2022-01-26 PROCEDURE — 32555 ASPIRATE PLEURA W/ IMAGING: CPT

## 2022-01-26 PROCEDURE — 82945 GLUCOSE OTHER FLUID: CPT

## 2022-01-26 PROCEDURE — 88112 CYTOPATH CELL ENHANCE TECH: CPT

## 2022-01-26 PROCEDURE — 83615 LACTATE (LD) (LDH) ENZYME: CPT

## 2022-01-26 PROCEDURE — 77030032034 HC SYS EVAC ASEPT DISP BBMI -B

## 2022-01-26 PROCEDURE — 87205 SMEAR GRAM STAIN: CPT

## 2022-01-26 PROCEDURE — 88305 TISSUE EXAM BY PATHOLOGIST: CPT

## 2022-01-26 PROCEDURE — 74011000250 HC RX REV CODE- 250: Performed by: RADIOLOGY

## 2022-01-26 RX ORDER — LIDOCAINE HYDROCHLORIDE 10 MG/ML
10 INJECTION, SOLUTION EPIDURAL; INFILTRATION; INTRACAUDAL; PERINEURAL
Status: COMPLETED | OUTPATIENT
Start: 2022-01-26 | End: 2022-01-26

## 2022-01-26 RX ADMIN — LIDOCAINE HYDROCHLORIDE 10 ML: 10 INJECTION, SOLUTION EPIDURAL; INFILTRATION; INTRACAUDAL; PERINEURAL at 09:49

## 2022-01-26 NOTE — PROCEDURES
Interventional Radiology  Procedure Note        1/26/2022 12:08 PM    Patient: Ekta Andrew     Informed consent obtained    Diagnosis: Malignant pleural effusion    Procedure(s): Ultrasound guided right thoracentesis    Specimens removed:  Approximately 300cc removed and submitted for analysis; additional 440 was removed and discarded    Complications: None    Primary Physician: Beth Sanchez MD    Recommendations: N/A    Discharge Disposition: Stable; discharge to home    Full dictated report to follow    Beth Sanchez MD  Interventional Radiology  [facility]  12:08 PM, 1/26/2022

## 2022-01-26 NOTE — PROGRESS NOTES
7076- Pt in ultrasound for Thoracentesis. Pt A&Ox 3 with no C/O pain. Pt sitting up on side of stretcher hugging pillow on [place] stand. Pt placed on monitor. 6077- Dr. Bita Degroot obtained informed consent for Ultrasound guided thoracentesis. 1563- Time out performed. Dr. Bita Degroot placed a RT 6 fr pigtail and removed fluid and samples obtained and given to lab. 0153- Total amount of fluid removed was 740 ml of yellow fluid. Pigtail cathter was removed and band aid placed over RT upper back site. Pt tolerated procedure well. Pt has no C/O pain or SOB. 1002- Post portable chest x-ray was done post thoracentesis. 1034- Per Dr. Rayo Damon x-ray was reviewed and pt may be discharged. 1040- Discharge instructions given to pt. opportunity was given for questions and clarification was provided. 1042- Pt was walked to front [place] to the care of his friend Citlalli Keith.

## 2022-01-26 NOTE — H&P
Radiology History and Physical    Patient: Augusta Segundo 51 y.o. male       Chief Complaint: shortness of breath    History of Present Illness: 51year old male with lung cancer, presents for thoracentesis    History:    Past Medical History:   Diagnosis Date    Anemia, iron deficiency     Aphagia     2/2 radiation    Hypertension     Lung cancer ([address] 2019    Pneumonia involving right lung      Family History   Problem Relation Age of Onset    Cancer Maternal Grandmother         ovarian     Social History     Socioeconomic History    Marital status: SINGLE     Spouse name: Not on file    Number of children: Not on file    Years of education: Not on file    Highest education level: Not on file   Occupational History    Not on file   Tobacco Use    Smoking status: Former Smoker     Packs/day: 0.25     Years: 9.00     Pack years: 2.25     Quit date: 2019     Years since quittin.9    Smokeless tobacco: Never Used    Tobacco comment: cigar smoking 5-6 months and quit   Substance and Sexual Activity    Alcohol use: Not Currently    Drug use: Not Currently    Sexual activity: Not Currently   Other Topics Concern    Not on file   Social History Narrative    Not on file     Social Determinants of Health     Financial Resource Strain:     Difficulty of Paying Living Expenses: Not on file   Food Insecurity:     Worried About Running Out of Food in the Last Year: Not on file    Diane of Food in the Last Year: Not on file   Transportation Needs:     Lack of Transportation (Medical): Not on file    Lack of Transportation (Non-Medical):  Not on file   Physical Activity:     Days of Exercise per Week: Not on file    Minutes of Exercise per Session: Not on file   Stress:     Feeling of Stress : Not on file   Social Connections:     Frequency of Communication with Friends and Family: Not on file    Frequency of Social Gatherings with Friends and Family: Not on file    Attends Synagogue Services: Not on file    Active Member of Clubs or Organizations: Not on file    Attends Club or Organization Meetings: Not on file    Marital Status: Not on file   Intimate Partner Violence:     Fear of Current or Ex-Partner: Not on file    Emotionally Abused: Not on file    Physically Abused: Not on file    Sexually Abused: Not on file   Housing Stability:     Unable to Pay for Housing in the Last Year: Not on file    Number of [place] in the Last Year: Not on file    Unstable Housing in the Last Year: Not on file       Allergies: No Known Allergies    Current Medications:  Current Outpatient Medications   Medication Sig    ondansetron (ZOFRAN ODT) 8 mg disintegrating tablet Take 1 Tab by mouth every eight (8) hours as needed for Nausea. (Patient not taking: Reported on 12/10/2021)    calcium carbonate (CALTRATE 600 PO) Take 600 mg by mouth daily.  metoprolol succinate (TOPROL-XL) 50 mg XL tablet Take 50 mg by mouth daily. No current facility-administered medications for this encounter. Physical Exam:  Blood pressure 136/81, pulse 80, resp. rate 12, SpO2 100 %. GENERAL: alert, cooperative, no distress, appears stated age,   LUNG: Nonlabored respiration on room air  HEART: regular rate and rhythm    Alerts:    Hospital Problems  Date Reviewed: 12/10/2021    None          Laboratory:    No results for input(s): HGB, HCT, WBC, PLT, INR, BUN, CREA, K, CRCLT, HGBEXT, HCTEXT, PLTEXT, INREXT in the last 72 hours. No lab exists for component: PTT, PT      Plan of Care/Planned Procedure:  Risks, benefits, and alternatives reviewed with patient and he agrees to proceed with the procedure.      Ultrasound guided right thoracentesis    Kathy Munguia MD  Interventional Radiology  [facility]  12:07 PM, 1/26/2022

## 2022-01-26 NOTE — DISCHARGE INSTRUCTIONS
Patient Education        Thoracentesis: What to Expect at Home  Your Recovery  Thoracentesis (say \"vzqu-qc-uoh-SARAY-sis\") is a procedure to remove fluid from the space between the lungs and the chest wall (pleural space). This procedure may also be called a \"chest tap. \" It's normal to have a small amount of fluid in the pleural space. But too much fluid can build up because of problems such as infection, heart failure, or lung cancer. The procedure may have been done to help with shortness of breath and pain caused by the fluid buildup. Or you may have had this procedure so the doctor could test the fluid to find the cause of the buildup. Your chest may be sore where the doctor put the needle or catheter into your skin (the puncture site). This usually gets better after a day or two. You can go back to work or your normal activities as soon as you feel up to it. If a large amount of pleural fluid was removed during the procedure, you will probably be able to breathe more easily. If more pleural fluid collects and needs to be removed, another thoracentesis may be done later. If the doctor sent the fluid to a lab for testing, it may take several days to get the results. The doctor or nurse will discuss the results with you. This care sheet gives you a general idea about how long it will take for you to recover. But each person recovers at a different pace. Follow the steps below to feel better as quickly as possible. How can you care for yourself at home? Activity    · Rest when you feel tired. Getting enough sleep will help you recover.     · Avoid strenuous activities, such as bicycle riding, jogging, weight lifting, or aerobic exercise, until your doctor says it is okay.     · You may shower. Do not take a bath until the puncture site has healed, or until your doctor tells you it is okay.     · Ask your doctor when you can drive again.     · You may need to take 1 or 2 days off from work.  It depends on the type of work you do and how you feel. Diet    · You can eat your normal diet.     · Drink plenty of fluids (unless your doctor tells you not to). Medicines    · Your doctor will tell you if and when you can restart your medicines. He or she will also give you instructions about taking any new medicines.     · If you take aspirin or some other blood thinner, ask your doctor if and when to start taking it again. Make sure that you understand exactly what your doctor wants you to do.     · Be safe with medicines. Take pain medicines exactly as directed. ? If the doctor gave you a prescription medicine for pain, take it as prescribed. ? If you are not taking a prescription pain medicine, ask your doctor if you can take an over-the-counter medicine. ? Do not take two or more pain medicines at the same time unless the doctor told you to. Many pain medicines have acetaminophen, which is Tylenol. Too much acetaminophen (Tylenol) can be harmful.     · If you think your pain medicine is making you sick to your stomach:  ? Take your medicine after meals (unless your doctor has told you not to). ? Ask your doctor for a different pain medicine.     · If your doctor prescribed antibiotics, take them as directed. Do not stop taking them just because you feel better. You need to take the full course of antibiotics. Care of the puncture site    · Wash the area daily with warm, soapy water, and pat it dry. Don't use hydrogen peroxide or alcohol, which may delay healing. You may cover the area with a gauze bandage if it weeps or rubs against clothing. Change the bandage every day.     · Keep the area clean and dry. Follow-up care is a key part of your treatment and safety. Be sure to make and go to all appointments, and call your doctor if you are having problems. It's also a good idea to know your test results and keep a list of the medicines you take. When should you call for help?    Call 911 anytime you think you may need emergency care. For example, call if:    · You passed out (lost consciousness).     · You have severe trouble breathing.     · You have sudden chest pain and shortness of breath, or you cough up blood. Call your doctor now or seek immediate medical care if:    · You have shortness of breath that is new or getting worse.     · You have new or worse pain in your chest, especially when you take a deep breath.     · You are sick to your stomach or cannot keep fluids down.     · You have a fever over 100°F.     · Bright red blood has soaked through the bandage over your puncture site.     · You have signs of infection, such as:  ? Increased pain, swelling, warmth, or redness. ? Red streaks leading from the puncture site. ? Pus draining from the puncture site. ? Swollen lymph nodes in your neck, armpits, or groin. ? A fever.     · You cough up a lot more mucus than normal, or your mucus changes color. Watch closely for changes in your health, and be sure to contact your doctor if you have any problems. Where can you learn more? Go to http://www.gray.com/  Enter Q755 in the search box to learn more about \"Thoracentesis: What to Expect at Home. \"  Current as of: July 6, 2021               Content Version: 13.0  © 2006-2021 [employer]. Care instructions adapted under license by [employer] (which disclaims liability or warranty for this information). If you have questions about a medical condition or this instruction, always ask your healthcare professional. [place] [id number] any warranty or liability for your use of this information.

## 2022-01-30 LAB
BACTERIA SPEC CULT: NORMAL
GRAM STN SPEC: NORMAL
GRAM STN SPEC: NORMAL
SERVICE CMNT-IMP: NORMAL

## 2022-02-21 NOTE — PROGRESS NOTES
[facility] [place] at [place] [address] [facility]  Vitaly Mailman: [phone]  F: [phone]      Reason for Visit:   Diamante Guerrero is a 51 y.o. male who is seen for follow up of non-small cell lung cancer. Treatment History:   · Diagnosed at [facility]  · Biopsy of right level 4 node: non small cell carcinoma, favored squamous cell carcinoma, insufficient sample for further testing  · Stage III Non-small cell lung cancer (Squamous cell)  · Definitive radiation with Dr. Christian Mccabe completed mid-July 2019  · Concurrent chemotherapy with carboplatin and paclitaxel by Dr. Marnell Aase, stopped during second infusion due to reaction to paclitaxel  · Concurrent chemotherapy with cisplatin and etoposide 7/16/2019 by Dr. Marnell Aase  · CT Chest 7/27/2019: There has been no significant change in size of the large right paratracheal mediastinal mass. There are many new areas of peripheral consolidation in the right upper lobe, which may represent some combination of progressive disease, posttreatment change, or infection. Attention on follow-up is needed. A previously seen right upper lobe nodule in the posterior segment appears to have decreased in size. The large right pleural effusion on this exam is significantly increased in size from prior. · CT A/P 7/29/2019: no metastatic disease in abdomen or pelvis  · MRI Brain 7/29/2019: no intracranial metastatic disease  · EBUS by Dr. Geri Quarles 7/30/2019: Squamous cell, PDL1 QNS, insufficient tissue for molecular studies  · Thoracentesis 8/5/2019: cytology negative  · Initiated maintenance therapy with Durvalumab on 8/27/2019-8/25/2020    History of Present Illness:   He saw Pulmonary after my last visit with him, and they ordered a thoracentesis which was completed on 1/26/2022. He had 740ml removed, with improved aeration on subsequent CXR. Cytology was negative.  He felt improvement in his dyspnea with this, as well as reduction in the frequency of his cough. Some PEDROZA persists, but overall improved. Infrequent right upper chest swelling/tightness, none in arm/neck. He is unaccompanied today. He quit tobacco at diagnosis, though he was a light smoker (1-2 packs per week). Review of systems was obtained and pertinent findings reviewed above. Past medical history, social history, family history, medications, and allergies are located in the electronic medical record. Physical Exam:     Visit Vitals  /80 (BP 1 Location: Right arm, BP Patient Position: Sitting, BP Cuff Size: Large adult)   Pulse 78   Temp 97.9 °F (36.6 °C) (Temporal)   Resp 16   Ht 5' 9\" (1.753 m)   Wt 190 lb 6.4 oz (86.4 kg)   SpO2 98%   BMI 28.12 kg/m²     ECOG PS: 1  General: no distress  Eyes: anicteric sclerae  HENT: oropharynx clear  Neck: supple  Lymphatic: no cervical, supraclavicular, or inguinal adenopathy  Respiratory: normal respiratory effort, left lung clear, right lung severely diminished. CV: no peripheral edema, HRR  GI: soft, nontender, nondistended, no masses  Skin: no rashes; no ecchymoses; no petechiae    Results:     Lab Results   Component Value Date/Time    WBC 2.5 (LL) 12/10/2021 12:00 AM    HGB 14.3 12/10/2021 12:00 AM    HCT 42.4 12/10/2021 12:00 AM    PLATELET 016 (L) 96/35/5652 12:00 AM    MCV 70 (L) 12/10/2021 12:00 AM    ABS. NEUTROPHILS 1.5 12/10/2021 12:00 AM     Lab Results   Component Value Date/Time    Sodium 137 12/10/2021 12:00 AM    Potassium 4.5 12/10/2021 12:00 AM    Chloride 99 12/10/2021 12:00 AM    CO2 22 12/10/2021 12:00 AM    Glucose 96 12/10/2021 12:00 AM    BUN 12 12/10/2021 12:00 AM    Creatinine 1.21 12/10/2021 12:00 AM    GFR est AA 82 12/10/2021 12:00 AM    GFR est non-AA 71 12/10/2021 12:00 AM    Calcium 9.8 12/10/2021 12:00 AM    Glucose (POC) 96 01/04/2021 03:09 PM     Lab Results   Component Value Date/Time    Bilirubin, total 0.4 12/10/2021 12:00 AM    ALT (SGPT) 24 12/10/2021 12:00 AM    Alk. phosphatase 90 12/10/2021 12:00 AM    Protein, total 7.5 12/10/2021 12:00 AM    Albumin 4.8 12/10/2021 12:00 AM    Globulin 3.7 08/25/2020 09:14 AM     Lab Results   Component Value Date/Time    Reticulocyte count 1.7 08/27/2019 11:19 AM    Iron % saturation 33 07/02/2020 04:35 AM    TIBC 199 (L) 07/02/2020 04:35 AM    Ferritin 413 (H) 07/02/2020 04:35 AM    Vitamin B12 873 07/02/2020 04:35 AM    Folate 10.1 07/02/2020 04:35 AM    TSH 3.49 08/11/2020 08:14 AM    Lipase 136 07/27/2019 11:31 AM    Hep C virus Ab Interp. NONREACTIVE 12/03/2019 11:22 AM     Lab Results   Component Value Date/Time    INR 1.0 12/03/2019 11:22 AM    aPTT 27.3 12/03/2019 11:22 AM    D-dimer 2.45 (H) 06/09/2020 10:56 AM    Fibrinogen 243 12/03/2019 11:22 AM       CT Chest 8/20/2021:  1.  Stable examination with regards to the moderate right pleural effusion and posttreatment changes in the right hilum. No evidence of local recurrence. 2.  Unchanged SVC and bilateral brachiocephalic vein occlusion. CT Chest 12/3/2021:  No evidence of recurrence or disease progression. Stable examination with regards to the moderate right pleural effusion and posttreatment changes in the right hilum. CT Chest 3/7/2022:  1. Moderate right pleural effusion has slightly decreased. 2. Posttreatment changes in the right lung especially right upper lobe medially are noted and unchanged. 3. Triangle shaped soft tissue density in the right-sided mediastinum that obstructs the superior vena cava is unchanged. No evidence for tumor recurrence or metastatic disease is identified. Assessment:   1) Non-small cell lung cancer  Stage IIIB  He is s/p definitive radiation with concurrent chemotherapy (cisplatin and etoposide) followed by one year of maintenance durvalumab completed 8/2020. He is now followed with surveillance. He has no evidence of recurrence at this time. CT remains stable. We will continue with surveillance.       Continue every 3 month imaging through 8/2022, then consider reducing frequency to 6 months if stable. Unfortunately, his two biopsies yielded scant material, insufficient for molecular studies or PDL1 testing. At progression, a repeat biopsy may be needed for PDL1 and NGS. 2) SVC syndrome  Symptoms well controlled now. Stable on imaging. Monitor. 3) Pleural effusion  Cytology negative. Underwent repeat thoracentesis on 11/27/2019, 2/14/2020, 7/2/2020, and 1/26/2022 with negative cytology. Following now with pulmonary. 4) Thrombocytopenia, Leukopenia  Mild. Chronic since at least 7/2019. Possibly from prior chemotherapy. Monitor, repeat CBC today. Consider bone marrow biopsy if this worsens significantly. 5) Compression fracture  Bone scan negative. Consider kyphoplasty vs radiation therapy if symptoms develop. No pain currently. 6) Osteopenia  Noted on DEXA 3/17/2020. Continue Calcium and Vit D replacement and weight bearing exercise as tolerated. 7) Dyspnea  He is now following with pulmonary, s/p repeat thoracentesis in 1/2022. Symptoms overall improved. Monitor.       Plan:     · Labs today: CBC, CMP  · CT Chest in 3 months  · Return to see me in 3 months      Signed By: Nuris Coronado MD

## 2022-03-07 ENCOUNTER — HOSPITAL ENCOUNTER (OUTPATIENT)
Dept: CT IMAGING | Age: 47
Discharge: HOME | End: 2022-03-07
Attending: INTERNAL MEDICINE
Payer: MEDICARE

## 2022-03-07 DIAGNOSIS — C34.11 MALIGNANT NEOPLASM OF UPPER LOBE OF RIGHT LUNG (HCC): ICD-10-CM

## 2022-03-07 PROCEDURE — 71260 CT THORAX DX C+: CPT

## 2022-03-07 PROCEDURE — 74011000636 HC RX REV CODE- 636: Performed by: RADIOLOGY

## 2022-03-07 RX ADMIN — IOPAMIDOL 100 ML: 612 INJECTION, SOLUTION INTRAVENOUS at 14:31

## 2022-03-11 ENCOUNTER — OFFICE VISIT (OUTPATIENT)
Dept: ONCOLOGY | Age: 47
End: 2022-03-11
Payer: MEDICARE

## 2022-03-11 VITALS
HEART RATE: 78 BPM | HEIGHT: 69 IN | TEMPERATURE: 97.9 F | OXYGEN SATURATION: 98 % | SYSTOLIC BLOOD PRESSURE: 113 MMHG | DIASTOLIC BLOOD PRESSURE: 80 MMHG | BODY MASS INDEX: 28.2 KG/M2 | WEIGHT: 190.4 LBS | RESPIRATION RATE: 16 BRPM

## 2022-03-11 DIAGNOSIS — C34.11 MALIGNANT NEOPLASM OF UPPER LOBE OF RIGHT LUNG (HCC): Primary | ICD-10-CM

## 2022-03-11 PROCEDURE — 99214 OFFICE O/P EST MOD 30 MIN: CPT | Performed by: INTERNAL MEDICINE

## 2022-03-11 PROCEDURE — G8427 DOCREV CUR MEDS BY ELIG CLIN: HCPCS | Performed by: INTERNAL MEDICINE

## 2022-03-11 PROCEDURE — G8432 DEP SCR NOT DOC, RNG: HCPCS | Performed by: INTERNAL MEDICINE

## 2022-03-11 PROCEDURE — G8752 SYS BP LESS 140: HCPCS | Performed by: INTERNAL MEDICINE

## 2022-03-11 PROCEDURE — G8754 DIAS BP LESS 90: HCPCS | Performed by: INTERNAL MEDICINE

## 2022-03-11 PROCEDURE — G8419 CALC BMI OUT NRM PARAM NOF/U: HCPCS | Performed by: INTERNAL MEDICINE

## 2022-03-11 NOTE — PROGRESS NOTES
Devon Jalloh is a 51 y.o. male follow up for lung cancer. 1. Have you been to the ER, urgent care clinic since your last visit? Hospitalized since your last visit?no     2. Have you seen or consulted any other health care providers outside of the [address] [place] [address] since your last visit? Include any pap smears or colon screening.  no

## 2022-03-12 LAB
ALBUMIN SERPL-MCNC: 4.6 G/DL (ref 4–5)
ALBUMIN/GLOB SERPL: 1.6 {RATIO} (ref 1.2–2.2)
ALP SERPL-CCNC: 84 IU/L (ref 44–121)
ALT SERPL-CCNC: 16 IU/L (ref 0–44)
AST SERPL-CCNC: 22 IU/L (ref 0–40)
BASOPHILS # BLD AUTO: 0 X10E3/UL (ref 0–0.2)
BASOPHILS NFR BLD AUTO: 1 %
BILIRUB SERPL-MCNC: 0.4 MG/DL (ref 0–1.2)
BUN SERPL-MCNC: 17 MG/DL (ref 6–24)
BUN/CREAT SERPL: 15 (ref 9–20)
CALCIUM SERPL-MCNC: 9.4 MG/DL (ref 8.7–10.2)
CHLORIDE SERPL-SCNC: 103 MMOL/L (ref 96–106)
CO2 SERPL-SCNC: 26 MMOL/L (ref 20–29)
CREAT SERPL-MCNC: 1.1 MG/DL (ref 0.76–1.27)
EGFR: 83 ML/MIN/1.73
EOSINOPHIL # BLD AUTO: 0.3 X10E3/UL (ref 0–0.4)
EOSINOPHIL NFR BLD AUTO: 8 %
ERYTHROCYTE [DISTWIDTH] IN BLOOD BY AUTOMATED COUNT: 17.7 % (ref 11.6–15.4)
GLOBULIN SER CALC-MCNC: 2.8 G/DL (ref 1.5–4.5)
GLUCOSE SERPL-MCNC: 99 MG/DL (ref 65–99)
HCT VFR BLD AUTO: 44.7 % (ref 37.5–51)
HGB BLD-MCNC: 14.5 G/DL (ref 13–17.7)
IMM GRANULOCYTES # BLD AUTO: 0 X10E3/UL (ref 0–0.1)
IMM GRANULOCYTES NFR BLD AUTO: 1 %
LYMPHOCYTES # BLD AUTO: 0.6 X10E3/UL (ref 0.7–3.1)
LYMPHOCYTES NFR BLD AUTO: 18 %
MCH RBC QN AUTO: 24.4 PG (ref 26.6–33)
MCHC RBC AUTO-ENTMCNC: 32.4 G/DL (ref 31.5–35.7)
MCV RBC AUTO: 75 FL (ref 79–97)
MONOCYTES # BLD AUTO: 0.6 X10E3/UL (ref 0.1–0.9)
MONOCYTES NFR BLD AUTO: 17 %
NEUTROPHILS # BLD AUTO: 1.8 X10E3/UL (ref 1.4–7)
NEUTROPHILS NFR BLD AUTO: 55 %
PLATELET # BLD AUTO: 148 X10E3/UL (ref 150–450)
POTASSIUM SERPL-SCNC: 4.8 MMOL/L (ref 3.5–5.2)
PROT SERPL-MCNC: 7.4 G/DL (ref 6–8.5)
RBC # BLD AUTO: 5.95 X10E6/UL (ref 4.14–5.8)
SODIUM SERPL-SCNC: 144 MMOL/L (ref 134–144)
WBC # BLD AUTO: 3.3 X10E3/UL (ref 3.4–10.8)

## 2022-03-18 PROBLEM — D69.6 THROMBOCYTOPENIA (HCC): Status: ACTIVE | Noted: 2020-06-30

## 2022-03-19 PROBLEM — I87.1 SVC SYNDROME: Status: ACTIVE | Noted: 2019-07-27

## 2022-03-19 PROBLEM — J90 PLEURAL EFFUSION: Status: ACTIVE | Noted: 2020-07-01

## 2022-03-19 PROBLEM — R78.81 BACTEREMIA: Status: ACTIVE | Noted: 2020-06-30

## 2022-03-19 PROBLEM — I10 HTN (HYPERTENSION): Status: ACTIVE | Noted: 2019-07-27

## 2022-03-20 PROBLEM — C34.90 LUNG CANCER (HCC): Status: ACTIVE | Noted: 2019-07-27

## 2022-03-20 PROBLEM — R13.10 DYSPHAGIA: Status: ACTIVE | Noted: 2019-07-28

## 2022-05-27 NOTE — TELEPHONE ENCOUNTER
[address] at [facility]  [phone]    10/04/19- Called patient to check in, stated he's doing well, no complaints. He's scheduled for an office visit next week when Dr. Nomi Sumner will be out. If patient is feeling well, we don't need to see him until his treatment on 10/22. Patient agreeable. Encouraged patient to call back with any questions or concerns. Will cancel office visit on 10/8. [Negative] : Genitourinary

## 2022-06-06 ENCOUNTER — HOSPITAL ENCOUNTER (OUTPATIENT)
Dept: CT IMAGING | Age: 47
Discharge: HOME | End: 2022-06-06
Attending: INTERNAL MEDICINE
Payer: MEDICARE

## 2022-06-06 DIAGNOSIS — C34.11 MALIGNANT NEOPLASM OF UPPER LOBE OF RIGHT LUNG (HCC): ICD-10-CM

## 2022-06-06 PROCEDURE — 74011000636 HC RX REV CODE- 636: Performed by: INTERNAL MEDICINE

## 2022-06-06 PROCEDURE — 71260 CT THORAX DX C+: CPT

## 2022-06-06 RX ADMIN — IOPAMIDOL 100 ML: 612 INJECTION, SOLUTION INTRAVENOUS at 13:47

## 2022-06-07 ENCOUNTER — TELEPHONE (OUTPATIENT)
Dept: ONCOLOGY | Age: 47
End: 2022-06-07

## 2022-06-07 DIAGNOSIS — J93.9 PNEUMOTHORAX, UNSPECIFIED TYPE: Primary | ICD-10-CM

## 2022-06-07 NOTE — TELEPHONE ENCOUNTER
[facility]  [phone]    I received a call from the radiologist.  Patient has a small left pneumothorax noted on CT yesterday. His cancer and prior thoracenteses have been on the right, it's not clear why he has developed this left pneumothorax. I called his pulmonologist, Dr. Marko Faulkner, and she recommends a repeat CXR on 6/9 and her office will arrange to see him on 6/10 to follow up. I called the patient and discussed the above. No answer, left a message for him to call us back. He returned my call. He denies any recent trauma. He has been having left chest pain for the past 2 days, consistent with his pneumothorax. Discussed plan as above and he is agreeable. Advised him to go to the ED for urgent evaluation and repeat imaging if his pain or dyspnea worsen.

## 2022-06-08 ENCOUNTER — HOSPITAL ENCOUNTER (OUTPATIENT)
Dept: GENERAL RADIOLOGY | Age: 47
Discharge: HOME | End: 2022-06-08
Payer: MEDICARE

## 2022-06-08 DIAGNOSIS — J93.9 PNEUMOTHORAX, UNSPECIFIED TYPE: ICD-10-CM

## 2022-06-08 PROCEDURE — 71046 X-RAY EXAM CHEST 2 VIEWS: CPT

## 2022-06-08 NOTE — PROGRESS NOTES
[facility] [place] at [address] [facility]  Julieth Lilibeth: [phone]  F: [phone]      Reason for Visit:   Suzie Gurrola is a 51 y.o. male who is seen for follow up of non-small cell lung cancer. Treatment History:   · Diagnosed at [facility]  · Biopsy of right level 4 node: non small cell carcinoma, favored squamous cell carcinoma, insufficient sample for further testing  · Stage III Non-small cell lung cancer (Squamous cell)  · Definitive radiation with Dr. Shyanne Choudhary completed mid-July 2019  · Concurrent chemotherapy with carboplatin and paclitaxel by Dr. Giovanny Castle, stopped during second infusion due to reaction to paclitaxel  · Concurrent chemotherapy with cisplatin and etoposide 7/16/2019 by Dr. Giovanny Castle  · CT Chest 7/27/2019: There has been no significant change in size of the large right paratracheal mediastinal mass. There are many new areas of peripheral consolidation in the right upper lobe, which may represent some combination of progressive disease, posttreatment change, or infection. Attention on follow-up is needed. A previously seen right upper lobe nodule in the posterior segment appears to have decreased in size. The large right pleural effusion on this exam is significantly increased in size from prior. · CT A/P 7/29/2019: no metastatic disease in abdomen or pelvis  · MRI Brain 7/29/2019: no intracranial metastatic disease  · EBUS by Dr. Marlee Graham 7/30/2019: Squamous cell, PDL1 QNS, insufficient tissue for molecular studies  · Thoracentesis 8/5/2019: cytology negative  · Initiated maintenance therapy with Durvalumab on 8/27/2019-8/25/2020    History of Present Illness:   He is feeling well. Chest pain from his pneumothorax has improved. Following with pulmonary, had repeat CXR yesterday. No pain elsewhere. Dyspnea is stable. He was coughing quite a bit last week, but better now. He is unaccompanied today.      He quit tobacco at diagnosis, though he was a light smoker (1-2 packs per week). Review of systems was obtained and pertinent findings reviewed above. Past medical history, social history, family history, medications, and allergies are located in the electronic medical record. Physical Exam:     Visit Vitals  BP (!) 136/91 (BP 1 Location: Right arm, BP Patient Position: Sitting, BP Cuff Size: Large adult)   Pulse 78   Temp 97 °F (36.1 °C) (Temporal)   Resp 18   Ht 5' 9\" (1.753 m)   Wt 186 lb (84.4 kg)   SpO2 97%   BMI 27.47 kg/m²     ECOG PS: 1  General: no distress  Eyes: anicteric sclerae  HENT: oropharynx clear  Neck: supple  Lymphatic: no cervical, supraclavicular, or inguinal adenopathy  Respiratory: normal respiratory effort, left lung clear, right lung severely diminished. CV: no peripheral edema, HRR  GI: soft, nontender, nondistended, no masses  Skin: no rashes; no ecchymoses; no petechiae    Results:     Lab Results   Component Value Date/Time    WBC 3.3 (L) 03/11/2022 12:00 AM    HGB 14.5 03/11/2022 12:00 AM    HCT 44.7 03/11/2022 12:00 AM    PLATELET 869 (L) 25/76/7474 12:00 AM    MCV 75 (L) 03/11/2022 12:00 AM    ABS. NEUTROPHILS 1.8 03/11/2022 12:00 AM     Lab Results   Component Value Date/Time    Sodium 144 03/11/2022 12:00 AM    Potassium 4.8 03/11/2022 12:00 AM    Chloride 103 03/11/2022 12:00 AM    CO2 26 03/11/2022 12:00 AM    Glucose 99 03/11/2022 12:00 AM    BUN 17 03/11/2022 12:00 AM    Creatinine 1.10 03/11/2022 12:00 AM    GFR est AA 82 12/10/2021 12:00 AM    GFR est non-AA 71 12/10/2021 12:00 AM    Calcium 9.4 03/11/2022 12:00 AM    Glucose (POC) 96 01/04/2021 03:09 PM     Lab Results   Component Value Date/Time    Bilirubin, total 0.4 03/11/2022 12:00 AM    ALT (SGPT) 16 03/11/2022 12:00 AM    Alk.  phosphatase 84 03/11/2022 12:00 AM    Protein, total 7.4 03/11/2022 12:00 AM    Albumin 4.6 03/11/2022 12:00 AM    Globulin 3.7 08/25/2020 09:14 AM     Lab Results   Component Value Date/Time Reticulocyte count 1.7 08/27/2019 11:19 AM    Iron % saturation 33 07/02/2020 04:35 AM    TIBC 199 (L) 07/02/2020 04:35 AM    Ferritin 413 (H) 07/02/2020 04:35 AM    Vitamin B12 873 07/02/2020 04:35 AM    Folate 10.1 07/02/2020 04:35 AM    TSH 3.49 08/11/2020 08:14 AM    Lipase 136 07/27/2019 11:31 AM    Hep C virus Ab Interp. NONREACTIVE 12/03/2019 11:22 AM     Lab Results   Component Value Date/Time    INR 1.0 12/03/2019 11:22 AM    aPTT 27.3 12/03/2019 11:22 AM    D-dimer 2.45 (H) 06/09/2020 10:56 AM    Fibrinogen 243 12/03/2019 11:22 AM       CT Chest 8/20/2021:  1.  Stable examination with regards to the moderate right pleural effusion and posttreatment changes in the right hilum. No evidence of local recurrence. 2.  Unchanged SVC and bilateral brachiocephalic vein occlusion. CT Chest 12/3/2021:  No evidence of recurrence or disease progression. Stable examination with regards to the moderate right pleural effusion and posttreatment changes in the right hilum. CT Chest 3/7/2022:  1. Moderate right pleural effusion has slightly decreased. 2. Posttreatment changes in the right lung especially right upper lobe medially are noted and unchanged. 3. Triangle shaped soft tissue density in the right-sided mediastinum that obstructs the superior vena cava is unchanged. No evidence for tumor recurrence or metastatic disease is identified. CT Chest 6/6/2022:  1. Left pneumothorax. 2. Large right effusion. 3. Otherwise no change. CXR 6/8/2022:  Small left pneumothorax is stable or slightly decreased in size. Unchanged moderately large right subpulmonic pleural effusion with associated right basilar atelectasis. CXR 6/14/2022: Unchanged inversion of the right hemidiaphragm, versus subpulmonic effusion. Right apical cap. Lungs otherwise clear. Small left lateral pneumothorax of 10%  or less is unchanged from 6 days ago. Normal heart and stable mediastinum.     Assessment:   1) Non-small cell lung cancer  Stage IIIB  He is s/p definitive radiation with concurrent chemotherapy (cisplatin and etoposide) followed by one year of maintenance durvalumab completed 8/2020. He is now followed with surveillance. He has no evidence of recurrence at this time. We will continue with surveillance. Continue every 3 month imaging through 8/2022, then consider reducing frequency to 6 months if stable. Unfortunately, his two biopsies yielded scant material, insufficient for molecular studies or PDL1 testing. At progression, a repeat biopsy may be needed for PDL1 and NGS. 2) SVC syndrome  Symptoms well controlled now. Stable on imaging. Monitor. 3) Pleural effusion  Cytology negative. Underwent repeat thoracentesis on 11/27/2019, 2/14/2020, 7/2/2020, and 1/26/2022 with negative cytology. Following now with pulmonary. 4) Thrombocytopenia, Leukopenia  Mild. Chronic since at least 7/2019. Possibly from prior chemotherapy. Improved on last check. Monitor. 5) Compression fracture  Bone scan negative. Consider kyphoplasty vs radiation therapy if symptoms develop. No pain currently. 6) Osteopenia  Noted on DEXA 3/17/2020. He is no longer taking calcium or vitamin D, I encouraged him to at least resume the vitamin D.    7) Pneumothorax  Incidentally noted on recent Chest CT. I personally reviewed with the radiologist and with his pulmonologist.  Dr. Jerald Teran has seen him in follow up, ordered additional CXR done yesterday which is stable.       Plan:     · CT Chest in 3 months  · Labs at next visit if not done elsewhere: CBC, CMP  · Return to see me in 3 months      Signed By: Guillermo Councilman, MD

## 2022-06-14 ENCOUNTER — TRANSCRIBE ORDER (OUTPATIENT)
Dept: REGISTRATION | Age: 47
End: 2022-06-14

## 2022-06-14 ENCOUNTER — HOSPITAL ENCOUNTER (OUTPATIENT)
Dept: GENERAL RADIOLOGY | Age: 47
Discharge: HOME | End: 2022-06-14
Payer: MEDICARE

## 2022-06-14 DIAGNOSIS — J93.9 PNEUMOTHORAX DISORDER: ICD-10-CM

## 2022-06-14 DIAGNOSIS — J93.9 PNEUMOTHORAX DISORDER: Primary | ICD-10-CM

## 2022-06-14 PROCEDURE — 71046 X-RAY EXAM CHEST 2 VIEWS: CPT

## 2022-06-15 ENCOUNTER — OFFICE VISIT (OUTPATIENT)
Dept: ONCOLOGY | Age: 47
End: 2022-06-15
Payer: MEDICARE

## 2022-06-15 VITALS
SYSTOLIC BLOOD PRESSURE: 136 MMHG | WEIGHT: 186 LBS | TEMPERATURE: 97 F | HEART RATE: 78 BPM | RESPIRATION RATE: 18 BRPM | DIASTOLIC BLOOD PRESSURE: 91 MMHG | OXYGEN SATURATION: 97 % | HEIGHT: 69 IN | BODY MASS INDEX: 27.55 KG/M2

## 2022-06-15 DIAGNOSIS — C34.11 MALIGNANT NEOPLASM OF UPPER LOBE OF RIGHT LUNG (HCC): Primary | ICD-10-CM

## 2022-06-15 PROCEDURE — G8755 DIAS BP > OR = 90: HCPCS | Performed by: INTERNAL MEDICINE

## 2022-06-15 PROCEDURE — G0463 HOSPITAL OUTPT CLINIC VISIT: HCPCS | Performed by: INTERNAL MEDICINE

## 2022-06-15 PROCEDURE — 99214 OFFICE O/P EST MOD 30 MIN: CPT | Performed by: INTERNAL MEDICINE

## 2022-06-15 PROCEDURE — G8427 DOCREV CUR MEDS BY ELIG CLIN: HCPCS | Performed by: INTERNAL MEDICINE

## 2022-06-15 PROCEDURE — G8752 SYS BP LESS 140: HCPCS | Performed by: INTERNAL MEDICINE

## 2022-06-15 PROCEDURE — G8419 CALC BMI OUT NRM PARAM NOF/U: HCPCS | Performed by: INTERNAL MEDICINE

## 2022-06-15 PROCEDURE — G8432 DEP SCR NOT DOC, RNG: HCPCS | Performed by: INTERNAL MEDICINE

## 2022-07-11 ENCOUNTER — HOSPITAL ENCOUNTER (OUTPATIENT)
Dept: GENERAL RADIOLOGY | Age: 47
Discharge: HOME | End: 2022-07-11
Payer: MEDICARE

## 2022-07-11 ENCOUNTER — TRANSCRIBE ORDER (OUTPATIENT)
Dept: REGISTRATION | Age: 47
End: 2022-07-11

## 2022-07-11 DIAGNOSIS — J93.9 PNEUMOTHORAX ON LEFT: ICD-10-CM

## 2022-07-11 DIAGNOSIS — J93.9 PNEUMOTHORAX ON LEFT: Primary | ICD-10-CM

## 2022-07-11 PROCEDURE — 71046 X-RAY EXAM CHEST 2 VIEWS: CPT

## 2022-09-07 ENCOUNTER — HOSPITAL ENCOUNTER (OUTPATIENT)
Dept: CT IMAGING | Age: 47
Discharge: HOME | End: 2022-09-07
Attending: INTERNAL MEDICINE
Payer: MEDICARE

## 2022-09-07 DIAGNOSIS — C34.11 MALIGNANT NEOPLASM OF UPPER LOBE OF RIGHT LUNG (HCC): ICD-10-CM

## 2022-09-07 PROCEDURE — 71260 CT THORAX DX C+: CPT

## 2022-09-07 PROCEDURE — 74011000636 HC RX REV CODE- 636: Performed by: INTERNAL MEDICINE

## 2022-09-07 RX ADMIN — IOPAMIDOL 100 ML: 612 INJECTION, SOLUTION INTRAVENOUS at 14:47

## 2022-09-08 NOTE — PROGRESS NOTES
[facility] [place] at [place] [address] [facility]  Abbie Barlow: [phone]  F: [phone]      Reason for Visit:   Lahoma Moritz is a 51 y.o. male who is seen for follow up of non-small cell lung cancer. Treatment History:   Diagnosed at [facility]  Biopsy of right level 4 node: non small cell carcinoma, favored squamous cell carcinoma, insufficient sample for further testing  Stage III Non-small cell lung cancer (Squamous cell)  Definitive radiation with Dr. Sandy Noriega completed mid-July 2019  Concurrent chemotherapy with carboplatin and paclitaxel by Dr. Irina Myers, stopped during second infusion due to reaction to paclitaxel  Concurrent chemotherapy with cisplatin and etoposide 7/16/2019 by Dr. Luis F Fields Chest 7/27/2019: There has been no significant change in size of the large right paratracheal mediastinal mass. There are many new areas of peripheral consolidation in the right upper lobe, which may represent some combination of progressive disease, posttreatment change, or infection. Attention on follow-up is needed. A previously seen right upper lobe nodule in the posterior segment appears to have decreased in size. The large right pleural effusion on this exam is significantly increased in size from prior. CT A/P 7/29/2019: no metastatic disease in abdomen or pelvis  MRI Brain 7/29/2019: no intracranial metastatic disease  EBUS by Dr. Andrea Amato 7/30/2019: Squamous cell carcinoma, PDL1 QNS, insufficient tissue for molecular studies  Thoracentesis 8/5/2019: cytology negative  Initiated maintenance therapy with Durvalumab on 8/27/2019-8/25/2020    History of Present Illness:   He is feeling well. No new issues. Mild cough persists, stable. No hemoptysis. Energy has been good, exercising regularly 5 times a week on treadmill. No pain. He is unaccompanied today. He quit tobacco at diagnosis, though he was a light smoker (1-2 packs per week).     Review of systems was obtained and pertinent findings reviewed above. Past medical history, social history, family history, medications, and allergies are located in the electronic medical record. Physical Exam:     Visit Vitals  Ht 5' 9\" (1.753 m)   Wt 184 lb 12.8 oz (83.8 kg)   BMI 27.29 kg/m²       ECOG PS: 0  General: no distress  Eyes: anicteric sclerae  HENT: oropharynx clear  Neck: supple  Lymphatic: no cervical, supraclavicular, or inguinal adenopathy  Respiratory: normal respiratory effort  CV: no peripheral edema  GI: soft, nontender, nondistended, no masses  Skin: no rashes; no ecchymoses; no petechiae      Results:     Lab Results   Component Value Date/Time    WBC 3.3 (L) 03/11/2022 12:00 AM    HGB 14.5 03/11/2022 12:00 AM    HCT 44.7 03/11/2022 12:00 AM    PLATELET 267 (L) 97/86/5475 12:00 AM    MCV 75 (L) 03/11/2022 12:00 AM    ABS. NEUTROPHILS 1.8 03/11/2022 12:00 AM     Lab Results   Component Value Date/Time    Sodium 144 03/11/2022 12:00 AM    Potassium 4.8 03/11/2022 12:00 AM    Chloride 103 03/11/2022 12:00 AM    CO2 26 03/11/2022 12:00 AM    Glucose 99 03/11/2022 12:00 AM    BUN 17 03/11/2022 12:00 AM    Creatinine 1.10 03/11/2022 12:00 AM    GFR est AA 82 12/10/2021 12:00 AM    GFR est non-AA 71 12/10/2021 12:00 AM    Calcium 9.4 03/11/2022 12:00 AM    Glucose (POC) 96 01/04/2021 03:09 PM     Lab Results   Component Value Date/Time    Bilirubin, total 0.4 03/11/2022 12:00 AM    ALT (SGPT) 16 03/11/2022 12:00 AM    Alk.  phosphatase 84 03/11/2022 12:00 AM    Protein, total 7.4 03/11/2022 12:00 AM    Albumin 4.6 03/11/2022 12:00 AM    Globulin 3.7 08/25/2020 09:14 AM     Lab Results   Component Value Date/Time    Reticulocyte count 1.7 08/27/2019 11:19 AM    Iron % saturation 33 07/02/2020 04:35 AM    TIBC 199 (L) 07/02/2020 04:35 AM    Ferritin 413 (H) 07/02/2020 04:35 AM    Vitamin B12 873 07/02/2020 04:35 AM    Folate 10.1 07/02/2020 04:35 AM    TSH 3.49 08/11/2020 08:14 AM    Lipase 136 07/27/2019 11:31 AM    Hep C virus Ab Interp. NONREACTIVE 12/03/2019 11:22 AM     Lab Results   Component Value Date/Time    INR 1.0 12/03/2019 11:22 AM    aPTT 27.3 12/03/2019 11:22 AM    D-dimer 2.45 (H) 06/09/2020 10:56 AM    Fibrinogen 243 12/03/2019 11:22 AM       CT Chest 9/7/2022:  1. Posttreatment changes in the right lung, without definite recurrence or intrathoracic metastasis. 2.  Moderate to large right pleural effusion, not significantly changed from prior. 3.  Sequelae of chronic SVC occlusion with prominent right chest wall collaterals. Assessment:   1) Non-small cell lung cancer  Stage IIIB  He is s/p definitive radiation with concurrent chemotherapy (cisplatin and etoposide) followed by one year of maintenance durvalumab completed 8/2020. He is now followed with surveillance. He has no evidence of recurrence at this time, 3 years out from radiation and 2 years out from immunotherapy. Recent CT looks ok. We will continue with surveillance. Reduce to 6 month interval now. Unfortunately, his two biopsies yielded scant material, insufficient for molecular studies or PDL1 testing. At progression, a repeat biopsy may be needed for PDL1 and NGS. 2) SVC syndrome  Symptoms well controlled now. Stable on imaging. Monitor. 3) Pleural effusion  Cytology negative. Underwent repeat thoracentesis on 11/27/2019, 2/14/2020, 7/2/2020, and 1/26/2022 with negative cytology. Following now with pulmonary. 4) Thrombocytopenia, Leukopenia  Mild. Chronic since at least 7/2019. Possibly from prior chemotherapy. Improved on last check. Monitor. 5) Compression fracture  Bone scan negative. Consider kyphoplasty vs radiation therapy if symptoms develop. No pain currently. 6) Osteopenia  Noted on DEXA 3/17/2020. He is back on calicum and vitamin D.    7) Pneumothorax  Spontaneous, noted on imaging 6/2022. Resolved now.     Plan:     Labs today: CBC, CMP  CT Chest in 6 months  Return to see me in 6 months, or sooner if symptoms develop      Signed By: Shon Ross MD

## 2022-09-15 ENCOUNTER — OFFICE VISIT (OUTPATIENT)
Dept: ONCOLOGY | Age: 47
End: 2022-09-15
Payer: MEDICARE

## 2022-09-15 VITALS — HEIGHT: 69 IN | BODY MASS INDEX: 27.37 KG/M2 | WEIGHT: 184.8 LBS

## 2022-09-15 DIAGNOSIS — C34.11 MALIGNANT NEOPLASM OF UPPER LOBE OF RIGHT LUNG (HCC): Primary | ICD-10-CM

## 2022-09-15 PROCEDURE — G8756 NO BP MEASURE DOC: HCPCS | Performed by: INTERNAL MEDICINE

## 2022-09-15 PROCEDURE — G8427 DOCREV CUR MEDS BY ELIG CLIN: HCPCS | Performed by: INTERNAL MEDICINE

## 2022-09-15 PROCEDURE — G8419 CALC BMI OUT NRM PARAM NOF/U: HCPCS | Performed by: INTERNAL MEDICINE

## 2022-09-15 PROCEDURE — 99214 OFFICE O/P EST MOD 30 MIN: CPT | Performed by: INTERNAL MEDICINE

## 2022-09-15 PROCEDURE — G8510 SCR DEP NEG, NO PLAN REQD: HCPCS | Performed by: INTERNAL MEDICINE

## 2022-09-16 LAB
ALBUMIN SERPL-MCNC: 4.2 G/DL (ref 4–5)
ALBUMIN/GLOB SERPL: 1.6 {RATIO} (ref 1.2–2.2)
ALP SERPL-CCNC: 81 IU/L (ref 44–121)
ALT SERPL-CCNC: 21 IU/L (ref 0–44)
AST SERPL-CCNC: 25 IU/L (ref 0–40)
BASOPHILS # BLD AUTO: 0 X10E3/UL (ref 0–0.2)
BASOPHILS NFR BLD AUTO: 1 %
BILIRUB SERPL-MCNC: 0.4 MG/DL (ref 0–1.2)
BUN SERPL-MCNC: 24 MG/DL (ref 6–24)
BUN/CREAT SERPL: 19 (ref 9–20)
CALCIUM SERPL-MCNC: 9.1 MG/DL (ref 8.7–10.2)
CHLORIDE SERPL-SCNC: 101 MMOL/L (ref 96–106)
CO2 SERPL-SCNC: 22 MMOL/L (ref 20–29)
CREAT SERPL-MCNC: 1.24 MG/DL (ref 0.76–1.27)
EGFR: 72 ML/MIN/1.73
EOSINOPHIL # BLD AUTO: 0.1 X10E3/UL (ref 0–0.4)
EOSINOPHIL NFR BLD AUTO: 4 %
ERYTHROCYTE [DISTWIDTH] IN BLOOD BY AUTOMATED COUNT: 17.2 % (ref 11.6–15.4)
GLOBULIN SER CALC-MCNC: 2.7 G/DL (ref 1.5–4.5)
GLUCOSE SERPL-MCNC: 97 MG/DL (ref 65–99)
HCT VFR BLD AUTO: 41.8 % (ref 37.5–51)
HGB BLD-MCNC: 14 G/DL (ref 13–17.7)
IMM GRANULOCYTES # BLD AUTO: 0 X10E3/UL (ref 0–0.1)
IMM GRANULOCYTES NFR BLD AUTO: 1 %
LYMPHOCYTES # BLD AUTO: 0.7 X10E3/UL (ref 0.7–3.1)
LYMPHOCYTES NFR BLD AUTO: 20 %
MCH RBC QN AUTO: 24.6 PG (ref 26.6–33)
MCHC RBC AUTO-ENTMCNC: 33.5 G/DL (ref 31.5–35.7)
MCV RBC AUTO: 74 FL (ref 79–97)
MONOCYTES # BLD AUTO: 0.6 X10E3/UL (ref 0.1–0.9)
MONOCYTES NFR BLD AUTO: 17 %
NEUTROPHILS # BLD AUTO: 2.1 X10E3/UL (ref 1.4–7)
NEUTROPHILS NFR BLD AUTO: 57 %
PLATELET # BLD AUTO: 177 X10E3/UL (ref 150–450)
POTASSIUM SERPL-SCNC: 4.4 MMOL/L (ref 3.5–5.2)
PROT SERPL-MCNC: 6.9 G/DL (ref 6–8.5)
RBC # BLD AUTO: 5.68 X10E6/UL (ref 4.14–5.8)
SODIUM SERPL-SCNC: 141 MMOL/L (ref 134–144)
WBC # BLD AUTO: 3.5 X10E3/UL (ref 3.4–10.8)

## 2023-03-06 ENCOUNTER — HOSPITAL ENCOUNTER (OUTPATIENT)
Dept: CT IMAGING | Age: 48
Discharge: HOME | End: 2023-03-06
Attending: INTERNAL MEDICINE
Payer: MEDICARE

## 2023-03-06 DIAGNOSIS — C34.11 MALIGNANT NEOPLASM OF UPPER LOBE OF RIGHT LUNG (HCC): ICD-10-CM

## 2023-03-06 PROCEDURE — 74011000636 HC RX REV CODE- 636: Performed by: INTERNAL MEDICINE

## 2023-03-06 PROCEDURE — 71260 CT THORAX DX C+: CPT

## 2023-03-06 RX ADMIN — IOPAMIDOL 100 ML: 755 INJECTION, SOLUTION INTRAVENOUS at 13:11

## 2023-03-09 NOTE — PROGRESS NOTES
[facility] [place] at [place] [address] [facility]  Екатерина Amen: [phone]  F: [phone]      Reason for Visit:   Magda Herrera is a 49 y.o. male who is seen for follow up of non-small cell lung cancer. Treatment History:   Diagnosed at [facility]  Biopsy of right level 4 node: non small cell carcinoma, favored squamous cell carcinoma, insufficient sample for further testing  Stage III Non-small cell lung cancer (Squamous cell)  Definitive radiation with Dr. Ayaka Driscoll completed mid-July 2019  Concurrent chemotherapy with carboplatin and paclitaxel by Dr. Michael Collins, stopped during second infusion due to reaction to paclitaxel  Concurrent chemotherapy with cisplatin and etoposide 7/16/2019 by Dr. Brayden Bae Chest 7/27/2019: There has been no significant change in size of the large right paratracheal mediastinal mass. There are many new areas of peripheral consolidation in the right upper lobe, which may represent some combination of progressive disease, posttreatment change, or infection. Attention on follow-up is needed. A previously seen right upper lobe nodule in the posterior segment appears to have decreased in size. The large right pleural effusion on this exam is significantly increased in size from prior. CT A/P 7/29/2019: no metastatic disease in abdomen or pelvis  MRI Brain 7/29/2019: no intracranial metastatic disease  EBUS by Dr. Roque Vela 7/30/2019: Squamous cell carcinoma, PDL1 QNS, insufficient tissue for molecular studies  Thoracentesis 8/5/2019: cytology negative  Initiated maintenance therapy with Durvalumab on 8/27/2019-8/25/2020    History of Present Illness:   He reports a recent increase in his cough, productive yellow sputum with some scant hemoptysis. Bleeding has resolved. No fevers. No pain. He saw pulmonary earlier this week and was started on Z-pack and prednisone. He has been eating ok, weight stable.   Energy has been ok, still using the treadmill several days a week, but gets more PEDROZA. He is unaccompanied today. He quit tobacco at diagnosis, though he was a light smoker (1-2 packs per week). Review of systems was obtained and pertinent findings reviewed above. Past medical history, social history, family history, medications, and allergies are located in the electronic medical record. Physical Exam:     Visit Vitals  /81 (BP 1 Location: Right arm, BP Patient Position: Sitting, BP Cuff Size: Large adult)   Pulse 67   Temp 96.8 °F (36 °C) (Oral)   Resp 20   Ht 5' 9\" (1.753 m)   Wt 184 lb (83.5 kg)   SpO2 98%   BMI 27.17 kg/m²         ECOG PS: 0  General: no distress  Eyes: anicteric sclerae  HENT: oropharynx clear  Neck: supple  Lymphatic: no cervical, supraclavicular, or inguinal adenopathy  Respiratory: normal respiratory effort  CV: no peripheral edema  GI: soft, nontender, nondistended, no masses  Skin: no rashes; no ecchymoses; no petechiae      Results:     Lab Results   Component Value Date/Time    WBC 3.5 09/15/2022 12:00 AM    HGB 14.0 09/15/2022 12:00 AM    HCT 41.8 09/15/2022 12:00 AM    PLATELET 290 73/74/6993 12:00 AM    MCV 74 (L) 09/15/2022 12:00 AM    ABS. NEUTROPHILS 2.1 09/15/2022 12:00 AM     Lab Results   Component Value Date/Time    Sodium 141 09/15/2022 12:00 AM    Potassium 4.4 09/15/2022 12:00 AM    Chloride 101 09/15/2022 12:00 AM    CO2 22 09/15/2022 12:00 AM    Glucose 97 09/15/2022 12:00 AM    BUN 24 09/15/2022 12:00 AM    Creatinine 1.24 09/15/2022 12:00 AM    GFR est AA 82 12/10/2021 12:00 AM    GFR est non-AA 71 12/10/2021 12:00 AM    Calcium 9.1 09/15/2022 12:00 AM    Glucose (POC) 96 01/04/2021 03:09 PM     Lab Results   Component Value Date/Time    Bilirubin, total 0.4 09/15/2022 12:00 AM    ALT (SGPT) 21 09/15/2022 12:00 AM    Alk.  phosphatase 81 09/15/2022 12:00 AM    Protein, total 6.9 09/15/2022 12:00 AM    Albumin 4.2 09/15/2022 12:00 AM    Globulin 3.7 08/25/2020 09:14 AM     Lab Results   Component Value Date/Time    Reticulocyte count 1.7 08/27/2019 11:19 AM    Iron % saturation 33 07/02/2020 04:35 AM    TIBC 199 (L) 07/02/2020 04:35 AM    Ferritin 413 (H) 07/02/2020 04:35 AM    Vitamin B12 873 07/02/2020 04:35 AM    Folate 10.1 07/02/2020 04:35 AM    TSH 3.49 08/11/2020 08:14 AM    Lipase 136 07/27/2019 11:31 AM    Hep C virus Ab Interp. NONREACTIVE 12/03/2019 11:22 AM     Lab Results   Component Value Date/Time    INR 1.0 12/03/2019 11:22 AM    aPTT 27.3 12/03/2019 11:22 AM    D-dimer 2.45 (H) 06/09/2020 10:56 AM    Fibrinogen 243 12/03/2019 11:22 AM       CT Chest 9/7/2022:  1. Posttreatment changes in the right lung, without definite recurrence or intrathoracic metastasis. 2.  Moderate to large right pleural effusion, not significantly changed from prior. 3.  Sequelae of chronic SVC occlusion with prominent right chest wall collaterals. CT Chest 3/6/2023:  1. New clustered ill-defined low-density nodules interval surrounding  groundglass opacity in the left lower lobe as described most likely due to acute  infectious or inflammatory process. 2. Persistent large right pleural effusion. There is further collapse of the  right upper lobe and the pleural effusion at the right lung apex has mildly  increased. 3. Soft tissue thickening in the right pretracheal region and paratracheal  region has increased as described above. This does not demonstrate abnormal  enhancement to further post radiation change and scarring. Recurrent neoplasm is  not excluded and close follow-up is suggested. 4. 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 Assessment:   1) Non-small cell lung cancer  Stage IIIB  He is s/p definitive radiation with concurrent chemotherapy (cisplatin and etoposide) followed by one year of maintenance durvalumab completed 8/2020. He is now followed with surveillance.     His recent surveillance CT is a bit concerning with increasing mediastinal adenopathy, along with some new nodules. His pulmonologist is treating him for infection with abx and steroids. I recommend close follow up CT to reassess. Consider repeating PET/CT if the adenopathy continues to enlarge. Unfortunately, his two biopsies yielded scant material, insufficient for molecular studies or PDL1 testing. At progression, a repeat biopsy may be needed for PDL1 and NGS. 2) SVC syndrome  Symptoms well controlled now. Stable on imaging. Monitor. 3) Pleural effusion  Cytology negative. Underwent repeat thoracentesis on 11/27/2019, 2/14/2020, 7/2/2020, and 1/26/2022 with negative cytology. Following now with pulmonary, notes from 9/2022 and 3/2023 reviewed and they suggested referral for VATS if this continues to be an issue. Persists on recent CT, with worsening RUL collapse. He is not interested in surgery referral at this time, he will follow back up with pulmonary in 3 months. 4) Thrombocytopenia, Leukopenia  Mild. Chronic since at least 7/2019. Possibly from prior chemotherapy. Improved on last check. Monitor. 5) Compression fracture  Bone scan negative. Consider kyphoplasty vs radiation therapy if symptoms develop. No pain currently. 6) Osteopenia  Noted on DEXA 3/17/2020. He is back on calicum and vitamin D.    7) Pneumothorax  Spontaneous, noted on imaging 6/2022. Resolved now.     Plan:     Labs today: CBC, CMP  Continue abx and steroids from pulmonary  CT Chest in 3 months  Follow up with pulmonary in 3 months  Return to see me in 3 months      Signed By: Antonio Lee MD

## 2023-03-16 ENCOUNTER — OFFICE VISIT (OUTPATIENT)
Dept: ONCOLOGY | Age: 48
End: 2023-03-16
Payer: MEDICARE

## 2023-03-16 VITALS
DIASTOLIC BLOOD PRESSURE: 81 MMHG | OXYGEN SATURATION: 98 % | WEIGHT: 184 LBS | SYSTOLIC BLOOD PRESSURE: 121 MMHG | RESPIRATION RATE: 20 BRPM | HEIGHT: 69 IN | TEMPERATURE: 96.8 F | HEART RATE: 67 BPM | BODY MASS INDEX: 27.25 KG/M2

## 2023-03-16 DIAGNOSIS — C34.11 MALIGNANT NEOPLASM OF UPPER LOBE OF RIGHT LUNG (HCC): Primary | ICD-10-CM

## 2023-03-16 PROCEDURE — G8427 DOCREV CUR MEDS BY ELIG CLIN: HCPCS | Performed by: INTERNAL MEDICINE

## 2023-03-16 PROCEDURE — 3074F SYST BP LT 130 MM HG: CPT | Performed by: INTERNAL MEDICINE

## 2023-03-16 PROCEDURE — 3079F DIAST BP 80-89 MM HG: CPT | Performed by: INTERNAL MEDICINE

## 2023-03-16 PROCEDURE — G8419 CALC BMI OUT NRM PARAM NOF/U: HCPCS | Performed by: INTERNAL MEDICINE

## 2023-03-16 PROCEDURE — G8432 DEP SCR NOT DOC, RNG: HCPCS | Performed by: INTERNAL MEDICINE

## 2023-03-16 PROCEDURE — 99214 OFFICE O/P EST MOD 30 MIN: CPT | Performed by: INTERNAL MEDICINE

## 2023-03-16 NOTE — PROGRESS NOTES
Tasia Tamayo is a 49 y.o. male follow up for lung cancer. 1. Have you been to the ER, urgent care clinic since your last visit? Hospitalized since your last visit?no    2. Have you seen or consulted any other health care providers outside of the [address] [place] [address] since your last visit?   Include any pap smears or colon screening?no

## 2023-03-17 LAB
ALBUMIN SERPL-MCNC: 4.9 G/DL (ref 4–5)
ALBUMIN/GLOB SERPL: 1.9 {RATIO} (ref 1.2–2.2)
ALP SERPL-CCNC: 86 IU/L (ref 44–121)
ALT SERPL-CCNC: 17 IU/L (ref 0–44)
AST SERPL-CCNC: 22 IU/L (ref 0–40)
BASOPHILS # BLD AUTO: 0 X10E3/UL (ref 0–0.2)
BASOPHILS NFR BLD AUTO: 0 %
BILIRUB SERPL-MCNC: 0.5 MG/DL (ref 0–1.2)
BUN SERPL-MCNC: 17 MG/DL (ref 6–24)
BUN/CREAT SERPL: 15 (ref 9–20)
CALCIUM SERPL-MCNC: 9.9 MG/DL (ref 8.7–10.2)
CHLORIDE SERPL-SCNC: 101 MMOL/L (ref 96–106)
CO2 SERPL-SCNC: 24 MMOL/L (ref 20–29)
CREAT SERPL-MCNC: 1.15 MG/DL (ref 0.76–1.27)
EGFRCR SERPLBLD CKD-EPI 2021: 79 ML/MIN/1.73
EOSINOPHIL # BLD AUTO: 0 X10E3/UL (ref 0–0.4)
EOSINOPHIL NFR BLD AUTO: 0 %
ERYTHROCYTE [DISTWIDTH] IN BLOOD BY AUTOMATED COUNT: 17.5 % (ref 11.6–15.4)
GLOBULIN SER CALC-MCNC: 2.6 G/DL (ref 1.5–4.5)
GLUCOSE SERPL-MCNC: 124 MG/DL (ref 70–99)
HCT VFR BLD AUTO: 44.4 % (ref 37.5–51)
HGB BLD-MCNC: 14.9 G/DL (ref 13–17.7)
IMM GRANULOCYTES # BLD AUTO: 0 X10E3/UL (ref 0–0.1)
IMM GRANULOCYTES NFR BLD AUTO: 1 %
LYMPHOCYTES # BLD AUTO: 0.7 X10E3/UL (ref 0.7–3.1)
LYMPHOCYTES NFR BLD AUTO: 15 %
MCH RBC QN AUTO: 24 PG (ref 26.6–33)
MCHC RBC AUTO-ENTMCNC: 33.6 G/DL (ref 31.5–35.7)
MCV RBC AUTO: 72 FL (ref 79–97)
MONOCYTES # BLD AUTO: 0.3 X10E3/UL (ref 0.1–0.9)
MONOCYTES NFR BLD AUTO: 7 %
NEUTROPHILS # BLD AUTO: 3.7 X10E3/UL (ref 1.4–7)
NEUTROPHILS NFR BLD AUTO: 77 %
PLATELET # BLD AUTO: 170 X10E3/UL (ref 150–450)
POTASSIUM SERPL-SCNC: 4.5 MMOL/L (ref 3.5–5.2)
PROT SERPL-MCNC: 7.5 G/DL (ref 6–8.5)
RBC # BLD AUTO: 6.21 X10E6/UL (ref 4.14–5.8)
SODIUM SERPL-SCNC: 139 MMOL/L (ref 134–144)
WBC # BLD AUTO: 4.8 X10E3/UL (ref 3.4–10.8)

## 2023-04-22 ENCOUNTER — TRANSCRIBE ORDERS (OUTPATIENT)
Facility: HOSPITAL | Age: 48
End: 2023-04-22

## 2023-04-22 DIAGNOSIS — C34.11 MALIGNANT NEOPLASM OF UPPER LOBE OF RIGHT LUNG (HCC): Primary | ICD-10-CM

## 2023-04-28 DIAGNOSIS — C34.11 MALIGNANT NEOPLASM OF UPPER LOBE OF RIGHT LUNG (HCC): Primary | ICD-10-CM

## 2023-06-06 ENCOUNTER — HOSPITAL ENCOUNTER (OUTPATIENT)
Facility: HOSPITAL | Age: 48
Discharge: HOME | End: 2023-06-09
Payer: MEDICARE

## 2023-06-06 DIAGNOSIS — C34.11 MALIGNANT NEOPLASM OF UPPER LOBE OF RIGHT LUNG (HCC): ICD-10-CM

## 2023-06-06 PROCEDURE — 6360000004 HC RX CONTRAST MEDICATION: Performed by: INTERNAL MEDICINE

## 2023-06-06 PROCEDURE — 71260 CT THORAX DX C+: CPT

## 2023-06-06 RX ADMIN — IOPAMIDOL 80 ML: 755 INJECTION, SOLUTION INTRAVENOUS at 13:17

## 2023-06-23 ENCOUNTER — OFFICE VISIT (OUTPATIENT)
Age: 48
End: 2023-06-23
Payer: MEDICARE

## 2023-06-23 VITALS
TEMPERATURE: 97.5 F | DIASTOLIC BLOOD PRESSURE: 71 MMHG | SYSTOLIC BLOOD PRESSURE: 104 MMHG | RESPIRATION RATE: 18 BRPM | OXYGEN SATURATION: 98 % | BODY MASS INDEX: 26.87 KG/M2 | WEIGHT: 181.4 LBS | HEIGHT: 69 IN | HEART RATE: 66 BPM

## 2023-06-23 DIAGNOSIS — C34.11 MALIGNANT NEOPLASM OF UPPER LOBE, RIGHT BRONCHUS OR LUNG (HCC): ICD-10-CM

## 2023-06-23 DIAGNOSIS — C34.11 MALIGNANT NEOPLASM OF UPPER LOBE, RIGHT BRONCHUS OR LUNG (HCC): Primary | ICD-10-CM

## 2023-06-23 LAB
ALBUMIN SERPL-MCNC: 4 G/DL (ref 3.5–5)
ALBUMIN/GLOB SERPL: 1.3 (ref 1.1–2.2)
ALP SERPL-CCNC: 96 U/L (ref 45–117)
ALT SERPL-CCNC: 42 U/L (ref 12–78)
ANION GAP SERPL CALC-SCNC: 4 MMOL/L (ref 5–15)
AST SERPL-CCNC: 31 U/L (ref 15–37)
BASOPHILS # BLD: 0 K/UL (ref 0–0.1)
BASOPHILS NFR BLD: 0 % (ref 0–1)
BILIRUB SERPL-MCNC: 0.3 MG/DL (ref 0.2–1)
BUN SERPL-MCNC: 27 MG/DL (ref 6–20)
BUN/CREAT SERPL: 21 (ref 12–20)
CALCIUM SERPL-MCNC: 9.2 MG/DL (ref 8.5–10.1)
CHLORIDE SERPL-SCNC: 107 MMOL/L (ref 97–108)
CO2 SERPL-SCNC: 27 MMOL/L (ref 21–32)
CREAT SERPL-MCNC: 1.27 MG/DL (ref 0.7–1.3)
DIFFERENTIAL METHOD BLD: ABNORMAL
EOSINOPHIL # BLD: 0.2 K/UL (ref 0–0.4)
EOSINOPHIL NFR BLD: 6 % (ref 0–7)
ERYTHROCYTE [DISTWIDTH] IN BLOOD BY AUTOMATED COUNT: 15 % (ref 11.5–14.5)
GLOBULIN SER CALC-MCNC: 3.2 G/DL (ref 2–4)
GLUCOSE SERPL-MCNC: 107 MG/DL (ref 65–100)
HCT VFR BLD AUTO: 43.1 % (ref 36.6–50.3)
HGB BLD-MCNC: 13.8 G/DL (ref 12.1–17)
IMM GRANULOCYTES # BLD AUTO: 0 K/UL (ref 0–0.04)
IMM GRANULOCYTES NFR BLD AUTO: 0 % (ref 0–0.5)
LYMPHOCYTES # BLD: 0.7 K/UL (ref 0.8–3.5)
LYMPHOCYTES NFR BLD: 21 % (ref 12–49)
MCH RBC QN AUTO: 23.4 PG (ref 26–34)
MCHC RBC AUTO-ENTMCNC: 32 G/DL (ref 30–36.5)
MCV RBC AUTO: 72.9 FL (ref 80–99)
MONOCYTES # BLD: 0.6 K/UL (ref 0–1)
MONOCYTES NFR BLD: 18 % (ref 5–13)
NEUTS SEG # BLD: 1.6 K/UL (ref 1.8–8)
NEUTS SEG NFR BLD: 55 % (ref 32–75)
NRBC # BLD: 0 K/UL (ref 0–0.01)
NRBC BLD-RTO: 0 PER 100 WBC
PLATELET # BLD AUTO: 153 K/UL (ref 150–400)
PMV BLD AUTO: 10.7 FL (ref 8.9–12.9)
POTASSIUM SERPL-SCNC: 4.5 MMOL/L (ref 3.5–5.1)
PROT SERPL-MCNC: 7.2 G/DL (ref 6.4–8.2)
RBC # BLD AUTO: 5.91 M/UL (ref 4.1–5.7)
RBC MORPH BLD: ABNORMAL
RBC MORPH BLD: ABNORMAL
SODIUM SERPL-SCNC: 138 MMOL/L (ref 136–145)
WBC # BLD AUTO: 3.1 K/UL (ref 4.1–11.1)

## 2023-06-23 PROCEDURE — 3074F SYST BP LT 130 MM HG: CPT | Performed by: INTERNAL MEDICINE

## 2023-06-23 PROCEDURE — 99214 OFFICE O/P EST MOD 30 MIN: CPT | Performed by: INTERNAL MEDICINE

## 2023-06-23 PROCEDURE — 3078F DIAST BP <80 MM HG: CPT | Performed by: INTERNAL MEDICINE

## 2023-06-23 RX ORDER — ALBUTEROL SULFATE 2.5 MG/3ML
SOLUTION RESPIRATORY (INHALATION)
COMMUNITY
Start: 2023-06-12

## 2023-06-23 NOTE — PROGRESS NOTES
Rm    Chief Complaint   Patient presents with    Follow-up     3 month        /71 (Site: Left Upper Arm, Position: Sitting, Cuff Size: Medium Adult)   Pulse 66   Temp 97.5 °F (36.4 °C) (Temporal)   Resp 18   Ht 5' 9\" (1.753 m)   Wt 181 lb 6.4 oz (82.3 kg)   SpO2 98%   BMI 26.79 kg/m²      1. Have you been to the ER, urgent care clinic since your last visit? Hospitalized since your last visit? no    2. Have you seen or consulted any other health care providers outside of the [address] [place] [address] since your last visit? Include any pap smears or colon screening. no    Health Maintenance Due   Topic Date Due    COVID-19 Vaccine (1) Never done    DTaP/Tdap/Td vaccine (1 - Tdap) Never done    Diabetes screen  Never done    Lipids  Never done    Colorectal Cancer Screen  Never done        No flowsheet data found. No flowsheet data found. No flowsheet data found.

## 2023-08-17 NOTE — PROGRESS NOTES
[facility] [employer]  Social Work Navigator Encounter     Patient Name:  Graciela Romero    Medical History: lung cancer    Advance Directives: None on file; conversation deferred     Narrative: Supportive visit with patient and his mother. Offered supportive listening as patient ventilates feelings regarding diagnosis and treatment. Patient demonstrated appropriate understanding of diagnosis. He voiced appreciation that Dr. Jamee Ivy answered all of his questions stating \"I finally am not in the dark\". He tells me  some days he feels \"down\" but mostly has a positive outlook. Patients mother is tearful but voiced support of her son. He is well supported but his mother, aunts and uncles who are able to provide transportation to medical appointments. Patient's FMLA was extended by two weeks. Encouraged him to bring in needed forms for us fill out. He has long-term disability through is employer which start after 6 months (around October) of leave. He has also applied for Social Security Disability. Offered to contact Gatu Hussein with Disability Determination Services to inquire if all appropriate medical records needed to review his claim were received. Barriers to Care: financial    Assessment/Action:   1. Patient currently has no income. He has applied for Social Security Disability. 2. Patient plans to extend FMLA. 3. Ongoing psychosocial support to include assessment, supportive counseling, and resource linkage.     Plan/Referral:  Insurance/Entitlements referral    Thank you,  Boris Rey LCSW [Chaperone Present] : A chaperone was present in the examining room during all aspects of the physical examination [Appropriately responsive] : appropriately responsive [Alert] : alert [No Acute Distress] : no acute distress [No Lymphadenopathy] : no lymphadenopathy [No Murmurs] : no murmurs [Soft] : soft [Non-tender] : non-tender [Non-distended] : non-distended [No HSM] : No HSM [No Lesions] : no lesions [No Mass] : no mass [Oriented x3] : oriented x3 [Examination Of The Breasts] : a normal appearance [No Masses] : no breast masses were palpable [Labia Majora] : normal [Labia Minora] : normal [Normal] : normal [Uterine Adnexae] : normal

## 2023-08-24 ENCOUNTER — TELEPHONE (OUTPATIENT)
Age: 48
End: 2023-08-24

## 2023-08-24 NOTE — TELEPHONE ENCOUNTER
Larry Moreno at [facility]  [phone]    08/24/23- Spoke to patient, he would like CT order sent to [address] [place] [address] Dr suarez the number for [facility] central scheduling to call and set up ([phone]). Advised him to get prior images on a disk to take with him for comparison purposes. Patient inquired about insurance approval for upcoming office visit. Informed him CARLEE request is scanned in, but will see if  can follow up on approval.  He verbalized understanding and was appreciative.

## 2023-08-24 NOTE — TELEPHONE ENCOUNTER
Pt called in stating he would like his CT order sent to [facility] to get done    CB# [phone]

## 2023-08-30 ENCOUNTER — TELEPHONE (OUTPATIENT)
Age: 48
End: 2023-08-30

## 2023-08-30 NOTE — TELEPHONE ENCOUNTER
Larry Moreno at [facility]  [phone]    08/30/23- Patient is scheduled for CT chest on 9/17 at [facility].    Call placed to Amen., spoke to Fox to initiate PA. CT chest approved, auth #[id number] from 8/30-11/27/23. Approval faxed to [facility] scheduling, confirmation received.

## 2023-09-14 NOTE — PROGRESS NOTES
[facility] [place] at [address], [address] [place] [address]  Kwabena Fret: [phone]  F: [phone]      Reason for Visit:   Heidi Mojica is a 49 y.o. male who is seen today for evaluation of non-small cell lung cancer. Hematology/Oncology Treatment History:   Diagnosed at [address] [facility]  Biopsy of right level 4 node: non small cell carcinoma, favored squamous cell carcinoma, insufficient sample for further testing  Stage III Non-small cell lung cancer (Squamous cell)  Definitive radiation with Dr. Jose Michaud completed mid-July 2019  Concurrent chemotherapy with carboplatin and paclitaxel by Dr. Jie Willett, stopped during second infusion due to reaction to paclitaxel  Concurrent chemotherapy with cisplatin and etoposide completed 7/16/2019 by Dr. Jie Willett  CT Chest 7/27/2019: There has been no significant change in size of the large right paratracheal mediastinal mass. There are many new areas  of peripheral consolidation in the right upper lobe, which may represent some combination of progressive disease, posttreatment change, or infection. Attention on follow-up is needed. A previously seen right upper lobe nodule in the posterior segment appears to have decreased in size. The large right pleural effusion on this exam is significantly increased in size from prior. CT A/P 7/29/2019: no metastatic disease in abdomen or pelvis  MRI Brain 7/29/2019: no intracranial metastatic disease  EBUS by Dr. Aziza Dewey 7/30/2019: Squamous cell carcinoma, PDL1 QNS, insufficient tissue  for molecular studies  Thoracentesis 8/5/2019: cytology negative  Maintenance immune therapy with Durvalumab from 8/27/2019 to 8/25/2020    History of Present Illness:   He reports his breathing is stable, some WHEELER. Mild intermittent cough, occasional yellow sputum, other times nonproductive. No pain. He saw pulmonary last week. Review of systems was obtained and pertinent findings reviewed above.  Past medical

## 2023-09-22 ENCOUNTER — OFFICE VISIT (OUTPATIENT)
Age: 48
End: 2023-09-22
Payer: MEDICARE

## 2023-09-22 VITALS
BODY MASS INDEX: 26.98 KG/M2 | RESPIRATION RATE: 16 BRPM | SYSTOLIC BLOOD PRESSURE: 116 MMHG | OXYGEN SATURATION: 97 % | WEIGHT: 182.2 LBS | DIASTOLIC BLOOD PRESSURE: 78 MMHG | TEMPERATURE: 97.7 F | HEIGHT: 69 IN | HEART RATE: 64 BPM

## 2023-09-22 DIAGNOSIS — C34.11 MALIGNANT NEOPLASM OF UPPER LOBE, RIGHT BRONCHUS OR LUNG (HCC): Primary | ICD-10-CM

## 2023-09-22 DIAGNOSIS — C34.11 MALIGNANT NEOPLASM OF UPPER LOBE, RIGHT BRONCHUS OR LUNG (HCC): ICD-10-CM

## 2023-09-22 PROCEDURE — 3074F SYST BP LT 130 MM HG: CPT | Performed by: INTERNAL MEDICINE

## 2023-09-22 PROCEDURE — 99214 OFFICE O/P EST MOD 30 MIN: CPT | Performed by: INTERNAL MEDICINE

## 2023-09-22 PROCEDURE — 3078F DIAST BP <80 MM HG: CPT | Performed by: INTERNAL MEDICINE

## 2023-09-22 NOTE — PROGRESS NOTES
Frankey Been is a 49 y.o. maleo follow up for lung cancer. 1. Have you been to the ER, urgent care clinic since your last visit? Hospitalized since your last visit?no    2. Have you seen or consulted any other health care providers outside of the [address] [place] [address] since your last visit? Include any pap smears or colon screening.  no

## 2023-09-23 LAB
ALBUMIN SERPL-MCNC: 4 G/DL (ref 3.5–5)
ALBUMIN/GLOB SERPL: 1.4 (ref 1.1–2.2)
ALP SERPL-CCNC: 77 U/L (ref 45–117)
ALT SERPL-CCNC: 33 U/L (ref 12–78)
ANION GAP SERPL CALC-SCNC: 4 MMOL/L (ref 5–15)
AST SERPL-CCNC: 22 U/L (ref 15–37)
BASOPHILS # BLD: 0 K/UL (ref 0–0.1)
BASOPHILS NFR BLD: 1 % (ref 0–1)
BILIRUB SERPL-MCNC: 0.7 MG/DL (ref 0.2–1)
BUN SERPL-MCNC: 18 MG/DL (ref 6–20)
BUN/CREAT SERPL: 14 (ref 12–20)
CALCIUM SERPL-MCNC: 8.9 MG/DL (ref 8.5–10.1)
CHLORIDE SERPL-SCNC: 105 MMOL/L (ref 97–108)
CO2 SERPL-SCNC: 28 MMOL/L (ref 21–32)
CREAT SERPL-MCNC: 1.33 MG/DL (ref 0.7–1.3)
DIFFERENTIAL METHOD BLD: ABNORMAL
EOSINOPHIL # BLD: 0.2 K/UL (ref 0–0.4)
EOSINOPHIL NFR BLD: 6 % (ref 0–7)
ERYTHROCYTE [DISTWIDTH] IN BLOOD BY AUTOMATED COUNT: 15.9 % (ref 11.5–14.5)
GLOBULIN SER CALC-MCNC: 2.9 G/DL (ref 2–4)
GLUCOSE SERPL-MCNC: 94 MG/DL (ref 65–100)
HCT VFR BLD AUTO: 40.7 % (ref 36.6–50.3)
HGB BLD-MCNC: 13.4 G/DL (ref 12.1–17)
IMM GRANULOCYTES # BLD AUTO: 0 K/UL (ref 0–0.04)
IMM GRANULOCYTES NFR BLD AUTO: 0 % (ref 0–0.5)
LYMPHOCYTES # BLD: 0.6 K/UL (ref 0.8–3.5)
LYMPHOCYTES NFR BLD: 19 % (ref 12–49)
MCH RBC QN AUTO: 23.7 PG (ref 26–34)
MCHC RBC AUTO-ENTMCNC: 32.9 G/DL (ref 30–36.5)
MCV RBC AUTO: 72 FL (ref 80–99)
MONOCYTES # BLD: 0.5 K/UL (ref 0–1)
MONOCYTES NFR BLD: 16 % (ref 5–13)
NEUTS SEG # BLD: 2 K/UL (ref 1.8–8)
NEUTS SEG NFR BLD: 58 % (ref 32–75)
NRBC # BLD: 0 K/UL (ref 0–0.01)
NRBC BLD-RTO: 0 PER 100 WBC
PLATELET # BLD AUTO: 146 K/UL (ref 150–400)
PMV BLD AUTO: 10.5 FL (ref 8.9–12.9)
POTASSIUM SERPL-SCNC: 4.3 MMOL/L (ref 3.5–5.1)
PROT SERPL-MCNC: 6.9 G/DL (ref 6.4–8.2)
RBC # BLD AUTO: 5.65 M/UL (ref 4.1–5.7)
RBC MORPH BLD: ABNORMAL
SODIUM SERPL-SCNC: 137 MMOL/L (ref 136–145)
WBC # BLD AUTO: 3.3 K/UL (ref 4.1–11.1)

## 2023-09-28 NOTE — ROUTINE PROCESS
Nikki Earing Prosise  1975  [id number]    Situation:  Verbal report received from: Rafa Xie RN  Procedure: Procedure(s):  BRONCHOSCOPY    Background:    Preoperative diagnosis: hempotysis  Postoperative diagnosis: Hemoptosis    :  Dr. Lauren Pena  Assistant(s): Endoscopy Technician-1: Isatu Manuel  Endoscopy RN-1: Jose Valdovinos RN    Specimens: * No specimens in log *  H. Pylori  no    Assessment:  Intra-procedure medications   Versed yes 6 mg  Fentanyl yes 100 mcg  Anesthesia gave intra-procedure sedation and medications, see anesthesia flow sheet no    Intravenous fluids: NS@ KVO     Vital signs stable yes    Abdominal assessment: round and soft yes    Recommendation:  Discharge patient per MD order yes.     Family or Friend Gabi  Permission to share finding with family or friend yes
no

## 2024-03-19 ENCOUNTER — HOSPITAL ENCOUNTER (OUTPATIENT)
Facility: HOSPITAL | Age: 49
Discharge: HOME | End: 2024-03-22
Attending: INTERNAL MEDICINE
Payer: MEDICARE

## 2024-03-19 DIAGNOSIS — C34.11 MALIGNANT NEOPLASM OF UPPER LOBE, RIGHT BRONCHUS OR LUNG (HCC): ICD-10-CM

## 2024-03-19 PROCEDURE — 71260 CT THORAX DX C+: CPT

## 2024-03-19 PROCEDURE — 6360000004 HC RX CONTRAST MEDICATION: Performed by: INTERNAL MEDICINE

## 2024-03-19 RX ADMIN — IOPAMIDOL 80 ML: 755 INJECTION, SOLUTION INTRAVENOUS at 15:50

## 2024-03-19 NOTE — PROGRESS NOTES
[facility] [place] at [facility]  [address] [facility] [address]  W: [phone]  F: [phone]      Reason for Visit:   Ayaz Foster is a 48 y.o. male who is seen today for evaluation of non-small cell lung cancer.    Hematology/Oncology Treatment History:   Diagnosed at [facility]  Biopsy of right level 4 node: non small cell carcinoma, favored squamous cell carcinoma, insufficient sample for further testing  Stage III Non-small cell lung cancer (Squamous cell)  Definitive radiation with Dr. Pelletier completed mid-July 2019  Concurrent chemotherapy with carboplatin and paclitaxel by Dr. Vasquez, stopped during second infusion due to reaction to paclitaxel  Concurrent chemotherapy with cisplatin and etoposide completed 7/16/2019 by Dr. Vasquez  CT Chest 7/27/2019:  There has been no significant change in size of the large right paratracheal mediastinal mass. There are many new areas  of peripheral consolidation in the right upper lobe, which may represent some combination of progressive disease, posttreatment change, or infection. Attention on follow-up is needed. A previously seen right upper lobe nodule in the posterior segment appears to have decreased in size. The large right pleural effusion on this exam is significantly increased in size from prior.  CT A/P 7/29/2019: no metastatic disease in abdomen or pelvis  MRI Brain 7/29/2019: no intracranial metastatic disease  EBUS by Dr. Graves 7/30/2019: Squamous cell carcinoma, PDL1 QNS, insufficient tissue  for molecular studies  Thoracentesis 8/5/2019: cytology negative  Maintenance immune therapy with Durvalumab from 8/27/2019 to 8/25/2020    History of Present Illness:   He notes some worsening cough and dyspnea in the past 2 months.  Saw pulmonary recently, they are arranging repeat thoracentesis. Denies pain.        Review of systems was obtained and pertinent findings reviewed above. Past medical history, social

## 2024-03-27 ENCOUNTER — HOSPITAL ENCOUNTER (OUTPATIENT)
Facility: HOSPITAL | Age: 49
Discharge: HOME | End: 2024-03-30
Payer: COMMERCIAL

## 2024-03-27 ENCOUNTER — OFFICE VISIT (OUTPATIENT)
Age: 49
End: 2024-03-27
Payer: MEDICARE

## 2024-03-27 VITALS
DIASTOLIC BLOOD PRESSURE: 79 MMHG | WEIGHT: 185 LBS | HEIGHT: 69 IN | RESPIRATION RATE: 16 BRPM | HEART RATE: 66 BPM | SYSTOLIC BLOOD PRESSURE: 121 MMHG | BODY MASS INDEX: 27.4 KG/M2 | TEMPERATURE: 97.9 F | OXYGEN SATURATION: 98 %

## 2024-03-27 DIAGNOSIS — D64.9 ANEMIA, UNSPECIFIED TYPE: ICD-10-CM

## 2024-03-27 DIAGNOSIS — M89.9 BONE LESION: ICD-10-CM

## 2024-03-27 DIAGNOSIS — Z02.71 ISSUE OF INCAPACITY CERTIFICATE: ICD-10-CM

## 2024-03-27 DIAGNOSIS — C34.11 MALIGNANT NEOPLASM OF UPPER LOBE, RIGHT BRONCHUS OR LUNG (HCC): Primary | ICD-10-CM

## 2024-03-27 PROCEDURE — G8419 CALC BMI OUT NRM PARAM NOF/U: HCPCS | Performed by: INTERNAL MEDICINE

## 2024-03-27 PROCEDURE — G2211 COMPLEX E/M VISIT ADD ON: HCPCS | Performed by: INTERNAL MEDICINE

## 2024-03-27 PROCEDURE — G8484 FLU IMMUNIZE NO ADMIN: HCPCS | Performed by: INTERNAL MEDICINE

## 2024-03-27 PROCEDURE — 99214 OFFICE O/P EST MOD 30 MIN: CPT | Performed by: INTERNAL MEDICINE

## 2024-03-27 PROCEDURE — 1036F TOBACCO NON-USER: CPT | Performed by: INTERNAL MEDICINE

## 2024-03-27 PROCEDURE — 3074F SYST BP LT 130 MM HG: CPT | Performed by: INTERNAL MEDICINE

## 2024-03-27 PROCEDURE — 3078F DIAST BP <80 MM HG: CPT | Performed by: INTERNAL MEDICINE

## 2024-03-27 PROCEDURE — 71046 X-RAY EXAM CHEST 2 VIEWS: CPT

## 2024-03-27 PROCEDURE — G8427 DOCREV CUR MEDS BY ELIG CLIN: HCPCS | Performed by: INTERNAL MEDICINE

## 2024-03-27 NOTE — PROGRESS NOTES
Ayaz Foster is a 48 y.o. male follow up for lung cancer.      1. Have you been to the ER, urgent care clinic since your last visit?  Hospitalized since your last visit?no    2. Have you seen or consulted any other health care providers outside of the [facility] since your last visit?  Include any pap smears or colon screening. no

## 2024-03-28 LAB
ALBUMIN SERPL-MCNC: 4.1 G/DL (ref 3.5–5)
ALBUMIN/GLOB SERPL: 1.3 (ref 1.1–2.2)
ALP SERPL-CCNC: 91 U/L (ref 45–117)
ALT SERPL-CCNC: 32 U/L (ref 12–78)
ANION GAP SERPL CALC-SCNC: 3 MMOL/L (ref 5–15)
AST SERPL-CCNC: 22 U/L (ref 15–37)
BASOPHILS # BLD: 0 K/UL (ref 0–0.1)
BASOPHILS NFR BLD: 1 % (ref 0–1)
BILIRUB SERPL-MCNC: 0.6 MG/DL (ref 0.2–1)
BUN SERPL-MCNC: 22 MG/DL (ref 6–20)
BUN/CREAT SERPL: 17 (ref 12–20)
CALCIUM SERPL-MCNC: 9.5 MG/DL (ref 8.5–10.1)
CHLORIDE SERPL-SCNC: 106 MMOL/L (ref 97–108)
CO2 SERPL-SCNC: 30 MMOL/L (ref 21–32)
CREAT SERPL-MCNC: 1.29 MG/DL (ref 0.7–1.3)
DIFFERENTIAL METHOD BLD: ABNORMAL
EOSINOPHIL # BLD: 0.2 K/UL (ref 0–0.4)
EOSINOPHIL NFR BLD: 6 % (ref 0–7)
ERYTHROCYTE [DISTWIDTH] IN BLOOD BY AUTOMATED COUNT: 14.7 % (ref 11.5–14.5)
FERRITIN SERPL-MCNC: 164 NG/ML (ref 26–388)
GLOBULIN SER CALC-MCNC: 3.2 G/DL (ref 2–4)
GLUCOSE SERPL-MCNC: 100 MG/DL (ref 65–100)
HCT VFR BLD AUTO: 42.3 % (ref 36.6–50.3)
HGB BLD-MCNC: 13.5 G/DL (ref 12.1–17)
IMM GRANULOCYTES # BLD AUTO: 0 K/UL (ref 0–0.04)
IMM GRANULOCYTES NFR BLD AUTO: 0 % (ref 0–0.5)
IRON SATN MFR SERPL: 22 % (ref 20–50)
IRON SERPL-MCNC: 67 UG/DL (ref 35–150)
LYMPHOCYTES # BLD: 0.8 K/UL (ref 0.8–3.5)
LYMPHOCYTES NFR BLD: 26 % (ref 12–49)
MCH RBC QN AUTO: 23.5 PG (ref 26–34)
MCHC RBC AUTO-ENTMCNC: 31.9 G/DL (ref 30–36.5)
MCV RBC AUTO: 73.7 FL (ref 80–99)
MONOCYTES # BLD: 0.3 K/UL (ref 0–1)
MONOCYTES NFR BLD: 9 % (ref 5–13)
NEUTS SEG # BLD: 1.7 K/UL (ref 1.8–8)
NEUTS SEG NFR BLD: 58 % (ref 32–75)
NRBC # BLD: 0 K/UL (ref 0–0.01)
NRBC BLD-RTO: 0 PER 100 WBC
PLATELET # BLD AUTO: 154 K/UL (ref 150–400)
PMV BLD AUTO: 10.9 FL (ref 8.9–12.9)
POTASSIUM SERPL-SCNC: 4.5 MMOL/L (ref 3.5–5.1)
PROT SERPL-MCNC: 7.3 G/DL (ref 6.4–8.2)
RBC # BLD AUTO: 5.74 M/UL (ref 4.1–5.7)
RBC MORPH BLD: ABNORMAL
SODIUM SERPL-SCNC: 139 MMOL/L (ref 136–145)
TIBC SERPL-MCNC: 303 UG/DL (ref 250–450)
WBC # BLD AUTO: 3 K/UL (ref 4.1–11.1)

## 2024-04-02 LAB
HEMOGLOBIN A2: 3.4 % (ref 1.8–3.2)
HEMOGLOBIN A: 71.1 % (ref 96.4–98.8)
HEMOGLOBIN C: 25.5 %
HEMOGLOBIN E%: 0 %
HEMOGLOBIN F: 0 % (ref 0–2)
HEMOGLOBIN S: 0 %
HEMOGLOBIN VARIANT: 0 %
HGB A MFR BLD: NORMAL % (ref 96.4–98.8)
HGB A2 MFR BLD COLUMN CHROM: NORMAL % (ref 1.8–3.2)
HGB F MFR BLD: NORMAL % (ref 0–2)
HGB FRACT BLD-IMP: NORMAL
HGB S MFR BLD: NORMAL %
INTERPRETATION: ABNORMAL

## 2024-04-08 ENCOUNTER — HOSPITAL ENCOUNTER (OUTPATIENT)
Facility: HOSPITAL | Age: 49
Discharge: HOME | End: 2024-04-11
Attending: INTERNAL MEDICINE
Payer: MEDICARE

## 2024-04-08 VITALS — BODY MASS INDEX: 26.88 KG/M2 | WEIGHT: 182 LBS

## 2024-04-08 DIAGNOSIS — M89.9 BONE LESION: ICD-10-CM

## 2024-04-08 DIAGNOSIS — C34.11 MALIGNANT NEOPLASM OF UPPER LOBE, RIGHT BRONCHUS OR LUNG (HCC): ICD-10-CM

## 2024-04-08 PROCEDURE — 72157 MRI CHEST SPINE W/O & W/DYE: CPT

## 2024-04-08 PROCEDURE — 6360000004 HC RX CONTRAST MEDICATION: Performed by: RADIOLOGY

## 2024-04-08 PROCEDURE — A9579 GAD-BASE MR CONTRAST NOS,1ML: HCPCS | Performed by: RADIOLOGY

## 2024-04-08 RX ADMIN — GADOTERIDOL 16 ML: 279.3 INJECTION, SOLUTION INTRAVENOUS at 18:11

## 2024-04-30 ENCOUNTER — HOSPITAL ENCOUNTER (OUTPATIENT)
Facility: HOSPITAL | Age: 49
Discharge: HOME | End: 2024-05-03
Attending: INTERNAL MEDICINE
Payer: MEDICARE

## 2024-04-30 VITALS
SYSTOLIC BLOOD PRESSURE: 138 MMHG | RESPIRATION RATE: 18 BRPM | HEART RATE: 58 BPM | OXYGEN SATURATION: 99 % | DIASTOLIC BLOOD PRESSURE: 77 MMHG

## 2024-04-30 DIAGNOSIS — J90 PLEURAL EFFUSION: ICD-10-CM

## 2024-04-30 LAB
COMMENT:: NORMAL
SPECIMEN HOLD: NORMAL

## 2024-04-30 PROCEDURE — 2500000003 HC RX 250 WO HCPCS

## 2024-04-30 PROCEDURE — C1729 CATH, DRAINAGE: HCPCS

## 2024-04-30 PROCEDURE — 71045 X-RAY EXAM CHEST 1 VIEW: CPT

## 2024-04-30 RX ORDER — LIDOCAINE HYDROCHLORIDE 10 MG/ML
10 INJECTION, SOLUTION EPIDURAL; INFILTRATION; INTRACAUDAL; PERINEURAL ONCE
Status: COMPLETED | OUTPATIENT
Start: 2024-04-30 | End: 2024-04-30

## 2024-04-30 RX ADMIN — LIDOCAINE HYDROCHLORIDE 10 ML: 10 INJECTION, SOLUTION EPIDURAL; INFILTRATION; INTRACAUDAL; PERINEURAL at 09:53

## 2024-04-30 ASSESSMENT — PAIN SCALES - GENERAL: PAINLEVEL_OUTOF10: 0

## 2024-04-30 NOTE — PROGRESS NOTES
Patient arrives in ultrasound today for an ultrasound guided Thoracentesis    ID verified, patient is alert and oriented x 4    Assessment is as follows: A&O without distress or complaints.  S1, S2, RLL demised all other lung fields clear.  ANGIE Muñoz at bedside for consent at 0925, all questions answered    Time out completed at 0925    Procedure start time 0925    Right mid back prepped under sterile technique, Right lower lobe accessed with a 6 Cymraes 12 cm pigtail, with clear and yellow fluid return.  Sample taken for hold in the lab.    Connected to [place] drainage canister    Total drainage out was 1000    Procedure end time 0938    Dressing applied to Right lower chest posterior.  by US tech Amanda, dressing is clean, dry, and intact    Portable chest xray ordered to be completed at 0951    Patient tolerated overall procedure well. Reviewed CXR with STEPHANIE Muñoz NP.  OK to discharge patient at this time.    Patient walked out to lob by RN.    Patient cleared by Radiologist for CHHAYA Mcginnis RN

## 2024-05-10 NOTE — ADDENDUM NOTE
Addended by: Brian Mazariegos on: 1/18/2021 11:40 AM     Modules accepted: Orders [facility]

## 2024-09-23 ENCOUNTER — HOSPITAL ENCOUNTER (OUTPATIENT)
Facility: HOSPITAL | Age: 49
Discharge: HOME | End: 2024-09-26
Attending: INTERNAL MEDICINE
Payer: MEDICARE

## 2024-09-23 DIAGNOSIS — C34.11 MALIGNANT NEOPLASM OF UPPER LOBE, RIGHT BRONCHUS OR LUNG (HCC): ICD-10-CM

## 2024-09-23 PROCEDURE — 71260 CT THORAX DX C+: CPT

## 2024-09-23 PROCEDURE — 6360000004 HC RX CONTRAST MEDICATION: Performed by: INTERNAL MEDICINE

## 2024-09-23 RX ORDER — IOPAMIDOL 755 MG/ML
80 INJECTION, SOLUTION INTRAVASCULAR
Status: COMPLETED | OUTPATIENT
Start: 2024-09-23 | End: 2024-09-23

## 2024-09-23 RX ADMIN — IOPAMIDOL 80 ML: 755 INJECTION, SOLUTION INTRAVENOUS at 13:29

## 2024-09-25 ENCOUNTER — OFFICE VISIT (OUTPATIENT)
Age: 49
End: 2024-09-25
Payer: MEDICARE

## 2024-09-25 VITALS
TEMPERATURE: 97.9 F | BODY MASS INDEX: 27.55 KG/M2 | RESPIRATION RATE: 20 BRPM | DIASTOLIC BLOOD PRESSURE: 80 MMHG | WEIGHT: 186 LBS | HEIGHT: 69 IN | SYSTOLIC BLOOD PRESSURE: 132 MMHG | OXYGEN SATURATION: 96 % | HEART RATE: 73 BPM

## 2024-09-25 DIAGNOSIS — C34.11 MALIGNANT NEOPLASM OF UPPER LOBE, RIGHT BRONCHUS OR LUNG (HCC): Primary | ICD-10-CM

## 2024-09-25 DIAGNOSIS — C34.11 MALIGNANT NEOPLASM OF UPPER LOBE, RIGHT BRONCHUS OR LUNG (HCC): ICD-10-CM

## 2024-09-25 LAB
ALBUMIN SERPL-MCNC: 4 G/DL (ref 3.5–5)
ALBUMIN/GLOB SERPL: 1.2 (ref 1.1–2.2)
ALP SERPL-CCNC: 104 U/L (ref 45–117)
ALT SERPL-CCNC: 36 U/L (ref 12–78)
ANION GAP SERPL CALC-SCNC: 4 MMOL/L (ref 2–12)
AST SERPL-CCNC: 29 U/L (ref 15–37)
BASOPHILS # BLD: 0 K/UL (ref 0–0.1)
BASOPHILS NFR BLD: 0 % (ref 0–1)
BILIRUB DIRECT SERPL-MCNC: 0.1 MG/DL (ref 0–0.2)
BILIRUB SERPL-MCNC: 0.4 MG/DL (ref 0.2–1)
BUN SERPL-MCNC: 22 MG/DL (ref 6–20)
BUN/CREAT SERPL: 18 (ref 12–20)
CALCIUM SERPL-MCNC: 9.3 MG/DL (ref 8.5–10.1)
CHLORIDE SERPL-SCNC: 106 MMOL/L (ref 97–108)
CO2 SERPL-SCNC: 29 MMOL/L (ref 21–32)
CREAT SERPL-MCNC: 1.2 MG/DL (ref 0.7–1.3)
DIFFERENTIAL METHOD BLD: ABNORMAL
EOSINOPHIL # BLD: 0.1 K/UL (ref 0–0.4)
EOSINOPHIL NFR BLD: 3 % (ref 0–7)
ERYTHROCYTE [DISTWIDTH] IN BLOOD BY AUTOMATED COUNT: 15.4 % (ref 11.5–14.5)
GLOBULIN SER CALC-MCNC: 3.3 G/DL (ref 2–4)
GLUCOSE SERPL-MCNC: 97 MG/DL (ref 65–100)
HCT VFR BLD AUTO: 38.9 % (ref 36.6–50.3)
HGB BLD-MCNC: 12.8 G/DL (ref 12.1–17)
IMM GRANULOCYTES # BLD AUTO: 0 K/UL (ref 0–0.04)
IMM GRANULOCYTES NFR BLD AUTO: 1 % (ref 0–0.5)
LYMPHOCYTES # BLD: 0.6 K/UL (ref 0.8–3.5)
LYMPHOCYTES NFR BLD: 16 % (ref 12–49)
MCH RBC QN AUTO: 23.7 PG (ref 26–34)
MCHC RBC AUTO-ENTMCNC: 32.9 G/DL (ref 30–36.5)
MCV RBC AUTO: 72.2 FL (ref 80–99)
MONOCYTES # BLD: 0.5 K/UL (ref 0–1)
MONOCYTES NFR BLD: 15 % (ref 5–13)
NEUTS SEG # BLD: 2.4 K/UL (ref 1.8–8)
NEUTS SEG NFR BLD: 65 % (ref 32–75)
NRBC # BLD: 0 K/UL (ref 0–0.01)
NRBC BLD-RTO: 0 PER 100 WBC
PLATELET # BLD AUTO: 157 K/UL (ref 150–400)
PMV BLD AUTO: 10.4 FL (ref 8.9–12.9)
POTASSIUM SERPL-SCNC: 4.3 MMOL/L (ref 3.5–5.1)
PROT SERPL-MCNC: 7.3 G/DL (ref 6.4–8.2)
RBC # BLD AUTO: 5.39 M/UL (ref 4.1–5.7)
RBC MORPH BLD: ABNORMAL
RBC MORPH BLD: ABNORMAL
SODIUM SERPL-SCNC: 139 MMOL/L (ref 136–145)
WBC # BLD AUTO: 3.6 K/UL (ref 4.1–11.1)

## 2024-09-25 PROCEDURE — G2211 COMPLEX E/M VISIT ADD ON: HCPCS | Performed by: INTERNAL MEDICINE

## 2024-09-25 PROCEDURE — G8419 CALC BMI OUT NRM PARAM NOF/U: HCPCS | Performed by: INTERNAL MEDICINE

## 2024-09-25 PROCEDURE — 99214 OFFICE O/P EST MOD 30 MIN: CPT | Performed by: INTERNAL MEDICINE

## 2024-09-25 PROCEDURE — 3079F DIAST BP 80-89 MM HG: CPT | Performed by: INTERNAL MEDICINE

## 2024-09-25 PROCEDURE — G8427 DOCREV CUR MEDS BY ELIG CLIN: HCPCS | Performed by: INTERNAL MEDICINE

## 2024-09-25 PROCEDURE — 1036F TOBACCO NON-USER: CPT | Performed by: INTERNAL MEDICINE

## 2024-09-25 PROCEDURE — 3075F SYST BP GE 130 - 139MM HG: CPT | Performed by: INTERNAL MEDICINE

## 2025-01-24 ENCOUNTER — HOSPITAL ENCOUNTER (OUTPATIENT)
Facility: HOSPITAL | Age: 50
Setting detail: SPECIMEN
Discharge: HOME | End: 2025-01-27
Payer: MEDICARE

## 2025-01-24 ENCOUNTER — TRANSCRIBE ORDERS (OUTPATIENT)
Facility: HOSPITAL | Age: 50
End: 2025-01-24

## 2025-01-24 ENCOUNTER — HOSPITAL ENCOUNTER (OUTPATIENT)
Facility: HOSPITAL | Age: 50
Discharge: HOME | End: 2025-01-27
Payer: MEDICARE

## 2025-01-24 DIAGNOSIS — C34.11 MALIGNANT NEOPLASM OF UPPER LOBE OF RIGHT LUNG (HCC): Primary | ICD-10-CM

## 2025-01-24 DIAGNOSIS — C34.11 MALIGNANT NEOPLASM OF UPPER LOBE, RIGHT BRONCHUS OR LUNG (HCC): ICD-10-CM

## 2025-01-24 DIAGNOSIS — C34.11 MALIGNANT NEOPLASM OF UPPER LOBE OF RIGHT LUNG (HCC): ICD-10-CM

## 2025-01-24 LAB — CREAT SERPL-MCNC: 1.4 MG/DL (ref 0.7–1.3)

## 2025-01-24 PROCEDURE — 82565 ASSAY OF CREATININE: CPT

## 2025-01-24 PROCEDURE — 71260 CT THORAX DX C+: CPT

## 2025-01-24 PROCEDURE — 36415 COLL VENOUS BLD VENIPUNCTURE: CPT

## 2025-01-24 PROCEDURE — 6360000004 HC RX CONTRAST MEDICATION: Performed by: INTERNAL MEDICINE

## 2025-01-24 RX ORDER — IOPAMIDOL 755 MG/ML
100 INJECTION, SOLUTION INTRAVASCULAR
Status: COMPLETED | OUTPATIENT
Start: 2025-01-24 | End: 2025-01-24

## 2025-01-24 RX ADMIN — IOPAMIDOL 100 ML: 755 INJECTION, SOLUTION INTRAVENOUS at 16:39

## 2025-03-18 ENCOUNTER — TELEPHONE (OUTPATIENT)
Age: 50
End: 2025-03-18

## 2025-03-18 NOTE — TELEPHONE ENCOUNTER
Spoke with patient to move his appointment to Thursday 3/27/25 due to Vesta Abebe having to leave early on 3/26/25, he had no further questions.

## 2025-03-27 ENCOUNTER — OFFICE VISIT (OUTPATIENT)
Age: 50
End: 2025-03-27
Payer: MEDICARE

## 2025-03-27 VITALS
DIASTOLIC BLOOD PRESSURE: 82 MMHG | HEIGHT: 69 IN | OXYGEN SATURATION: 99 % | SYSTOLIC BLOOD PRESSURE: 131 MMHG | TEMPERATURE: 97.9 F | BODY MASS INDEX: 27.4 KG/M2 | HEART RATE: 80 BPM | RESPIRATION RATE: 18 BRPM | WEIGHT: 185 LBS

## 2025-03-27 DIAGNOSIS — C34.91 MALIGNANT NEOPLASM OF RIGHT LUNG, UNSPECIFIED PART OF LUNG (HCC): Primary | ICD-10-CM

## 2025-03-27 DIAGNOSIS — J90 PLEURAL EFFUSION: ICD-10-CM

## 2025-03-27 PROCEDURE — 1036F TOBACCO NON-USER: CPT | Performed by: NURSE PRACTITIONER

## 2025-03-27 PROCEDURE — 3075F SYST BP GE 130 - 139MM HG: CPT | Performed by: NURSE PRACTITIONER

## 2025-03-27 PROCEDURE — 3017F COLORECTAL CA SCREEN DOC REV: CPT | Performed by: NURSE PRACTITIONER

## 2025-03-27 PROCEDURE — G8427 DOCREV CUR MEDS BY ELIG CLIN: HCPCS | Performed by: NURSE PRACTITIONER

## 2025-03-27 PROCEDURE — 3079F DIAST BP 80-89 MM HG: CPT | Performed by: NURSE PRACTITIONER

## 2025-03-27 PROCEDURE — 99214 OFFICE O/P EST MOD 30 MIN: CPT | Performed by: NURSE PRACTITIONER

## 2025-03-27 PROCEDURE — G8419 CALC BMI OUT NRM PARAM NOF/U: HCPCS | Performed by: NURSE PRACTITIONER

## 2025-03-27 NOTE — PROGRESS NOTES
Ayaz Foster is a 49 y.o. male is here today for a follow up.    1. Have you been to the ER, urgent care clinic since your last visit?  Hospitalized since your last visit?No    2. Have you seen or consulted any other health care providers outside of the [facility] since your last visit?  Include any pap smears or colon screening. No    
Seeing primary care once yearly, will fu again today for additional blood work.       Plan:     Labs with PCP: CBC, BMP, LFTs  Follow up with pulmonary  CT Chest in 6 months, or sooner if needed by pulmonary  Return to see us in 6 months (NP/MD alternating)      Signed By: ILEANA Li NP

## (undated) DEVICE — BITEBLOCK ENDOSCP 60FR MAXI WHT POLYETH STURDY W/ VELC WVN

## (undated) DEVICE — ELECTRODE,RADIOTRANSLUCENT,FOAM,3PK: Brand: MEDLINE

## (undated) DEVICE — AIRLIFE™ ADULT OXYGEN MASK VINYL, UNDER-THE-CHIN STYLE, 3 IN 1 MASK WITH 7 FEET (2.1 M) CRUSH-RESISTANT TUBING AND U/CONNECT-IT ADAPTER: Brand: AIRLIFE™

## (undated) DEVICE — SYR 3ML LL TIP 1/10ML GRAD --

## (undated) DEVICE — BASIN EMSIS 16OZ GRAPHITE PLAS KID SHP MOLD GRAD FOR ORAL

## (undated) DEVICE — NDL PRT INJ NSAF BLNT 18GX1.5 --

## (undated) DEVICE — SYR 5ML 1/5 GRAD LL NSAF LF --

## (undated) DEVICE — AIRLIFE™ CORRUGATED FLEXIBLE EVA TUBING FOR AEROSOL AND IPPB USE, SEGMENTED, 6 FEET (1.8 M) LENGTH, 22 MM I.D.: Brand: AIRLIFE™

## (undated) DEVICE — BAG BELONG PT PERS CLEAR HANDL

## (undated) DEVICE — NDL FLTR TIP 5 MIC 18GX1.5IN --

## (undated) DEVICE — SOLIDIFIER MEDC 1200ML -- CONVERT TO 356117

## (undated) DEVICE — Z DISCONTINUED NO SUB IDED SET EXTN W/ 4 W STPCOCK M SPIN LOK 36IN

## (undated) DEVICE — 1200 GUARD II KIT W/5MM TUBE W/O VAC TUBE: Brand: GUARDIAN

## (undated) DEVICE — BLOCK BITE PEDIATRIC 14 MM MOUTHPIECE POLYETH ENDO-GUARD LTX 69100] AVANOS MEDICAL]

## (undated) DEVICE — BAG SPEC BIOHZRD 10 X 10 IN --

## (undated) DEVICE — Device: Brand: BALLOON

## (undated) DEVICE — AIRLIFE™ FACE TENT MASK VINYL: Brand: AIRLIFE™

## (undated) DEVICE — SYRINGE MED 10CC ECC TIP W/O NDL

## (undated) DEVICE — CATH IV AUTOGRD BC BLU 22GA 25 -- INSYTE

## (undated) DEVICE — TRAP,MUCUS SPECIMEN, 80CC: Brand: MEDLINE

## (undated) DEVICE — SET ADMIN 16ML TBNG L100IN 2 Y INJ SITE IV PIGGY BK DISP

## (undated) DEVICE — KIT COLON W/ 1.1OZ LUB AND 2 END

## (undated) DEVICE — BRUSH CYTO BRONCHSCP 1.5/140MM -- CELLEBRITY

## (undated) DEVICE — Device

## (undated) DEVICE — FCPS BIOP PULM RAD JAW 100CML -- BX/10 M00515180

## (undated) DEVICE — SINGLE USE ASPIRATION NEEDLE: Brand: SINGLE USE ASPIRATION NEEDLE

## (undated) DEVICE — KENDALL RADIOLUCENT FOAM MONITORING ELECTRODE -RECTANGULAR SHAPE: Brand: KENDALL

## (undated) DEVICE — CONTAINER SPEC 20 ML LID NEUT BUFF FORMALIN 10 % POLYPR STS